# Patient Record
Sex: FEMALE | Race: WHITE | NOT HISPANIC OR LATINO | Employment: OTHER | URBAN - NONMETROPOLITAN AREA
[De-identification: names, ages, dates, MRNs, and addresses within clinical notes are randomized per-mention and may not be internally consistent; named-entity substitution may affect disease eponyms.]

---

## 2018-05-17 ENCOUNTER — TRANSFERRED RECORDS (OUTPATIENT)
Dept: HEALTH INFORMATION MANAGEMENT | Facility: HOSPITAL | Age: 48
End: 2018-05-17

## 2018-05-17 LAB
HPV ABSTRACT: NORMAL
PAP-ABSTRACT: NORMAL

## 2018-10-04 ENCOUNTER — TRANSFERRED RECORDS (OUTPATIENT)
Dept: HEALTH INFORMATION MANAGEMENT | Facility: CLINIC | Age: 48
End: 2018-10-04

## 2018-10-10 ENCOUNTER — TRANSFERRED RECORDS (OUTPATIENT)
Dept: HEALTH INFORMATION MANAGEMENT | Facility: CLINIC | Age: 48
End: 2018-10-10

## 2018-11-07 ENCOUNTER — TRANSFERRED RECORDS (OUTPATIENT)
Dept: HEALTH INFORMATION MANAGEMENT | Facility: HOSPITAL | Age: 48
End: 2018-11-07

## 2018-11-07 LAB — HIV 1&2 EXT: NORMAL

## 2021-06-22 ENCOUNTER — TRANSFERRED RECORDS (OUTPATIENT)
Dept: HEALTH INFORMATION MANAGEMENT | Facility: HOSPITAL | Age: 51
End: 2021-06-22

## 2022-07-19 ENCOUNTER — TRANSFERRED RECORDS (OUTPATIENT)
Dept: HEALTH INFORMATION MANAGEMENT | Facility: CLINIC | Age: 52
End: 2022-07-19

## 2022-11-18 ENCOUNTER — APPOINTMENT (OUTPATIENT)
Dept: GENERAL RADIOLOGY | Facility: HOSPITAL | Age: 52
End: 2022-11-18
Attending: NURSE PRACTITIONER
Payer: COMMERCIAL

## 2022-11-18 ENCOUNTER — HOSPITAL ENCOUNTER (EMERGENCY)
Facility: HOSPITAL | Age: 52
Discharge: HOME | End: 2022-11-18
Attending: NURSE PRACTITIONER | Admitting: NURSE PRACTITIONER
Payer: COMMERCIAL

## 2022-11-18 VITALS
SYSTOLIC BLOOD PRESSURE: 162 MMHG | TEMPERATURE: 98.7 F | RESPIRATION RATE: 18 BRPM | OXYGEN SATURATION: 94 % | DIASTOLIC BLOOD PRESSURE: 112 MMHG | HEART RATE: 87 BPM

## 2022-11-18 DIAGNOSIS — Z20.822 ENCOUNTER FOR LABORATORY TESTING FOR COVID-19 VIRUS: ICD-10-CM

## 2022-11-18 DIAGNOSIS — S69.92XA HAND INJURY, LEFT, INITIAL ENCOUNTER: Primary | ICD-10-CM

## 2022-11-18 LAB
FLUAV RNA SPEC QL NAA+PROBE: NEGATIVE
FLUBV RNA RESP QL NAA+PROBE: NEGATIVE
RSV RNA SPEC NAA+PROBE: NEGATIVE
SARS-COV-2 RNA RESP QL NAA+PROBE: NEGATIVE

## 2022-11-18 PROCEDURE — 87637 SARSCOV2&INF A&B&RSV AMP PRB: CPT | Performed by: NURSE PRACTITIONER

## 2022-11-18 PROCEDURE — G0463 HOSPITAL OUTPT CLINIC VISIT: HCPCS

## 2022-11-18 PROCEDURE — 99213 OFFICE O/P EST LOW 20 MIN: CPT | Performed by: NURSE PRACTITIONER

## 2022-11-18 PROCEDURE — C9803 HOPD COVID-19 SPEC COLLECT: HCPCS

## 2022-11-18 PROCEDURE — 73130 X-RAY EXAM OF HAND: CPT | Mod: LT

## 2022-11-18 RX ORDER — LEVOTHYROXINE SODIUM 100 UG/1
100 TABLET ORAL
COMMUNITY
Start: 2022-09-29 | End: 2023-10-23

## 2022-11-18 RX ORDER — NICOTINE 21 MG/24HR
PATCH, TRANSDERMAL 24 HOURS TRANSDERMAL
COMMUNITY
Start: 2022-08-15 | End: 2024-07-09

## 2022-11-18 RX ORDER — OXYCODONE HYDROCHLORIDE 5 MG/1
TABLET ORAL
COMMUNITY
Start: 2022-09-16 | End: 2023-10-23

## 2022-11-18 RX ORDER — HYDROXYZINE HYDROCHLORIDE 25 MG/1
25 TABLET, FILM COATED ORAL
COMMUNITY
Start: 2022-09-29 | End: 2023-11-01

## 2022-11-18 RX ORDER — PREGABALIN 75 MG/1
CAPSULE ORAL
COMMUNITY
Start: 2022-11-15 | End: 2023-10-23

## 2022-11-18 RX ORDER — NICOTINE 21 MG/24HR
PATCH, TRANSDERMAL 24 HOURS TRANSDERMAL
COMMUNITY
Start: 2022-08-16

## 2022-11-18 RX ORDER — TEMAZEPAM 15 MG/1
CAPSULE ORAL
COMMUNITY
Start: 2022-11-15 | End: 2023-10-23

## 2022-11-18 RX ORDER — FLUTICASONE PROPIONATE AND SALMETEROL 250; 50 UG/1; UG/1
1 POWDER RESPIRATORY (INHALATION)
COMMUNITY
Start: 2022-09-29 | End: 2023-12-08

## 2022-11-18 RX ORDER — ALBUTEROL SULFATE 90 UG/1
AEROSOL, METERED RESPIRATORY (INHALATION)
COMMUNITY
Start: 2022-10-30

## 2022-11-18 RX ORDER — ESTRADIOL 0.1 MG/G
1 CREAM VAGINAL
COMMUNITY
Start: 2022-09-29

## 2022-11-18 RX ORDER — SIMVASTATIN 40 MG
40 TABLET ORAL
COMMUNITY
Start: 2022-09-29 | End: 2023-11-22

## 2022-11-18 RX ORDER — GABAPENTIN 100 MG/1
100 CAPSULE ORAL 3 TIMES DAILY
COMMUNITY
Start: 2022-10-30 | End: 2023-10-23

## 2022-11-18 RX ORDER — PRAMIPEXOLE DIHYDROCHLORIDE 1.5 MG/1
1.5 TABLET ORAL
COMMUNITY
Start: 2022-09-29

## 2022-11-18 RX ORDER — IPRATROPIUM BROMIDE AND ALBUTEROL SULFATE 2.5; .5 MG/3ML; MG/3ML
3 SOLUTION RESPIRATORY (INHALATION)
COMMUNITY
Start: 2022-09-29

## 2022-11-18 RX ORDER — DULOXETIN HYDROCHLORIDE 30 MG/1
30 CAPSULE, DELAYED RELEASE ORAL
COMMUNITY
Start: 2022-09-29 | End: 2023-10-23

## 2022-11-18 ASSESSMENT — ENCOUNTER SYMPTOMS
MYALGIAS: 1
FEVER: 0
CHILLS: 0
JOINT SWELLING: 1

## 2022-11-18 ASSESSMENT — ACTIVITIES OF DAILY LIVING (ADL): ADLS_ACUITY_SCORE: 35

## 2022-11-19 NOTE — ED PROVIDER NOTES
History     Chief Complaint   Patient presents with     Hand Pain     HPI  Jami Wang is a 52 year old female who presents to urgent care for evaluation of an injury to her left hand.  Patient tells me that she got up in the middle of the night and was walking in the dark when she tripped and fell injuring her left hand.  She has pain and swelling mostly to her thumb.  Patient had left thumb CMC arthroplasty to this thumb 3 months ago.  She tells me that she is concerned that she may have injured this area and made things worse.  She denies any paresthesias.  She still able to move her thumb with minimal difficulty.    Patient also states that she would like a COVID-19 test.  No concerning symptoms.    Allergies:  Allergies   Allergen Reactions     Hydromorphone Other (See Comments)     Azithromycin      Other reaction(s): Stomach Upset     Black [place] Flavor Itching     Erythromycin Nausea and Vomiting     Pecan Extract Allergy Skin Test Itching     Penicillins Rash     Has tolerated Cefazolin (ANCEF) and other Cephalosporins       Problem List:    There are no problems to display for this patient.       Past Medical History:    No past medical history on file.    Past Surgical History:    No past surgical history on file.    Family History:    No family history on file.    Social History:  Marital Status:   [2]        Medications:    DULoxetine (CYMBALTA) 30 MG capsule  estradiol (ESTRACE) 0.1 MG/GM vaginal cream  fluticasone-salmeterol (ADVAIR) 250-50 MCG/ACT inhaler  gabapentin (NEURONTIN) 100 MG capsule  hydrOXYzine (ATARAX) 25 MG tablet  ipratropium - albuterol 0.5 mg/2.5 mg/3 mL (DUONEB) 0.5-2.5 (3) MG/3ML neb solution  levothyroxine (SYNTHROID/LEVOTHROID) 100 MCG tablet  nicotine (NICODERM CQ) 14 MG/24HR 24 hr patch  nicotine (NICODERM CQ) 21 MG/24HR 24 hr patch  omeprazole (PRILOSEC) 20 MG DR capsule  oxyCODONE (ROXICODONE) 5 MG tablet  pramipexole (MIRAPEX) 1.5 MG tablet  pregabalin (LYRICA)  75 MG capsule  PROAIR  (90 Base) MCG/ACT inhaler  simvastatin (ZOCOR) 40 MG tablet  temazepam (RESTORIL) 15 MG capsule          Review of Systems   Constitutional: Negative for chills and fever.   Musculoskeletal: Positive for joint swelling and myalgias.   All other systems reviewed and are negative.      Physical Exam   BP: (!) 162/112  Pulse: 87  Temp: 98.7  F (37.1  C)  Resp: 18  SpO2: 94 %      Physical Exam  Vitals and nursing note reviewed.   Constitutional:       Appearance: Normal appearance. She is not ill-appearing or toxic-appearing.   Eyes:      Pupils: Pupils are equal, round, and reactive to light.   Cardiovascular:      Rate and Rhythm: Normal rate.   Pulmonary:      Effort: Pulmonary effort is normal.   Musculoskeletal:         General: Swelling and tenderness present. No deformity.      Left hand: Swelling and tenderness present. Normal range of motion. Normal strength. Normal sensation. Normal capillary refill. Normal pulse.      Cervical back: Neck supple.      Comments: Old healed surgical incision noted to dorsal aspect of left thumb over the MCP joint.  Swelling and tenderness adjacent to this area.  Full range of motion to the left thumb.  No redness or bruising.  No obvious deformity.   Skin:     General: Skin is warm and dry.      Capillary Refill: Capillary refill takes less than 2 seconds.      Findings: No bruising or erythema.   Neurological:      Mental Status: She is alert and oriented to person, place, and time.         ED Course                 Procedures         Results for orders placed or performed during the hospital encounter of 11/18/22 (from the past 24 hour(s))   Symptomatic; Yes; 11/15/2022 Influenza A/B & SARS-CoV2 (COVID-19) Virus PCR Multiplex Nasopharyngeal    Specimen: Nasopharyngeal; Swab   Result Value Ref Range    Influenza A PCR Negative Negative    Influenza B PCR Negative Negative    RSV PCR Negative Negative    SARS CoV2 PCR Negative Negative    Narrative     Testing was performed using the Xpert Xpress CoV2/Flu/RSV Assay on the [employer] GeneXpert Instrument. This test should be ordered for the detection of SARS-CoV-2 and influenza viruses in individuals who meet clinical and/or epidemiological criteria. Test performance is unknown in asymptomatic patients. This test is for in vitro diagnostic use under the FDA EUA for laboratories certified under CLIA to perform high or moderate complexity testing. This test has not been FDA cleared or approved. A negative result does not rule out the presence of PCR inhibitors in the specimen or target RNA in concentration below the limit of detection for the assay. If only one viral target is positive but coinfection with multiple targets is suspected, the sample should be re-tested with another FDA cleared, approved, or authorized test, if coinfection would change clinical management. This test was validated by the [facility]. These laboratories are certified under the Clinical Laboratory Improvement Amendments of 1988 (CLIA-88) as qualified to perform high complexity laboratory testing.   XR Hand Left G/E 3 Views    Narrative    XR HAND LEFT G/E 3 VIEWS    HISTORY: 52 years Female pain swelling in wrist and thumb through the  wrist that radiates up into and around the forearm.    COMPARISON: None    TECHNIQUE: Left hand 3 views    FINDINGS: There are metallic suture anchors adjacent to the proximal  first and second metacarpals. The trapezium is absent. Congruent.  There is no evidence of fracture or dislocation. No concerning osseous  changes are present.      Impression    IMPRESSION: Postsurgical changes. No evidence of fracture, dislocation  or concerning osseous change.    MARI MILLIGAN MD         SYSTEM ID:  [id number]       Medications - No data to display    Assessments & Plan (with Medical Decision Making)     I have reviewed the nursing notes.    This is a 52-year-old female that presented for  evaluation of left hand pain following an injury.  Patient had CMC arthroplasty done to the left thumb 3 months ago and was concerned that she may have caused further damage to her thumb.  She still moving her thumb with minimal difficulty.  Tenderness to palpation over the MCP joint along with mild swelling and tenderness adjacent to this area.  No obvious deformity.    X-ray negative for acute fractures or dislocation.  Postsurgical changes were noted per radiologist read.  Patient tested negative for COVID-19, influenza and RSV.      Findings were discussed with patient.  Thumb spica splint was applied.  Recommended applying ice packs over the affected area and taking Tylenol ibuprofen as needed for pain.  Follow-up with orthopedic surgeon for reevaluation if symptoms persist.  Return to ED/UC for worsening or concerning symptoms.    I have reviewed the findings, diagnosis, plan and need for follow up with the patient.  This document was prepared using a combination of typing and voice generated software.  While every attempt was made for accuracy, spelling and grammatical errors may exist.    New Prescriptions    No medications on file       Final diagnoses:   Hand injury, left, initial encounter       11/18/2022   [facility]     Mpofu, Prudence, CNP  11/18/22 2020

## 2022-11-19 NOTE — ED TRIAGE NOTES
Patient presents to urgent care for left hand from a fall 2 days ago. Patient reports she had left hand surgery back in Sept.  Patient also wants a COVID test as she was exposed to COVID .

## 2022-11-19 NOTE — DISCHARGE INSTRUCTIONS
Use the splint to your hand.  Apply ice packs and take Tylenol or ibuprofen as needed for pain.    Follow-up with your surgeon as needed if no improvement in symptoms.    Return to emergency department for worsening or concerning symptoms.

## 2022-12-19 ENCOUNTER — HOSPITAL ENCOUNTER (OUTPATIENT)
Dept: GENERAL RADIOLOGY | Facility: HOSPITAL | Age: 52
Discharge: HOME | End: 2022-12-19
Attending: NURSE PRACTITIONER | Admitting: NURSE PRACTITIONER
Payer: COMMERCIAL

## 2022-12-19 DIAGNOSIS — Z00.00 ROUTINE CHECK-UP: ICD-10-CM

## 2022-12-19 PROCEDURE — 73522 X-RAY EXAM HIPS BI 3-4 VIEWS: CPT

## 2023-01-10 ENCOUNTER — MEDICAL CORRESPONDENCE (OUTPATIENT)
Dept: MRI IMAGING | Facility: HOSPITAL | Age: 53
End: 2023-01-10

## 2023-01-20 ENCOUNTER — HOSPITAL ENCOUNTER (OUTPATIENT)
Dept: MRI IMAGING | Facility: HOSPITAL | Age: 53
Discharge: HOME | End: 2023-01-20
Attending: ORTHOPAEDIC SURGERY | Admitting: ORTHOPAEDIC SURGERY
Payer: COMMERCIAL

## 2023-01-20 DIAGNOSIS — R29.898 LEG WEAKNESS: ICD-10-CM

## 2023-01-20 DIAGNOSIS — M54.50 LOW BACK PAIN: ICD-10-CM

## 2023-01-20 PROCEDURE — 72148 MRI LUMBAR SPINE W/O DYE: CPT

## 2023-02-12 ENCOUNTER — HEALTH MAINTENANCE LETTER (OUTPATIENT)
Age: 53
End: 2023-02-12

## 2023-02-16 ENCOUNTER — APPOINTMENT (OUTPATIENT)
Dept: GENERAL RADIOLOGY | Facility: HOSPITAL | Age: 53
End: 2023-02-16
Attending: NURSE PRACTITIONER
Payer: COMMERCIAL

## 2023-02-16 ENCOUNTER — HOSPITAL ENCOUNTER (EMERGENCY)
Facility: HOSPITAL | Age: 53
Discharge: HOME | End: 2023-02-16
Attending: PHYSICIAN ASSISTANT | Admitting: PHYSICIAN ASSISTANT
Payer: COMMERCIAL

## 2023-02-16 VITALS
OXYGEN SATURATION: 96 % | TEMPERATURE: 98 F | HEART RATE: 92 BPM | DIASTOLIC BLOOD PRESSURE: 90 MMHG | SYSTOLIC BLOOD PRESSURE: 155 MMHG | RESPIRATION RATE: 18 BRPM

## 2023-02-16 DIAGNOSIS — R07.81 RIB PAIN ON RIGHT SIDE: Primary | ICD-10-CM

## 2023-02-16 PROCEDURE — G0463 HOSPITAL OUTPT CLINIC VISIT: HCPCS

## 2023-02-16 PROCEDURE — 250N000013 HC RX MED GY IP 250 OP 250 PS 637: Performed by: NURSE PRACTITIONER

## 2023-02-16 PROCEDURE — 99213 OFFICE O/P EST LOW 20 MIN: CPT | Performed by: NURSE PRACTITIONER

## 2023-02-16 PROCEDURE — 74019 RADEX ABDOMEN 2 VIEWS: CPT

## 2023-02-16 PROCEDURE — 71101 X-RAY EXAM UNILAT RIBS/CHEST: CPT | Mod: RT

## 2023-02-16 RX ORDER — LIDOCAINE 4 G/G
1 PATCH TOPICAL ONCE
Status: DISCONTINUED | OUTPATIENT
Start: 2023-02-16 | End: 2023-02-16 | Stop reason: HOSPADM

## 2023-02-16 RX ORDER — LIDOCAINE 50 MG/G
1 PATCH TOPICAL EVERY 24 HOURS
Qty: 10 PATCH | Refills: 0 | Status: SHIPPED | OUTPATIENT
Start: 2023-02-16 | End: 2023-02-26

## 2023-02-16 RX ADMIN — Medication 1 PATCH: at 21:09

## 2023-02-16 ASSESSMENT — ACTIVITIES OF DAILY LIVING (ADL): ADLS_ACUITY_SCORE: 35

## 2023-02-16 ASSESSMENT — ENCOUNTER SYMPTOMS: FEVER: 0

## 2023-02-17 ASSESSMENT — ENCOUNTER SYMPTOMS
COLOR CHANGE: 1
VOMITING: 1
PSYCHIATRIC NEGATIVE: 1
SHORTNESS OF BREATH: 0
DIARRHEA: 0
CHILLS: 0
MYALGIAS: 1
NECK STIFFNESS: 0
NAUSEA: 0
BACK PAIN: 0
ABDOMINAL PAIN: 0
NECK PAIN: 0

## 2023-02-17 NOTE — ED PROVIDER NOTES
History     Chief Complaint   Patient presents with     Rib Pain     HPI  Jami Wang is a 52 year old female who presents to urgent care today (ambulatory) with complaints of right rib pain and bruising over the past 2 weeks after she had fallen into a .  No open skin wounds.  No previous rib fractures.  Denies any fever or chills.  Patient states she had vomited once two days ago, none since, no current nausea and denies diarrhea.  Denies any shortness of breath or chest pain.  No other concerns.    Allergies:  Allergies   Allergen Reactions     Hydromorphone Other (See Comments)     Azithromycin      Other reaction(s): Stomach Upset     Black [place] Flavor Itching     Erythromycin Nausea and Vomiting     Pecan Extract Allergy Skin Test Itching     Penicillins Rash     Has tolerated Cefazolin (ANCEF) and other Cephalosporins       Problem List:    There are no problems to display for this patient.       Past Medical History:    No past medical history on file.    Past Surgical History:    No past surgical history on file.    Family History:    No family history on file.    Social History:  Marital Status:   [2]        Medications:    DULoxetine (CYMBALTA) 30 MG capsule  estradiol (ESTRACE) 0.1 MG/GM vaginal cream  fluticasone-salmeterol (ADVAIR) 250-50 MCG/ACT inhaler  gabapentin (NEURONTIN) 100 MG capsule  hydrOXYzine (ATARAX) 25 MG tablet  ipratropium - albuterol 0.5 mg/2.5 mg/3 mL (DUONEB) 0.5-2.5 (3) MG/3ML neb solution  levothyroxine (SYNTHROID/LEVOTHROID) 100 MCG tablet  lidocaine (LIDODERM) 5 % patch  nicotine (NICODERM CQ) 14 MG/24HR 24 hr patch  nicotine (NICODERM CQ) 21 MG/24HR 24 hr patch  omeprazole (PRILOSEC) 20 MG DR capsule  oxyCODONE (ROXICODONE) 5 MG tablet  pramipexole (MIRAPEX) 1.5 MG tablet  pregabalin (LYRICA) 75 MG capsule  PROAIR  (90 Base) MCG/ACT inhaler  simvastatin (ZOCOR) 40 MG tablet  temazepam (RESTORIL) 15 MG capsule      Review of Systems    Constitutional: Negative for chills and fever.   Respiratory: Negative for shortness of breath.    Cardiovascular: Negative for chest pain.   Gastrointestinal: Positive for vomiting (once 2 days ago). Negative for abdominal pain, diarrhea and nausea.   Musculoskeletal: Positive for myalgias. Negative for back pain, gait problem, neck pain and neck stiffness.   Skin: Positive for color change (slight bruising to right ribs).   Psychiatric/Behavioral: Negative.      Physical Exam   BP: 155/90  Pulse: 92  Temp: 98  F (36.7  C)  Resp: 18  SpO2: 96 %    Physical Exam  Vitals and nursing note reviewed.   Constitutional:       General: She is not in acute distress.     Appearance: Normal appearance. She is not ill-appearing or toxic-appearing.   Cardiovascular:      Rate and Rhythm: Normal rate and regular rhythm.      Pulses: Normal pulses.      Heart sounds: Normal heart sounds.   Pulmonary:      Effort: Pulmonary effort is normal.      Breath sounds: Normal breath sounds.   Chest:      Chest wall: Tenderness present. No mass, lacerations, deformity, swelling, crepitus or edema.      Comments: Right rib pain 6-7, slight bruising.  Abdominal:      General: Bowel sounds are normal. There is no distension.      Palpations: Abdomen is soft.      Tenderness: There is no abdominal tenderness. There is no guarding or rebound.   Neurological:      Mental Status: She is alert.   Psychiatric:         Mood and Affect: Mood normal.       ED Course     Procedures    Results for orders placed or performed during the hospital encounter of 02/16/23 (from the past 24 hour(s))   XR Abdomen 2 Views    Narrative    XR ABDOMEN 2 VIEWS    HISTORY: 52 years Female pain    COMPARISON: None    TECHNIQUE: Abdomen 3 views    FINDINGS: There is a moderate volume of stool. There is no evidence of  bowel obstruction or free air.      Impression    IMPRESSION: No evidence of bowel obstruction or free air.    MARI MILLIGAN MD         SYSTEM ID:   [id number]   Ribs XR, unilat 3 views + PA chest, right    Narrative    XR RIBS & CHEST RT 3VW    HISTORY: 52 years Female rib pain    COMPARISON: None    TECHNIQUE: Chest x-ray and 2 views of the right RIBS, 3 views total    FINDINGS: Lungs are clear. Heart size and pulmonary vascularity are  within normal limits.    There is no evidence of right rib fracture.      Impression    IMPRESSION: No evidence of right rib fracture.    MARI MILLIGAN MD         SYSTEM ID:  [id number]       Medications - No data to display    Assessments & Plan (with Medical Decision Making)     I have reviewed the nursing notes.    I have reviewed the findings, diagnosis, plan and need for follow up with the patient.  (R07.81) Rib pain on right side  (primary encounter diagnosis)  Plan:   Patient ambulatory with a nontoxic appearance.  Patient able to stand from seated position and ambulate.  Patient has had ongoing right rib pain to ribs 6/7 which have slight bruising to the area.  No crepitus.  X-ray shows no evidence of right rib fracture.  Patient concerned about possible constipation, has had bowel movement daily.  No abdominal tenderness upon palpation.  Abdominal x-ray shows no evidence of bowel obstruction or free air.  Symptoms consistent with muscle strain.  Lidoderm patch placed to help with pain.  Patient alternate ice and heat as needed for pain.  Rest.  Patient to follow-up with primary care provider or return to urgent care/ED with any worsening in condition or additional concerns.  Patient is in agreement with treatment plan.    Discharge Medication List as of 2/16/2023  9:07 PM      START taking these medications    Details   lidocaine (LIDODERM) 5 % patch Place 1 patch onto the skin every 24 hours for 10 daysDisp-10 patch, [id number]           Final diagnoses:   Rib pain on right side     2/16/2023   [facility]     Brie Aden, CHEMA  02/17/23 [id number]

## 2023-02-17 NOTE — ED TRIAGE NOTES
Fell a couple weeks ago into ,  noticed bruising over ribs recently. Pain has been constant.  Pain is currently 8/10 Pt states she does have a hx of reflux states she does also have a hernia.    Jessa Behrman, LPN

## 2023-02-17 NOTE — DISCHARGE INSTRUCTIONS
Rest  Alternate ice and heat  Lidoderm patch as needed  Follow up with primary care provider or return to urgent care/ED with any worsening in condition or additional concerns.

## 2023-03-19 ENCOUNTER — HOSPITAL ENCOUNTER (EMERGENCY)
Facility: HOSPITAL | Age: 53
Discharge: HOME | End: 2023-03-19
Attending: NURSE PRACTITIONER | Admitting: NURSE PRACTITIONER
Payer: COMMERCIAL

## 2023-03-19 VITALS
TEMPERATURE: 98.4 F | OXYGEN SATURATION: 94 % | SYSTOLIC BLOOD PRESSURE: 147 MMHG | RESPIRATION RATE: 18 BRPM | DIASTOLIC BLOOD PRESSURE: 89 MMHG | HEART RATE: 101 BPM

## 2023-03-19 DIAGNOSIS — B86 SCABIES INFESTATION: ICD-10-CM

## 2023-03-19 PROCEDURE — 99213 OFFICE O/P EST LOW 20 MIN: CPT | Performed by: NURSE PRACTITIONER

## 2023-03-19 PROCEDURE — G0463 HOSPITAL OUTPT CLINIC VISIT: HCPCS

## 2023-03-19 RX ORDER — HYDROXYZINE PAMOATE 25 MG/1
25 CAPSULE ORAL 3 TIMES DAILY PRN
Qty: 30 CAPSULE | Refills: 0 | Status: SHIPPED | OUTPATIENT
Start: 2023-03-19

## 2023-03-19 RX ORDER — PERMETHRIN 50 MG/G
CREAM TOPICAL
Qty: 60 G | Refills: 1 | Status: SHIPPED | OUTPATIENT
Start: 2023-03-19 | End: 2023-10-23

## 2023-03-19 ASSESSMENT — ENCOUNTER SYMPTOMS
FEVER: 0
VOMITING: 0
MYALGIAS: 0
DIZZINESS: 0
NAUSEA: 0
ACTIVITY CHANGE: 1
SHORTNESS OF BREATH: 0
HEADACHES: 0
CHILLS: 0
COLOR CHANGE: 1
LIGHT-HEADEDNESS: 0

## 2023-03-19 ASSESSMENT — ACTIVITIES OF DAILY LIVING (ADL): ADLS_ACUITY_SCORE: 33

## 2023-03-20 NOTE — ED TRIAGE NOTES
States ongoing for a couple months, stated granddaughter moved up and had bed bugs at her home, brought sea shells, sprayed everywhere, no other new exposures.   Therapies tried cleaned everything, bacitracin and lanocaine, no relief.    Jessa Behrman, LPN

## 2023-03-20 NOTE — DISCHARGE INSTRUCTIONS
You need to treat your bedding, furniture and car when you do your treatment.  You should repeat the treatment in two weeks.  Be reevaluated if the rash worsens and/or you develop fevers or signs of infection  Watch for signs of infection: pain, redness, abnormal drainage, or fevers.

## 2023-03-20 NOTE — ED PROVIDER NOTES
History     Chief Complaint   Patient presents with     Rash     HPI  Jami Wang is a 53 year old female who presents with a 3-week history of severely pruritic, red, papule, macular rash on her bilateral arms, lower legs, and underneath her left breast.  Has applied hydrocortisone cream and Lanacane ointment without relief of her symptoms.  Denies new exposures, but a family member recently moved back into her house that had been homeless was exposed to bedbugs.  Denies recent travel.  Denies fevers, chills, nausea, vomiting, shortness of breath, and headaches.    Rash      Duration: three weeks     Description  Location: arms, under left breast. Lower legs  Itching: severe    Intensity:  moderate    Accompanying signs and symptoms: None    History (similar episodes/previous evaluation): None    Precipitating or alleviating factors:  New exposures:  None, medication no, lotions no, soaps no, clothes detergant no, foods - no, no new exposures to pets, house dust mites and trees  Recent travel: no      Therapies tried and outcome: hydrocortisone cream -  not effective and lanicaine     Allergies:  Allergies   Allergen Reactions     Hydromorphone Other (See Comments)     Azithromycin      Other reaction(s): Stomach Upset     Black [place] Flavor Itching     Erythromycin Nausea and Vomiting     Pecan Extract Allergy Skin Test Itching     Penicillins Rash     Has tolerated Cefazolin (ANCEF) and other Cephalosporins       Problem List:    There are no problems to display for this patient.       Past Medical History:    History reviewed. No pertinent past medical history.    Past Surgical History:    History reviewed. No pertinent surgical history.    Family History:    History reviewed. No pertinent family history.    Social History:  Marital Status:   [2]        Medications:    DULoxetine (CYMBALTA) 30 MG capsule  estradiol (ESTRACE) 0.1 MG/GM vaginal cream  fluticasone-salmeterol (ADVAIR) 250-50 MCG/ACT  inhaler  gabapentin (NEURONTIN) 100 MG capsule  hydrOXYzine (ATARAX) 25 MG tablet  hydrOXYzine (VISTARIL) 25 MG capsule  ipratropium - albuterol 0.5 mg/2.5 mg/3 mL (DUONEB) 0.5-2.5 (3) MG/3ML neb solution  levothyroxine (SYNTHROID/LEVOTHROID) 100 MCG tablet  nicotine (NICODERM CQ) 14 MG/24HR 24 hr patch  nicotine (NICODERM CQ) 21 MG/24HR 24 hr patch  omeprazole (PRILOSEC) 20 MG DR capsule  oxyCODONE (ROXICODONE) 5 MG tablet  permethrin (ELIMITE) 5 % external cream  pramipexole (MIRAPEX) 1.5 MG tablet  pregabalin (LYRICA) 75 MG capsule  PROAIR  (90 Base) MCG/ACT inhaler  simvastatin (ZOCOR) 40 MG tablet  temazepam (RESTORIL) 15 MG capsule          Review of Systems   Constitutional: Positive for activity change. Negative for chills and fever.   Respiratory: Negative for shortness of breath.    Gastrointestinal: Negative for nausea and vomiting.   Musculoskeletal: Negative for myalgias.   Skin: Positive for color change and rash (itching red macular paplur rash ).   Neurological: Negative for dizziness, light-headedness and headaches.       Physical Exam   BP: 147/89  Pulse: 101  Temp: 98.4  F (36.9  C)  Resp: 18  SpO2: 94 %      Physical Exam  Vitals and nursing note reviewed.   Constitutional:       General: She is in acute distress (Moderate).      Appearance: She is overweight.   Cardiovascular:      Rate and Rhythm: Normal rate.   Pulmonary:      Effort: Pulmonary effort is normal.   Skin:     General: Skin is warm and dry.      Capillary Refill: Capillary refill takes less than 2 seconds.      Findings: Erythema and rash present. No bruising. Rash is macular and papular.          Neurological:      Mental Status: She is alert and oriented to person, place, and time.   Psychiatric:         Behavior: Behavior normal.         ED Course                 Procedures                  No results found for this or any previous visit (from the past 24 hour(s)).    Medications - No data to display    Assessments &  Plan (with Medical Decision Making)     I have reviewed the nursing notes.    I have reviewed the findings, diagnosis, plan and need for follow up with the patient.  (B86) Scabies infestation  Comment:  53 year old female who presents with a 3-week history of severely pruritic, red, papule, macular rash on her bilateral arms, lower legs, and underneath her left breast.  Has applied hydrocortisone cream and Lanacane ointment without relief of her symptoms.  Denies new exposures, but a family member recently moved back into her house that had been homeless was exposed to bedbugs.  Denies recent travel.  Denies fevers, chills, nausea, vomiting, shortness of breath, and headaches.    MDM: Erythema, macular, papular rash on medial, lower arms, underneath left breast, and bilateral lower legs.    Plan: Permethrin.  Education provided and/or discussed for this/these medication and scabies.  You need to treat your bedding, furniture and car when you do your treatment.  You should repeat the treatment in two weeks.  Be reevaluated if the rash worsens and/or you develop fevers or signs of infection  Watch for signs of infection: pain, redness, abnormal drainage, or fevers.  These discharge instructions and medications were reviewed with her and understanding verbalized.    This document was prepared using a combination of typing and voice generated software.  While every attempt was made for accuracy, spelling and grammatical errors may exist.    Medical Decision Making  The patient's presentation was of low complexity (an acute and uncomplicated illness or injury).    The patient's evaluation involved:  history and exam without other MDM data elements    The patient's management necessitated moderate risk (prescription drug management including medications given in the ED).        Discharge Medication List as of 3/19/2023  9:39 PM      START taking these medications    Details   hydrOXYzine (VISTARIL) 25 MG capsule Take 1  capsule (25 mg) by mouth 3 times daily as needed for itching, Disp-30 capsule, R-0, [facility]      permethrin (ELIMITE) 5 % external cream Apply cream from head to toe (except the face); leave on for 8-14 hours then wash off with water; reapply in 1 week if live mites appear.Disp-60 g, [id number]             Final diagnoses:   Scabies infestation       3/19/2023   [facility]       Lili Campos, CNP  03/20/23 1201

## 2023-03-20 NOTE — ED TRIAGE NOTES
Patient presents with c/o full body rash that started 3 weeks ago. Started on right arm and spread. Patient report pain and itching.

## 2023-04-01 ENCOUNTER — HOSPITAL ENCOUNTER (EMERGENCY)
Facility: HOSPITAL | Age: 53
Discharge: HOME | End: 2023-04-01
Attending: PHYSICIAN ASSISTANT | Admitting: PHYSICIAN ASSISTANT
Payer: COMMERCIAL

## 2023-04-01 VITALS
OXYGEN SATURATION: 95 % | TEMPERATURE: 98.6 F | SYSTOLIC BLOOD PRESSURE: 119 MMHG | HEART RATE: 104 BPM | RESPIRATION RATE: 16 BRPM | DIASTOLIC BLOOD PRESSURE: 84 MMHG

## 2023-04-01 DIAGNOSIS — S61.209A AVULSION OF FINGER, INITIAL ENCOUNTER: ICD-10-CM

## 2023-04-01 PROCEDURE — G0463 HOSPITAL OUTPT CLINIC VISIT: HCPCS

## 2023-04-01 PROCEDURE — 99213 OFFICE O/P EST LOW 20 MIN: CPT | Performed by: PHYSICIAN ASSISTANT

## 2023-04-01 RX ORDER — BUPIVACAINE HYDROCHLORIDE 5 MG/ML
10 INJECTION, SOLUTION PERINEURAL ONCE
Status: DISCONTINUED | OUTPATIENT
Start: 2023-04-01 | End: 2023-04-01 | Stop reason: HOSPADM

## 2023-04-01 ASSESSMENT — ACTIVITIES OF DAILY LIVING (ADL): ADLS_ACUITY_SCORE: 35

## 2023-04-02 NOTE — ED TRIAGE NOTES
Patient presents to urgent care for cut to tip of the left index finger from peeling potatoes with a potatoe slicer tonight.  Last Tdap 2015     Triage Assessment     Row Name 04/01/23 2014       Cognitive/Neuro/Behavioral WDL    Cognitive/Neuro/Behavioral WDL WDL

## 2023-04-02 NOTE — ED PROVIDER NOTES
History     Chief Complaint   Patient presents with     Laceration     Left index finger       HPI  Jami Wang is a 53 year old female who presents to the urgent care for evaluation of left index finger injury.  Patient states she cut her finger while peeling potatoes.  Patient states that her symptoms include bleeding and pain.    Allergies:  Allergies   Allergen Reactions     Hydromorphone Other (See Comments)     Azithromycin      Other reaction(s): Stomach Upset     Black [place] Flavor Itching     Erythromycin Nausea and Vomiting     Pecan Extract Allergy Skin Test Itching     Penicillins Rash     Has tolerated Cefazolin (ANCEF) and other Cephalosporins       Problem List:    There are no problems to display for this patient.       Past Medical History:    No past medical history on file.    Past Surgical History:    No past surgical history on file.    Family History:    No family history on file.    Social History:  Marital Status:   [2]        Medications:    DULoxetine (CYMBALTA) 30 MG capsule  estradiol (ESTRACE) 0.1 MG/GM vaginal cream  fluticasone-salmeterol (ADVAIR) 250-50 MCG/ACT inhaler  gabapentin (NEURONTIN) 100 MG capsule  hydrOXYzine (ATARAX) 25 MG tablet  hydrOXYzine (VISTARIL) 25 MG capsule  ipratropium - albuterol 0.5 mg/2.5 mg/3 mL (DUONEB) 0.5-2.5 (3) MG/3ML neb solution  levothyroxine (SYNTHROID/LEVOTHROID) 100 MCG tablet  nicotine (NICODERM CQ) 14 MG/24HR 24 hr patch  nicotine (NICODERM CQ) 21 MG/24HR 24 hr patch  omeprazole (PRILOSEC) 20 MG DR capsule  oxyCODONE (ROXICODONE) 5 MG tablet  permethrin (ELIMITE) 5 % external cream  pramipexole (MIRAPEX) 1.5 MG tablet  pregabalin (LYRICA) 75 MG capsule  PROAIR  (90 Base) MCG/ACT inhaler  simvastatin (ZOCOR) 40 MG tablet  temazepam (RESTORIL) 15 MG capsule          Review of Systems   All other systems reviewed and are negative.      Physical Exam   BP: 119/84  Pulse: 104  Temp: 98.6  F (37  C)  Resp: 16  SpO2: 95  %      Physical Exam  Constitutional:       General: She is not in acute distress.     Appearance: Normal appearance. She is normal weight. She is not ill-appearing, toxic-appearing or diaphoretic.   HENT:      Head: Normocephalic and atraumatic.      Right Ear: External ear normal.      Left Ear: External ear normal.   Eyes:      Extraocular Movements: Extraocular movements intact.      Conjunctiva/sclera: Conjunctivae normal.      Pupils: Pupils are equal, round, and reactive to light.   Cardiovascular:      Rate and Rhythm: Normal rate.   Pulmonary:      Effort: Pulmonary effort is normal. No respiratory distress.   Musculoskeletal:         General: Normal range of motion.      Comments: There is avulsion to the left tip of the index finger.  Bleeding is controlled sensation intact throughout range of motion normal.  Cap refill around the injury is less than 2 seconds.   Skin:     General: Skin is warm and dry.      Coloration: Skin is not jaundiced or pale.   Neurological:      Mental Status: She is alert and oriented to person, place, and time. Mental status is at baseline.      Cranial Nerves: No cranial nerve deficit.   Psychiatric:         Mood and Affect: Mood normal.         ED Course        I have reviewed the epic chart, the nurses note and triage note. vital signs were reviewed.  Discussed making sure tetanus is up-to-date however the patient was not interested in this.  I did discuss risk and benefits and she is willing to accept the chances of having tetanus.  Wound was cleaned after being numbed up secondary to pain control.  No laceration repair is needed at this time.  Antibiotic ointment and dressing placed.  Discussed proper management of the wound.  No further questions at this time.         Procedures             No results found for this or any previous visit (from the past 24 hour(s)).    Medications   bupivacaine (MARCAINE) 0.5% injection MDV (has no administration in time range)        Assessments & Plan (with Medical Decision Making)     I have reviewed the nursing notes.    I have reviewed the findings, diagnosis, plan and need for follow up with the patient.        Discharge Medication List as of 4/1/2023  9:16 PM          Final diagnoses:   Avulsion of finger, initial encounter       4/1/2023   [facility]     Wenceslao Rowell PA-C  04/01/23 1540

## 2023-05-08 ENCOUNTER — TRANSFERRED RECORDS (OUTPATIENT)
Dept: HEALTH INFORMATION MANAGEMENT | Facility: CLINIC | Age: 53
End: 2023-05-08

## 2023-05-08 LAB — RETINOPATHY: NEGATIVE

## 2023-07-14 LAB
ALBUMIN (URINE) MG/SPEC: <5 MG/L
ALBUMIN/CREATININE RATIO: NORMAL
CREATININE (URINE): 0.94 G/L

## 2023-08-24 ENCOUNTER — HOSPITAL ENCOUNTER (EMERGENCY)
Facility: HOSPITAL | Age: 53
Discharge: HOME | End: 2023-08-24
Payer: COMMERCIAL

## 2023-08-24 VITALS
DIASTOLIC BLOOD PRESSURE: 93 MMHG | HEART RATE: 99 BPM | OXYGEN SATURATION: 96 % | HEIGHT: 62 IN | WEIGHT: 166.4 LBS | RESPIRATION RATE: 16 BRPM | BODY MASS INDEX: 30.62 KG/M2 | TEMPERATURE: 98.5 F | SYSTOLIC BLOOD PRESSURE: 150 MMHG

## 2023-08-24 DIAGNOSIS — N39.0 UTI (URINARY TRACT INFECTION): ICD-10-CM

## 2023-08-24 LAB
ALBUMIN UR-MCNC: 10 MG/DL
APPEARANCE UR: ABNORMAL
BACTERIA #/AREA URNS HPF: ABNORMAL /HPF
BILIRUB UR QL STRIP: NEGATIVE
COLOR UR AUTO: YELLOW
GLUCOSE UR STRIP-MCNC: NEGATIVE MG/DL
HGB UR QL STRIP: ABNORMAL
KETONES UR STRIP-MCNC: NEGATIVE MG/DL
LEUKOCYTE ESTERASE UR QL STRIP: ABNORMAL
MUCOUS THREADS #/AREA URNS LPF: PRESENT /LPF
NITRATE UR QL: NEGATIVE
PH UR STRIP: 5.5 [PH] (ref 4.7–8)
RBC URINE: 14 /HPF
SP GR UR STRIP: 1.02 (ref 1–1.03)
SQUAMOUS EPITHELIAL: 3 /HPF
UROBILINOGEN UR STRIP-MCNC: NORMAL MG/DL
WBC CLUMPS #/AREA URNS HPF: PRESENT /HPF
WBC URINE: >182 /HPF

## 2023-08-24 PROCEDURE — 87086 URINE CULTURE/COLONY COUNT: CPT

## 2023-08-24 PROCEDURE — G0463 HOSPITAL OUTPT CLINIC VISIT: HCPCS

## 2023-08-24 PROCEDURE — 81001 URINALYSIS AUTO W/SCOPE: CPT

## 2023-08-24 PROCEDURE — 99213 OFFICE O/P EST LOW 20 MIN: CPT

## 2023-08-24 RX ORDER — SULFAMETHOXAZOLE/TRIMETHOPRIM 800-160 MG
1 TABLET ORAL 2 TIMES DAILY
Qty: 14 TABLET | Refills: 0 | Status: SHIPPED | OUTPATIENT
Start: 2023-08-24 | End: 2023-08-31

## 2023-08-24 ASSESSMENT — ENCOUNTER SYMPTOMS
DIFFICULTY URINATING: 0
NAUSEA: 0
BACK PAIN: 1
VOMITING: 0
ACTIVITY CHANGE: 0
APPETITE CHANGE: 0
ABDOMINAL PAIN: 0
DYSURIA: 1
FLANK PAIN: 1
HEMATURIA: 0
DIARRHEA: 0
FEVER: 0
FATIGUE: 1
FREQUENCY: 1
CHILLS: 0

## 2023-08-24 ASSESSMENT — ACTIVITIES OF DAILY LIVING (ADL): ADLS_ACUITY_SCORE: 35

## 2023-08-24 NOTE — ED TRIAGE NOTES
Pt presents with c/o dysuria, and times were she is unable to urinate. Sx onset was this morning. Concerned about a UTI. Denies vaginal discharge, flank pain, abdomen pain. Endorses Hx of UTI's. Pt has been taking aleve.

## 2023-08-24 NOTE — ED PROVIDER NOTES
History     Chief Complaint   Patient presents with    UTI     HPI  Jami Wang is a 53 year old female who presents to the urgent care with complaints of dysuria, right sided back pain, and the feeling of incomplete bladder emptying that started this morning. She denies vaginal discharge, abd pain, flank pain, vaginal discharge, hematuria, fevers, chills, and n/v/d. Denies concern for STIs. No recent abx.     Allergies:  Allergies   Allergen Reactions    Hydromorphone Other (See Comments)    Azithromycin      Other reaction(s): Stomach Upset    Black [place] Flavor Itching    Erythromycin Nausea and Vomiting    Pecan Extract Allergy Skin Test Itching    Penicillins Rash     Has tolerated Cefazolin (ANCEF) and other Cephalosporins       Problem List:    There are no problems to display for this patient.       Past Medical History:    No past medical history on file.    Past Surgical History:    No past surgical history on file.    Family History:    No family history on file.    Social History:  Marital Status:   [2]        Medications:    DULoxetine (CYMBALTA) 30 MG capsule  estradiol (ESTRACE) 0.1 MG/GM vaginal cream  fluticasone-salmeterol (ADVAIR) 250-50 MCG/ACT inhaler  gabapentin (NEURONTIN) 100 MG capsule  hydrOXYzine (ATARAX) 25 MG tablet  hydrOXYzine (VISTARIL) 25 MG capsule  ipratropium - albuterol 0.5 mg/2.5 mg/3 mL (DUONEB) 0.5-2.5 (3) MG/3ML neb solution  levothyroxine (SYNTHROID/LEVOTHROID) 100 MCG tablet  nicotine (NICODERM CQ) 14 MG/24HR 24 hr patch  nicotine (NICODERM CQ) 21 MG/24HR 24 hr patch  omeprazole (PRILOSEC) 20 MG DR capsule  oxyCODONE (ROXICODONE) 5 MG tablet  permethrin (ELIMITE) 5 % external cream  pramipexole (MIRAPEX) 1.5 MG tablet  pregabalin (LYRICA) 75 MG capsule  PROAIR  (90 Base) MCG/ACT inhaler  simvastatin (ZOCOR) 40 MG tablet  sulfamethoxazole-trimethoprim (BACTRIM DS) 800-160 MG tablet  temazepam (RESTORIL) 15 MG capsule          Review of Systems  "  Constitutional:  Positive for fatigue. Negative for activity change, appetite change, chills and fever.   Gastrointestinal:  Negative for abdominal pain, diarrhea, nausea and vomiting.   Genitourinary:  Positive for dysuria, flank pain (right sided), frequency and urgency. Negative for difficulty urinating, hematuria, pelvic pain, vaginal bleeding, vaginal discharge and vaginal pain.   Musculoskeletal:  Positive for back pain (right sided).   All other systems reviewed and are negative.      Physical Exam   BP: 150/93  Pulse: 99  Temp: 98.5  F (36.9  C)  Resp: 16  Height: 157.5 cm (5' 2\")  Weight: 75.5 kg (166 lb 6.4 oz)  SpO2: 96 %      Physical Exam  Vitals and nursing note reviewed.   Constitutional:       General: She is not in acute distress.     Appearance: Normal appearance. She is not ill-appearing or toxic-appearing.   Cardiovascular:      Rate and Rhythm: Normal rate and regular rhythm.      Pulses: Normal pulses.      Heart sounds: Normal heart sounds.   Pulmonary:      Effort: Pulmonary effort is normal.      Breath sounds: Normal breath sounds.   Abdominal:      General: Abdomen is flat.      Palpations: Abdomen is soft.      Tenderness: There is no right CVA tenderness or left CVA tenderness.   Skin:     General: Skin is warm and dry.      Capillary Refill: Capillary refill takes less than 2 seconds.   Neurological:      General: No focal deficit present.      Mental Status: She is alert and oriented to person, place, and time.         ED Course                 Procedures                Results for orders placed or performed during the hospital encounter of 08/24/23 (from the past 24 hour(s))   UA with Microscopic reflex to Culture    Specimen: Urine, Midstream   Result Value Ref Range    Color Urine Yellow Colorless, Straw, Light Yellow, Yellow    Appearance Urine Slightly Cloudy (A) Clear    Glucose Urine Negative Negative mg/dL    Bilirubin Urine Negative Negative    Ketones Urine Negative " Negative mg/dL    Specific Gravity Urine 1.020 1.003 - 1.035    Blood Urine Moderate (A) Negative    pH Urine 5.5 4.7 - 8.0    Protein Albumin Urine 10 (A) Negative mg/dL    Urobilinogen Urine Normal Normal, 2.0 mg/dL    Nitrite Urine Negative Negative    Leukocyte Esterase Urine Large (A) Negative    Bacteria Urine Few (A) None Seen /HPF    WBC Clumps Urine Present (A) None Seen /HPF    Mucus Urine Present (A) None Seen /LPF    RBC Urine 14 (H) <=2 /HPF    WBC Urine >182 (H) <=5 /HPF    Squamous Epithelials Urine 3 (H) <=1 /HPF    Narrative    Urine Culture ordered based on laboratory criteria       Medications - No data to display    Assessments & Plan (with Medical Decision Making)     I have reviewed the nursing notes.    I have reviewed the findings, diagnosis, plan and need for follow up with the patient.  Jami Wang is a 53 year old female who presents to the urgent care with complaints of dysuria, right sided back pain, and the feeling of incomplete bladder emptying that started this morning. She denies vaginal discharge, abd pain, flank pain, vaginal discharge, hematuria, fevers, chills, and n/v/d. Denies concern for STIs. No recent abx.     MDM: Ua positive for infection with large leuk esterase and >182 WBC. Culture pending. She does have right lower back/flank pain. No CVA tenderness. Less suspicious for pyelonephritis. Lungs clear, heart tones regular. VSS and afebrile. She is non toxic in appearance. No noted distress. Bactrim twice daily for 7 days.     (N39.0) UTI (urinary tract infection)  Plan: Antibiotics twice daily for 7 days. Yogurt or probiotic while taking. Push fluids. Return with any back pain, vomiting, or other concerns.     Follow up with PCP as needed. Understanding verbalized.        Discharge Medication List as of 8/24/2023  1:19 PM        START taking these medications    Details   sulfamethoxazole-trimethoprim (BACTRIM DS) 800-160 MG tablet Take 1 tablet by mouth 2 times daily  for 7 days, Disp-14 tablet, R-0, E-Prescribe             Final diagnoses:   UTI (urinary tract infection)       8/24/2023   [facility]       Aarti Sherwood NP  08/24/23 7341

## 2023-08-24 NOTE — DISCHARGE INSTRUCTIONS
Antibiotics twice daily for 7 days. Yogurt or probiotic while taking. Push fluids. Return with any back pain, vomiting, or other concerns.     Follow up with PCP as needed.

## 2023-08-26 LAB — BACTERIA UR CULT: NORMAL

## 2023-09-15 ENCOUNTER — TRANSFERRED RECORDS (OUTPATIENT)
Dept: HEALTH INFORMATION MANAGEMENT | Facility: HOSPITAL | Age: 53
End: 2023-09-15

## 2023-09-15 LAB
ALBUMIN (EXTERNAL): 4.2 G/DL (ref 3.5–5.2)
ALKALINE PHOSPHATASE (EXTERNAL): 89 IU/L (ref 50–136)
ALT SERPL-CCNC: 16 IU/L (ref 8–45)
ANION GAP SERPL CALC-SCNC: 10 MMOL/L (ref 5–18)
AST SERPL-CCNC: 16 IU/L (ref 2–40)
BILIRUB SERPL-MCNC: 0.4 MG/DL (ref 0.2–1.2)
BUN SERPL-MCNC: 11 MG/DL (ref 8–25)
CALCIUM (EXTERNAL): 9.5 MG/DL (ref 8.5–10.5)
CHLORIDE (EXTERNAL): 111 MMOL/L (ref 98–110)
CHOLESTEROL (EXTERNAL): 249 MG/DL (ref 100–199)
CO2 (EXTERNAL): 21 MMOL/L (ref 21–31)
CREATININE (EXTERNAL): 0.87 MG/DL (ref 0.57–1.11)
GFR ESTIMATED (EXTERNAL): 79 ML/MIN/1.73M2
GFR ESTIMATED (IF AFRICAN AMERICAN) (EXTERNAL): ABNORMAL ML/MIN/1.73M2
GLUCOSE (EXTERNAL): 118 MG/DL (ref 70–99)
HBA1C MFR BLD: 6.1 %
HDLC SERPL-MCNC: 40 MG/DL
LDL CHOLESTEROL CALCULATED (EXTERNAL): 145 MG/DL
NON HDL CHOLESTEROL (EXTERNAL): 209 MG/DL
POTASSIUM (EXTERNAL): 3.7 MMOL/L (ref 3.5–5)
PROTEIN TOTAL (EXTERNAL): 7.4 G/DL (ref 6–8)
SODIUM (EXTERNAL): 142 MMOL/L (ref 135–145)
TRIGLYCERIDES (EXTERNAL): 318 MG/DL
TSH SERPL-ACNC: 0.59 UIU/ML (ref 0.35–4.94)

## 2023-09-18 PROBLEM — M47.812 SPONDYLOSIS WITHOUT MYELOPATHY OR RADICULOPATHY, CERVICAL REGION: Status: ACTIVE | Noted: 2021-08-10

## 2023-09-18 PROBLEM — G25.81 RLS (RESTLESS LEGS SYNDROME): Chronic | Status: ACTIVE | Noted: 2018-03-08

## 2023-09-18 PROBLEM — F17.200 TOBACCO USE DISORDER: Chronic | Status: ACTIVE | Noted: 2018-07-18

## 2023-09-18 PROBLEM — E78.5 HYPERLIPIDEMIA: Chronic | Status: ACTIVE | Noted: 2018-09-27

## 2023-09-18 PROBLEM — E03.9 HYPOTHYROIDISM (ACQUIRED): Chronic | Status: ACTIVE | Noted: 2017-06-06

## 2023-09-18 PROBLEM — G47.00 INSOMNIA: Chronic | Status: ACTIVE | Noted: 2022-08-11

## 2023-09-18 PROBLEM — L50.9 URTICARIA: Status: ACTIVE | Noted: 2023-03-22

## 2023-09-18 PROBLEM — K42.9 UMBILICAL HERNIA WITHOUT OBSTRUCTION AND WITHOUT GANGRENE: Status: ACTIVE | Noted: 2022-07-22

## 2023-09-18 PROBLEM — F41.1 GENERALIZED ANXIETY DISORDER: Chronic | Status: ACTIVE | Noted: 2021-08-10

## 2023-09-18 PROBLEM — E11.9 TYPE 2 DIABETES MELLITUS WITHOUT COMPLICATION, WITHOUT LONG-TERM CURRENT USE OF INSULIN (H): Chronic | Status: ACTIVE | Noted: 2023-04-19

## 2023-10-12 ENCOUNTER — TRANSCRIBE ORDERS (OUTPATIENT)
Dept: OTHER | Age: 53
End: 2023-10-12

## 2023-10-12 DIAGNOSIS — M47.812 SPONDYLOSIS WITHOUT MYELOPATHY OR RADICULOPATHY, CERVICAL REGION: Primary | ICD-10-CM

## 2023-10-22 ENCOUNTER — HEALTH MAINTENANCE LETTER (OUTPATIENT)
Age: 53
End: 2023-10-22

## 2023-10-23 ENCOUNTER — OFFICE VISIT (OUTPATIENT)
Dept: FAMILY MEDICINE | Facility: OTHER | Age: 53
End: 2023-10-23
Attending: STUDENT IN AN ORGANIZED HEALTH CARE EDUCATION/TRAINING PROGRAM
Payer: COMMERCIAL

## 2023-10-23 VITALS
SYSTOLIC BLOOD PRESSURE: 120 MMHG | TEMPERATURE: 98 F | DIASTOLIC BLOOD PRESSURE: 72 MMHG | OXYGEN SATURATION: 94 % | HEART RATE: 101 BPM | BODY MASS INDEX: 29.89 KG/M2 | WEIGHT: 163.4 LBS

## 2023-10-23 DIAGNOSIS — Z76.89 ENCOUNTER TO ESTABLISH CARE: Primary | ICD-10-CM

## 2023-10-23 DIAGNOSIS — G25.81 RLS (RESTLESS LEGS SYNDROME): Chronic | ICD-10-CM

## 2023-10-23 DIAGNOSIS — F32.A DEPRESSIVE DISORDER: Chronic | ICD-10-CM

## 2023-10-23 DIAGNOSIS — R06.83 SNORING: ICD-10-CM

## 2023-10-23 DIAGNOSIS — K21.9 GASTROESOPHAGEAL REFLUX DISEASE WITHOUT ESOPHAGITIS: ICD-10-CM

## 2023-10-23 DIAGNOSIS — M48.02 CERVICAL STENOSIS OF SPINAL CANAL: ICD-10-CM

## 2023-10-23 DIAGNOSIS — Z12.11 COLON CANCER SCREENING: ICD-10-CM

## 2023-10-23 DIAGNOSIS — E11.9 TYPE 2 DIABETES MELLITUS WITHOUT COMPLICATION, WITHOUT LONG-TERM CURRENT USE OF INSULIN (H): Chronic | ICD-10-CM

## 2023-10-23 DIAGNOSIS — M51.369 DEGENERATION OF LUMBAR INTERVERTEBRAL DISC: ICD-10-CM

## 2023-10-23 DIAGNOSIS — F41.1 GENERALIZED ANXIETY DISORDER: Chronic | ICD-10-CM

## 2023-10-23 DIAGNOSIS — J41.8 MIXED SIMPLE AND MUCOPURULENT CHRONIC BRONCHITIS (H): ICD-10-CM

## 2023-10-23 DIAGNOSIS — E03.9 HYPOTHYROIDISM (ACQUIRED): Chronic | ICD-10-CM

## 2023-10-23 DIAGNOSIS — G47.00 INSOMNIA, UNSPECIFIED TYPE: ICD-10-CM

## 2023-10-23 DIAGNOSIS — Z12.31 BREAST CANCER SCREENING BY MAMMOGRAM: ICD-10-CM

## 2023-10-23 PROBLEM — L50.9 URTICARIA: Status: RESOLVED | Noted: 2023-03-22 | Resolved: 2023-10-23

## 2023-10-23 PROBLEM — G89.4 CHRONIC PAIN DISORDER: Status: ACTIVE | Noted: 2023-09-12

## 2023-10-23 PROBLEM — Z90.710 S/P COMPLETE HYSTERECTOMY: Status: ACTIVE | Noted: 2023-10-23

## 2023-10-23 PROBLEM — K42.9 UMBILICAL HERNIA WITHOUT OBSTRUCTION AND WITHOUT GANGRENE: Status: RESOLVED | Noted: 2022-07-22 | Resolved: 2023-10-23

## 2023-10-23 PROCEDURE — 99215 OFFICE O/P EST HI 40 MIN: CPT | Performed by: STUDENT IN AN ORGANIZED HEALTH CARE EDUCATION/TRAINING PROGRAM

## 2023-10-23 RX ORDER — PREGABALIN 25 MG/1
CAPSULE ORAL
COMMUNITY
Start: 2023-09-27 | End: 2024-09-23

## 2023-10-23 RX ORDER — PROCHLORPERAZINE 25 MG/1
SUPPOSITORY RECTAL
Qty: 3 EACH | Refills: 3 | Status: SHIPPED | OUTPATIENT
Start: 2023-10-23

## 2023-10-23 RX ORDER — TRAZODONE HYDROCHLORIDE 150 MG/1
150 TABLET ORAL AT BEDTIME
COMMUNITY
Start: 2022-07-06 | End: 2024-09-23

## 2023-10-23 RX ORDER — GUAIFENESIN 600 MG/1
600 TABLET, EXTENDED RELEASE ORAL 2 TIMES DAILY PRN
Qty: 30 TABLET | Refills: 1 | Status: SHIPPED | OUTPATIENT
Start: 2023-10-23 | End: 2024-02-27

## 2023-10-23 RX ORDER — PROCHLORPERAZINE 25 MG/1
SUPPOSITORY RECTAL
COMMUNITY
Start: 2023-10-08 | End: 2023-10-23

## 2023-10-23 RX ORDER — PROCHLORPERAZINE 25 MG/1
SUPPOSITORY RECTAL
COMMUNITY
Start: 2023-09-26 | End: 2023-10-23

## 2023-10-23 RX ORDER — FLUTICASONE FUROATE, UMECLIDINIUM BROMIDE AND VILANTEROL TRIFENATATE 200; 62.5; 25 UG/1; UG/1; UG/1
1 POWDER RESPIRATORY (INHALATION) DAILY
Status: CANCELLED | OUTPATIENT
Start: 2023-10-23

## 2023-10-23 RX ORDER — PROCHLORPERAZINE 25 MG/1
SUPPOSITORY RECTAL
Qty: 1 EACH | Refills: 1 | Status: SHIPPED | OUTPATIENT
Start: 2023-10-23

## 2023-10-23 ASSESSMENT — ANXIETY QUESTIONNAIRES
GAD7 TOTAL SCORE: 17
6. BECOMING EASILY ANNOYED OR IRRITABLE: NEARLY EVERY DAY
IF YOU CHECKED OFF ANY PROBLEMS ON THIS QUESTIONNAIRE, HOW DIFFICULT HAVE THESE PROBLEMS MADE IT FOR YOU TO DO YOUR WORK, TAKE CARE OF THINGS AT HOME, OR GET ALONG WITH OTHER PEOPLE: SOMEWHAT DIFFICULT
3. WORRYING TOO MUCH ABOUT DIFFERENT THINGS: NEARLY EVERY DAY
5. BEING SO RESTLESS THAT IT IS HARD TO SIT STILL: MORE THAN HALF THE DAYS
2. NOT BEING ABLE TO STOP OR CONTROL WORRYING: NEARLY EVERY DAY
7. FEELING AFRAID AS IF SOMETHING AWFUL MIGHT HAPPEN: MORE THAN HALF THE DAYS
1. FEELING NERVOUS, ANXIOUS, OR ON EDGE: MORE THAN HALF THE DAYS
GAD7 TOTAL SCORE: 17

## 2023-10-23 ASSESSMENT — PATIENT HEALTH QUESTIONNAIRE - PHQ9
5. POOR APPETITE OR OVEREATING: MORE THAN HALF THE DAYS
SUM OF ALL RESPONSES TO PHQ QUESTIONS 1-9: 20

## 2023-10-23 ASSESSMENT — PAIN SCALES - GENERAL: PAINLEVEL: MODERATE PAIN (5)

## 2023-10-23 ASSESSMENT — ASTHMA QUESTIONNAIRES
QUESTION_3 LAST FOUR WEEKS HOW OFTEN DID YOUR ASTHMA SYMPTOMS (WHEEZING, COUGHING, SHORTNESS OF BREATH, CHEST TIGHTNESS OR PAIN) WAKE YOU UP AT NIGHT OR EARLIER THAN USUAL IN THE MORNING: FOUR OR MORE NIGHTS A WEEK
ACT_TOTALSCORE: 12
QUESTION_4 LAST FOUR WEEKS HOW OFTEN HAVE YOU USED YOUR RESCUE INHALER OR NEBULIZER MEDICATION (SUCH AS ALBUTEROL): TWO OR THREE TIMES PER WEEK
ACT_TOTALSCORE: 12
QUESTION_5 LAST FOUR WEEKS HOW WOULD YOU RATE YOUR ASTHMA CONTROL: SOMEWHAT CONTROLLED
QUESTION_2 LAST FOUR WEEKS HOW OFTEN HAVE YOU HAD SHORTNESS OF BREATH: MORE THAN ONCE A DAY
QUESTION_1 LAST FOUR WEEKS HOW MUCH OF THE TIME DID YOUR ASTHMA KEEP YOU FROM GETTING AS MUCH DONE AT WORK, SCHOOL OR AT HOME: A LITTLE OF THE TIME

## 2023-10-23 NOTE — Clinical Note
Please abstraction lipids, A1c, bmp, microalbumin, tsh and lung cancer screening from [facility] everywhere

## 2023-10-23 NOTE — PROGRESS NOTES
Assessment & Plan     Encounter to establish care  Medical, surgical, family, and social histories discussed updated. Reviewed active healthcare problems. Medications reviewed.   Patient would still prefer to keep PCP in [place] - would like us to fill in when needed for acute issues/when she needs to be seen locally.     Type 2 diabetes mellitus without complication, without long-term current use of insulin (H)  Controlled. Continues on Ozempic. Up to date on monitoring.   - Continuous Blood Gluc  (DEXCOM G6 ) MIKE; Use to read blood sugars as per 's instructions.  - Continuous Blood Gluc Sensor (DEXCOM G6 SENSOR) MISC; CHANGE SENSOR EVERY 10 DAYS    Hypothyroidism (acquired)  Stable. Continues on synthroid 88mcg daily. Recent tsh within normal limits.     Mixed simple and mucopurulent chronic bronchitis (H)  Suspect COPD rather than asthma with tobacco history.   Recent low dose CT within normal limits   On advair, continue for now. Consider anticholinergic/laba inhaler in the future if indeed COPD.   Will update lung testing - likely more copd than asthma  Can trial guaifenesin to help with secretions   Encouraged smoking cessation   - General PFT Lab (Please always keep checked); Future  - Pulmonary Function Test; Future  - guaiFENesin (MUCINEX) 600 MG 12 hr tablet; Take 1 tablet (600 mg) by mouth 2 times daily as needed for congestion  - General PFT Lab (Please always keep checked)    Generalized anxiety disorder  Following with  in [place] - on hydroxyzine as needed   Would recommend daily agent     Depressive disorder  Stopped buproprion.   Has follow up with  in [place].     RLS (restless legs syndrome)  Stable. Continues on Mirapex.     Insomnia, unspecified type  Not well controlled. Uses hydroxyzine, Trazodone and as needed sonata. Has not been able to get daridorexant due to insurance issue. Following with  in [place].     Breast cancer screening by mammogram  - MA  "Screen Bilateral w/Jakob; Future    Colon cancer screening  - Colonoscopy Screening  Referral; Future    Gastroesophageal reflux disease without esophagitis  With longstanding history of GERD and omeprazole use, reasonable to update EGD.   - Adult GI  Referral - Procedure Only; Future    Snoring  Stop bang 3-4. Referral placed for sleep study.   - Adult Sleep Eval & Management  Referral; Future    Degeneration of lumbar intervertebral disc  Cervical stenosis of spinal canal  Uses pregabalin for pain  Waiting to hear from neurosurgery at  of      Review of external notes as documented elsewhere in note  42 minutes spent by me on the date of the encounter doing chart review, history and exam, documentation and further activities per the note     Nicotine/Tobacco Cessation:  She reports that she has been smoking cigarettes. She started smoking about 37 years ago. She has a 37.50 pack-year smoking history. She uses smokeless tobacco.  Nicotine/Tobacco Cessation Plan:   Information offered: Patient not interested at this time        Hannah Eaton MD  [facility] ALBERTO Anthony is a 53 year old, presenting for the following health issues:  Establish Care        10/23/2023     7:59 AM   Additional Questions   Roomed by Amadou Pandey   Accompanied by Self         10/23/2023     7:59 AM   Patient Reported Additional Medications   Patient reports taking the following new medications none       HPI     Establish care - moved here one year ago. Was in [facility] prior. Dad's side of the family is here. Reports she wants to keep doctor there but needs a \"backup doctor\". Doctor in [place] has been her physician for 10-15 years. Wants to see her for sleep, diabetes, and mood issues.     Last mammogram was this last year.     Type 2 diabetes mellitus - current regimen: ozempic. Has Dexcom G6.   A1c: 6.1 on 9/15/2023  Eye exam was done in [place] this year     Hypothyroid " - synthroid dose is 88mcg. TSH 0.59 9/15/2023.     Insomnia - insurance won't cover daridorexant. On Trazodone. Has sonata that she uses as needed if she doesn't get sleep the night before.     Restless legs - on mirapex     GERD - long history of reflux. On omeprazole 20mg daily. Symptoms may be worsening. Last endoscopy 7-10 years ago.     Lung disease - currently on advair, inhalers. Lung cancer screening 8/2/2022 within normal limits. Worsening cough in the last few months. No leg swelling. No PND or orthopnea.     Wondering if she has SUKI. Does snore.   Snore (+1)  Daytime fatigue (+1)  Stop breathing (+1)  Hypertension (+1)  BMI >35 (+1)  Age >50 (+1)  Neck >40 (+1)  Gender male (+1)    Chronic pain - takes lyrica for lumbar disk disease with spinal stenosis, severe, per patient in her neck. Waiting for [facility] to call and schedule.     Depression and Anxiety Follow-Up  How are you doing with your depression since your last visit? Worsened Worsened because she stopped taking the medication due to feeling loopy  How are you doing with your anxiety since your last visit?  In between no change and worsened. Its not really and but also no change.  Are you having other symptoms that might be associated with depression or anxiety? No  Have you had a significant life event? No   Do you have any concerns with your use of alcohol or other drugs? No    Taking hydroxyzine every night - 25mg.   Has appointment with dr jose guadalupe casey to discuss depression/anxiety further     Social History     Tobacco Use    Smoking status: Every Day     Packs/day: 1.50     Years: 25.00     Additional pack years: 0.00     Total pack years: 37.50     Types: Cigarettes     Start date: 1986    Smokeless tobacco: Current   Vaping Use    Vaping Use: Every day   Substance Use Topics    Drug use: Never          No data to display                   No data to display                    Objective    /72 (BP Location: Right arm, Patient Position:  Sitting, Cuff Size: Adult Large)   Pulse 101   Temp 98  F (36.7  C) (Tympanic)   Wt 74.1 kg (163 lb 6.4 oz)   SpO2 94%   BMI 29.89 kg/m    Body mass index is 29.89 kg/m .    Physical Exam  Constitutional:       General: She is not in acute distress.     Appearance: Normal appearance.   Cardiovascular:      Rate and Rhythm: Normal rate and regular rhythm.      Heart sounds: No murmur heard.  Pulmonary:      Effort: Pulmonary effort is normal.      Breath sounds: Rhonchi present. No wheezing or rales.   Musculoskeletal:      Right lower leg: No edema.      Left lower leg: No edema.   Neurological:      General: No focal deficit present.      Mental Status: She is alert and oriented to person, place, and time.   Psychiatric:         Mood and Affect: Mood normal.         Behavior: Behavior normal.

## 2023-10-23 NOTE — COMMUNITY RESOURCES LIST (ENGLISH)
10/23/2023   [facility]  N/A  For questions about this resource list or additional care needs, please contact your primary care clinic or care manager.  Phone: [phone]   Email: N/A   Address: [address] [place] [address]   Hours: N/A        Food and Nutrition       Food pantry  1  [facility] and Service [place] Distance: 5.19 miles      Pickup   [address] [place] [address]  Language: English  Hours: Mon 9:00 AM - 11:00 AM , Tue 1:00 PM - 3:00 PM , Wed - Thu 9:00 AM - 11:00 AM  Fees: Free, Self Pay   Phone: (431) 541-1514 Email: [email or web address]@[facility] Website: https://[facility].org/[facility]/[place]     2  [facility] [employer] [facility] Free [facility] Distance: 16.43 miles      Pickup   [address] [place] [address]  Language: English  Hours: Mon - Sun Open 24 Hours  Fees: Free   Phone: (870) 483-1637 Email: lito@[employer].org Website: http://www.[employer].org     SNAP application assistance  3  [facility] & Human Services  Economic Services & Supports [facility] Distance: 4.71 miles      In-Person, Phone/Virtual   [address] [place] [address]  Language: English  Hours: Mon - Fri 8:00 AM - 4:30 PM  Fees: Free   Phone: (331) 888-2137 Website: https://www.[facility].gov/vbtxzlyluoa-k-r/public-health-human-services/economic-services-supports     4  [facility] & Human Services - Economic Services & [facility] Distance: 16.44 miles      In-Person, Phone/Virtual   [address] [place] [address]  Language: English  Hours: Mon - Fri 8:00 AM - 4:30 PM  Fees: Free   Phone: (289) 196-5678 Email: financialassistance@[facility].gov Website: https://[place].gov/eszecuwhnww-f-o/public-health-human-services/economic-services-supports     Soup kitchen or free meals  5  [facility]  [facility] and [facility] Center Distance: 5.19 miles      Pickup   [address] [place] [address]  Language: English  Hours: Mon - Fri 4:00 PM - 4:45 PM  Fees: Free   Phone: (603) 747-7579 Email: jing@[facility] Hospital[facility].[employer] Website: https://[place].[facility].org/northern/[place]          Important Numbers & Websites       Emergency Services   911  [facility] Services   311  Poison Control   (957) 339-6922  Suicide Prevention Lifeline   (947) 549-2471 (TALK)  Child Abuse Hotline   (946) 796-1864 (4-A-Child)  Sexual Assault Hotline   (304) 402-8605 (HOPE)  National Runaway Safeline   (906) 555-6869 (RUNAWAY)  All-Options Talkline   (742) 163-9289  Substance Abuse Referral   (544) 556-5112 (HELP)

## 2023-10-23 NOTE — COMMUNITY RESOURCES LIST (ENGLISH)
10/23/2023   [facility]  N/A  For questions about this resource list or additional care needs, please contact your primary care clinic or care manager.  Phone: [phone]   Email: N/A   Address: [address] [place] [address]   Hours: N/A        Food and Nutrition       Food pantry  1  [facility] and Service [place] Distance: 5.19 miles      Pickup   [address] [place] [address]  Language: English  Hours: Mon 9:00 AM - 11:00 AM , Tue 1:00 PM - 3:00 PM , Wed - Thu 9:00 AM - 11:00 AM  Fees: Free, Self Pay   Phone: (990) 785-5860 Email: jing@[facility] Website: https://[facility].org/[facility]/[place]     2  [facility] Meetings[employer] [facility] Free [facility] Distance: 16.43 miles      Pickup   [address] [place] [address]  Language: English  Hours: Mon - Sun Open 24 Hours  Fees: Free   Phone: (785) 109-2437 Email: lito@[employer].org Website: http://www.[employer].org     SNAP application assistance  3  [facility] & Human Services  Economic Services & Supports [facility] Distance: 4.71 miles      In-Person, Phone/Virtual   [address] [place] [address]  Language: English  Hours: Mon - Fri 8:00 AM - 4:30 PM  Fees: Free   Phone: (115) 504-6102 Website: https://www.[facility].gov/wvrosvxyyrj-p-t/public-health-human-services/economic-services-supports     4  [facility] & Human Services - Economic Services & [facility] Distance: 16.44 miles      In-Person, Phone/Virtual   [address] [place] [address]  Language: English  Hours: Mon - Fri 8:00 AM - 4:30 PM  Fees: Free   Phone: (407) 346-4778 Email: financialassistance@[facility].gov Website: https://[place].gov/eqglkkwcnln-n-a/public-health-human-services/economic-services-supports     Soup kitchen or free meals  5  [facility]  [facility] and Service Center Distance: 5.19 miles      Pickup   [address] [place] [address]  Language: English  Hours: Mon - Fri 4:00 PM - 4:45 PM  Fees: Free   Phone: (709) 968-8511 Email: jing@[facility].[employer] Website: https://centralusa.[facility].org/northern/[place]          Important Numbers & Websites       Emergency Services   911  [facility] Services   311  Poison Control   (110) 741-8042  Suicide Prevention Lifeline   (812) 459-7128 (TALK)  Child Abuse Hotline   (442) 991-9264 (4-A-Child)  Sexual Assault Hotline   (762) 308-6301 (HOPE)  National Runaway Safeline   (521) 680-8104 (RUNAWAY)  All-Options Talkline   (231) 636-6743  Substance Abuse Referral   (270) 229-8447 (HELP)

## 2023-10-23 NOTE — COMMUNITY RESOURCES LIST (ENGLISH)
10/23/2023   [facility]  N/A  For questions about this resource list or additional care needs, please contact your primary care clinic or care manager.  Phone: [phone]   Email: N/A   Address: [address] [place] [id number]   Hours: N/A        Food and Nutrition       Food pantry  1  [facility] [place] Distance: 5.19 miles      Pickup   [address] [place] [address]  Language: English  Hours: Mon 9:00 AM - 11:00 AM , Tue 1:00 PM - 3:00 PM , Wed - Thu 9:00 AM - 11:00 AM  Fees: Free, Self Pay   Phone: (156) 190-3830 Email: [email or web address] Website: [email or web address]/[facility]/[place]     2  [facility] [employer] [facility] Free [facility] Distance: 16.43 miles      Pickup   [address] [place] [address]  Language: English  Hours: Mon - Sun Open 24 Hours  Fees: Free   Phone: (182) 714-2808 Email: lito@[place].org Website: http://www.[place].org     SNAP application assistance  3  [facility]  Economic Services & Supports [facility] Distance: 4.71 miles      In-Person, Phone/Virtual   [address] [place] [address]  Language: English  Hours: Mon - Fri 8:00 AM - 4:30 PM  Fees: Free   Phone: (129) 490-3002 Website: https://www.[facility].gov/fabgbfvfocd-e-k/public-health-human-services/economic-services-supports     4  [facility] - Economic Services & [facility] Distance: 16.44 miles      In-Person, Phone/Virtual   [address] [place] [address]  Language: English  Hours: Mon - Fri 8:00 AM - 4:30 PM  Fees: Free   Phone: (404) 578-5244 Email: financialassistance@[facility].gov Website: https://[place].gov/swvclcfvjbp-s-o/public-health-human-services/economic-services-supports     Soup kitchen or free meals  5  [facility]  [facility] Distance: 5.19 miles      Pickup   [address] [place] [address]  Language: English  Hours: Mon - Fri 4:00 PM - 4:45 PM  Fees: Free   Phone: (200) 854-3882 Email: jing@[facility].[employer] Website: https://[place].[facility].org/northern/[place]          Important Numbers & Websites       Emergency Services   911  [facility] Services   311  Poison Control   (668) 340-4536  Suicide Prevention Lifeline   (607) 421-3694 (TALK)  Child Abuse Hotline   (318) 934-2522 (4-A-Child)  Sexual Assault Hotline   (865) 129-9184 (HOPE)  National Runaway Safeline   (254) 534-1405 (RUNAWAY)  All-Options Talkline   (389) 563-7869  Substance Abuse Referral   (453) 604-8480 (HELP)

## 2023-10-23 NOTE — PATIENT INSTRUCTIONS
A referral was placed for you to have a colonoscopy. This can take 2 weeks to hear to schedule, as it goes through scheduling review. If it is a diagnostic (more urgent) colonoscopy, please call after one week. General surgery department phone number is [phone].     You are due for your mammogram. You can call the [facility] at [phone] to schedule an appointment or you can also schedule through [employer].      The imaging (radiology) department will call to schedule your lung testing. If you do not hear to schedule, please call [phone] to schedule your imaging.      Sleep medicine will call to schedule your sleep appointment. Will need to complete questionairre prior.     Hold off on Trelegy for now - need lung testing.     Dexcom sensor and  refilled.     Thank you for choosing [facility].   I have office hours 8:00 am to 4:30 pm on Mondays, Tuesdays, Wednesday mornings, Thursdays and Fridays. I am out of the office Wednesday afternoons.   Following your visit, if you had labs and diagnostic testing, once they have returned, I will review them and you will be contacted by myself or my nurse if there are concerns. If the labs are normal, you will receive a letter instead of a phone call unless otherwise requested. If you are on My Chart, you can also view results there.  For refills, notify your pharmacy regarding what you need and the pharmacy will generate a refill request. Please plan ahead and allow 3-5 days for refill requests.  If you have an urgent/same day appointment need, please request that when you call and our support staff will send me an Overbook request to try to accommodate you for the urgent visit.   In the event that you need to be seen for emergent concerns and I am out of office, please see one of my colleagues for acute concerns.  You may also present to Urgent Care or the ER.  I appreciate the opportunity to serve you and look forward to  supporting your healthcare needs in the future.

## 2023-10-24 ENCOUNTER — TRANSFERRED RECORDS (OUTPATIENT)
Dept: OTHER | Facility: HOSPITAL | Age: 53
End: 2023-10-24

## 2023-10-24 ASSESSMENT — SLEEP AND FATIGUE QUESTIONNAIRES
HOW LIKELY ARE YOU TO NOD OFF OR FALL ASLEEP WHILE LYING DOWN TO REST IN THE AFTERNOON WHEN CIRCUMSTANCES PERMIT: MODERATE CHANCE OF DOZING
HOW LIKELY ARE YOU TO NOD OFF OR FALL ASLEEP WHILE SITTING AND TALKING TO SOMEONE: SLIGHT CHANCE OF DOZING
HOW LIKELY ARE YOU TO NOD OFF OR FALL ASLEEP IN A CAR, WHILE STOPPED FOR A FEW MINUTES IN TRAFFIC: WOULD NEVER DOZE
HOW LIKELY ARE YOU TO NOD OFF OR FALL ASLEEP WHILE SITTING INACTIVE IN A PUBLIC PLACE: MODERATE CHANCE OF DOZING
HOW LIKELY ARE YOU TO NOD OFF OR FALL ASLEEP WHILE SITTING QUIETLY AFTER LUNCH WITHOUT ALCOHOL: SLIGHT CHANCE OF DOZING
HOW LIKELY ARE YOU TO NOD OFF OR FALL ASLEEP WHILE WATCHING TV: HIGH CHANCE OF DOZING
HOW LIKELY ARE YOU TO NOD OFF OR FALL ASLEEP WHEN YOU ARE A PASSENGER IN A CAR FOR AN HOUR WITHOUT A BREAK: MODERATE CHANCE OF DOZING
HOW LIKELY ARE YOU TO NOD OFF OR FALL ASLEEP WHILE SITTING AND READING: HIGH CHANCE OF DOZING

## 2023-10-27 NOTE — PROGRESS NOTES
Chart review prior to sleep testing.    Patient Summary:  53 year old female who is referred for chronic insomnia.    Patient Active Problem List    Diagnosis Date Noted    Cervical stenosis of spinal canal 10/23/2023     Priority: Medium    Degeneration of lumbar intervertebral disc 10/23/2023     Priority: Medium    Mixed simple and mucopurulent chronic bronchitis (H) 10/23/2023     Priority: Medium    S/P complete hysterectomy 10/23/2023     Priority: Medium    Chronic pain disorder 09/12/2023     Priority: Medium    Type 2 diabetes mellitus without complication, without long-term current use of insulin (H) 04/19/2023     Priority: Medium    Insomnia 08/11/2022     Priority: Medium     Current Medications: Lunesta - causes a foul taste in her mouth. Not sleeping well still.   Previous medications: Valium, Trazodone, Restoril, Gabapentin, Lyrica       Generalized anxiety disorder 08/10/2021     Priority: Medium    Spondylosis without myelopathy or radiculopathy, cervical region 08/10/2021     Priority: Medium    Hyperlipidemia 09/27/2018     Priority: Medium    Tobacco use disorder (30 pack year history plus) 07/18/2018     Priority: Medium    RLS (restless legs syndrome) 03/08/2018     Priority: Medium    Hypothyroidism (acquired) 06/06/2017     Priority: Medium    Depressive disorder 06/21/2002     Priority: Medium       Current Outpatient Medications   Medication    Blood Glucose Monitoring Suppl (ACCU-CHEK GUIDE) w/Device KIT    Continuous Blood Gluc  (DEXCOM G6 ) IMKE    Continuous Blood Gluc Sensor (DEXCOM G6 SENSOR) MISC    Daridorexant HCl 50 MG TABS    estradiol (ESTRACE) 0.1 MG/GM vaginal cream    fluticasone-salmeterol (ADVAIR) 250-50 MCG/ACT inhaler    guaiFENesin (MUCINEX) 600 MG 12 hr tablet    hydrOXYzine (ATARAX) 25 MG tablet    hydrOXYzine (VISTARIL) 25 MG capsule    ipratropium - albuterol 0.5 mg/2.5 mg/3 mL (DUONEB) 0.5-2.5 (3) MG/3ML neb solution    levothyroxine  "(SYNTHROID/LEVOTHROID) 88 MCG tablet    nicotine (NICODERM CQ) 14 MG/24HR 24 hr patch    nicotine (NICODERM CQ) 21 MG/24HR 24 hr patch    omeprazole (PRILOSEC) 20 MG DR capsule    pramipexole (MIRAPEX) 1.5 MG tablet    pregabalin (LYRICA) 25 MG capsule    PROAIR  (90 Base) MCG/ACT inhaler    semaglutide (OZEMPIC) 2 MG/3ML pen    simvastatin (ZOCOR) 40 MG tablet    traZODone (DESYREL) 150 MG tablet    triamcinolone (KENALOG) 0.1 % external cream    zaleplon (SONATA) 10 MG capsule     No current facility-administered medications for this visit.       Pertinent PMHx of generalized anxiety disorder, chronic pain disorder, DM II, hypothyroidism, RLS, depression.    Per chart review, has regular care with Monica Marie NP with [facility] in [place], [place].  It appears that a sleep consult was recommended based on recent visits presumably in regards to chronic insomnia, reported restless leg syndrome.    Noted for multiple prior medications tried for sleep:  Quviviq (Daridorexant) - on current list, 50mg  Eszopiclone  Zaleplon  Valium  Temazepam  Pregablin  Gabapentin  Trazodone - on current list, 150mg  Pramipexole - on current list, 1.5mg  Hydroxyzine - on current list, 25mg BID PRN    STOP-BANG score of 2, with unknown neck circumference.  [place] score of 14.  RYAN: 28 (report of 4 to all questions)    BMI of Estimated body mass index is 29.89 kg/m  as calculated from the following:    Height as of 8/24/23: 1.575 m (5' 2\").    Weight as of 10/23/23: 74.1 kg (163 lb 6.4 oz).     Per questionnaire: \"I cant sleep or i fall asleep and cant stay asleep. \"    Sxs for 5-6 years.    See above for multiple medication trials to date.    Caffeine use:  No for 3+ per day.  No for within 6 hours of bed.    Tobacco use: Yes    Sleep pattern:  Workdays.  8-10pm to 3am (?).  Weekends.  8-10pm to 3am..  Time to fall asleep: ~20 minutes with medication  Awakenings: 3-4 times per night, \"I don't\" minutes to return to " sleep.  What wakes up: Snorting self awake;Pain;Use the bathroom;Anxiety;Uncertain   What do you usually do if you have trouble getting back to sleep? - Go and clean the house or work on puzzles or play on my phone   Average total sleep time:  4 hours  Napping.  ? (Not on purpose) days per week, 1-2 hours per nap.    Yes for RLS screen.  No for sleep walking.  Yes for dream enactment behavior.  No for bruxism.    Yes for morning headaches.  Yes for snoring.  No for observed apnea.      Noted for pan-positive review of symptoms:  In the last TWO WEEKS have you experienced any of the following symptoms?   Fevers No   Night Sweats Yes   Weight Gain Yes   Pain at Night Yes   Double Vision No   Changes in Vision Yes   Difficulty Breathing through Nose Yes   Sore Throat in Morning Yes   Dry Mouth in the Morning Yes   Shortness of Breath Lying Flat Yes   Shortness of Breath With Activity Yes   Awakening with Shortness of Breath Yes   Increased Cough Yes   Heart Racing at Night No   Swelling in Feet or Legs No   Diarrhea at Night No   Urinating More than Once at Night Yes   Joint Pains at Night Yes   Headaches in Morning Yes   Weakness in Arms or Legs Yes   Depressed Mood Yes   Anxiety Yes       A/P:    1.)  Low pretest likelihood of SUKI with STOP-BANG score of 2.    2.)  Chronic sleep onset and maintenance insomnia    Noted for history of trial of high number of medications for insomnia including benzodiazepines, gabapentin, DENISA receptor agonists, direct orexin receptor antagonists, trazodone, dopaminergic's.    I suspect her insomnia is highly multifactorial along with an increased clinical suspicion for sleep state misperception.  Sleep state misperception-increased concern based on reports of incomplete response to multiple medications spanning multiple medication families for insomnia  Clear psychophysiological insomnia with inadequate sleep hygiene, inadequate stimulus control, potential excessive daytime  napping  Comorbid chronic pain, mental illness    I would recommend we start with clinic consult with sleep diaries for 2 weeks.  Most likely we will perform actigraphy, but will wait to see details from visit.    ---  This note was written with the assistance of the Dragon voice-dictation technology software. The final document, although reviewed, may contain errors. For corrections, please contact the office.    Wenceslao Ramirez MD    Sleep Medicine  [place], [place]  Main Office: [phone]  [facility] [address], [address]  Schedule visits: [phone]  Main Office: [phone]  Fax: [phone]

## 2023-10-27 NOTE — PROGRESS NOTES
10/24/23 1910   STOP-BANG Giancarlo F, Nakia B, Giuseppe P, Giancarlo SA, Allan S, Winnie S, Feli A, Vijay CM. Anesthesiology. 2008 May;108(5):812-21. Copyright   2008, the American Society of Anesthesiologists, Inc. [employer]   Do you SNORE loudly (louder than talking or loud enough to be heard through closed doors)? 0   Do you often feel TIRED, fatigued, or sleepy during daytime? 1   Has anyone OBSERVED you stop breathing during your sleep? 0   Do you have or are you being treated for high blood PRESSURE? 0   BMI more than 35kg/m2? 0   AGE over 50 years old? 1   NECK circumference > 16 inches (40cm)? 0   GENDER: Male? 0

## 2023-10-27 NOTE — PROGRESS NOTES
10/24/23 1908   Joffre Sleepiness Scale   Sitting and reading 3   Watching TV 3   Sitting, inactive in a public place (e.g. a theatre or a meeting) 2   As a passenger in a car for an hour without a break 2   Lying down to rest in the afternoon when circumstances permit 2   Sitting and talking to someone 1   Sitting quietly after a lunch without alcohol 1   In a car, while stopped for a few minutes in traffic 0   Total score - Joffre 14

## 2023-10-27 NOTE — PROGRESS NOTES
10/24/23 1906   Reason For Your Visit   Please briefly describe the main reason(s) for your sleep visit I cant sleep or i fall asleep and cant stay asleep.   Approximately when did this problem start Not sure 5 or 6 years ago   What are your goals for this visit To find out why i cant stay asleep   Time in Bed - Work Or School Days   Do you work or go to school No   What time do you usually get into bed 8 to 10 pm   About how long does it take you to fall asleep 20 minutes with sleeping pills   How often do you have trouble falling asleep 7   How often do you wake up during the night 3 or 4   What wakes you up at night Snorting self awake;Pain;Use the bathroom;Anxiety;Uncertain   How often do you have trouble falling back to sleep 7   About how long does it take to fall back to sleep I dont   What do you usually do if you have trouble getting back to sleep Go and clean the house or work on puzzles or play on my phone   What time do you usually get out of bed to start your day 3 am   Do you use an alarm No   Time in Bed - Weekends/Non-work Days/All Other Days   What time do you usually get into bed 8 to 10 pm   About how long does it take you to fall asleep 20 minutes   What time do you usually get out of bed to start your day 3 am   Do you use an alarm No   Sleep Need   On average, about how much sleep do you think you get 4 hours   About how much sleep do you think you need 7 or 8   Sleep Position   Which sleep positions do you prefer Back;Side;Recliner   How often do you take a nap on purpose 0   About how long are your naps 1 to 2 hours   Do you feel better after naps Yes   How often do you doze off unintentionally 7   Have you ever had a driving accident or near-miss due to sleepiness/drowsiness No   Sleep Disruptions - Breathing/Snoring   Do you snore Yes   Do other people complain about your snoring No   Have you been told you stop breathing in your sleep No   Do you have issues with any of the following  Morning headaches;Morning mouth dryness;Stuffy nose when you wake up;Heartburn or reflux at night;Getting up to urinate more than once   Sleep Disruptions - Movement   Do you get pain, discomfort, with an urge to move Yes   Does it happen when you are resting Yes   Does it get better if you move around No   Does it happen more at night Yes   Have you been told you kick your legs at night Yes   Sleep Disruptions - Behaviours in Sleep   Have you ever experienced any of the following during your sleep Kicking or punching;See or hear things that are not really there upon awakening or just falling asleep   Do you ever experience sudden muscle weakness during the day Yes   4) Is there anything else you would like your sleep provider to know I will get the list of medications that i have used. And some of the questions i will answer during the consult   Caffeine, Alcohol and Other Substances   How many caffeinated beverages (coffee, tea, soda, energy drinks) per day 2   What time of day is your last caffeine use Noon   List any prescribed or over the counter stimulants that you take 0   Do you drink alcohol to help you sleep No   Do you drink alcohol near bedtime No   In the last TWO WEEKS have you experienced any of the following symptoms?   Fevers No   Night Sweats Yes   Weight Gain Yes   Pain at Night Yes   Double Vision No   Changes in Vision Yes   Difficulty Breathing through Nose Yes   Sore Throat in Morning Yes   Dry Mouth in the Morning Yes   Shortness of Breath Lying Flat Yes   Shortness of Breath With Activity Yes   Awakening with Shortness of Breath Yes   Increased Cough Yes   Heart Racing at Night No   Swelling in Feet or Legs No   Diarrhea at Night No   Urinating More than Once at Night Yes   Joint Pains at Night Yes   Headaches in Morning Yes   Weakness in Arms or Legs Yes   Depressed Mood Yes   Anxiety Yes

## 2023-10-27 NOTE — PROGRESS NOTES
10/23/23 1230   Insomnia Severity Index- The RYAN contact information and permission to use: [employer], [place]. Internet: [email or web address]   Difficulty falling asleep 4   Difficulty staying asleep 4   Problems waking up too early 4   How SATISFIED/DISSATISFIED are you with your CURRENT sleep pattern? 4   How NOTICEABLE to others do you think your sleep problem is in terms of impairing the quality of your life? 4   How WORRIED/DISTRESSED are you about your current sleep problem? 4   To what extent do you consider your sleep problem to INTERFERE with your daily functioning (e.g. daytime fatigue, mood, ability to function at work/daily chores, concentration, memory, mood, etc.) CURRENTLY? 4   RYAN Total Score 28

## 2023-10-30 ENCOUNTER — VIRTUAL VISIT (OUTPATIENT)
Dept: SLEEP MEDICINE | Facility: HOSPITAL | Age: 53
End: 2023-10-30
Attending: FAMILY MEDICINE
Payer: COMMERCIAL

## 2023-10-30 DIAGNOSIS — G47.33 OSA (OBSTRUCTIVE SLEEP APNEA): Primary | ICD-10-CM

## 2023-11-01 ENCOUNTER — HOSPITAL ENCOUNTER (OUTPATIENT)
Facility: HOSPITAL | Age: 53
End: 2023-11-01
Attending: SURGERY | Admitting: SURGERY
Payer: COMMERCIAL

## 2023-11-01 ENCOUNTER — PREP FOR PROCEDURE (OUTPATIENT)
Dept: SURGERY | Facility: OTHER | Age: 53
End: 2023-11-01

## 2023-11-01 ENCOUNTER — OFFICE VISIT (OUTPATIENT)
Dept: SURGERY | Facility: OTHER | Age: 53
End: 2023-11-01
Attending: NURSE PRACTITIONER
Payer: COMMERCIAL

## 2023-11-01 VITALS
RESPIRATION RATE: 16 BRPM | OXYGEN SATURATION: 95 % | HEIGHT: 62 IN | TEMPERATURE: 98.4 F | HEART RATE: 94 BPM | BODY MASS INDEX: 30 KG/M2 | SYSTOLIC BLOOD PRESSURE: 110 MMHG | DIASTOLIC BLOOD PRESSURE: 70 MMHG | WEIGHT: 163 LBS

## 2023-11-01 DIAGNOSIS — K21.9 GASTROESOPHAGEAL REFLUX DISEASE, UNSPECIFIED WHETHER ESOPHAGITIS PRESENT: Primary | ICD-10-CM

## 2023-11-01 DIAGNOSIS — Z86.0100 HISTORY OF COLONIC POLYPS: ICD-10-CM

## 2023-11-01 DIAGNOSIS — R19.5 CHANGE IN STOOL: ICD-10-CM

## 2023-11-01 DIAGNOSIS — Z86.0100 HISTORY OF COLONIC POLYPS: Primary | ICD-10-CM

## 2023-11-01 DIAGNOSIS — R19.8 ALTERED BOWEL FUNCTION: ICD-10-CM

## 2023-11-01 DIAGNOSIS — K21.9 GASTROESOPHAGEAL REFLUX DISEASE WITHOUT ESOPHAGITIS: ICD-10-CM

## 2023-11-01 PROCEDURE — 99213 OFFICE O/P EST LOW 20 MIN: CPT | Performed by: NURSE PRACTITIONER

## 2023-11-01 RX ORDER — ROSUVASTATIN CALCIUM 40 MG/1
40 TABLET, COATED ORAL DAILY
COMMUNITY
Start: 2023-10-26 | End: 2024-04-02

## 2023-11-01 RX ORDER — BISACODYL 5 MG/1
5 TABLET, DELAYED RELEASE ORAL DAILY PRN
Qty: 4 TABLET | Refills: 0 | Status: SHIPPED | OUTPATIENT
Start: 2023-11-01 | End: 2024-01-30

## 2023-11-01 RX ORDER — ARIPIPRAZOLE 2 MG/1
5 TABLET ORAL
COMMUNITY
Start: 2023-10-26 | End: 2024-05-02

## 2023-11-01 ASSESSMENT — PAIN SCALES - GENERAL: PAINLEVEL: SEVERE PAIN (6)

## 2023-11-01 NOTE — PROGRESS NOTES
CLINIC NOTE - CONSULT  11/1/2023    Patient : Jami Wang    Referring Physician :  Dr. Eaton    Reason for Referral : Upper and lower endoscopy    This is a 53 year old female with a need for an upper and lower endoscopy.  Upper endoscopy is needed for Gastroesohpageal Reflux.  Lower endoscopy is needed for History of Colon Polyps and Change in Bowel Habits (change in character of stools).      Last EGD : 5 years ago approximately  History of GERD : YES  History of PUD : NO  On PPI's : YES    Last colonoscopy : 5 years  Family history of colon cancer : Yes but unknown who  Family history of colon polyps : NO  Personal history of colon cancer : NO  Personal history of colon polyps : YES  Rectal bleeding : NO  Changes in bowel habits :YES  Personal history of inflammatory bowel disease : NO    Past Medical History:  Past Medical History:   Diagnosis Date    Gastroesophageal reflux disease with esophagitis     Hypothyroidism     Insomnia     Type 2 diabetes mellitus (H)     Umbilical hernia without obstruction and without gangrene     Urticaria     Last Assessment & Plan: Formatting of this note might be different from the original. Patient presented to the emergency room on 3/19/2023 with history of full body rash starting 3 weeks prior with discomfort and itching treated with hydrocortisone cream without relief.  Homeless family member had moved back into the house with prior exposure to bedbugs.  Under breasts lower legs severity moderate       Past Surgical History:  Past Surgical History:   Procedure Laterality Date    CARPAL TUNNEL RELEASE RT/LT Bilateral     cmc arthroplasty Left     COLONOSCOPY - HIM SCAN N/A 10/04/2018    [facility].    HYSTERECTOMY, PAP NO LONGER INDICATED N/A 10/10/2018    Cervix removed per [facility]'s Hysterectomy Path Report.    HYSTERECTOMY, TOTAL, LAPAROSCOPIC, WITH SALPINGO-OOPHORECTOMY, CYSTOSCOPY Bilateral 10/10/2018    [facility].       Family History History:  No  family history on file.    History of Tobacco Use:  History   Smoking Status    Every Day    Packs/day: 0.20    Years: 25.00    Types: Cigarettes    Start date: 1986   Smokeless Tobacco    Current       Current Medications:  Current Outpatient Medications   Medication Sig Dispense Refill    ARIPiprazole (ABILIFY) 2 MG tablet Take 2 mg by mouth      Blood Glucose Monitoring Suppl (ACCU-CHEK GUIDE) w/Device KIT USE TO TEST BLOOD SUGAR DAILY      Continuous Blood Gluc  (DEXCOM G6 ) MIKE Use to read blood sugars as per 's instructions. 1 each 1    Continuous Blood Gluc Sensor (DEXCOM G6 SENSOR) MISC CHANGE SENSOR EVERY 10 DAYS 3 each 3    Daridorexant HCl 50 MG TABS Take 50 mg by mouth      estradiol (ESTRACE) 0.1 MG/GM vaginal cream Place 1 g vaginally      fluticasone-salmeterol (ADVAIR) 250-50 MCG/ACT inhaler Inhale 1 puff into the lungs      guaiFENesin (MUCINEX) 600 MG 12 hr tablet Take 1 tablet (600 mg) by mouth 2 times daily as needed for congestion 30 tablet 1    hydrOXYzine (VISTARIL) 25 MG capsule Take 1 capsule (25 mg) by mouth 3 times daily as needed for itching 30 capsule 0    ipratropium - albuterol 0.5 mg/2.5 mg/3 mL (DUONEB) 0.5-2.5 (3) MG/3ML neb solution Inhale 3 mLs into the lungs      levothyroxine (SYNTHROID/LEVOTHROID) 88 MCG tablet Take 88 mcg by mouth daily      omeprazole (PRILOSEC) 20 MG DR capsule TAKE 1 CAPSULE BY MOUTH EVERY DAY AS NEEDED FOR GI UPSET FOR UP TO 14 DAYS      pramipexole (MIRAPEX) 1.5 MG tablet Take 1.5 mg by mouth      pregabalin (LYRICA) 25 MG capsule       PROAIR  (90 Base) MCG/ACT inhaler INHALE 2 PUFFS INTO THE LUNGS EVERY 4 HOURS AS NEEDED FOR SHORTNESS OF BREATH      rosuvastatin (CRESTOR) 40 MG tablet Take 40 mg by mouth daily      semaglutide (OZEMPIC) 2 MG/3ML pen Inject 0.25 mg Subcutaneous every 7 days      triamcinolone (KENALOG) 0.1 % external cream Apply topically 2 times daily      nicotine (NICODERM CQ) 14 MG/24HR 24 hr patch  " (Patient not taking: Reported on 11/1/2023)      nicotine (NICODERM CQ) 21 MG/24HR 24 hr patch  (Patient not taking: Reported on 11/1/2023)      simvastatin (ZOCOR) 40 MG tablet Take 40 mg by mouth (Patient not taking: Reported on 11/1/2023)      traZODone (DESYREL) 150 MG tablet Take 150 mg by mouth at bedtime (Patient not taking: Reported on 11/1/2023)         Allergies:  Allergies   Allergen Reactions    Hydromorphone Other (See Comments)    Amoxicillin-Pot Clavulanate     Azithromycin      Other reaction(s): Stomach Upset    Black [place] Path Flavor Itching    Erythromycin Nausea and Vomiting    Pecan Extract Allergy Skin Test Itching    Penicillins Rash     Has tolerated Cefazolin (ANCEF) and other Cephalosporins       ROS:  Constitutional: positive for weight gain  Eyes: negative  Ears, nose, mouth, throat, and face: positive for stuffed nose  Respiratory: positive for emphysema or ?COPD  Cardiovascular: negative  Gastrointestinal: positive for GERD, history of colon polyps, change in bowels  Genitourinary:positive for blood in urine  Integument/breast: dry and cracked skin to fingers  Hematologic/lymphatic: negative  Musculoskeletal: positive for neck pain and back pain   Neurological: positive for headaches, dizziness, and balance problems  Behavioral/Psych: positive for vaping, medical marijuana, depression, and tobacco use  Endocrine: positive for depression  Allergic/Immunologic: negative    PHYSICAL EXAM:     Vital signs: /70 (BP Location: Left arm, Cuff Size: Adult Regular)   Pulse 94   Temp 98.4  F (36.9  C) (Tympanic)   Resp 16   Ht 1.575 m (5' 2\")   Wt 73.9 kg (163 lb)   SpO2 95%   BMI 29.81 kg/m     BMI: Body mass index is 29.81 kg/m .   General: Normal, healthy, cooperative, in no acute distress, alert   Skin: no rashes   Lungs: respirations are non-labored   Abdominal: non-distended   Extremities: No cyanosis, clubbing or edema noted bilaterally in Upper and Lower " Extremities   Neurological: without deficit    Assessment:   53 year old female with need for upper endoscopy for Gastroesohpageal Reflux and lower endoscopy for History of Colon Polyps and Change in Bowel Habits (change in nature of stools):    Plan:   Will schedule an esophagogastroduodenoscopy and colonoscopy.  The procedures with their risks, benefits and alternatives were explained.  Risks include but are not limited to bleeding, perforation, missing lesions, need for additional procedures, reaction to anesthesia.  All the patients questions were answered.  The patient consents to proceed.  The procedures will be scheduled.    Patient will need pre-operative clearance for these procedures.

## 2023-11-21 ENCOUNTER — HOSPITAL ENCOUNTER (OUTPATIENT)
Dept: RESPIRATORY THERAPY | Facility: HOSPITAL | Age: 53
Discharge: HOME | End: 2023-11-21
Attending: STUDENT IN AN ORGANIZED HEALTH CARE EDUCATION/TRAINING PROGRAM | Admitting: INTERNAL MEDICINE
Payer: COMMERCIAL

## 2023-11-21 DIAGNOSIS — J41.8 MIXED SIMPLE AND MUCOPURULENT CHRONIC BRONCHITIS (H): Primary | ICD-10-CM

## 2023-11-21 PROBLEM — E06.3 HASHIMOTO'S DISEASE: Status: ACTIVE | Noted: 2017-10-26

## 2023-11-21 PROBLEM — R29.898 BILATERAL ARM WEAKNESS: Status: ACTIVE | Noted: 2023-11-14

## 2023-11-21 PROBLEM — Z86.19 HISTORY OF HELICOBACTER PYLORI INFECTION: Status: ACTIVE | Noted: 2023-11-14

## 2023-11-21 PROBLEM — E06.3 HASHIMOTO'S DISEASE: Status: RESOLVED | Noted: 2017-10-26 | Resolved: 2023-11-21

## 2023-11-21 PROBLEM — Z90.710 S/P COMPLETE HYSTERECTOMY: Chronic | Status: ACTIVE | Noted: 2023-10-23

## 2023-11-21 LAB — HGB BLD-MCNC: 13.9 G/DL (ref 11.7–15.7)

## 2023-11-21 PROCEDURE — 94726 PLETHYSMOGRAPHY LUNG VOLUMES: CPT | Mod: 26 | Performed by: INTERNAL MEDICINE

## 2023-11-21 PROCEDURE — 85018 HEMOGLOBIN: CPT | Performed by: STUDENT IN AN ORGANIZED HEALTH CARE EDUCATION/TRAINING PROGRAM

## 2023-11-21 PROCEDURE — 94010 BREATHING CAPACITY TEST: CPT

## 2023-11-21 PROCEDURE — 94010 BREATHING CAPACITY TEST: CPT | Mod: 26 | Performed by: INTERNAL MEDICINE

## 2023-11-21 PROCEDURE — 94729 DIFFUSING CAPACITY: CPT | Mod: 26 | Performed by: INTERNAL MEDICINE

## 2023-11-21 PROCEDURE — 36415 COLL VENOUS BLD VENIPUNCTURE: CPT | Performed by: STUDENT IN AN ORGANIZED HEALTH CARE EDUCATION/TRAINING PROGRAM

## 2023-11-21 PROCEDURE — 94726 PLETHYSMOGRAPHY LUNG VOLUMES: CPT

## 2023-11-21 PROCEDURE — 94729 DIFFUSING CAPACITY: CPT

## 2023-11-21 NOTE — PROGRESS NOTES
[facility] [place]  [address] [facility] [address]  Phone: [phone]  Primary Provider: Hannah Tovar  Pre-op Performing Provider: HANNAH TOVAR      PREOPERATIVE EVALUATION:  Today's date: 11/22/2023    Gabrielle is a 53 year old, presenting for the following:  Pre-Op Exam    {(!) Visit Details have not yet been documented.  Please enter Visit Details and then use this list to pull in documentation. (Optional):211338}    Surgical Information:  Surgery/Procedure: ***  Surgery Location: ***  Surgeon: ***  Surgery Date: ***  Time of Surgery: ***  Where patient plans to recover: {Preop post recovery plans :223195}  Fax number for surgical facility: {:[phone]}    {2021 Provider Charting Preference for Preop :346925}    Subjective       HPI related to upcoming procedure: planning screening colonoscopy and EGD for longstanding GERD.     Has been feeling well*** with no fevers, congestion, cough, or other URI symptoms.   *** cardiac history. Denies any shortness of breath, chest pain, or dyspnea on exertion.   Is able to ***{light housework, climb flight of stairs, walk at 4mph, heavy housework, dance/tennis/softball} function at >4 METS.   Has *** had prior surgeries with general anesthesia and did ***.       {Click here to pull in Questionnaire Data after Qnr completed :494999}  Health Care Directive:  Patient does not have a Health Care Directive or Living Will: {ADVANCE_DIRECTIVE_STATUS:266406}    Preoperative Review of :  {Mnpmpreport:509523}  {Review MNPMP for all patients per [facility] MNPMP Profile:742258}    Status of Chronic Conditions:  COPD - Patient has a longstanding history of moderate-severe COPD . Patient has been doing well overall noting {LUNG SX:614587} and continues on medication regimen consisting of *** without adverse reactions or side effects.    HYPOTHYROIDISM - Patient has a longstanding history of chronic Hypothyroidism. Patient has been doing well, noting no tremor,  "insomnia, hair loss or changes in skin texture. Continues to take medications as directed, without adverse reactions or side effects. Last TSH No results found for: \"TSH\".  TSH at OSH was 0.59 on 9/15/23.     T2DM - has been well controlled. Last A1c 9/15/23 was 6.1 in [place]. Continues on Ozempic, dose ***.     ANXIETY/DEPRESSION - ***    INSOMNIA - ***    Tobacco use - ***        Review of Systems  {ROS Preop Choices:732936}    Patient Active Problem List    Diagnosis Date Noted    Mixed simple and mucopurulent chronic bronchitis (H) 10/23/2023     Priority: High    Type 2 diabetes mellitus without complication, without long-term current use of insulin (H) 04/19/2023     Priority: High    Bilateral arm weakness 11/14/2023     Priority: Medium    Cervical stenosis of spinal canal 10/23/2023     Priority: Medium    Degeneration of lumbar intervertebral disc 10/23/2023     Priority: Medium    Chronic pain disorder 09/12/2023     Priority: Medium    Insomnia 08/11/2022     Priority: Medium     Current Medications: Lunesta - causes a foul taste in her mouth. Not sleeping well still.   Previous medications: Valium, Trazodone, Restoril, Gabapentin, Lyrica       Generalized anxiety disorder 08/10/2021     Priority: Medium    Spondylosis without myelopathy or radiculopathy, cervical region 08/10/2021     Priority: Medium    Hyperlipidemia 09/27/2018     Priority: Medium    Tobacco use disorder (30 pack year history plus) 07/18/2018     Priority: Medium    RLS (restless legs syndrome) 03/08/2018     Priority: Medium    Hypothyroidism (acquired) 06/06/2017     Priority: Medium    Depressive disorder 06/21/2002     Priority: Medium    History of Helicobacter pylori infection 11/14/2023     Priority: Low    S/P complete hysterectomy 10/23/2023     Priority: Low      Past Medical History:   Diagnosis Date    Gastroesophageal reflux disease with esophagitis     Hashimoto's disease     Hypothyroidism     Insomnia     Type 2 " diabetes mellitus (H)     Umbilical hernia without obstruction and without gangrene     Urticaria     Last Assessment & Plan: Formatting of this note might be different from the original. Patient presented to the emergency room on 3/19/2023 with history of full body rash starting 3 weeks prior with discomfort and itching treated with hydrocortisone cream without relief.  Homeless family member had moved back into the house with prior exposure to bedbugs.  Under breasts lower legs severity moderate     Past Surgical History:   Procedure Laterality Date    CARPAL TUNNEL RELEASE RT/LT Bilateral     [facility] arthroplasty Left     COLONOSCOPY - HIM SCAN N/A 10/04/2018    [facility].    HYSTERECTOMY, PAP NO LONGER INDICATED N/A 10/10/2018    Cervix removed per [facility]'s Hysterectomy Path Report.    HYSTERECTOMY, TOTAL, LAPAROSCOPIC, WITH SALPINGO-OOPHORECTOMY, CYSTOSCOPY Bilateral 10/10/2018    [facility].     Current Outpatient Medications   Medication Sig Dispense Refill    ARIPiprazole (ABILIFY) 2 MG tablet Take 2 mg by mouth      bisacodyl (DULCOLAX) 5 MG EC tablet Take 1 tablet (5 mg) by mouth daily as needed for constipation 4 tablet 0    Blood Glucose Monitoring Suppl (ACCU-CHEK GUIDE) w/Device KIT USE TO TEST BLOOD SUGAR DAILY      Continuous Blood Gluc  (DEXCOM G6 ) MIKE Use to read blood sugars as per 's instructions. 1 each 1    Continuous Blood Gluc Sensor (DEXCOM G6 SENSOR) MISC CHANGE SENSOR EVERY 10 DAYS 3 each 3    Daridorexant HCl 50 MG TABS Take 50 mg by mouth      estradiol (ESTRACE) 0.1 MG/GM vaginal cream Place 1 g vaginally      fluticasone-salmeterol (ADVAIR) 250-50 MCG/ACT inhaler Inhale 1 puff into the lungs      guaiFENesin (MUCINEX) 600 MG 12 hr tablet Take 1 tablet (600 mg) by mouth 2 times daily as needed for congestion 30 tablet 1    hydrOXYzine (VISTARIL) 25 MG capsule Take 1 capsule (25 mg) by mouth 3 times daily as needed for itching 30 capsule 0     ipratropium - albuterol 0.5 mg/2.5 mg/3 mL (DUONEB) 0.5-2.5 (3) MG/3ML neb solution Inhale 3 mLs into the lungs      levothyroxine (SYNTHROID/LEVOTHROID) 88 MCG tablet Take 88 mcg by mouth daily      nicotine (NICODERM CQ) 14 MG/24HR 24 hr patch  (Patient not taking: Reported on 11/1/2023)      nicotine (NICODERM CQ) 21 MG/24HR 24 hr patch  (Patient not taking: Reported on 11/1/2023)      omeprazole (PRILOSEC) 20 MG DR capsule TAKE 1 CAPSULE BY MOUTH EVERY DAY AS NEEDED FOR GI UPSET FOR UP TO 14 DAYS      pramipexole (MIRAPEX) 1.5 MG tablet Take 1.5 mg by mouth      pregabalin (LYRICA) 25 MG capsule       PROAIR  (90 Base) MCG/ACT inhaler INHALE 2 PUFFS INTO THE LUNGS EVERY 4 HOURS AS NEEDED FOR SHORTNESS OF BREATH      rosuvastatin (CRESTOR) 40 MG tablet Take 40 mg by mouth daily      semaglutide (OZEMPIC) 2 MG/3ML pen Inject 0.25 mg Subcutaneous every 7 days      simvastatin (ZOCOR) 40 MG tablet Take 40 mg by mouth (Patient not taking: Reported on 11/1/2023)      traZODone (DESYREL) 150 MG tablet Take 150 mg by mouth at bedtime (Patient not taking: Reported on 11/1/2023)      triamcinolone (KENALOG) 0.1 % external cream Apply topically 2 times daily         Allergies   Allergen Reactions    Hydromorphone Other (See Comments)    Amoxicillin-Pot Clavulanate     Azithromycin      Other reaction(s): Stomach Upset    Black [place] Flavor Itching    Erythromycin Nausea and Vomiting    Pecan Extract Allergy Skin Test Itching    Penicillins Rash     Has tolerated Cefazolin (ANCEF) and other Cephalosporins        Social History     Tobacco Use    Smoking status: Every Day     Packs/day: 0.20     Years: 25.00     Additional pack years: 0.00     Total pack years: 5.00     Types: Cigarettes     Start date: 1986    Smokeless tobacco: Current   Substance Use Topics    Alcohol use: Not on file     {FAMILY HISTORY (Optional):430049995}  History   Drug Use Unknown         Objective     /84 (BP Location: Right arm,  "Patient Position: Sitting, Cuff Size: Adult Regular)   Pulse 90   Temp 97.9  F (36.6  C)   Resp 18   Ht 1.575 m (5' 2\")   Wt 75.8 kg (167 lb 3.2 oz)   SpO2 95%   BMI 30.58 kg/m      Physical Exam  {EXAM Preop Choices:059046}    Recent Labs   Lab Test 23  0719   HGB 13.9        Diagnostics:  {LABS:866804}   {EK}    Revised Cardiac Risk Index (RCRI):  The patient has the following serious cardiovascular risks for perioperative complications:  {PREOP REVISED CARDIAC RISK INDEX (RCRI) :484240}     RCRI Interpretation: {REVISED CARDIAC RISK INTERPRETATION :103952}         Signed Electronically by: Hannah Eaton MD  Copy of this evaluation report is provided to requesting physician.    {Provider Resources  Preop [facility] Preop Guidelines  Revised Cardiac Risk Index :147247}  "

## 2023-11-22 ENCOUNTER — OFFICE VISIT (OUTPATIENT)
Dept: FAMILY MEDICINE | Facility: OTHER | Age: 53
End: 2023-11-22
Attending: STUDENT IN AN ORGANIZED HEALTH CARE EDUCATION/TRAINING PROGRAM
Payer: COMMERCIAL

## 2023-11-22 VITALS
HEIGHT: 62 IN | BODY MASS INDEX: 30.77 KG/M2 | OXYGEN SATURATION: 95 % | RESPIRATION RATE: 18 BRPM | SYSTOLIC BLOOD PRESSURE: 130 MMHG | DIASTOLIC BLOOD PRESSURE: 84 MMHG | HEART RATE: 90 BPM | WEIGHT: 167.2 LBS | TEMPERATURE: 97.9 F

## 2023-11-22 DIAGNOSIS — E11.9 TYPE 2 DIABETES MELLITUS WITHOUT COMPLICATION, WITHOUT LONG-TERM CURRENT USE OF INSULIN (H): Chronic | ICD-10-CM

## 2023-11-22 DIAGNOSIS — L24.9 IRRITANT CONTACT DERMATITIS, UNSPECIFIED TRIGGER: ICD-10-CM

## 2023-11-22 DIAGNOSIS — F17.200 TOBACCO USE DISORDER: Chronic | ICD-10-CM

## 2023-11-22 DIAGNOSIS — E03.9 HYPOTHYROIDISM (ACQUIRED): Chronic | ICD-10-CM

## 2023-11-22 DIAGNOSIS — G47.00 INSOMNIA, UNSPECIFIED TYPE: Chronic | ICD-10-CM

## 2023-11-22 DIAGNOSIS — Z12.11 COLON CANCER SCREENING: ICD-10-CM

## 2023-11-22 DIAGNOSIS — F32.A DEPRESSIVE DISORDER: Chronic | ICD-10-CM

## 2023-11-22 DIAGNOSIS — K21.9 GASTROESOPHAGEAL REFLUX DISEASE WITHOUT ESOPHAGITIS: ICD-10-CM

## 2023-11-22 DIAGNOSIS — G25.81 RLS (RESTLESS LEGS SYNDROME): Chronic | ICD-10-CM

## 2023-11-22 DIAGNOSIS — F41.1 GENERALIZED ANXIETY DISORDER: Chronic | ICD-10-CM

## 2023-11-22 DIAGNOSIS — J41.8 MIXED SIMPLE AND MUCOPURULENT CHRONIC BRONCHITIS (H): Chronic | ICD-10-CM

## 2023-11-22 DIAGNOSIS — Z01.818 PREOP GENERAL PHYSICAL EXAM: Primary | ICD-10-CM

## 2023-11-22 LAB
ANION GAP SERPL CALCULATED.3IONS-SCNC: 10 MMOL/L (ref 7–15)
BUN SERPL-MCNC: 10.2 MG/DL (ref 6–20)
CALCIUM SERPL-MCNC: 9.9 MG/DL (ref 8.6–10)
CHLORIDE SERPL-SCNC: 107 MMOL/L (ref 98–107)
CREAT SERPL-MCNC: 0.86 MG/DL (ref 0.51–0.95)
DEPRECATED HCO3 PLAS-SCNC: 25 MMOL/L (ref 22–29)
EGFRCR SERPLBLD CKD-EPI 2021: 79 ML/MIN/1.73M2
GLUCOSE SERPL-MCNC: 97 MG/DL (ref 70–99)
POTASSIUM SERPL-SCNC: 4.1 MMOL/L (ref 3.4–5.3)
SODIUM SERPL-SCNC: 142 MMOL/L (ref 135–145)

## 2023-11-22 PROCEDURE — 36415 COLL VENOUS BLD VENIPUNCTURE: CPT | Performed by: STUDENT IN AN ORGANIZED HEALTH CARE EDUCATION/TRAINING PROGRAM

## 2023-11-22 PROCEDURE — 93000 ELECTROCARDIOGRAM COMPLETE: CPT | Mod: 77 | Performed by: INTERNAL MEDICINE

## 2023-11-22 PROCEDURE — 99214 OFFICE O/P EST MOD 30 MIN: CPT | Performed by: STUDENT IN AN ORGANIZED HEALTH CARE EDUCATION/TRAINING PROGRAM

## 2023-11-22 PROCEDURE — 80048 BASIC METABOLIC PNL TOTAL CA: CPT | Performed by: STUDENT IN AN ORGANIZED HEALTH CARE EDUCATION/TRAINING PROGRAM

## 2023-11-22 RX ORDER — CEFADROXIL 500 MG/1
500 CAPSULE ORAL 2 TIMES DAILY
Qty: 10 CAPSULE | Refills: 0 | Status: SHIPPED | OUTPATIENT
Start: 2023-11-22 | End: 2023-11-27

## 2023-11-22 RX ORDER — CLOBETASOL PROPIONATE 0.5 MG/G
CREAM TOPICAL 2 TIMES DAILY
Qty: 45 G | Refills: 0 | Status: SHIPPED | OUTPATIENT
Start: 2023-11-22

## 2023-11-22 ASSESSMENT — PAIN SCALES - GENERAL: PAINLEVEL: SEVERE PAIN (6)

## 2023-11-22 NOTE — PROGRESS NOTES
[facility]  [address]  [place] [address]  Phone: [phone]  Primary Provider: Carolyn Tovar  Pre-op Performing Provider: CAROLYN TOVAR      PREOPERATIVE EVALUATION:  Today's date: 11/22/2023    Gabrielle is a 53 year old, presenting for the following:  Pre-Op Exam        11/22/2023     9:23 AM   Additional Questions   Roomed by andre montgomery rn   Accompanied by none         11/22/2023     9:23 AM   Patient Reported Additional Medications   Patient reports taking the following new medications none       Surgical Information:  Surgery/Procedure: upper endoscopy and colonoscopy with possible biopsy, possible polypectomy   Surgery Location: [facility]  Surgeon: Dr. SOWMYA Monahan  Surgery Date: 12/4/2023  Time of Surgery: 6 am  Where patient plans to recover: At home with family  Fax number for surgical facility: Note does not need to be faxed, will be available electronically in Epic.    Assessment & Plan     The proposed surgical procedure is considered LOW risk.    Preop general physical exam  Optimized for surgical procedure, which is low risk.   - Basic metabolic panel; Future  - EKG 12-lead complete w/read - (Clinic Performed)  - Basic metabolic panel    Colon cancer screening  Gastroesophageal reflux disease without esophagitis  Reason for colonoscopy and EGD.     Mixed simple and mucopurulent chronic bronchitis (H)  Presumed COPD with tobacco history.   Minimally compliant with inhaler therapy - awaiting results of PFTs.   No wheezing on examination today. Does have frequent cough, responded well to her mucinex and is chronic & likely secondary to chronic bronchitis COPD type.   No signs of pneumonia or infection.     Type 2 diabetes mellitus without complication, without long-term current use of insulin (H)  Well controlled on Ozempic. Will hold one week prior to surgery.     RLS (restless legs syndrome)  Stable. Continues on pramipexole.     Depressive  disorder  Generalized anxiety disorder  Currently on Abilify 5mg. Fairly stable. Following with provider in [place].     Insomnia, unspecified type  Long history of difficulty - awaiting sleep study to r/o other pathology and SUKI  Uses trazodone 1/2 the month then daridorexant the other half (insurance only gives her half month rx)    Hypothyroidism (acquired)  Stable - tsh within normal limits and continues on synthroid    Tobacco use disorder (30 pack year history plus)  30+ pack year history, currently 1 pack/day smoker    Irritant contact dermatitis, unspecified trigger  Secondary to epoxy, located on right 3rd/4th finger.   Some honey colored crust - will do 5 days of abx to be safe/minimize infection risk. Has tolerated cephalosporins in the past and this should not hinder surgery.   Clobetasol given for active lesions/pruritus.   Discussed avoiding triggers/epoxy  Follow up if worsening/fails to improve   - cefadroxil (DURICEF) 500 MG capsule; Take 1 capsule (500 mg) by mouth 2 times daily for 5 days  - clobetasol (TEMOVATE) 0.05 % external cream; Apply topically 2 times daily Apply twice daily to affected lesions for up to 10-14 days.      Possible Sleep Apnea:      11/22/2023    10:36 AM   STOP-Bang Total Score   Total Score 3   Risk Stratification 3 - 4: Moderate Risk for SUKI           - No identified additional risk factors other than previously addressed    Antiplatelet or Anticoagulation Medication Instructions:   - Patient is on no antiplatelet or anticoagulation medications.    Additional Medication Instructions:  Patient is to take all scheduled medications on the day of surgery EXCEPT for modifications listed below:   - GLP-1 Injectable (exenitide, liraglutide, semaglutide, dulaglutide, etc.): HOLD 7 days before surgery   -hold supplements one week prior  - hold topical creams day of surgery   -bring inhaler to preop    RECOMMENDATION:  APPROVAL GIVEN to proceed with proposed procedure, without  further diagnostic evaluation.      Subjective       HPI related to upcoming procedure: planning screening colonoscopy and EGD for longstanding GERD.     Has been feeling well with no fevers, congestion, cough, or other URI symptoms. No recent covid, etc. Does have chronic sinus related issues with congestion.  No ischemic cardiac history. Denies any chest pain. No exertional symptoms. History of palpitations.   Does have significant lung disease - does have chronic dyspnea related to her copd. Mucinex has been very helpful. No longer doing daily advair. Formal PFTs were completed yesterday, awaiting results.   Is able to function at >4 METS. Able to walk a few blocks, up stairs with grocery bag.   Has  had prior surgeries with general anesthesia and did fine.   With anesthesia, reports hard time waking up/slower to wake up.     Reports chemical burn from epoxy resin - predominantly affecting right hand.   Has tried creams.   Just started when she hit diabetes.   Flares whenever she does her expoxy resin.   No fevers, chills, spreading redness         11/22/2023     9:01 AM   Preop Questions   1. Have you ever had a heart attack or stroke? UNKNOWN - no   2. Have you ever had surgery on your heart or blood vessels, such as a stent placement, a coronary artery bypass, or surgery on an artery in your head, neck, heart, or legs? No   3. Do you have chest pain with activity? No   4. Do you have a history of  heart failure? No   5. Do you currently have a cold, bronchitis or symptoms of other infection? No   6. Do you have a cough, shortness of breath, or wheezing? YES - constant related to COPD. No changes.    7. Do you or anyone in your family have previous history of blood clots? YES - Uncle   8. Do you or does anyone in your family have a serious bleeding problem such as prolonged bleeding following surgeries or cuts? UNKNOWN - no   9. Have you ever had problems with anemia or been told to take iron pills? UNKNOWN -  "no   10. Have you had any abnormal blood loss such as black, tarry or bloody stools, or abnormal vaginal bleeding? No   11. Have you ever had a blood transfusion? No   12. Are you willing to have a blood transfusion if it is medically needed before, during, or after your surgery? Yes   13. Have you or any of your relatives ever had problems with anesthesia? YES - patient   14. Do you have sleep apnea, excessive snoring or daytime drowsiness? UNKNOWN - yes, sleeping issues   15. Do you have any artifical heart valves or other implanted medical devices like a pacemaker, defibrillator, or continuous glucose monitor? No   16. Do you have artificial joints? No   17. Are you allergic to latex? No   18. Is there any chance that you may be pregnant? No       Health Care Directive:  Patient does not have a Health Care Directive or Living Will: Discussed advance care planning with patient; information given to patient to review.    Preoperative Review of :   reviewed - controlled substances reflected in medication list.    COPD - Patient has a longstanding history of moderate-severe COPD . Patient has been doing well overall noting SOB and continues on medication regimen consisting of as needed nebs without adverse reactions or side effects. Stopped her advair.     HYPOTHYROIDISM - Patient has a longstanding history of chronic Hypothyroidism. Patient has been doing well, noting no tremor, insomnia, hair loss or changes in skin texture. Continues to take medications as directed, without adverse reactions or side effects. Last TSH No results found for: \"TSH\".  TSH at OSH was 0.59 on 9/15/23.     T2DM - has been well controlled. Last A1c 9/15/23 was 6.1 in [place]. Continues on Ozempic, dose 0.25mg.     ANXIETY/DEPRESSION - stable. Continues on Abilify 5mg.     INSOMNIA - fluctuates between daridoexant and trazodone for her sleep. Awaiting evaluation with sleep medicine. Planning to r/o sleep apnea.     Tobacco use - was " down to 5 cigs/day, now up to 1 pack per day again. Over 30 pack year history.     Review of Systems  Constitutional, neuro, ENT, endocrine, pulmonary, cardiac, gastrointestinal, genitourinary, musculoskeletal, integument and psychiatric systems are negative, except as otherwise noted.    Patient Active Problem List    Diagnosis Date Noted    Mixed simple and mucopurulent chronic bronchitis (H) 10/23/2023     Priority: High    Type 2 diabetes mellitus without complication, without long-term current use of insulin (H) 04/19/2023     Priority: High    Bilateral arm weakness 11/14/2023     Priority: Medium    Cervical stenosis of spinal canal 10/23/2023     Priority: Medium    Degeneration of lumbar intervertebral disc 10/23/2023     Priority: Medium    Chronic pain disorder 09/12/2023     Priority: Medium    Insomnia 08/11/2022     Priority: Medium     Current Medications: Lunesta - causes a foul taste in her mouth. Not sleeping well still.   Previous medications: Valium, Trazodone, Restoril, Gabapentin, Lyrica       Generalized anxiety disorder 08/10/2021     Priority: Medium    Spondylosis without myelopathy or radiculopathy, cervical region 08/10/2021     Priority: Medium    Hyperlipidemia 09/27/2018     Priority: Medium    Tobacco use disorder (30 pack year history plus) 07/18/2018     Priority: Medium    RLS (restless legs syndrome) 03/08/2018     Priority: Medium    Hypothyroidism (acquired) 06/06/2017     Priority: Medium    Depressive disorder 06/21/2002     Priority: Medium    History of Helicobacter pylori infection 11/14/2023     Priority: Low    S/P complete hysterectomy 10/23/2023     Priority: Low      Past Medical History:   Diagnosis Date    Gastroesophageal reflux disease with esophagitis     Hashimoto's disease     Hypothyroidism     Insomnia     Type 2 diabetes mellitus (H)     Umbilical hernia without obstruction and without gangrene     Urticaria     Last Assessment & Plan: Formatting of this note  might be different from the original. Patient presented to the emergency room on 3/19/2023 with history of full body rash starting 3 weeks prior with discomfort and itching treated with hydrocortisone cream without relief.  Homeless family member had moved back into the house with prior exposure to bedbugs.  Under breasts lower legs severity moderate     Past Surgical History:   Procedure Laterality Date    CARPAL TUNNEL RELEASE RT/LT Bilateral     [facility] arthroplasty Left     COLONOSCOPY - HIM SCAN N/A 10/04/2018    [facility].    HYSTERECTOMY, PAP NO LONGER INDICATED N/A 10/10/2018    Cervix removed per [facility]'s Hysterectomy Path Report.    HYSTERECTOMY, TOTAL, LAPAROSCOPIC, WITH SALPINGO-OOPHORECTOMY, CYSTOSCOPY Bilateral 10/10/2018    [facility].     Current Outpatient Medications   Medication Sig Dispense Refill    cefadroxil (DURICEF) 500 MG capsule Take 1 capsule (500 mg) by mouth 2 times daily for 5 days 10 capsule 0    clobetasol (TEMOVATE) 0.05 % external cream Apply topically 2 times daily Apply twice daily to affected lesions for up to 10-14 days. 45 g 0    ARIPiprazole (ABILIFY) 2 MG tablet Take 5 mg by mouth      bisacodyl (DULCOLAX) 5 MG EC tablet Take 1 tablet (5 mg) by mouth daily as needed for constipation 4 tablet 0    Blood Glucose Monitoring Suppl (ACCU-CHEK GUIDE) w/Device KIT USE TO TEST BLOOD SUGAR DAILY      Continuous Blood Gluc  (DEXCOM G6 ) MIKE Use to read blood sugars as per 's instructions. 1 each 1    Continuous Blood Gluc Sensor (DEXCOM G6 SENSOR) MISC CHANGE SENSOR EVERY 10 DAYS 3 each 3    Daridorexant HCl 50 MG TABS Take 50 mg by mouth      estradiol (ESTRACE) 0.1 MG/GM vaginal cream Place 1 g vaginally      fluticasone-salmeterol (ADVAIR) 250-50 MCG/ACT inhaler Inhale 1 puff into the lungs      guaiFENesin (MUCINEX) 600 MG 12 hr tablet Take 1 tablet (600 mg) by mouth 2 times daily as needed for congestion 30 tablet 1    hydrOXYzine (VISTARIL)  25 MG capsule Take 1 capsule (25 mg) by mouth 3 times daily as needed for itching 30 capsule 0    ipratropium - albuterol 0.5 mg/2.5 mg/3 mL (DUONEB) 0.5-2.5 (3) MG/3ML neb solution Inhale 3 mLs into the lungs      levothyroxine (SYNTHROID/LEVOTHROID) 88 MCG tablet Take 88 mcg by mouth daily      nicotine (NICODERM CQ) 14 MG/24HR 24 hr patch  (Patient not taking: Reported on 11/1/2023)      nicotine (NICODERM CQ) 21 MG/24HR 24 hr patch  (Patient not taking: Reported on 11/1/2023)      omeprazole (PRILOSEC) 20 MG DR capsule TAKE 1 CAPSULE BY MOUTH EVERY DAY AS NEEDED FOR GI UPSET FOR UP TO 14 DAYS      pramipexole (MIRAPEX) 1.5 MG tablet Take 1.5 mg by mouth      pregabalin (LYRICA) 25 MG capsule       PROAIR  (90 Base) MCG/ACT inhaler INHALE 2 PUFFS INTO THE LUNGS EVERY 4 HOURS AS NEEDED FOR SHORTNESS OF BREATH      rosuvastatin (CRESTOR) 40 MG tablet Take 40 mg by mouth daily      semaglutide (OZEMPIC) 2 MG/3ML pen Inject 0.25 mg Subcutaneous every 7 days      traZODone (DESYREL) 150 MG tablet Take 150 mg by mouth at bedtime (Patient not taking: Reported on 11/1/2023)      triamcinolone (KENALOG) 0.1 % external cream Apply topically 2 times daily         Allergies   Allergen Reactions    Hydromorphone Other (See Comments)    Amoxicillin-Pot Clavulanate     Azithromycin      Other reaction(s): Stomach Upset    Black [place] Flavor Itching    Erythromycin Nausea and Vomiting    Pecan Extract Allergy Skin Test Itching    Penicillins Rash     Has tolerated Cefazolin (ANCEF) and other Cephalosporins        Social History     Tobacco Use    Smoking status: Every Day     Packs/day: 0.20     Years: 25.00     Additional pack years: 0.00     Total pack years: 5.00     Types: Cigarettes     Start date: 1986    Smokeless tobacco: Current   Substance Use Topics    Alcohol use: Not on file     No family history on file.  History   Drug Use Unknown         Objective     /84 (BP Location: Right arm, Patient Position:  "Sitting, Cuff Size: Adult Regular)   Pulse 90   Temp 97.9  F (36.6  C)   Resp 18   Ht 1.575 m (5' 2\")   Wt 75.8 kg (167 lb 3.2 oz)   SpO2 95%   BMI 30.58 kg/m      Physical Exam    GENERAL APPEARANCE: healthy, alert and no distress     EYES: EOMI, PERRL     HENT: ear canals and TM's normal and nose and mouth without ulcers or lesions     NECK: no adenopathy, no asymmetry, masses, or scars and thyroid normal to palpation     RESP: lungs clear to auscultation - no rales, rhonchi or wheezes; good air entry; no increased work of breathing     CV: regular rates and rhythm, normal S1 S2, no S3 or S4 and no murmur, click or rub     ABDOMEN:  soft, nontender, no HSM or masses and bowel sounds normal     MS: extremities normal- no gross deformities noted, no evidence of inflammation in joints, FROM in all extremities.     SKIN: no rashes; on her right hand 3rd and 4th finger, there are erythematous plaques with erythema and some mild edema with crusting, no drainage, slight tenderness without significant warmth     NEURO: Normal strength and tone, sensory exam grossly normal, mentation intact and speech normal     PSYCH: mentation appears normal. and affect normal/bright     LYMPHATICS: No cervical adenopathy    Recent Labs   Lab Test 11/21/23  0719   HGB 13.9        Diagnostics:  Recent Results (from the past 24 hour(s))   EKG 12-lead complete w/read - (Clinic Performed)    Collection Time: 11/22/23 10:05 AM   Result Value Ref Range    Systolic Blood Pressure  mmHg    Diastolic Blood Pressure  mmHg    Ventricular Rate 84 BPM    Atrial Rate 84 BPM    NH Interval 162 ms    QRS Duration 90 ms     ms    QTc 453 ms    P Axis 63 degrees    R AXIS -7 degrees    T Axis 52 degrees    Interpretation ECG       Sinus rhythm  Minimal voltage criteria for LVH, may be normal variant ( Tramaine product )  Borderline ECG  No previous ECGs available     Basic metabolic panel    Collection Time: 11/22/23 10:26 AM   Result Value " Ref Range    Sodium 142 135 - 145 mmol/L    Potassium 4.1 3.4 - 5.3 mmol/L    Chloride 107 98 - 107 mmol/L    Carbon Dioxide (CO2) 25 22 - 29 mmol/L    Anion Gap 10 7 - 15 mmol/L    Urea Nitrogen 10.2 6.0 - 20.0 mg/dL    Creatinine 0.86 0.51 - 0.95 mg/dL    GFR Estimate 79 >60 mL/min/1.73m2    Calcium 9.9 8.6 - 10.0 mg/dL    Glucose 97 70 - 99 mg/dL      EKG: appears normal, NSR, normal axis, normal intervals, no acute ST/T changes c/w ischemia, no LVH by voltage criteria, there are no prior tracings available    Revised Cardiac Risk Index (RCRI):  The patient has the following serious cardiovascular risks for perioperative complications:   - No serious cardiac risks = 0 points     RCRI Interpretation: 0 points: Class I (very low risk - 0.4% complication rate)         Signed Electronically by: Hannah Eaton MD  Copy of this evaluation report is provided to requesting physician.

## 2023-11-22 NOTE — PATIENT INSTRUCTIONS
Before Your Surgery     Call your surgeon if there is any change in your health. This includes signs of a cold or flu (such as a sore throat, runny nose, cough, rash, fever, urinary symptoms).   If you take prescribed drugs: Follow your doctor s orders about which medicines to take and which to stop until after surgery.  HOLD: ozempic one week prior to surgery  Hold supplements one week prior   Hold topical creams day of surgery   Bring inhaler to preoperative area   Okay to take your pramipexole, trazodone, or daridorexant the night prior   Okay to continue lyrica, crestor, abilify, omeprazole, thyroid (synthroid)  Do not smoke, drink alcohol or take over the counter medicine (unless your surgeon or primary care doctor tells you to) for the 24 hours before and after surgery. Smoking can increase your risk of an infection. Nicotine patches are safe to use. NSAIDS like ibuprofen (Advil, Motrin) should be held one day before surgery. Celebrex should be held 3 days before surgery. Naproxen (Aleve) should be held four days before surgery.   Do not take supplements/vitamins day prior and morning of surgery. Some supplements/vitamins can interfere with anesthesia.   Eating and drinking prior to surgery: follow the instructions from your surgeon  Take a shower or bath the night before surgery and morning of surgery. Use the soap your surgeon gave you to gently clean your skin night before and morning of surgery. If you do not have soap from your surgeon, use your regular soap.[facility] patients can receive free Chlorhexidine Gluconate 4% soap for surgery at an outpatient [facility] pharmacy.  Do not shave the surgery site.  Wear clean pajamas and have clean sheets on your bed.  Brush teeth morning of surgery to reduce risk of infection.

## 2023-11-26 LAB
ATRIAL RATE - MUSE: 84 BPM
DIASTOLIC BLOOD PRESSURE - MUSE: NORMAL MMHG
INTERPRETATION ECG - MUSE: NORMAL
P AXIS - MUSE: 63 DEGREES
PR INTERVAL - MUSE: 162 MS
QRS DURATION - MUSE: 90 MS
QT - MUSE: 384 MS
QTC - MUSE: 453 MS
R AXIS - MUSE: -7 DEGREES
SYSTOLIC BLOOD PRESSURE - MUSE: NORMAL MMHG
T AXIS - MUSE: 52 DEGREES
VENTRICULAR RATE- MUSE: 84 BPM

## 2023-11-27 ENCOUNTER — TELEPHONE (OUTPATIENT)
Dept: SURGERY | Facility: OTHER | Age: 53
End: 2023-11-27

## 2023-11-27 NOTE — OR NURSING
Instructed to hold NSAIDs, ASA, vitamins or supplements starting 11/27. Take levothyroxine day of procedure & bring inhaler.

## 2023-11-27 NOTE — TELEPHONE ENCOUNTER
Spoke with patient and told her I'd leave a new instruction packet at  of clinic for her to . Patient verbalized understanding.     Lucretia Howard RN on 11/27/2023 at 9:23 AM      ----- Message from Sabra Pelayo RN sent at 11/27/2023  9:11 AM CST -----  Regarding: Patient lost her instructions  She's scheduled 12/04 with Dr. Monahan.  Patient called and states she lost her instructions and needs another copy.  She's asking for a call back [phone]

## 2023-11-28 RX ORDER — LIDOCAINE 40 MG/G
CREAM TOPICAL
Status: CANCELLED | OUTPATIENT
Start: 2023-11-28

## 2023-11-28 RX ORDER — ONDANSETRON 4 MG/1
4 TABLET, ORALLY DISINTEGRATING ORAL EVERY 30 MIN PRN
Status: CANCELLED | OUTPATIENT
Start: 2023-11-28

## 2023-11-28 RX ORDER — SODIUM CHLORIDE, SODIUM LACTATE, POTASSIUM CHLORIDE, CALCIUM CHLORIDE 600; 310; 30; 20 MG/100ML; MG/100ML; MG/100ML; MG/100ML
INJECTION, SOLUTION INTRAVENOUS CONTINUOUS
Status: CANCELLED | OUTPATIENT
Start: 2023-11-28

## 2023-11-28 RX ORDER — ONDANSETRON 2 MG/ML
4 INJECTION INTRAMUSCULAR; INTRAVENOUS EVERY 30 MIN PRN
Status: CANCELLED | OUTPATIENT
Start: 2023-11-28

## 2023-11-30 ENCOUNTER — TELEPHONE (OUTPATIENT)
Dept: NEUROSURGERY | Facility: CLINIC | Age: 53
End: 2023-11-30
Payer: COMMERCIAL

## 2023-11-30 ENCOUNTER — VIRTUAL VISIT (OUTPATIENT)
Dept: PULMONOLOGY | Facility: OTHER | Age: 53
End: 2023-11-30
Attending: FAMILY MEDICINE
Payer: COMMERCIAL

## 2023-11-30 VITALS — WEIGHT: 163 LBS | HEIGHT: 62 IN | BODY MASS INDEX: 30 KG/M2

## 2023-11-30 DIAGNOSIS — G47.00 INSOMNIA, UNSPECIFIED TYPE: Primary | ICD-10-CM

## 2023-11-30 DIAGNOSIS — G89.4 CHRONIC PAIN SYNDROME: Primary | ICD-10-CM

## 2023-11-30 DIAGNOSIS — G25.81 RESTLESS LEGS SYNDROME (RLS): ICD-10-CM

## 2023-11-30 PROCEDURE — 99214 OFFICE O/P EST MOD 30 MIN: CPT | Mod: VID | Performed by: FAMILY MEDICINE

## 2023-11-30 ASSESSMENT — PAIN SCALES - GENERAL: PAINLEVEL: MODERATE PAIN (4)

## 2023-11-30 NOTE — NURSING NOTE
Is the patient currently in the state of [place]? YES    Visit mode:VIDEO    If the visit is dropped, the patient can be reconnected by: VIDEO VISIT: Text to cell phone:   Telephone Information:   Mobile [phone]       Will anyone else be joining the visit? NO  (If patient encounters technical issues they should call [phone]    How would you like to obtain your AVS? MyChart    Are changes needed to the allergy or medication list? Pt stated no med changes    Reason for visit: RECHECK    Sherly Altman VVF  Has patient had flu shot for current/most recent flu season? If so, when? No Pt has not had influenza vaccine

## 2023-12-01 RX ORDER — ROTIGOTINE 4 MG/24H
1 PATCH, EXTENDED RELEASE TRANSDERMAL DAILY
Qty: 30 PATCH | Refills: 1 | Status: SHIPPED | OUTPATIENT
Start: 2023-12-01 | End: 2023-12-08

## 2023-12-01 NOTE — PROGRESS NOTES
"Jami Wang is a 53 year old female who is being evaluated via a billable video visit.       The patient has been notified of following:      \"This video visit will be conducted via a call between you and your physician/provider. We have found that certain health care needs can be provided without the need for an in-person physical exam.  This service lets us provide the care you need with a video conversation.  If a prescription is necessary we can send it directly to your pharmacy.  If lab work is needed we can place an order for that and you can then stop by our lab to have the test done at a later time.     Video visits are billed at different rates depending on your insurance coverage.  Please reach out to your insurance provider with any questions.     If during the course of the call the physician/provider feels a video visit is not appropriate, you will not be charged for this service.\"     Patient has given verbal consent for Video visit? Yes  How would you like to obtain your AVS? Mail a copy  If you are dropped from the video visit, the video invite should be resent to: Text to cell phone: -  Will anyone else be joining your video visit? No  If patient encounters technical issues they should call [phone]      Video-Visit Details     Type of service:  Video Visit     Start Time:  0800  End Time:  0830    Originating Location (pt. Location): Home     Distant Location (provider location):  Off-site, [facility] [place]       Platform used for Video Visit: [employer]      Virtual visit for chronic insomnia.     A/P:     1.)  Low pretest likelihood of SUKI with STOP-BANG score of 2.     2.)  Chronic sleep onset and maintenance insomnia     Noted for history of trial of high number of medications for insomnia including benzodiazepines, gabapentin, DENISA receptor agonists, direct orexin receptor antagonists, trazodone, dopaminergic's.     I suspect her insomnia is highly multifactorial along " with an increased clinical suspicion for sleep state misperception.  Sleep state misperception-increased concern based on reports of incomplete response to multiple medications spanning multiple medication families for insomnia  Clear psychophysiological insomnia with inadequate sleep hygiene, inadequate stimulus control (household chores, use of phone), potential excessive daytime napping  Comorbid chronic pain, mental illness  Potential restless leg syndrome with no known prior ferritin levels along with concern for augmentation with incredibly high dose of pramipexole (up to 5 mg/day)     Given her longstanding chronic insomnia with incomplete or short-lived response to a large number of medication trials, we discussed that we do need to get a subjective and objective picture of her sleep wake pattern.  She appears open to this so we will start with actigraphy with sleep diaries for 2 weeks.  We also briefly discussed my concern regarding the very high dose of pramipexole and would increase the clinical suspicion for augmentation that may also be worsening her insomnia symptoms.  And I agree that it is challenging to assess the effectiveness of the to Daridorexant given that she is not able to take it consistently.    While I do not recommend additional sleep promoting medications, we discussed that I would like to try to decrease the pramipexole and for this would like to change to rotigotine patch and once the patch is started we will start with decreasing daytime pramipexole and then nighttime pramipexole.  I will also see if we might build to get coverage for Belsomra to allow for consistent usage, though I do not suspect that this will be significantly improved compared to previous medication trials.     Recommended Sleep Testing/Procedures:     Actigraphy and Sleep Diaries        SUBJECTIVE:  Jami Wang is a 53 year old female.     53 year old female who is referred for chronic insomnia.      Pertinent  "PMHx of generalized anxiety disorder, chronic pain disorder, DM II, hypothyroidism, RLS, depression.     Per chart review, has regular care with Monica Marie NP with [facility] in [place], [place].  It appears that a sleep consult was recommended based on recent visits presumably in regards to chronic insomnia, reported restless leg syndrome.  Appears referral has also been placed to sleep psychology.     Noted for multiple prior medications tried for sleep:  Quviviq (Daridorexant) - on current list, 50mg  Eszopiclone  Zaleplon  Valium  Temazepam  Pregablin  Gabapentin  Ropinirole - ? Dose / timing  Trazodone - on current list, 150mg  Pramipexole - on current list, 1.5mg  Hydroxyzine - on current list, 25mg BID PRN     STOP-BANG score of 2, with unknown neck circumference.  [place] score of 14.  RYAN: 28 (report of 4 to all questions)     No sleep diaries were available for review.     BMI of Estimated body mass index is 29.89 kg/m  as calculated from the following:    Height as of 8/24/23: 1.575 m (5' 2\").    Weight as of 10/23/23: 74.1 kg (163 lb 6.4 oz).      Today -we reviewed her extensive sleep history today of longstanding chronic sleep onset and maintenance insomnia.  We reviewed her current sleep related medications focused on decreasing sleep onset latency as well as treating presumed restless leg syndrome.     She does report that her insomnia has been better the last few weeks and she attributes this to stopping her thyroid replacement and diabetes medications (Ozempic).     She also discussed challenges to establishing a consistent sleep-wake pattern and potentially in regards to behavioral modification given that her granddaughter lives with her and is in many activities.     We also clarified that she is on an incredibly high dose of pramipexole she reports that she has the 1.5 mg tablets and takes 1.5 tablets (2.25 mg) at bedtime and take an additional 1-2 tablets (1.5-3 mg) during the day " "with up to a total of over 5 mg potentially daily.  She also feels that it has not been effective and she is needing to take more during the day for increased daytime restless leg type symptoms.     She discussed most recently starting Quivivig (direct orexin receptor antagonist) but is unsure of the effectiveness given that her insurance only covers 2 weeks out of 1 month..     We did confirm her caffeine use is 1 cup of coffee and 1 Mountain Dew typically in the morning, none later.     Per questionnaire: \"I cant sleep or i fall asleep and cant stay asleep. \"     Sxs for 5-6 years.     See above for multiple medication trials to date.     Caffeine use:  No for 3+ per day.  No for within 6 hours of bed.     Tobacco use: Yes     Sleep pattern:  Workdays.  8-10pm to 3am (?).  Weekends.  8-10pm to 3am..  Time to fall asleep: ~20 minutes with medication  Awakenings: 3-4 times per night, \"I don't\" minutes to return to sleep.  What wakes up: Snorting self awake;Pain;Use the bathroom;Anxiety;Uncertain   What do you usually do if you have trouble getting back to sleep? - Go and clean the house or work on puzzles or play on my phone   Average total sleep time:  4 hours  Napping.  ? (Not on purpose) days per week, 1-2 hours per nap.     Yes for RLS screen.  No for sleep walking.  Yes for dream enactment behavior.  No for bruxism.     Yes for morning headaches.  Yes for snoring.  No for observed apnea.        Noted for relatively pan-positive review of symptoms.        Past medical history:           Patient Active Problem List     Diagnosis Date Noted    Bilateral arm weakness 11/14/2023       Priority: Medium    History of Helicobacter pylori infection 11/14/2023       Priority: Medium    Cervical stenosis of spinal canal 10/23/2023       Priority: Medium    Degeneration of lumbar intervertebral disc 10/23/2023       Priority: Medium    Mixed simple and mucopurulent chronic bronchitis (H) 10/23/2023       Priority: Medium "    S/P complete hysterectomy 10/23/2023       Priority: Medium    Chronic pain disorder 09/12/2023       Priority: Medium    Type 2 diabetes mellitus without complication, without long-term current use of insulin (H) 04/19/2023       Priority: Medium    Insomnia 08/11/2022       Priority: Medium       Current Medications: Lunesta - causes a foul taste in her mouth. Not sleeping well still.   Previous medications: Valium, Trazodone, Restoril, Gabapentin, Lyrica        Generalized anxiety disorder 08/10/2021       Priority: Medium    Spondylosis without myelopathy or radiculopathy, cervical region 08/10/2021       Priority: Medium    Hyperlipidemia 09/27/2018       Priority: Medium    Tobacco use disorder (30 pack year history plus) 07/18/2018       Priority: Medium    RLS (restless legs syndrome) 03/08/2018       Priority: Medium    Hypothyroidism (acquired) 06/06/2017       Priority: Medium    Depressive disorder 06/21/2002       Priority: Medium         10 point ROS of systems including Constitutional, Eyes, Respiratory, Cardiovascular, Gastroenterology, Genitourinary, Integumentary, Muscularskeletal, Psychiatric were all negative except for pertinent positives noted in my HPI.     Current Outpatient Prescriptions          Current Outpatient Medications   Medication Sig Dispense Refill    ARIPiprazole (ABILIFY) 2 MG tablet Take 5 mg by mouth        bisacodyl (DULCOLAX) 5 MG EC tablet Take 1 tablet (5 mg) by mouth daily as needed for constipation 4 tablet 0    Blood Glucose Monitoring Suppl (ACCU-CHEK GUIDE) w/Device KIT USE TO TEST BLOOD SUGAR DAILY        clobetasol (TEMOVATE) 0.05 % external cream Apply topically 2 times daily Apply twice daily to affected lesions for up to 10-14 days. 45 g 0    Continuous Blood Gluc  (DEXCOM G6 ) MIKE Use to read blood sugars as per 's instructions. 1 each 1    Continuous Blood Gluc Sensor (DEXCOM G6 SENSOR) MISC CHANGE SENSOR EVERY 10 DAYS 3 each 3     Daridorexant HCl 50 MG TABS Take 50 mg by mouth        estradiol (ESTRACE) 0.1 MG/GM vaginal cream Place 1 g vaginally        fluticasone-salmeterol (ADVAIR) 250-50 MCG/ACT inhaler Inhale 1 puff into the lungs        guaiFENesin (MUCINEX) 600 MG 12 hr tablet Take 1 tablet (600 mg) by mouth 2 times daily as needed for congestion 30 tablet 1    hydrOXYzine (VISTARIL) 25 MG capsule Take 1 capsule (25 mg) by mouth 3 times daily as needed for itching 30 capsule 0    ipratropium - albuterol 0.5 mg/2.5 mg/3 mL (DUONEB) 0.5-2.5 (3) MG/3ML neb solution Inhale 3 mLs into the lungs        levothyroxine (SYNTHROID/LEVOTHROID) 88 MCG tablet Take 88 mcg by mouth daily        nicotine (NICODERM CQ) 14 MG/24HR 24 hr patch  (Patient not taking: Reported on 11/1/2023)        nicotine (NICODERM CQ) 21 MG/24HR 24 hr patch  (Patient not taking: Reported on 11/1/2023)        omeprazole (PRILOSEC) 20 MG DR capsule TAKE 1 CAPSULE BY MOUTH EVERY DAY AS NEEDED FOR GI UPSET FOR UP TO 14 DAYS        pramipexole (MIRAPEX) 1.5 MG tablet Take 1.5 mg by mouth        pregabalin (LYRICA) 25 MG capsule          PROAIR  (90 Base) MCG/ACT inhaler INHALE 2 PUFFS INTO THE LUNGS EVERY 4 HOURS AS NEEDED FOR SHORTNESS OF BREATH        rosuvastatin (CRESTOR) 40 MG tablet Take 40 mg by mouth daily        semaglutide (OZEMPIC) 2 MG/3ML pen Inject 0.25 mg Subcutaneous every 7 days        traZODone (DESYREL) 150 MG tablet Take 150 mg by mouth at bedtime (Patient not taking: Reported on 11/1/2023)        triamcinolone (KENALOG) 0.1 % external cream Apply topically 2 times daily                OBJECTIVE:  There were no vitals taken for this visit.     Physical Exam      ---  This note was written with the assistance of the Dragon voice-dictation technology software. The final document, although reviewed, may contain errors. For corrections, please contact the office.     Total time spent preparing to see the patient, review of chart, obtaining history  and physical examination, review of sleep testing, review of treatment options, education, discussion with patient and documenting in Epic / EMR was 45 minutes.  All time involved was spent on the day of service for the patient (the same day as the patient's appointment).     Wenceslao Ramirez MD     Sleep Medicine  [place], [place]  Main Office: [phone]  [facility] [address], [address]  Schedule visits: [phone]  Main Office: [phone]  Fax: [phone]

## 2023-12-01 NOTE — OR NURSING
Patient called to reschedule EGD/ Colonoscopy for 12/04 with Dr Monahan - states she is feeling sick - Call transferred to Dr Monahan's Office to reschedule.

## 2023-12-04 DIAGNOSIS — G25.81 RESTLESS LEGS SYNDROME (RLS): ICD-10-CM

## 2023-12-04 DIAGNOSIS — G47.00 INSOMNIA, UNSPECIFIED TYPE: ICD-10-CM

## 2023-12-04 NOTE — TELEPHONE ENCOUNTER
Action 12/5/23 MV 1.09pm   Action Taken Imaging request faxed to [facility]     Action 12/11/23 MV 1.30pm   Action Taken Images resolved in PACS       SPINE PATIENTS - NEW PROTOCOL PREVISIT    RECORDS RECEIVED FROM: external   REASON FOR VISIT: Spondylosis without myelopathy or radiculopathy, cervical region    Date of Appt: 12/19/23   NOTES (FOR ALL VISITS) STATUS DETAILS   OFFICE NOTE from referring provider Care Everywhere Monica Marie NP @ [facility]:  9/27/23 9/12/23   MEDICATION LIST Care Everywhere    IMAGING  (FOR ALL VISITS)     MRI (HEAD, NECK, SPINE) PACS [facility]:  MRI Lumbar Spine 1/20/23    [facility]:  MRI Cervical Spine 9/13/21  MRI Lumbar Spine 9/13/21  MRI Thoracic Spine 9/13/21

## 2023-12-05 ENCOUNTER — TELEPHONE (OUTPATIENT)
Dept: PULMONOLOGY | Facility: OTHER | Age: 53
End: 2023-12-05

## 2023-12-05 NOTE — TELEPHONE ENCOUNTER
Received an APPROVAL from [employer] for Suvorexant (BELSOMRA) 20 MG tablet. Effective dates 11/5/23-12/4/24. Will scan letter into chart when received.

## 2023-12-05 NOTE — TELEPHONE ENCOUNTER
Received a PA request from [employer] for Suvorexant (BELSOMRA) 20 MG tablet. Submitted on CMM. Waiting for a response.

## 2023-12-08 ENCOUNTER — OFFICE VISIT (OUTPATIENT)
Dept: FAMILY MEDICINE | Facility: OTHER | Age: 53
End: 2023-12-08
Attending: STUDENT IN AN ORGANIZED HEALTH CARE EDUCATION/TRAINING PROGRAM
Payer: COMMERCIAL

## 2023-12-08 VITALS
RESPIRATION RATE: 18 BRPM | BODY MASS INDEX: 31.08 KG/M2 | DIASTOLIC BLOOD PRESSURE: 68 MMHG | OXYGEN SATURATION: 91 % | HEART RATE: 97 BPM | HEIGHT: 62 IN | SYSTOLIC BLOOD PRESSURE: 124 MMHG | TEMPERATURE: 98.4 F | WEIGHT: 168.9 LBS

## 2023-12-08 DIAGNOSIS — J44.1 COPD EXACERBATION (H): ICD-10-CM

## 2023-12-08 DIAGNOSIS — J41.8 MIXED SIMPLE AND MUCOPURULENT CHRONIC BRONCHITIS (H): Primary | ICD-10-CM

## 2023-12-08 PROCEDURE — 90471 IMMUNIZATION ADMIN: CPT | Performed by: FAMILY MEDICINE

## 2023-12-08 PROCEDURE — 99214 OFFICE O/P EST MOD 30 MIN: CPT | Mod: 25 | Performed by: FAMILY MEDICINE

## 2023-12-08 PROCEDURE — 90686 IIV4 VACC NO PRSV 0.5 ML IM: CPT | Performed by: FAMILY MEDICINE

## 2023-12-08 ASSESSMENT — PAIN SCALES - GENERAL: PAINLEVEL: SEVERE PAIN (6)

## 2023-12-08 NOTE — PROGRESS NOTES
"  Assessment & Plan       ICD-10-CM    1. Mixed simple and mucopurulent chronic bronchitis (H)  J41.8 tiotropium (SPIRIVA RESPIMAT) 2.5 MCG/ACT inhaler      2. COPD exacerbation (H)  J44.1 tiotropium (SPIRIVA RESPIMAT) 2.5 MCG/ACT inhaler      Reviewed chart almost back a year   Seeing PCP in [place] and Dr VILLALOBOS here  Still smoking but decreasing   Real wet cough - more like chronic bronchitis /COPD   Pt compliance is some question on that  Stopped Advair and other meds  After long discussion- will start Spiriva - try first for 2-4 weeks   If improved but not satisfactory - then - add back Advair  Needs to quit smoking   Follow-up in March with DR VILLALOBOS  Has appt with [place] PCP next week       Review of external notes as documented elsewhere in note  Review of the result(s) of each unique test - PFT  Diagnosis or treatment significantly limited by social determinants of health - complex - lots of counseling   Prescription drug management  30 minutes spent by me on the date of the encounter doing chart review, history and exam, documentation and further activities per the note       BMI:   Estimated body mass index is 30.89 kg/m  as calculated from the following:    Height as of this encounter: 1.575 m (5' 2\").    Weight as of this encounter: 76.6 kg (168 lb 14.4 oz).           No follow-ups on file.    Ras Quintanilla MD  [facility] [place] Anthony is a 53 year old, presenting for the following health issues:  No chief complaint on file.        12/8/2023     9:22 AM   Additional Questions   Roomed by RHETT Wilkins   Accompanied by self`       HPI     COPD Follow-Up  Overall, how are your COPD symptoms since your last clinic visit?  Slightly worse  How much fatigue or shortness of breath do you have when you are walking?  More than usual  How much shortness of breath do you have when you are resting?  None  How often do you cough? All the time  Have you noticed any change in your " "sputum/phlegm?  No  Have you experienced a recent fever? No  Please describe how far you can walk without stopping to rest:  The length of 3-5 rooms  How many flights of stairs are you able to walk up without stopping?  1  Have you had any Emergency Room Visits, Urgent Care Visits, or Hospital Admissions because of your COPD since your last office visit?  No    Says Advair not help ?mucinex and duonebs - she feels nothing has made a difference   Pt stopped Advair - was on for about 2 months   Stopped \" everything \"-- nebs/advair /  Neupro for RLS- due to rash   Still smoking some     History   Smoking Status    Every Day    Packs/day: 0.20    Years: 25.00    Types: Cigarettes    Start date: 1986   Smokeless Tobacco    Current     No results found for: \"FEV1\", \"[id number]\"          Review of Systems   Constitutional, HEENT, cardiovascular, pulmonary, gi and gu systems are negative, except as otherwise noted.      Objective    /68 (BP Location: Left arm, Patient Position: Sitting, Cuff Size: Adult Large)   Pulse 97   Temp 98.4  F (36.9  C) (Tympanic)   Resp 18   Ht 1.575 m (5' 2\")   Wt 76.6 kg (168 lb 14.4 oz)   SpO2 91%   BMI 30.89 kg/m    There is no height or weight on file to calculate BMI.  Physical Exam   GENERAL: healthy, alert and no distress  NECK: no adenopathy, no asymmetry, masses, or scars and thyroid normal to palpation  RESP: lungs clear to auscultation - no rales, rhonchi or wheezes  CV: regular rate and rhythm, normal S1 S2, no S3 or S4, no murmur, click or rub, no peripheral edema and peripheral pulses strong  MS: no gross musculoskeletal defects noted, no edema                      "

## 2023-12-08 NOTE — LETTER
My COPD Action Plan     Name: Jami Wang    YOB: 1970   Date: 12/8/2023    My doctor: Ras Quintanilla MD   My clinic: [facility] [place]    [facility] [address]  [phone]  My Controller Medicine: Albuterol (Proair/Ventolin/Proventolin)   Dose: 108mcg     My Rescue Medicine:  duo neb   Dose:  0.5mg/2.5mg/3ml     My Flare Up Medicine:  none   Dose: none     My COPD Severity: { :712105}      Use of Oxygen: Oxygen Not Prescribed      Make sure you've had your pneumonia   vaccines.          GREEN ZONE       Doing well today    Usual level of activity and exercise  Usual amount of cough and mucus  No shortness of breath  Usual level of health (thinking clearly, sleeping well, feel like eating) Actions:    Take daily medicines  Use oxygen as prescribed  Follow regular exercise and diet plan  Avoid cigarette smoke and other irritants that harm the lungs           YELLOW ZONE          Having a bad day or flare up    Short of breath more than usual  A lot more sputum (mucus) than usual  Sputum looks yellow, green, tan, brown or bloody  More coughing or wheezing  Fever or chills  Less energy; trouble completing activities  Trouble thinking or focusing  Using quick relief inhaler or nebulizer more often  Poor sleep; symptoms wake me up  Do not feel like eating Actions:    Get plenty of rest  Take daily medicines  Use quick relief inhaler every *** hours  If you use oxygen, call you doctor to see if you should adjust your oxygen  Do breathing exercises or other things to help you relax  Let a loved one, friend or neighbor know you are feeling worse  Call your care team if you have 2 or more symptoms.  Start taking steroids or antibiotics if directed by your care team           RED ZONE       Need medical care now    Severe shortness of breath (feel you can't breathe)  Fever, chills  Not enough breath to do any activity  Trouble coughing up mucus, walking or talking  Blood in  mucus  Frequent coughing Rescue medicines are not working  Not able to sleep because of breathing  Feel confused or drowsy  Chest pain    Actions:    Call your health care team.  If you cannot reach your care team, call 911 or go to the emergency room.        Annual Reminders:  Meet with Care Team, Flu Shot every Fall  Pharmacy:    [id number] - [place], [place] - [address] [facility] [address] [id number] - [place], [place] - [address] AT [facility] [address]  [address]

## 2023-12-18 ENCOUNTER — CARE COORDINATION (OUTPATIENT)
Dept: NEUROSURGERY | Facility: CLINIC | Age: 53
End: 2023-12-18
Payer: COMMERCIAL

## 2023-12-19 ENCOUNTER — PRE VISIT (OUTPATIENT)
Dept: NEUROSURGERY | Facility: CLINIC | Age: 53
End: 2023-12-19

## 2023-12-19 ENCOUNTER — OFFICE VISIT (OUTPATIENT)
Dept: NEUROSURGERY | Facility: CLINIC | Age: 53
End: 2023-12-19
Payer: COMMERCIAL

## 2023-12-19 VITALS
HEART RATE: 87 BPM | HEIGHT: 62 IN | RESPIRATION RATE: 16 BRPM | BODY MASS INDEX: 30.91 KG/M2 | SYSTOLIC BLOOD PRESSURE: 142 MMHG | WEIGHT: 168 LBS | DIASTOLIC BLOOD PRESSURE: 87 MMHG | OXYGEN SATURATION: 96 %

## 2023-12-19 DIAGNOSIS — M54.2 CERVICALGIA: ICD-10-CM

## 2023-12-19 DIAGNOSIS — R20.0 FACIAL NUMBNESS: ICD-10-CM

## 2023-12-19 DIAGNOSIS — R32 URINARY INCONTINENCE, UNSPECIFIED TYPE: ICD-10-CM

## 2023-12-19 DIAGNOSIS — M47.12 CERVICAL SPONDYLOSIS WITH MYELOPATHY: ICD-10-CM

## 2023-12-19 DIAGNOSIS — R20.2 PARESTHESIAS: ICD-10-CM

## 2023-12-19 DIAGNOSIS — M54.50 CHRONIC BILATERAL LOW BACK PAIN, UNSPECIFIED WHETHER SCIATICA PRESENT: ICD-10-CM

## 2023-12-19 DIAGNOSIS — G89.29 CHRONIC BILATERAL LOW BACK PAIN, UNSPECIFIED WHETHER SCIATICA PRESENT: ICD-10-CM

## 2023-12-19 DIAGNOSIS — R15.9 INCONTINENCE OF FECES, UNSPECIFIED FECAL INCONTINENCE TYPE: ICD-10-CM

## 2023-12-19 DIAGNOSIS — H53.2 DOUBLE VISION: ICD-10-CM

## 2023-12-19 DIAGNOSIS — R29.2 HYPER REFLEXIA: Primary | ICD-10-CM

## 2023-12-19 DIAGNOSIS — R26.89 IMBALANCE: ICD-10-CM

## 2023-12-19 DIAGNOSIS — F17.219 CIGARETTE NICOTINE DEPENDENCE WITH NICOTINE-INDUCED DISORDER: ICD-10-CM

## 2023-12-19 DIAGNOSIS — R29.898 TRANSIENT WEAKNESS OF LOWER EXTREMITY: ICD-10-CM

## 2023-12-19 PROCEDURE — 99205 OFFICE O/P NEW HI 60 MIN: CPT | Performed by: PHYSICIAN ASSISTANT

## 2023-12-19 RX ORDER — PROCHLORPERAZINE 25 MG/1
SUPPOSITORY RECTAL
COMMUNITY
Start: 2023-12-07 | End: 2024-06-03

## 2023-12-19 RX ORDER — HYDROXYZINE HYDROCHLORIDE 25 MG/1
TABLET, FILM COATED ORAL
COMMUNITY
Start: 2023-12-18 | End: 2024-08-09

## 2023-12-19 RX ORDER — BLOOD SUGAR DIAGNOSTIC
STRIP MISCELLANEOUS
COMMUNITY
Start: 2023-12-13

## 2023-12-19 ASSESSMENT — PAIN SCALES - GENERAL: PAINLEVEL: SEVERE PAIN (7)

## 2023-12-19 NOTE — PROGRESS NOTES
Writer received call from patient to confirm change in appointment time.     Mariana Aden LPN  Neurosurgery

## 2023-12-19 NOTE — LETTER
"12/19/2023       RE: Jami Wang  [address]  [facility] [address]       Dear Colleague,    Thank you for referring your patient, Jami Wang, to the [facility]UROSURGERY [facility]IC [place] at [facility]. Please see a copy of my visit note below.        Neurosurgery Clinic Note    Chief Complaint: neck pain    History of Present Illness:  It was a pleasure to evaluate Jami Wang in clinic today at the kind referral of   Monica Marie, NP  [address] [place] [address].    Jami Wang is a 53 year old female with a PMH of RLS, DM2, REINALDO/MDD, hypothyroidism, tobacco dependency,  who is presenting for evaluation of neck pain.    Patient notes her neck is her main worry, but she has low back issues as well.    She notes neck pain for 10+ years.  Like someone having a knife in her.  Throbbing, burning and never goes away.  She also notes facial numbness in scattered areas.  If she sleeps wrong, her neck pain is really bad and that is when she experiences the numbness and \"double vision\".  She describes when moving her arm to make coffee that her hand looks like it is trailing, streaking.  She does not see 2 of something.      Several years ago (9/20212), she was lifting a large rug (~15 pounds) into her house and her entire body went limp. She decided to go out of the [employer] business after that episode.  She was able to get back up within 5 minutes, but notes her pain has increased over the years.  This has been a recurrent issue with her body giving out.  The last time it occurred is about 1 year ago.      She has constant headaches which start at the base of her skull and go upward into her head.      She has muscle weakness in her hands for about 5-6 years.  She is unable to open jars.  She drops things frequently.  She denies issues with buttoning or zipping.  She has had issues with her balance for the last couple of " "years.  She endorses stress incontinence (sneezing, etc), but sometimes she has incontinence with standing up or because she could not get to the bathroom on time.  She also notes bowel urgency.  She endorses constant tingling in her fingertips and the top of her toes on the left side.      The back pain is constant.  She is unable to lift things or walk for very long distances because of pain.  She is having to hang onto things or sit .  She can walk about 1000 feet max distance.  She has had low back pain since a fall on ice about 12-13 years ago.  She denies prior surgery on her neck or low back.      Currently smokes 1/2-1 pack per day.  Using e-cig to try and stop smoking.  She is interested in trying \"breath therapy\".     She has NS and chills.  She is post-menopausal.    Taking ozempic for DM2    Patient has issues with needles.      Conservative Treatment:  Lyrica 25 mg tid - not helpful  Aleve  Oxycodone PRN - by PCP  Injections - not tried  Oral steroids - not tried  Marijuana smoking                    Review of Systems   See HPI    Past Medical History:   Diagnosis Date    Gastroesophageal reflux disease with esophagitis     Hashimoto's disease     Hypothyroidism     Insomnia     Type 2 diabetes mellitus (H)     Umbilical hernia without obstruction and without gangrene     Urticaria     Last Assessment & Plan: Formatting of this note might be different from the original. Patient presented to the emergency room on 3/19/2023 with history of full body rash starting 3 weeks prior with discomfort and itching treated with hydrocortisone cream without relief.  Homeless family member had moved back into the house with prior exposure to bedbugs.  Under breasts lower legs severity moderate       Past Surgical History:   Procedure Laterality Date    CARPAL TUNNEL RELEASE RT/LT Bilateral     cmc arthroplasty Left     COLONOSCOPY - HIM SCAN N/A 10/04/2018    [facility].    HYSTERECTOMY, PAP NO LONGER INDICATED N/A " 10/10/2018    Cervix removed per [facility]'s Hysterectomy Path Report.    HYSTERECTOMY, TOTAL, LAPAROSCOPIC, WITH SALPINGO-OOPHORECTOMY, CYSTOSCOPY Bilateral 10/10/2018    [facility].       Social History     Socioeconomic History    Marital status:      Spouse name: None    Number of children: None    Years of education: None    Highest education level: None   Tobacco Use    Smoking status: Every Day     Packs/day: 0.20     Years: 25.00     Additional pack years: 0.00     Total pack years: 5.00     Types: Cigarettes     Start date: 1986     Passive exposure: Never    Smokeless tobacco: Current   Vaping Use    Vaping Use: Every day   Substance and Sexual Activity    Drug use: Never     Social Determinants of Health     Financial Resource Strain: Unknown (11/22/2023)    Financial Resource Strain     Within the past 12 months, have you or your family members you live with been unable to get utilities (heat, electricity) when it was really needed?: Patient refused   Food Insecurity: Unknown (11/22/2023)    Food Insecurity     Within the past 12 months, did you worry that your food would run out before you got money to buy more?: Patient refused     Within the past 12 months, did the food you bought just not last and you didn t have money to get more?: Patient refused   Recent Concern: Food Insecurity - High Risk (10/23/2023)    Food Insecurity     Within the past 12 months, did you worry that your food would run out before you got money to buy more?: Yes     Within the past 12 months, did the food you bought just not last and you didn t have money to get more?: Yes   Transportation Needs: Unknown (11/22/2023)    Transportation Needs     Within the past 12 months, has lack of transportation kept you from medical appointments, getting your medicines, non-medical meetings or appointments, work, or from getting things that you need?: Patient refused   Interpersonal Safety: Low Risk  (10/23/2023)     Interpersonal Safety     Do you feel physically and emotionally safe where you currently live?: Yes     Within the past 12 months, have you been hit, slapped, kicked or otherwise physically hurt by someone?: No     Within the past 12 months, have you been humiliated or emotionally abused in other ways by your partner or ex-partner?: No   Housing Stability: Unknown (11/22/2023)    Housing Stability     Do you have housing? : Patient refused     Are you worried about losing your housing?: Patient refused       family history is not on file.       IMAGING   Imaging independently reviewed.      MR Lumbar Spine w/o Contrast  Result Date: 1/20/2023  Exam: MR LUMBAR SPINE W/O CONTRAST Exam Reason: Low back pain; Leg weakness Technique: Sagittal T1, T2, and STIR as well as axial T2 images of the lumbar spine were obtained. Comparison: None. FINDINGS:  Normal lumbar lordosis is maintained.  No abnormalities of alignment are identified. The vertebral body heights are preserved. The marrow signal is unremarkable. The distal cord and conus medullaris have a normal caliber and morphology. The conus terminates at L2. No abnormal cord signal is seen in the distal cord or conus. There are a couple of T2 hyperintense cysts in the kidneys. L1-2: Minimal symmetric disc bulge. Minimal central canal narrowing. No significant neural foraminal narrowing. L2-3: Mild symmetric disc bulge. Mild central canal narrowing. Minimal bilateral neural foraminal narrowing. L3-4: Mild symmetric disc bulge. Mild central canal narrowing. Minimal bilateral neural foraminal narrowing. Mild facet hypertrophy. L4-5: Mild symmetric disc bulge. Minimal central canal narrowing. Mild bilateral neural foraminal narrowing. Mild facet hypertrophy. L5-S1: Mild symmetric disc bulge. Minimal central canal narrowing. Mild to moderate bilateral neural foraminal narrowing. Moderate facet hypertrophy.   IMPRESSION: Moderate facet arthropathy at L5-S1 with mild to  "moderate bilateral neural foraminal narrowing. Otherwise mild degenerative changes of the lumbar spine as detailed above. No high-grade central canal narrowing. RIO APARICIO MD   SYSTEM ID:  [id number]    XR Pelvis and Hip Bilateral 2 Views  Result Date: 12/19/2022  XR PELVIS AND HIP BILATERAL 2 VIEWS HISTORY: Bilateral hip pain. COMPARISON: None. TECHNIQUE: 5 views of the pelvis and hips. FINDINGS: No significant hip osteoarthritis. Facet degenerative changes degenerative hypertrophy at L5-S1. Possible pars defects. Bilateral SI joint degeneration.   IMPRESSION: Bulky L5-S1 facet degeneration. Possible L5 pars defects. Bilateral SI joint degeneration.  SHARI FRY MD   SYSTEM ID:  [id number]    Nicotine Usage:  Yes     Physical Exam   BP (!) 142/87 (BP Location: Right arm, Patient Position: Sitting)   Pulse 87   Resp 16   Ht 1.575 m (5' 2\")   Wt 76.2 kg (168 lb)   SpO2 96%   BMI 30.73 kg/m      Constitutional: Appears well-developed and well-nourished. Cooperative. No acute distress.   HENT:   Head: Normocephalic and atraumatic.   Eyes: Conjunctivae are normal.  Neck: Neck supple. No tracheal deviation present.  Cardiovascular: Normal rate and regular rhythm.    Pulmonary/Chest: Effort normal and breath sounds normal.  Abdominal: Exhibits no obvious distension.   Musculoskeletal: Able to move all extremities.  No involuntary motor movements.  Cervical muscular/paraspinal tension noted. Low back pain with extension and twisting. +SI R>L TTP and provocation (distraction, compression, Gaenslen's)   Skin: Skin is warm, dry and intact.   Psychiatric: Normal mood and affect. Speech is normal and behavior is normal.    Neurological:  Alert, NAD, and oriented to person, place, and time.   Facies symmetrical, EOMI, PERRL, speech clear/fluent, tongue midline, no pronator drift or dysmetria with FTN, left facial numbness, hearing bilaterally intact.   Gait: unable to tandem walk/imbalanced.     Strength " "(L/R)  5/5 Deltoid  5/5 Bicep  5/5 Wrist Extensor  5/5 Tricep  5/5 Finger Flexion  4/5 Finger Abduction  4+/5 Handgrip    5/4+ Hip Flexion  5/5 Knee Extension  5/5 Ankle Dorsiflexion  5/5 Extensor Hallucis Longus  5/5 Plantar Flexion    Reflexes (L/R)  3+/3+ Bicep  3+/3+ Brachioradialis  3+/3+ Patellar  3+/3+ Ankle    No Lhermitte's  No Spurling's  +/+ Tito's   No ankle clonus    Sensation: \"weird\" sensation in fingers       ASSESSMENT & PLAN:  Jami Wang is a 53 year old female with a PMH of RLS, DM2, REINALDO/MDD, hypothyroidism, tobacco dependency, who is presenting for evaluation of neck pain--but has low back pain as well.  Her signs and symptoms are concerning not only for spinal stenosis, but also for an intracranial process.      Will need EOS and full neuroaxis imaging for further evaluation.  Since patient lives remote, okay for her to get imaging done on same day as her return visit.  Patient understands that the official radiology read will not likely be available at that time and additional follow up may be needed.  She has severe claustrophobia and will need to have Valium prior to her imaging.  She will contact me a few days ahead of her MRIs for the prescription.      Discussed she will need to work on smoking cessation.      I spent 66 minutes spent in patient care, independent review and interpretation of medical records/imaging, reviewing old records.       Again, thank you for allowing me to participate in the care of your patient.      Sincerely,    Carlita Watson PA-C  "

## 2023-12-19 NOTE — TELEPHONE ENCOUNTER
SPINE PATIENTS - NEW PROTOCOL PREVISIT    RECORDS RECEIVED FROM: Care Everywhere   REASON FOR VISIT: Spondylosis without myelopathy or radiculopathy, cervical region   Date of Appt: 12/19/23 @ 2:30 pm    NOTES (FOR ALL VISITS) STATUS DETAILS   OFFICE NOTE from referring provider Care Everywhere Monica Marie NP @ [facility]:  9/27/23 9/12/23   MEDICATION LIST Care Everywhere    IMAGING  (FOR ALL VISITS)     XRAY (SPINE) *NEUROSURGERY* PACS [facility]:  MRI Lumbar Spine 1/20/23     [facility]:  MRI Cervical Spine 9/13/21  MRI Lumbar Spine 9/13/21  MRI Thoracic Spine 9/13/21

## 2023-12-19 NOTE — PROGRESS NOTES
"    Neurosurgery Clinic Note    Chief Complaint: neck pain    History of Present Illness:  It was a pleasure to evaluate Jami Wang in clinic today at the kind referral of   Monica Marie, NP  [address] [place] [address].    Jami Wang is a 53 year old female with a PMH of RLS, DM2, REINALDO/MDD, hypothyroidism, tobacco dependency,  who is presenting for evaluation of neck pain.    Patient notes her neck is her main worry, but she has low back issues as well.    She notes neck pain for 10+ years.  Like someone having a knife in her.  Throbbing, burning and never goes away.  She also notes facial numbness in scattered areas.  If she sleeps wrong, her neck pain is really bad and that is when she experiences the numbness and \"double vision\".  She describes when moving her arm to make coffee that her hand looks like it is trailing, streaking.  She does not see 2 of something.      Several years ago (9/20212), she was lifting a large rug (~15 pounds) into her house and her entire body went limp. She decided to go out of the [employer] business after that episode.  She was able to get back up within 5 minutes, but notes her pain has increased over the years.  This has been a recurrent issue with her body giving out.  The last time it occurred is about 1 year ago.      She has constant headaches which start at the base of her skull and go upward into her head.      She has muscle weakness in her hands for about 5-6 years.  She is unable to open jars.  She drops things frequently.  She denies issues with buttoning or zipping.  She has had issues with her balance for the last couple of years.  She endorses stress incontinence (sneezing, etc), but sometimes she has incontinence with standing up or because she could not get to the bathroom on time.  She also notes bowel urgency.  She endorses constant tingling in her fingertips and the top of her toes on the left side.      The back pain is " "constant.  She is unable to lift things or walk for very long distances because of pain.  She is having to hang onto things or sit .  She can walk about 1000 feet max distance.  She has had low back pain since a fall on ice about 12-13 years ago.  She denies prior surgery on her neck or low back.      Currently smokes 1/2-1 pack per day.  Using e-cig to try and stop smoking.  She is interested in trying \"breath therapy\".     She has NS and chills.  She is post-menopausal.    Taking ozempic for DM2    Patient has issues with needles.      Conservative Treatment:  Lyrica 25 mg tid - not helpful  Aleve  Oxycodone PRN - by PCP  Injections - not tried  Oral steroids - not tried  Marijuana smoking                    Review of Systems   See HPI    Past Medical History:   Diagnosis Date    Gastroesophageal reflux disease with esophagitis     Hashimoto's disease     Hypothyroidism     Insomnia     Type 2 diabetes mellitus (H)     Umbilical hernia without obstruction and without gangrene     Urticaria     Last Assessment & Plan: Formatting of this note might be different from the original. Patient presented to the emergency room on 3/19/2023 with history of full body rash starting 3 weeks prior with discomfort and itching treated with hydrocortisone cream without relief.  Homeless family member had moved back into the house with prior exposure to bedbugs.  Under breasts lower legs severity moderate       Past Surgical History:   Procedure Laterality Date    CARPAL TUNNEL RELEASE RT/LT Bilateral     cmc arthroplasty Left     [facility]    HYSTERECTOMY, PAP NO LONGER INDICATED N/A 10/10/2018    Cervix removed per [facility]'s Hysterectomy Path Report.    HYSTERECTOMY, TOTAL, LAPAROSCOPIC, WITH SALPINGO-OOPHORECTOMY, CYSTOSCOPY Bilateral 10/10/2018    [facility].       Social History     Socioeconomic History    Marital status:      Spouse name: None    Number of " children: None    Years of education: None    Highest education level: None   Tobacco Use    Smoking status: Every Day     Packs/day: 0.20     Years: 25.00     Additional pack years: 0.00     Total pack years: 5.00     Types: Cigarettes     Start date: 1986     Passive exposure: Never    Smokeless tobacco: Current   Vaping Use    Vaping Use: Every day   Substance and Sexual Activity    Drug use: Never     Social Determinants of Health     Financial Resource Strain: Unknown (11/22/2023)    Financial Resource Strain     Within the past 12 months, have you or your family members you live with been unable to get utilities (heat, electricity) when it was really needed?: Patient refused   Food Insecurity: Unknown (11/22/2023)    Food Insecurity     Within the past 12 months, did you worry that your food would run out before you got money to buy more?: Patient refused     Within the past 12 months, did the food you bought just not last and you didn t have money to get more?: Patient refused   Recent Concern: Food Insecurity - High Risk (10/23/2023)    Food Insecurity     Within the past 12 months, did you worry that your food would run out before you got money to buy more?: Yes     Within the past 12 months, did the food you bought just not last and you didn t have money to get more?: Yes   Transportation Needs: Unknown (11/22/2023)    Transportation Needs     Within the past 12 months, has lack of transportation kept you from medical appointments, getting your medicines, non-medical meetings or appointments, work, or from getting things that you need?: Patient refused   Interpersonal Safety: Low Risk  (10/23/2023)    Interpersonal Safety     Do you feel physically and emotionally safe where you currently live?: Yes     Within the past 12 months, have you been hit, slapped, kicked or otherwise physically hurt by someone?: No     Within the past 12 months, have you been humiliated or emotionally abused in other ways by your  partner or ex-partner?: No   Housing Stability: Unknown (11/22/2023)    Housing Stability     Do you have housing? : Patient refused     Are you worried about losing your housing?: Patient refused       family history is not on file.       IMAGING   Imaging independently reviewed.      MR Lumbar Spine w/o Contrast  Result Date: 1/20/2023  Exam: MR LUMBAR SPINE W/O CONTRAST Exam Reason: Low back pain; Leg weakness Technique: Sagittal T1, T2, and STIR as well as axial T2 images of the lumbar spine were obtained. Comparison: None. FINDINGS:  Normal lumbar lordosis is maintained.  No abnormalities of alignment are identified. The vertebral body heights are preserved. The marrow signal is unremarkable. The distal cord and conus medullaris have a normal caliber and morphology. The conus terminates at L2. No abnormal cord signal is seen in the distal cord or conus. There are a couple of T2 hyperintense cysts in the kidneys. L1-2: Minimal symmetric disc bulge. Minimal central canal narrowing. No significant neural foraminal narrowing. L2-3: Mild symmetric disc bulge. Mild central canal narrowing. Minimal bilateral neural foraminal narrowing. L3-4: Mild symmetric disc bulge. Mild central canal narrowing. Minimal bilateral neural foraminal narrowing. Mild facet hypertrophy. L4-5: Mild symmetric disc bulge. Minimal central canal narrowing. Mild bilateral neural foraminal narrowing. Mild facet hypertrophy. L5-S1: Mild symmetric disc bulge. Minimal central canal narrowing. Mild to moderate bilateral neural foraminal narrowing. Moderate facet hypertrophy.   IMPRESSION: Moderate facet arthropathy at L5-S1 with mild to moderate bilateral neural foraminal narrowing. Otherwise mild degenerative changes of the lumbar spine as detailed above. No high-grade central canal narrowing. RIO APARICIO MD   SYSTEM ID:  [id number]    XR Pelvis and Hip Bilateral 2 Views  Result Date: 12/19/2022  XR PELVIS AND HIP BILATERAL 2 VIEWS HISTORY:  "Bilateral hip pain. COMPARISON: None. TECHNIQUE: 5 views of the pelvis and hips. FINDINGS: No significant hip osteoarthritis. Facet degenerative changes degenerative hypertrophy at L5-S1. Possible pars defects. Bilateral SI joint degeneration.   IMPRESSION: Bulky L5-S1 facet degeneration. Possible L5 pars defects. Bilateral SI joint degeneration.  SHARI FRY MD   SYSTEM ID:  [id number]    Nicotine Usage:  Yes     Physical Exam   BP (!) 142/87 (BP Location: Right arm, Patient Position: Sitting)   Pulse 87   Resp 16   Ht 1.575 m (5' 2\")   Wt 76.2 kg (168 lb)   SpO2 96%   BMI 30.73 kg/m      Constitutional: Appears well-developed and well-nourished. Cooperative. No acute distress.   HENT:   Head: Normocephalic and atraumatic.   Eyes: Conjunctivae are normal.  Neck: Neck supple. No tracheal deviation present.  Cardiovascular: Normal rate and regular rhythm.    Pulmonary/Chest: Effort normal and breath sounds normal.  Abdominal: Exhibits no obvious distension.   Musculoskeletal: Able to move all extremities.  No involuntary motor movements.  Cervical muscular/paraspinal tension noted. Low back pain with extension and twisting. +SI R>L TTP and provocation (distraction, compression, Gaenslen's)   Skin: Skin is warm, dry and intact.   Psychiatric: Normal mood and affect. Speech is normal and behavior is normal.    Neurological:  Alert, NAD, and oriented to person, place, and time.   Facies symmetrical, EOMI, PERRL, speech clear/fluent, tongue midline, no pronator drift or dysmetria with FTN, left facial numbness, hearing bilaterally intact.   Gait: unable to tandem walk/imbalanced.     Strength (L/R)  5/5 Deltoid  5/5 Bicep  5/5 Wrist Extensor  5/5 Tricep  5/5 Finger Flexion  4/5 Finger Abduction  4+/5 Handgrip    5/4+ Hip Flexion  5/5 Knee Extension  5/5 Ankle Dorsiflexion  5/5 Extensor Hallucis Longus  5/5 Plantar Flexion    Reflexes (L/R)  3+/3+ Bicep  3+/3+ Brachioradialis  3+/3+ Patellar  3+/3+ Ankle    No " "Lhermitte's  No Spurling's  +/+ Tito's   No ankle clonus    Sensation: \"weird\" sensation in fingers       ASSESSMENT & PLAN:  Jami Wang is a 53 year old female with a PMH of RLS, DM2, REINALDO/MDD, hypothyroidism, tobacco dependency, who is presenting for evaluation of neck pain--but has low back pain as well.  Her signs and symptoms are concerning not only for spinal stenosis, but also for an intracranial process.      Will need EOS and full neuroaxis imaging for further evaluation.  Since patient lives remote, okay for her to get imaging done on same day as her return visit.  Patient understands that the official radiology read will not likely be available at that time and additional follow up may be needed.  She has severe claustrophobia and will need to have Valium prior to her imaging.  She will contact me a few days ahead of her MRIs for the prescription.      Discussed she will need to work on smoking cessation.      I spent 66 minutes spent in patient care, independent review and interpretation of medical records/imaging, reviewing old records.      Carlita Watson PA-C  Department of Neurosurgery  Office: [phone]       "

## 2023-12-19 NOTE — PATIENT INSTRUCTIONS
PT order provided to you.     MRI imaging of brain, cervical, thoracic and lumbar spine ordered.  Please get that when you are able.  Please let provider know a couple days ahead of your scheduled visit so Valium can be ordered.  Okay to follow up with provider same day of MRIs.        EOS XR - please get today if possible.      Smoking cessation.

## 2023-12-21 ENCOUNTER — OFFICE VISIT (OUTPATIENT)
Dept: SLEEP MEDICINE | Facility: HOSPITAL | Age: 53
End: 2023-12-21
Attending: FAMILY MEDICINE
Payer: COMMERCIAL

## 2023-12-21 DIAGNOSIS — G47.30 SLEEP-DISORDERED BREATHING: Primary | ICD-10-CM

## 2023-12-21 NOTE — PROGRESS NOTES
Jami came and picked up her actiwatch. She was instructed on the device and the paperwork that needed to be filled out. She demonstrated understand of the process. She will be returning her Actiwatch on 1/4

## 2023-12-22 ENCOUNTER — THERAPY VISIT (OUTPATIENT)
Dept: PHYSICAL THERAPY | Facility: OTHER | Age: 53
End: 2023-12-22
Attending: NURSE PRACTITIONER
Payer: COMMERCIAL

## 2023-12-22 DIAGNOSIS — G89.4 CHRONIC PAIN SYNDROME: ICD-10-CM

## 2023-12-22 PROCEDURE — 97542 WHEELCHAIR MNGMENT TRAINING: CPT | Mod: GP

## 2023-12-22 NOTE — PROGRESS NOTES
PHYSICAL THERAPY EVALUATION FOR DURABLE MEDICAL EQUIPMENT:  Type of Visit: Evaluation     Presenting condition and medical history: She has spinal stenosis and disc degenerative disease in multiple levels of her neck and low back. She can only walk a little bit then she is in terrible pain and arms and legs will give out so has to sit down. She has had about 4 falls in the past year. Her balance has gotten much worse as her neuropathy has progressed with the spinal stenosis. She was given some medical equipment when a family member passed away including a manual wheelchair, a small 3 wheeled scooter, a 2 wheeled and 4 wheeled walker.  She uses a walker most days and on her bad days, which are getting more often, she has to use the manual wheelchair. She still can only go short distances with the walker before she becomes short of breath with her COPD and increased pain in back and neck and cannot walk any further. She has a very hard time with the manual wheelchair though because the shoulder movements put pressure on her neck and history of about 10 hand surgeries (carpel tunnel both x2, trigger finger both x2, thumb surgery both sides, CMC arthroplasty on L and needs to have done on R), she has poor  strength and has a hard time gripping, dropped multiple things, and can't open things. The scooter works on level surfaces but not with the wide turns for in her home and not well on gravel, hills or thresholds to be able to use to get to medical appointments or to get into the community for shopping and to get out of the house for her mental health. She has a ramp into her home and their single level house is wheelchair accessible. Stove and fridge were moved so she can use the wheelchair in the kitchen without a problem. Has met with spine surgeon at [facility] and currently doing testing to see what she is appropriate for. She's been having episodes of double vision at times, she is working with neurology on this  "as well. With all her medical conditions she doesn't drive much anymore, and only for short distances if needed. They have a truck and also got a ramp to be able to load the wheelchair in the back. Her  does help her more on painful days, she still tries to do as much as she can around the house but very limited due to the pain. He works nights and then sleeps during the day. Her 12 year old grand daughter stays overnights with them so she is not alone most of the time. She might be home alone for a few hours a day during the week if he is running errands or longer when he works on a weekend.   Date of onset: chronic, progressively worse over several years  Dates & types of surgery including recent and planned surgery: possible back surgery  Cardiovascular status: COPD  Height: 5' 2\"   Weight: 168  See electronic medical record for Abuse and Falls Screening details.  Full list of past medical history, past surgical history and problem list from chart:   Past Medical History:   Diagnosis Date    Gastroesophageal reflux disease with esophagitis     Hashimoto's disease     Hypothyroidism     Insomnia     Type 2 diabetes mellitus (H)     Umbilical hernia without obstruction and without gangrene     Urticaria     Last Assessment & Plan: Formatting of this note might be different from the original. Patient presented to the emergency room on 3/19/2023 with history of full body rash starting 3 weeks prior with discomfort and itching treated with hydrocortisone cream without relief.  Homeless family member had moved back into the house with prior exposure to bedbugs.  Under breasts lower legs severity moderate      Past Surgical History:   Procedure Laterality Date    CARPAL TUNNEL RELEASE RT/LT Bilateral     cmc arthroplasty Left     COLON[facility] - HIM SC[facility]    HYSTERECTOMY, PAP NO LONGER INDICATED N/A 10/10/2018    [facility]'s Hysterectomy Path Report.    " HYSTERECTOMY, TOTAL, LAPAROSCOPIC, WITH SALPINGO-OOPHORECTOMY, CYSTOSCOPY Bilateral 10/10/2018    [facility].      Patient Active Problem List   Diagnosis    Generalized anxiety disorder    Depressive disorder    Hyperlipidemia    Hypothyroidism (acquired)    Insomnia    RLS (restless legs syndrome)    Spondylosis without myelopathy or radiculopathy, cervical region    Tobacco use disorder (30 pack year history plus)    Type 2 diabetes mellitus without complication, without long-term current use of insulin (H)    Chronic pain disorder    Cervical stenosis of spinal canal    Degeneration of lumbar intervertebral disc    Mixed simple and mucopurulent chronic bronchitis (H)    S/P complete hysterectomy    Bilateral arm weakness    History of Helicobacter pylori infection       Others Present at Evaluation:  Damian, pt, PT, vendor   Rehabilitation Technology Supplier/Phone Number:   Prem Joseph, [employer] with [email or web address] [phone]    Current Equipment for Mobility:   Manual Wheelchair, Power 3 wheeled Scooter, 4 wheeled and 2 wheeled walker- uses when her back or neck are flared up at least 4 days per week  All donated to her and are difficult for her to use because of her progressing mobility limitations.   Current Equipment Requires: New power equipment needed because she has used the more basic options and they are no longer providing safe and effective mobility for her daily needs.   Rationale for Equipment Changes: to be able to increase her independence in her home and for safe mobility around home and to medical appointments    Wheelchair Use: Ability to Perform Weight Shifts: Independent    Problems with Equipment for Mobility:  Equipment that she has been given (none purchased through insurance):   walker - does help with support to reduce falls but still puts a lot of stress through hands and shoulders that bothers cervical radiculopathy, limited tolerance of gait with COPD  manual  wheelchair - limited strength and range of motion in shoulders puts strain on cervical spine, poor  strength makes it difficult to maneuver wheelchair over thresholds and ramps or for any distance  3 wheeled scooter - not good in her smaller home with the wide turns, not well on gravel, hills or thresholds to be able to use to get to medical appointments or to get into the community     Home Accessibility  Primary Entrance:  Exposed ramp   Primary Entrance Width (inches): wheelchair accessible  All Rooms Wheelchair Accessible: can get wheelchair to the door of bathroom then holds onto walls/sink,   Type of home: single story  Who provides assistance (PCA/Family):  and daughter-in-law, applying for PCA coverage and may be qualifying for 1.75 hours per day (not finished with process yet)  How many hours home alone per day: few hours but not every day  Assistive equipment currently using: walker, manual wheelchair, 3 wheeled scooter    Employment/community mobility status: disabled     Community Activities of Daily Living  Transportation: truck  Community Mobility Requirements: Medical Appointments, Shopping    Cognitive Status Exam  Orientation Oriented to person, place and time   Level of Consciousness Alert  Follows Commands and Answers Questions: 100% of the time, Follows multi step instructions  Personal Safety and Judgement: Intact  Memory: Intact  Attention: No deficits identified  Organization/Problem Solving: No deficits identified  Executive Function: No deficits identified    Vision: Wears glasses    Hearing: intact    LEVEL OF FUNCTION    Bed mobility: Independent    Transfers: independent, uses upper extremity assistance     Bathing: sometimes needing help out of the tub    Upper Body Dressing: need moderate assist when really hurting    Lower Body Dressing: sitting due to balance is terrible, needs moderate assistance when really hurting     Toileting: independent    Grooming:  Independent    Eating/Self Feeding: Independent     Instrumental Activities of Daily Living (IADL):   Meal Planning/Prep: Mod Assist  Home/Financial Management: she can do a little house cleaning, but needs assistance with most shopping, cleaning, and laundry    Fatigue:  Reported Problems: fatigue with any mobility    Balance:  Unsupported Sitting Balance: WNL  Sitting Balance in Chair: WNL  Standing Balance: slow and reaches for walls/chairs, hand held assist from  with gait in/out of clinic or walker at home    Ambulation:   Level of [place]: Independent  Assistive Device(s): Walker (four wheeled), hand held assist, very short distance can do without holding on    Wheelchair Ability:   Manual wheelchair very limited due to pain and weakness, power scooters in stores go well    NEUROMUSCULAR:  History of Pressure Sores: No  Current Pressure Sores: No  Able to Perform Effective Pressure Relief/Reperfusion at Seated Surface: Yes  Methods of Pressure Relief: Standing  Methods Performed Consistently Through the Day: Yes    Coordination: hands limited coordination with trigger finger and h/o surgery    Tone: WNL    Sensation:  Sensory Deficits Reported: numbness at times but not constant  Sensation on Seating Surface: Intact per report, Impaired per report normally intact, sometimes numbness present in buttock    Pain: pain present constantly in back and neck, radiates down arms and legs when it gets worse with much activity like if she tries to do some light housework or really bad after back doctor did evaluation     Edema: no    POSTURE/POSITIONING:   Head and Neck:  Head and Neck Position: WFL  Head Control: Good  Tone/Movement of Head and Neck: good    Upper Extremities  Shoulder Position: WFL Bilaterally  UE ROM: R 125, L 130  UE Strength: shoulder flex, abd, ER, IR, elbow flex, ext: 4-/5 and reproduce radicular pain in cervical spine,  strength moderately limited  Dominance:  Right    Pelvis  Anterior/Posterior Pelvis Position: Neutral  Pelvic Obliquity: WFL  Pelvic Rotation: WFL    Trunk  Anterior/Posterior Trunk Position: Increased Thoracic Kyphosis, Partially Flexible  Left-Right Trunk Position: WFL  Upper Trunk Rotation: Neutral    Lower Extremities  Hip Position: Flexible, Neutral  LE ROM/ hip Limitations: she is able to stand upright so no hip or knee flexion contractures but takes short steps with limited hip extension present  LE Strength: hip flexion bilaterally 3+/5, hip abduction bilaterally 4-/5, hip add 3+/5, L knee flex and ext 3+/5, R knee flex and ext 4-/5, ankle L 3+/5, R 4/5, pain on L side but can be on both  Foot Positioning: WFL    Patient Measurements: vendor measured    Assessment & Plan   CLINICAL IMPRESSIONS   Medical Diagnosis: G89.4 (ICD-10-CM) - Chronic pain syndrome (also has orders for PT for: M54.50, G89.29 (ICD-10-CM) - Chronic bilateral low back pain, unspecified whether sciatica present, R26.89 (ICD-10-CM) - Imbalance, M54.2 (ICD-10-CM) - Cervicalgia, R32 (ICD-10-CM) - Urinary incontinence, unspecified type)  Treatment Diagnosis: spinal stenosis with lumbar radiculopathy and cervical radiculopathy causing chronic pain, weakness, and parasthesia, COPD, multiple co-morbidities  Impression/Assessment: Patient is a 53 year old female with multiple diagnoses.  The following significant findings have been identified: Pain, Decreased ROM/flexibility, Decreased strength, Impaired balance, Impaired sensation, Impaired gait, Impaired muscle performance, Decreased activity tolerance, and Impaired posture. These impairments interfere with their ability to perform self care tasks, work tasks, recreational activities, household chores, driving , household mobility, and community mobility as compared to previous level of function.     Clinical Decision Making (Complexity):   Clinical Presentation: Evolving/Changing  Clinical Presentation Rationale: based on medical and  "personal factors listed in PT evaluation  Clinical Decision Making (Complexity): Moderate complexity    PLAN OF CARE  Treatment Interventions: Interventions: Wheelchair Management/Training  Wheelchair goals set and met with assessment today:  Patient and family demonstrates understanding of equipment to reduce risk to patient and caregiver during ADL.  Patient and family demonstrates understanding of equipment for independent mobility, including benefits and limitations.  Frequency of Treatment: 1 time visit only for DME assessment  Duration of Treatment:  one visit only    Education Assessment: Learner/Method: Patient/Caregiver explanation  Risks and benefits of evaluation/treatment have been explained.   Patient/Family/caregiver agrees with Plan of Care.     Evaluation Time: included as part of wheelchair assessment  Intervention time: Wheelchair Mgmt/Training Minutes (62445): 90    Signing Clinician: Misty Quispe DPT     Referring Provider: Monica Marie NP    REQUISITION AND JUSTIFICATION FOR DURABLE MEDICAL EQUIPMENT    Patient Name:  Jami Wang (Gina\)  MR #:  [id number]  :  1970  Age/Gender:  53 year old female  Visit Date:  Jami \"Juancarlos Wang seen for seating and wheeled mobility evaluation by Misty Quispe DPT and ALEXANDER Dai from [employer] on 23.    CLINICAL CRITERIA FOR MOBILITY ASSISTIVE EQUIPMENT  Coverage Criteria Per Local Coverage Determination    A) Gabrielle has mobility limitations due to spinal stenosis causing lumbar and cervical radiculopathy with chronic pain, weakness and parasthesia in addition to her co-morbidities  that significantly impairs her ability to participate in all of her mobility-related activities of daily living (MRADL). Specifically affected are toileting (being able to get there in time to prevent accidents), dressing, and bathing (getting into the bathroom of designated place). Current equipment used is walker or manual " wheelchair that cause increased pain. This patient needs the new equipment requested to be able to increase her independence, decrease pain, and improve quality of life. Please see additional documentation in the seating and wheeled mobility report for details.   Gabrielle had a successful clinical trial here, and also will complete a successful trial at home with the recommended equipment. Jami is very willing and physically / cognitively able to use the recommended equipment to assist her with mobility-related activities of daily living and general mobility. A group 2 power wheelchair is being requested because it has better turning radius in her smaller home, more sturdy device to reduce risk of falls, and more power to be able to get up/down ramp to get into her home and over multiple surfaces including gravel and hills which are common in [facility] parking lots and sidewalks that Jami needs for independence with her activities of daily living and getting in and out of her home for medical appointments. A Group 1 power wheelchair does not have sufficient electronics to support this patient's progressive neurological deficits due to spinal stenosis with cervical and lumbar radiculopathy.  B) Gabrielle's mobility limitation cannot be sufficiently and safely resolved by the use of an appropriately fitted cane or walker because she is a very high fall risk. Timed up and Go results 28 seconds with no assistive device (could be slightly quicker with walker but she didn't bring it with her today instead held onto  for support), TUG results over 11 seconds is considered high fall risk. Distance and time to ambulate - stand from chair, walk 10 feet, turn around target, walk 10 feet, turn and sit on chair. Strength of legs is hip flexion bilaterally 3+/5, hip abduction bilaterally 4-/5, hip add 3+/5, L knee flex and ext 3+/5, R knee flex and ext 4-/5, ankle L 3+/5, R 4/5, pain present for one maximal repetition.  Fatigue and shortness of breath also impacts this patient's ability to ambulate, regardless of the gait aid.    C) Gabrielle does not have sufficient upper extremity function to self-propel an optimally-configured manual wheelchair in her home to perform MRADLs during a typical day due to limitations in strength, endurance, range of motion, and coordination. Strength of arms is shoulder flex, abd, ER, IR, elbow flex, ext: 4-/5 and reproduce radicular pain in cervical spine,  strength moderately limited.  D) Gabrielle is not able to use a POV/scooter because it will not fit in her home environment. Jami pushes heavily from chair for transfers and the scooter would be unsteady to safely transfer to and from a POV, pain with reaching that will limit the ability to operate the [employer] steering system.  E) The need for this equipment is LIFETIME.     RECOMMENDATIONS FOR MOBILITY BASE, SEATING SYSTEM AND COMPONENTS  Gabrielle has spinal stenosis that has progressively gotten worse and is causing cervical and lumbar radiculopathy and in addition to shortness of breath with her COPD she has very limited mobility in her home and in the community. She requires a walker for most of her gait or used hand held assist in/out of her appointment today. She has decreased step length, wide base of support, unsteady gait with no assistive device and has history of 4 falls in the past year. She requires a power wheelchair due to both her cervical radiculopathy causing pain, weakness and numbness in her upper extremities and her long history of over 10 hand surgeries causing decreased  strength. She is only 53 and wants to keep some independence around her home and in the community, a power wheelchair will give her increased mobility to see her granddaughters activities which is important for her mental health. She requires a group 2 power wheelchair ([employer]) to meet these mobility needs in a [facility] community and be able to  get in and out her her home.     To fit the power wheelchair to Gabrielle, the following features are required:   Height adjustable arms to be set to an appropriate height for her transfers and to rest arms on for upper body and trunk support to reduce pain with proper posture.   Swing away hardware for joystick on the right side so that she is able to drive up to a table or move up to a chair or bed to transfer and be able to swing the joystick out of the way so she does not catch on it causing injury and it doesn't damage the expensive drive features that would render the wheelchair unusable.   Upgraded batteries to make for full day use and for enough power to use on ramps and unstable surfaces to get in and out of her house for appointments. The smaller battery size will not accommodate the daily energy need.       I have read and concur with the above recommendations.    Physician Printed Name __________________________________________    Physician Signature  _____________________________________________    Date of Signature ______________________________    Physician Phone  ______________________________

## 2023-12-28 DIAGNOSIS — G25.81 RESTLESS LEGS SYNDROME (RLS): ICD-10-CM

## 2023-12-28 DIAGNOSIS — G47.00 INSOMNIA, UNSPECIFIED TYPE: ICD-10-CM

## 2024-01-04 ENCOUNTER — DOCUMENTATION ONLY (OUTPATIENT)
Dept: SLEEP MEDICINE | Facility: HOSPITAL | Age: 54
End: 2024-01-04
Attending: FAMILY MEDICINE
Payer: COMMERCIAL

## 2024-01-05 ENCOUNTER — DOCUMENTATION ONLY (OUTPATIENT)
Dept: SLEEP MEDICINE | Facility: HOSPITAL | Age: 54
End: 2024-01-05

## 2024-01-08 NOTE — OR NURSING
Ozempic - 12 day hold (last dose 1/03), Instructed to bring rescue inhaler dy of procedure.  Instructed to hold NSAIDs, ASA< vitamins & supplements starting today 1/08, continue other medications as prescribed up to night before, take levothyroxine day of procedure.

## 2024-01-09 ENCOUNTER — TELEPHONE (OUTPATIENT)
Dept: FAMILY MEDICINE | Facility: OTHER | Age: 54
End: 2024-01-09

## 2024-01-10 ENCOUNTER — OFFICE VISIT (OUTPATIENT)
Dept: FAMILY MEDICINE | Facility: OTHER | Age: 54
End: 2024-01-10
Attending: FAMILY MEDICINE
Payer: COMMERCIAL

## 2024-01-10 VITALS
DIASTOLIC BLOOD PRESSURE: 78 MMHG | BODY MASS INDEX: 32.09 KG/M2 | HEIGHT: 62 IN | HEART RATE: 100 BPM | TEMPERATURE: 98.3 F | SYSTOLIC BLOOD PRESSURE: 119 MMHG | WEIGHT: 174.4 LBS | OXYGEN SATURATION: 95 %

## 2024-01-10 DIAGNOSIS — F17.200 TOBACCO USE DISORDER: Chronic | ICD-10-CM

## 2024-01-10 DIAGNOSIS — E03.9 HYPOTHYROIDISM (ACQUIRED): Chronic | ICD-10-CM

## 2024-01-10 DIAGNOSIS — R21 RASH: ICD-10-CM

## 2024-01-10 DIAGNOSIS — Z01.818 PREOPERATIVE EXAMINATION: ICD-10-CM

## 2024-01-10 DIAGNOSIS — R06.09 CHRONIC DYSPNEA: Primary | ICD-10-CM

## 2024-01-10 DIAGNOSIS — E11.9 TYPE 2 DIABETES MELLITUS WITHOUT COMPLICATION, WITHOUT LONG-TERM CURRENT USE OF INSULIN (H): ICD-10-CM

## 2024-01-10 DIAGNOSIS — Z12.11 COLON CANCER SCREENING: ICD-10-CM

## 2024-01-10 PROCEDURE — 99214 OFFICE O/P EST MOD 30 MIN: CPT | Performed by: FAMILY MEDICINE

## 2024-01-10 RX ORDER — DOXYCYCLINE HYCLATE 100 MG
100 TABLET ORAL 2 TIMES DAILY
Qty: 14 TABLET | Refills: 0 | Status: SHIPPED | OUTPATIENT
Start: 2024-01-10 | End: 2024-01-17

## 2024-01-10 NOTE — PROGRESS NOTES
[facility] [place]  [address]  [place] [address]  Phone: [phone]  Primary Provider: Hannah Eaton  Pre-op Performing Provider: ALBERTO PITTMAN      PREOPERATIVE EVALUATION:  Today's date: 1/10/2024    Gabrielle is a 53 year old, presenting for the following:  Pre-Op Exam        1/10/2024    10:08 AM   Additional Questions   Roomed by Marianne Clancy   Accompanied by PCA         1/10/2024    10:08 AM   Patient Reported Additional Medications   Patient reports taking the following new medications none       Surgical Information:  Surgery/Procedure: upper endoscopy and colonoscopy with possible biopsy, possible polypectomy   Surgery Location: [facility]  Surgeon: MICHELLE Rossi  Surgery Date: 1/15/24  Time of Surgery: 8:55 AM  Where patient plans to recover: At home with family  Fax number for surgical facility: Note does not need to be faxed, will be available electronically in Epic.    Assessment & Plan     The proposed surgical procedure is considered LOW risk.    Preoperative examination    Colon cancer screening    Chronic dyspnea  Echo - may be done after colonoscopy.  - Echocardiogram Complete; Future    Rash  Ongoing rash on fingers, topical steroid clobetasol didn't help, visually resembles dyshidrotic eczema, now borderline developing a paronychia, not fluctuance yet.  - doxycycline hyclate (VIBRA-TABS) 100 MG tablet; Take 1 tablet (100 mg) by mouth 2 times daily for 7 days  - Adult Dermatology  Referral; Future    Type 2 diabetes mellitus without complication, without long-term current use of insulin (H)    Hypothyroidism (acquired)    Tobacco use disorder (30 pack year history plus)           - No identified additional risk factors other than previously addressed    Antiplatelet or Anticoagulation Medication Instructions:   - Patient is on no antiplatelet or anticoagulation medications.    Additional Medication Instructions:  Ozempic already on hold.  Okay to continue other meds per  usual schedule.    RECOMMENDATION:  APPROVAL GIVEN to proceed with proposed procedure, without further diagnostic evaluation.          Subjective       HPI related to upcoming procedure: preop EGD colonoscopy          1/10/2024     9:56 AM   Preop Questions   1. Have you ever had a heart attack or stroke? No   2. Have you ever had surgery on your heart or blood vessels, such as a stent placement, a coronary artery bypass, or surgery on an artery in your head, neck, heart, or legs? No   3. Do you have chest pain with activity? No   4. Do you have a history of  heart failure? No   5. Do you currently have a cold, bronchitis or symptoms of other infection? No   6. Do you have a cough, shortness of breath, or wheezing? YES - chronic cough and wheezing   7. Do you or anyone in your family have previous history of blood clots? No   8. Do you or does anyone in your family have a serious bleeding problem such as prolonged bleeding following surgeries or cuts? No   9. Have you ever had problems with anemia or been told to take iron pills? No   10. Have you had any abnormal blood loss such as black, tarry or bloody stools, or abnormal vaginal bleeding? No   11. Have you ever had a blood transfusion? No   12. Are you willing to have a blood transfusion if it is medically needed before, during, or after your surgery? Yes   13. Have you or any of your relatives ever had problems with anesthesia? YES - patient has difficult time waking up   14. Do you have sleep apnea, excessive snoring or daytime drowsiness? UNKNOWN - upcoming sleep eval later this month per her report   15. Do you have any artifical heart valves or other implanted medical devices like a pacemaker, defibrillator, or continuous glucose monitor? No   16. Do you have artificial joints? No   17. Are you allergic to latex? No   18. Is there any chance that you may be pregnant? No     Health Care Directive:  Patient does not have a Health Care Directive or Living  Will: Discussed advance care planning with patient; however, patient declined at this time.    Preoperative Review of :  Current meds on med list      Status of Chronic Conditions:  Chronic dyspnea/cough, Clinical COPD, smoking 25+ yrs, up to 2 ppd, current 1/2 to 1 ppd.  PFT's dated 11/21/23 showing mild diffusion defect - Pulmonary [facility]  No previous echo.  CT Chest 9/15/23 - minor emphysema.  Restless Leg Syndrome / Sleep issues - reports upcoming sleep study  GERD  Hypothyroid - TSH current/at goal on supplement  HLP on statin  DM2 - last dose of Ozempic 2 weeks ago per patient (holding for surgery)  Hasn't used her estrogen cream in 3 weeks - takes for hot flashes and vaginal symptoms  Patient doesn't have pill bottles/med list with her - so unable to update med list - she thinks she isn't on some of the listed meds  Smoking marijuana daily        Review of Systems   Constitutional:  Negative for fever.   HENT:  Negative for sore throat.    Respiratory:  Negative for shortness of breath.    Cardiovascular:  Negative for chest pain and peripheral edema.   Neurological:  Negative for light-headedness.         Patient Active Problem List    Diagnosis Date Noted    Bilateral arm weakness 11/14/2023     Priority: Medium    History of Helicobacter pylori infection 11/14/2023     Priority: Medium    Cervical stenosis of spinal canal 10/23/2023     Priority: Medium    Degeneration of lumbar intervertebral disc 10/23/2023     Priority: Medium    Mixed simple and mucopurulent chronic bronchitis (H) 10/23/2023     Priority: Medium    S/P complete hysterectomy 10/23/2023     Priority: Medium    Chronic pain disorder 09/12/2023     Priority: Medium    Type 2 diabetes mellitus without complication, without long-term current use of insulin (H) 04/19/2023     Priority: Medium    Insomnia 08/11/2022     Priority: Medium     Current Medications: Lunesta - causes a foul taste in her mouth. Not sleeping well still.    Previous medications: Valium, Trazodone, Restoril, Gabapentin, Lyrica       Generalized anxiety disorder 08/10/2021     Priority: Medium    Spondylosis without myelopathy or radiculopathy, cervical region 08/10/2021     Priority: Medium    Hyperlipidemia 09/27/2018     Priority: Medium    Tobacco use disorder (30 pack year history plus) 07/18/2018     Priority: Medium    RLS (restless legs syndrome) 03/08/2018     Priority: Medium    Hypothyroidism (acquired) 06/06/2017     Priority: Medium    Depressive disorder 06/21/2002     Priority: Medium      Past Medical History:   Diagnosis Date    Arthritis     Depressive disorder     Gastroesophageal reflux disease with esophagitis     Hashimoto's disease     Hypothyroidism     Insomnia     Type 2 diabetes mellitus (H)     Umbilical hernia without obstruction and without gangrene     Urticaria     Last Assessment & Plan: Formatting of this note might be different from the original. Patient presented to the emergency room on 3/19/2023 with history of full body rash starting 3 weeks prior with discomfort and itching treated with hydrocortisone cream without relief.  Homeless family member had moved back into the house with prior exposure to bedbugs.  Under breasts lower legs severity moderate     Past Surgical History:   Procedure Laterality Date    ABDOMEN SURGERY      CARPAL TUNNEL RELEASE RT/LT Bilateral     cmc arthroplasty Left     COLONOSCOPY      [facility] - HIM SCAN N/A [facility]    HYSTERECTOMY, PAP NO LONGER INDICATED N/A 10/10/2018    Cervix removed per [facility]'s Hysterectomy Path Report.    HYSTERECTOMY, TOTAL, LAPAROSCOPIC, WITH SALPINGO-OOPHORECTOMY, CYSTOSCOPY Bilateral 10/10/2018    [facility].     Current Outpatient Medications   Medication Sig Dispense Refill    ACCU-CHEK GUIDE test strip       ARIPiprazole (ABILIFY) 2 MG tablet Take 5 mg by mouth      bisacodyl (DULCOLAX) 5 MG EC tablet Take 1 tablet (5 mg) by mouth daily  as needed for constipation 4 tablet 0    Blood Glucose Monitoring Suppl (ACCU-CHEK GUIDE) w/Device KIT USE TO TEST BLOOD SUGAR DAILY      clobetasol (TEMOVATE) 0.05 % external cream Apply topically 2 times daily Apply twice daily to affected lesions for up to 10-14 days. 45 g 0    Continuous Blood Gluc  (DEXCOM G6 ) MIKE Use to read blood sugars as per 's instructions. 1 each 1    Continuous Blood Gluc Sensor (DEXCOM G6 SENSOR) MISC CHANGE SENSOR EVERY 10 DAYS 3 each 3    Continuous Blood Gluc Transmit (DEXCOM G6 TRANSMITTER) MISC CHANGE EVERY 90 DAYS      Daridorexant HCl 50 MG TABS Take 50 mg by mouth      estradiol (ESTRACE) 0.1 MG/GM vaginal cream Place 1 g vaginally      guaiFENesin (MUCINEX) 600 MG 12 hr tablet Take 1 tablet (600 mg) by mouth 2 times daily as needed for congestion 30 tablet 1    hydrOXYzine (VISTARIL) 25 MG capsule Take 1 capsule (25 mg) by mouth 3 times daily as needed for itching 30 capsule 0    hydrOXYzine HCl (ATARAX) 25 MG tablet       ipratropium - albuterol 0.5 mg/2.5 mg/3 mL (DUONEB) 0.5-2.5 (3) MG/3ML neb solution Inhale 3 mLs into the lungs      levothyroxine (SYNTHROID/LEVOTHROID) 88 MCG tablet Take 88 mcg by mouth daily      nicotine (NICODERM CQ) 14 MG/24HR 24 hr patch       nicotine (NICODERM CQ) 21 MG/24HR 24 hr patch       omeprazole (PRILOSEC) 20 MG DR capsule TAKE 1 CAPSULE BY MOUTH EVERY DAY AS NEEDED FOR GI UPSET FOR UP TO 14 DAYS      pramipexole (MIRAPEX) 1.5 MG tablet Take 1.5 mg by mouth      pregabalin (LYRICA) 25 MG capsule       PROAIR  (90 Base) MCG/ACT inhaler       rosuvastatin (CRESTOR) 40 MG tablet Take 40 mg by mouth daily      semaglutide (OZEMPIC) 2 MG/3ML pen Inject 0.25 mg Subcutaneous every 7 days      Suvorexant (BELSOMRA) 20 MG tablet Take 1 tablet (20 mg) by mouth at bedtime 30 tablet 1    tiotropium (SPIRIVA RESPIMAT) 2.5 MCG/ACT inhaler Inhale 2 puffs into the lungs daily 4 g 4    traZODone (DESYREL) 150 MG tablet  "Take 150 mg by mouth at bedtime      triamcinolone (KENALOG) 0.1 % external cream Apply topically 2 times daily         Allergies   Allergen Reactions    Hydromorphone Other (See Comments)    Amoxicillin-Pot Clavulanate     Azithromycin      Other reaction(s): Stomach Upset    Black [place] Flavor Itching    Erythromycin Nausea and Vomiting    Pecan Extract Allergy Skin Test Itching    Penicillins Rash     Has tolerated Cefazolin (ANCEF) and other Cephalosporins        Social History     Tobacco Use    Smoking status: Every Day     Packs/day: 1.50     Years: 25.00     Additional pack years: 0.00     Total pack years: 37.50     Types: Cigarettes     Start date: 1/1/1986     Passive exposure: Never    Smokeless tobacco: Current   Substance Use Topics    Alcohol use: Not Currently       History   Drug Use    Types: Marijuana         Objective     /78 (BP Location: Right arm, Patient Position: Sitting, Cuff Size: Adult Regular)   Pulse 100   Temp 98.3  F (36.8  C) (Tympanic)   Ht 1.575 m (5' 2\")   Wt 79.1 kg (174 lb 6.4 oz)   SpO2 95%   BMI 31.90 kg/m      Physical Exam  Constitutional:       General: She is not in acute distress.     Appearance: Normal appearance.   HENT:      Head: Normocephalic and atraumatic.      Mouth/Throat:      Mouth: Mucous membranes are moist.      Pharynx: Oropharynx is clear.   Eyes:      Conjunctiva/sclera: Conjunctivae normal.      Pupils: Pupils are equal, round, and reactive to light.   Neck:      Vascular: No carotid bruit.   Cardiovascular:      Rate and Rhythm: Normal rate and regular rhythm.      Heart sounds: Normal heart sounds. No murmur heard.  Pulmonary:      Effort: Pulmonary effort is normal.      Breath sounds: Normal breath sounds.   Abdominal:      General: Bowel sounds are normal.      Palpations: Abdomen is soft.      Tenderness: There is no abdominal tenderness.   Musculoskeletal:      Cervical back: Normal range of motion.      Right lower leg: No edema.     "  Left lower leg: No edema.   Lymphadenopathy:      Cervical: No cervical adenopathy.   Neurological:      Mental Status: She is alert and oriented to person, place, and time.     Eczema appearing rash on fingers, swelling near a couple proximal nails.      Recent Labs   Lab Test 11/22/23  1026 11/21/23  0719   HGB  --  13.9     --    POTASSIUM 4.1  --    CR 0.86  --         Diagnostics:  CBC, CMP dated 12/19/23 are acceptable.    EKG dated 11/22/23 - sinus rhythm, minimal voltage criteria for LVH (may be normal variant), rate 84,            Signed Electronically by: ALBERTO PITTMAN DO  Copy of this evaluation report is provided to requesting physician.

## 2024-01-10 NOTE — COMMUNITY RESOURCES LIST (ENGLISH)
01/10/2024   [facility]  N/A  For questions about this resource list or additional care needs, please contact your primary care clinic or care manager.  Phone: [phone]   Email: N/A   Address: [address] [place] [id number]   Hours: N/A        Transportation       Free or low-cost transportation  1  [employer] [facility] - Free or Low-cost Transportation Distance: 6.44 miles      In-Person   [address] [place] [address]  Language: English  Hours: Mon - Fri 8:00 AM - 4:30 PM  Fees: Free   Phone: (409) 218-7274 Email: contactus@[employer].org Website: https://www.[employer].org/component/mymaplocations/tyomqvq-irianizcdik-sjioio          Important Numbers & Websites       Emergency Services   911  [facility]   311  Poison Control   (545) 991-1856  Suicide Prevention Lifeline   (953) 443-7072 (TALK)  Child Abuse Hotline   (690) 430-1061 (4-A-Child)  Sexual Assault Hotline   (285) 360-7535 (HOPE)  National Runaway Safeline   (351) 190-2688 (RUNAWAY)  All-Options Talkline   (606) 641-7454  Substance Abuse Referral   (764) 320-2748 (HELP)

## 2024-01-11 ENCOUNTER — VIRTUAL VISIT (OUTPATIENT)
Dept: PULMONOLOGY | Facility: OTHER | Age: 54
End: 2024-01-11
Attending: FAMILY MEDICINE
Payer: COMMERCIAL

## 2024-01-11 VITALS — WEIGHT: 174 LBS | HEIGHT: 62 IN | BODY MASS INDEX: 32.02 KG/M2

## 2024-01-11 DIAGNOSIS — G25.81 RESTLESS LEGS SYNDROME (RLS): ICD-10-CM

## 2024-01-11 DIAGNOSIS — G47.00 INSOMNIA, UNSPECIFIED TYPE: Primary | ICD-10-CM

## 2024-01-11 DIAGNOSIS — E11.9 TYPE 2 DIABETES MELLITUS WITHOUT COMPLICATION, WITHOUT LONG-TERM CURRENT USE OF INSULIN (H): Chronic | ICD-10-CM

## 2024-01-11 DIAGNOSIS — G89.4 CHRONIC PAIN DISORDER: ICD-10-CM

## 2024-01-11 PROCEDURE — 99214 OFFICE O/P EST MOD 30 MIN: CPT | Mod: 95 | Performed by: FAMILY MEDICINE

## 2024-01-11 ASSESSMENT — PAIN SCALES - GENERAL: PAINLEVEL: EXTREME PAIN (8)

## 2024-01-11 NOTE — NURSING NOTE
Is the patient currently in the state of [place]? YES    Visit mode:VIDEO    If the visit is dropped, the patient can be reconnected by: VIDEO VISIT: Text to cell phone:   Telephone Information:   Mobile [phone]       Will anyone else be joining the visit? NO  (If patient encounters technical issues they should call [phone] :[phone])    How would you like to obtain your AVS? MyChart    Are changes needed to the allergy or medication list? No    Reason for visit: RECHECK    Dontae BRICEÑO

## 2024-01-11 NOTE — PROGRESS NOTES
"Virtual Visit Details    Type of service:  Video Visit   Video Start Time: {video visit start/end time for provider to select:990670}  Video End Time:{video visit start/end time for provider to select:740321}    Originating Location (pt. Location): {video visit patient location:573264::\"Home\"}  {PROVIDER LOCATION On-site should be selected for visits conducted from your clinic location or adjoining [facility] hospital, academic office, or other nearby [facility] building. Off-site should be selected for all other provider locations, including home:496093}  Distant Location (provider location):  {virtual location provider:759259}  Platform used for Video Visit: {Virtual Visit Platforms:870717::\"[employer]\"}    "

## 2024-01-11 NOTE — PROGRESS NOTES
"Jami Wang is a 53 year old female who is being evaluated via a billable video visit.       The patient has been notified of following:      \"This video visit will be conducted via a call between you and your physician/provider. We have found that certain health care needs can be provided without the need for an in-person physical exam.  This service lets us provide the care you need with a video conversation.  If a prescription is necessary we can send it directly to your pharmacy.  If lab work is needed we can place an order for that and you can then stop by our lab to have the test done at a later time.     Video visits are billed at different rates depending on your insurance coverage.  Please reach out to your insurance provider with any questions.     If during the course of the call the physician/provider feels a video visit is not appropriate, you will not be charged for this service.\"     Patient has given verbal consent for Video visit? Yes  How would you like to obtain your AVS? Mail a copy  If you are dropped from the video visit, the video invite should be resent to: Text to cell phone: -  Will anyone else be joining your video visit? No  If patient encounters technical issues they should call [phone]      Video-Visit Details     Type of service:  Video Visit     Start Time:  0800  End Time:  0825    Originating Location (pt. Location): Home     Distant Location (provider location):  Off-site, [facility]       Platform used for Video Visit: [employer]    Virtual visit for review of actigraphy results.     A/P:     1.)  Low pretest likelihood of SUKI with STOP-BANG score of 2.     2.)  Chronic sleep onset and maintenance insomnia     Noted for history of trial of high number of medications for insomnia including benzodiazepines, gabapentin, DENISA receptor agonists, direct orexin receptor antagonists, trazodone, dopaminergic's.     I suspect her insomnia is highly " multifactorial along with an increased clinical suspicion for sleep state misperception.  Sleep state misperception-increased concern based on reports of incomplete response to multiple medications spanning multiple medication families for insomnia  Clear psychophysiological insomnia with inadequate sleep hygiene, inadequate stimulus control (household chores, use of phone), potential excessive daytime napping  Comorbid chronic pain, mental illness  Potential restless leg syndrome with no known prior ferritin levels along with concern for augmentation with incredibly high dose of pramipexole (up to 5 mg/day)     Actigraphy was difficult to interpret given incomplete sleep diaries, but it showed a highly variable sleep onset and sleep offset timing, but total sleep time was higher than expected at 6 hours and an early sleep phase pattern.  The portion of sleep diaries completed were fairly consistent with actigraphy and did show a higher total sleep time than previously reported.    We also briefly discussed my concern regarding the very high dose of pramipexole and would increase the clinical suspicion for augmentation that may also be worsening her insomnia symptoms.  Unfortunately, she did not have success with adherence for the rotigotine patch, and did restart pramipexole but is on a slightly lower dose of 0.75 mg twice daily and 2.25 mg at bedtime.  As we hopefully are able to start nightly suvorexant, I would still like to work on reducing this dose of pramipexole.    She reports that she did get temporary approval for the suvorexant, presumed 20 mg size, and will build to start taking this consistently in roughly 4 days.    I did recommend follow-up with her primary physician in regards to the levothyroxine dosing and to recheck a TSH and also recheck a ferritin (elevated at 226 in April of this year).    Plan for follow-up in 1 month.  I will also write a letter of medical Sisley and sent to her via email in  "support of the suvorexant.      SUBJECTIVE:  Jami Wang is a 53 year old female.    RYAN 28 (all 4's).    Pertinent PMHx of generalized anxiety disorder, chronic pain disorder, DM II, hypothyroidism, RLS, depression.     Per chart review, has regular care with Monica Marie NP with [facility] in [place], [place].  It appears that a sleep consult was recommended based on recent visits presumably in regards to chronic insomnia, reported restless leg syndrome.  Appears referral has also been placed to sleep psychology.     Noted for multiple prior medications tried for sleep:  Quviviq (Daridorexant) - on current list, 50mg  Eszopiclone  Zaleplon  Valium  Temazepam  Pregablin  Gabapentin  Ropinirole - ? Dose / timing  Trazodone - on current list, 150mg  Pramipexole - on current list, 1.5mg  Hydroxyzine - on current list, 25mg BID PRN     STOP-BANG score of 2, with unknown neck circumference.  Sundance score of 14.  RYAN: 28 (report of 4 to all questions)     No sleep diaries were available for review.     BMI of Estimated body mass index is 29.89 kg/m  as calculated from the following:    Height as of 8/24/23: 1.575 m (5' 2\").    Weight as of 10/23/23: 74.1 kg (163 lb 6.4 oz).      11/30/2023 -we reviewed her extensive sleep history today of longstanding chronic sleep onset and maintenance insomnia.  We reviewed her current sleep related medications focused on decreasing sleep onset latency as well as treating presumed restless leg syndrome.     She does report that her insomnia has been better the last few weeks and she attributes this to stopping her thyroid replacement and diabetes medications (Ozempic).     She also discussed challenges to establishing a consistent sleep-wake pattern and potentially in regards to behavioral modification given that her granddaughter lives with her and is in many activities.     We also clarified that she is on an incredibly high dose of pramipexole she reports that she has " "the 1.5 mg tablets and takes 1.5 tablets (2.25 mg) at bedtime and take an additional 1-2 tablets (1.5-3 mg) during the day with up to a total of over 5 mg potentially daily.  She also feels that it has not been effective and she is needing to take more during the day for increased daytime restless leg type symptoms.     She discussed most recently starting Quivivig (direct orexin receptor antagonist) but is unsure of the effectiveness given that her insurance only covers 2 weeks out of 1 month..     We did confirm her caffeine use is 1 cup of coffee and 1 Mountain Dew typically in the morning, none later.     Per questionnaire: \"I cant sleep or i fall asleep and cant stay asleep. \"     Sxs for 5-6 years.     See above for multiple medication trials to date.     Caffeine use:  No for 3+ per day.  No for within 6 hours of bed.     Tobacco use: Yes     Sleep pattern:  Workdays.  8-10pm to 3am (?).  Weekends.  8-10pm to 3am..  Time to fall asleep: ~20 minutes with medication  Awakenings: 3-4 times per night, \"I don't\" minutes to return to sleep.  What wakes up: Snorting self awake;Pain;Use the bathroom;Anxiety;Uncertain   What do you usually do if you have trouble getting back to sleep? - Go and clean the house or work on puzzles or play on my phone   Average total sleep time:  4 hours  Napping.  ? (Not on purpose) days per week, 1-2 hours per nap.     Yes for RLS screen.  No for sleep walking.  Yes for dream enactment behavior.  No for bruxism.     Yes for morning headaches.  Yes for snoring.  No for observed apnea.     Noted for relatively pan-positive review of symptoms.    A/P for actigraphy, plan for likely referral to sleep psychology, change to suvorexant, eventual goal to decrease pramipexole given concern for augmentation, check ferritin.    Today -overall, she feels that her sleep was improved during the time of the actigraphy and she attributes this to not taking the levothyroxine for majority of this time.  " She has restarted levothyroxine at a dose of 88 mcg and again feels that this does exacerbate insomnia.  She was finally able to get approval, though may be temporary, for the suvorexant and will be able to start this as of Monday night.    She did receive an attempt to use the rotigotine patch, but found that it was unable to stay stuck to her skin so she has returned to taking the pramipexole.  This is still the 1.5 mg tablet and she is taking 0.75 mg in the morning, 0.75 mg around 3-4 PM and 2.25 mg near bedtime.    I did find 1 previous ferritin level with Kelsi on April 14, 2023 that was elevated at 226.    Actigraphy results:  Performed from 12/21/2023 - 1/3/2024.  Adequate signal quality, but sleep diaries were only partially completed - though portions completed seemed fairly consistent to actigraphy.  Average total sleep time of 6-7 hours.  Light exposure was not appropriately linked with sleep period.  Average sleep onset of ~8pm, ranging from 7pm - 11pm.  Was quite variable.  Average sleep offset of ~5am, ranging from ?[place] - 6am.  Was highly variable.  I suspect daytime naps noted ~2-3 per week, but hard to assess with incomplete sleep diaries.  Frequent prolonged middle of the night awakening and some increased body movement.          Past medical history:    Patient Active Problem List    Diagnosis Date Noted    Bilateral arm weakness 11/14/2023     Priority: Medium    History of Helicobacter pylori infection 11/14/2023     Priority: Medium    Cervical stenosis of spinal canal 10/23/2023     Priority: Medium    Degeneration of lumbar intervertebral disc 10/23/2023     Priority: Medium    Mixed simple and mucopurulent chronic bronchitis (H) 10/23/2023     Priority: Medium    S/P complete hysterectomy 10/23/2023     Priority: Medium    Chronic pain disorder 09/12/2023     Priority: Medium    Type 2 diabetes mellitus without complication, without long-term current use of insulin (H) 04/19/2023     Priority:  Medium    Insomnia 08/11/2022     Priority: Medium     Current Medications: Lunesta - causes a foul taste in her mouth. Not sleeping well still.   Previous medications: Valium, Trazodone, Restoril, Gabapentin, Lyrica       Generalized anxiety disorder 08/10/2021     Priority: Medium    Spondylosis without myelopathy or radiculopathy, cervical region 08/10/2021     Priority: Medium    Hyperlipidemia 09/27/2018     Priority: Medium    Tobacco use disorder (30 pack year history plus) 07/18/2018     Priority: Medium    RLS (restless legs syndrome) 03/08/2018     Priority: Medium    Hypothyroidism (acquired) 06/06/2017     Priority: Medium    Depressive disorder 06/21/2002     Priority: Medium       10 point ROS of systems including Constitutional, Eyes, Respiratory, Cardiovascular, Gastroenterology, Genitourinary, Integumentary, Muscularskeletal, Psychiatric were all negative except for pertinent positives noted in my HPI.    Current Outpatient Medications   Medication Sig Dispense Refill    ACCU-CHEK GUIDE test strip       ARIPiprazole (ABILIFY) 2 MG tablet Take 5 mg by mouth      bisacodyl (DULCOLAX) 5 MG EC tablet Take 1 tablet (5 mg) by mouth daily as needed for constipation 4 tablet 0    Blood Glucose Monitoring Suppl (ACCU-CHEK GUIDE) w/Device KIT USE TO TEST BLOOD SUGAR DAILY      clobetasol (TEMOVATE) 0.05 % external cream Apply topically 2 times daily Apply twice daily to affected lesions for up to 10-14 days. 45 g 0    Continuous Blood Gluc  (DEXCOM G6 ) MIKE Use to read blood sugars as per 's instructions. 1 each 1    Continuous Blood Gluc Sensor (DEXCOM G6 SENSOR) MISC CHANGE SENSOR EVERY 10 DAYS 3 each 3    Continuous Blood Gluc Transmit (DEXCOM G6 TRANSMITTER) MISC CHANGE EVERY 90 DAYS      doxycycline hyclate (VIBRA-TABS) 100 MG tablet Take 1 tablet (100 mg) by mouth 2 times daily for 7 days 14 tablet 0    estradiol (ESTRACE) 0.1 MG/GM vaginal cream Place 1 g vaginally       guaiFENesin (MUCINEX) 600 MG 12 hr tablet Take 1 tablet (600 mg) by mouth 2 times daily as needed for congestion 30 tablet 1    hydrOXYzine (VISTARIL) 25 MG capsule Take 1 capsule (25 mg) by mouth 3 times daily as needed for itching 30 capsule 0    hydrOXYzine HCl (ATARAX) 25 MG tablet       ipratropium - albuterol 0.5 mg/2.5 mg/3 mL (DUONEB) 0.5-2.5 (3) MG/3ML neb solution Inhale 3 mLs into the lungs      levothyroxine (SYNTHROID/LEVOTHROID) 88 MCG tablet Take 88 mcg by mouth daily      nicotine (NICODERM CQ) 14 MG/24HR 24 hr patch       nicotine (NICODERM CQ) 21 MG/24HR 24 hr patch       omeprazole (PRILOSEC) 20 MG DR capsule TAKE 1 CAPSULE BY MOUTH EVERY DAY AS NEEDED FOR GI UPSET FOR UP TO 14 DAYS      pramipexole (MIRAPEX) 1.5 MG tablet Take 1.5 mg by mouth      pregabalin (LYRICA) 25 MG capsule       PROAIR  (90 Base) MCG/ACT inhaler       rosuvastatin (CRESTOR) 40 MG tablet Take 40 mg by mouth daily      semaglutide (OZEMPIC) 2 MG/3ML pen Inject 0.25 mg Subcutaneous every 7 days      Suvorexant (BELSOMRA) 20 MG tablet Take 1 tablet (20 mg) by mouth at bedtime 30 tablet 1    tiotropium (SPIRIVA RESPIMAT) 2.5 MCG/ACT inhaler Inhale 2 puffs into the lungs daily 4 g 4    traZODone (DESYREL) 150 MG tablet Take 150 mg by mouth at bedtime      triamcinolone (KENALOG) 0.1 % external cream Apply topically 2 times daily         OBJECTIVE:  There were no vitals taken for this visit.    Physical Exam     ---  This note was written with the assistance of the Dragon voice-dictation technology software. The final document, although reviewed, may contain errors. For corrections, please contact the office.    Total time spent preparing to see the patient, review of chart, obtaining history and physical examination, review of sleep testing, review of treatment options, education, discussion with patient and documenting in Epic / EMR was 25 minutes.  All time involved was spent on the day of service for the patient (the  same day as the patient's appointment).    Wenceslao Ramirez MD    Sleep Medicine  [place], [place]  Main Office: [phone]  [facility] [address], [address]  Schedule visits: [phone]  Main Office: [phone]  Fax: [phone]

## 2024-01-11 NOTE — LETTER
Dear Medical Reviewer,    I am writing on behalf of my patient, Jami Wang, to request coverage for the medication suvorexant, which is necessary for the management of their chronic insomnia secondary to a medical illness.    Despite our best efforts to manage their insomnia through conservative treatment methods, such as sleep hygiene education, cognitive behavioral therapy and multiple other medications trials (including eszopiclone, zaleplon, temazepam, pregabalin, gabapentin, ropinirole, trazodone, pramipexole, hydroxyzine) and other non-pharmacological interventions, their condition has not improved significantly. The chronic insomnia they are experiencing is adversely affecting their quality of life, daily functioning, and overall health.    Suvorexant is a dual orexin receptor antagonist that is specifically indicated for the treatment of insomnia characterized by difficulties with sleep onset and/or sleep maintenance. It has proven to be an effective treatment option for many patients with similar conditions to Jami and it is my professional opinion that this medication will provide them with significant relief and help manage their symptoms.    Without this medication, Jami is at risk of suffering from severe sleep deprivation, which can lead to other serious health complications such as increased risk of cardiac disease, impaired immune function, and mental health disorders among others.    I understand that suvorexant may not be a preferred medication on Jami s current insurance plan. However, given their specific medical history and the severity of their symptoms, I strongly believe that suvorexant is medically necessary and appropriate for their treatment.    I kindly request that you reconsider the coverage for suvorexant for her.    Thank you for your time and consideration. Please do not hesitate to contact me if you require any further information or clarification.    Wenceslao Ramirez,  MD    Sleep Medicine  [facility]  - [place], [place]  Main Office: [phone]  [place] [facility] - [address], [address]  Schedule visits: [phone]  Main Office: [phone]  Fax: [phone]

## 2024-01-14 ASSESSMENT — ENCOUNTER SYMPTOMS
SORE THROAT: 0
SHORTNESS OF BREATH: 0
FEVER: 0
LIGHT-HEADEDNESS: 0

## 2024-01-16 ENCOUNTER — ANCILLARY PROCEDURE (OUTPATIENT)
Dept: RADIOLOGY | Facility: CLINIC | Age: 54
End: 2024-01-16
Payer: COMMERCIAL

## 2024-01-17 ENCOUNTER — TELEPHONE (OUTPATIENT)
Dept: FAMILY MEDICINE | Facility: OTHER | Age: 54
End: 2024-01-17

## 2024-01-17 DIAGNOSIS — R21 RASH: Primary | ICD-10-CM

## 2024-01-17 NOTE — TELEPHONE ENCOUNTER
Writer spoke with patient, rash still presents, patient cannot get in to Derm at [place] until July. Patient wants new referral, writer will pend for provider.  Batsheva Bell LPN

## 2024-01-17 NOTE — TELEPHONE ENCOUNTER
Reason for call:  REFERRAL   Concern: Atopic Dermatitis   Have you seen a provider for this concern? Yes  Who? Dr Mark   When? 1/10/24  What services are you requesting? Dermatologist   Description: Patient has had a rash for a few months and Dr Soriano doesn't have any openings at [facility] and he doesn't have an open schedule past March 2024. Patient wants to see a dermatologist in the [facility] or [facility].   Was an appointment offered for this a call? No  If Yes:  Appointment type                Date  Preferred method for responding to this message: Telephone Call  What is your phone number ? [phone]  If we cannot reach you directly, may we leave a detailed response at the number you provided? Yes  Can this message wait until your PCP/Provider returns if not available today? YES, No

## 2024-01-22 ENCOUNTER — HOSPITAL ENCOUNTER (OUTPATIENT)
Dept: CARDIOLOGY | Facility: HOSPITAL | Age: 54
Discharge: HOME | End: 2024-01-22
Attending: FAMILY MEDICINE | Admitting: INTERNAL MEDICINE
Payer: COMMERCIAL

## 2024-01-22 DIAGNOSIS — R06.09 CHRONIC DYSPNEA: ICD-10-CM

## 2024-01-22 LAB — LVEF ECHO: NORMAL

## 2024-01-22 PROCEDURE — 93306 TTE W/DOPPLER COMPLETE: CPT | Mod: 26 | Performed by: INTERNAL MEDICINE

## 2024-01-22 PROCEDURE — 93306 TTE W/DOPPLER COMPLETE: CPT

## 2024-01-24 ENCOUNTER — OFFICE VISIT (OUTPATIENT)
Dept: FAMILY MEDICINE | Facility: OTHER | Age: 54
End: 2024-01-24
Attending: FAMILY MEDICINE
Payer: COMMERCIAL

## 2024-01-24 VITALS
RESPIRATION RATE: 18 BRPM | SYSTOLIC BLOOD PRESSURE: 120 MMHG | WEIGHT: 172.9 LBS | HEART RATE: 95 BPM | HEIGHT: 62 IN | OXYGEN SATURATION: 93 % | DIASTOLIC BLOOD PRESSURE: 72 MMHG | BODY MASS INDEX: 31.82 KG/M2 | TEMPERATURE: 98.3 F

## 2024-01-24 DIAGNOSIS — R93.1 DECREASED CARDIAC EJECTION FRACTION: Primary | ICD-10-CM

## 2024-01-24 DIAGNOSIS — F17.210 CIGARETTE NICOTINE DEPENDENCE WITHOUT COMPLICATION: ICD-10-CM

## 2024-01-24 PROCEDURE — 99213 OFFICE O/P EST LOW 20 MIN: CPT | Performed by: FAMILY MEDICINE

## 2024-01-24 RX ORDER — TRIAMCINOLONE ACETONIDE 1 MG/G
CREAM TOPICAL 2 TIMES DAILY
Status: CANCELLED | OUTPATIENT
Start: 2024-01-24

## 2024-01-24 ASSESSMENT — PAIN SCALES - GENERAL: PAINLEVEL: MODERATE PAIN (5)

## 2024-01-24 NOTE — PROGRESS NOTES
Assessment & Plan     Decreased cardiac ejection fraction  - NM Lexiscan stress test; Future    Cigarette nicotine dependence without complication  - nicotine (NICORETTE) 2 MG gum; Place 1 each (2 mg) inside cheek every hour as needed for nicotine withdrawal symptoms        Anton Anthony is a 53 year old, presenting for the following health issues:  Derm Problem        1/24/2024    10:32 AM   Additional Questions   Roomed by Isabel Jarquin   Accompanied by  Damian         1/24/2024    10:32 AM   Patient Reported Additional Medications   Patient reports taking the following new medications none     HPI     States she did a virtual derm visit.  States they told her the rash on her fingers was from smoking.  States down to 3-4 cigs per day, was 1-1.5 ppd.  States rash improving somewhat.  She also self-stopped her Vistaril as she feels that may have been contributing.  Concerned rash is still present.  Advised her to follow-up with Derm.    Would like Rx for nicotine gum.    Discussed echo results from 2 days ago.  Decreased EF - needs stress test.      Rash  Onset: 9 months ago or so   Description: little red bump, dry, looks like little open cuts on fingertips, rash areas are red and flaky. Patient noticed that when she would scratch with fingers (which had open area) rash would spread to area that she was itching   Location: both hands, fingers, arms and legs, top of left foot  Character: blotchy, flakey, draining, red, very itchy, bleed a little when scratching  Itching (Pruritis): YES  Progression of Symptoms:  improving  Accompanying Signs & Symptoms:  Fever: no   Body aches or joint pain: no   Sore throat symptoms: no   Recent cold symptoms: no   History:   Previous similar rash: no   Precipitating factors:   Exposure to similar rash: no   New exposures: None and started medication hydroxyzine about 1 year ago, stopped taking about 3-4 days ago, rash is starting to heal. Patient used to do her own nails,  "unsure if it is a fungus or reaction from medication.    Recent travel: no   Alleviating factors:  Cerave cream    Therapies Tried and outcome: pours peroxide over open cuts on fingers               Objective    /72   Pulse 95   Temp 98.3  F (36.8  C) (Tympanic)   Resp 18   Ht 1.575 m (5' 2\")   Wt 78.4 kg (172 lb 14.4 oz)   SpO2 93%   BMI 31.62 kg/m    Body mass index is 31.62 kg/m .  Physical Exam  Constitutional:       General: She is not in acute distress.     Appearance: Normal appearance.   Pulmonary:      Effort: Pulmonary effort is normal.   Skin:     Comments: Dermatitis appearing skin distal digits - unchanged from previous visit   Neurological:      Mental Status: She is alert and oriented to person, place, and time.                    Signed Electronically by: ALBERTO PITTMAN DO    "

## 2024-02-02 ENCOUNTER — TELEPHONE (OUTPATIENT)
Dept: NUCLEAR MEDICINE | Facility: HOSPITAL | Age: 54
End: 2024-02-02

## 2024-02-02 NOTE — TELEPHONE ENCOUNTER
Appointment Reminder Call    -Exam prep  -Where and when to register  -Appointment length with waiting time

## 2024-02-05 ENCOUNTER — HOSPITAL ENCOUNTER (OUTPATIENT)
Dept: NUCLEAR MEDICINE | Facility: HOSPITAL | Age: 54
Setting detail: NUCLEAR MEDICINE
Discharge: HOME | End: 2024-02-05
Attending: FAMILY MEDICINE
Payer: COMMERCIAL

## 2024-02-05 ENCOUNTER — TELEPHONE (OUTPATIENT)
Dept: FAMILY MEDICINE | Facility: OTHER | Age: 54
End: 2024-02-05

## 2024-02-05 ENCOUNTER — HOSPITAL ENCOUNTER (OUTPATIENT)
Dept: CARDIOLOGY | Facility: HOSPITAL | Age: 54
Discharge: HOME | End: 2024-02-05
Attending: FAMILY MEDICINE
Payer: COMMERCIAL

## 2024-02-05 DIAGNOSIS — R93.1 DECREASED CARDIAC EJECTION FRACTION: ICD-10-CM

## 2024-02-05 LAB
CV BLOOD PRESSURE: 70 MMHG
CV STRESS MAX HR HE: 120
NUC STRESS EJECTION FRACTION: 75 %
RATE PRESSURE PRODUCT: NORMAL
STRESS ECHO BASELINE DIASTOLIC HE: 86
STRESS ECHO BASELINE HR: 95 BPM
STRESS ECHO BASELINE SYSTOLIC BP: 128
STRESS ECHO CALCULATED PERCENT HR: 73 %
STRESS ECHO LAST STRESS DIASTOLIC BP: 84
STRESS ECHO LAST STRESS SYSTOLIC BP: 156
STRESS ECHO TARGET HR: 165

## 2024-02-05 PROCEDURE — 93018 CV STRESS TEST I&R ONLY: CPT | Performed by: INTERNAL MEDICINE

## 2024-02-05 PROCEDURE — 93016 CV STRESS TEST SUPVJ ONLY: CPT | Performed by: INTERNAL MEDICINE

## 2024-02-05 PROCEDURE — A9500 TC99M SESTAMIBI: HCPCS | Performed by: RADIOLOGY

## 2024-02-05 PROCEDURE — 250N000011 HC RX IP 250 OP 636: Mod: JZ | Performed by: INTERNAL MEDICINE

## 2024-02-05 PROCEDURE — 343N000001 HC RX 343: Performed by: RADIOLOGY

## 2024-02-05 PROCEDURE — 93017 CV STRESS TEST TRACING ONLY: CPT

## 2024-02-05 PROCEDURE — 78452 HT MUSCLE IMAGE SPECT MULT: CPT

## 2024-02-05 RX ORDER — AMINOPHYLLINE 25 MG/ML
INJECTION, SOLUTION INTRAVENOUS
Status: DISCONTINUED
Start: 2024-02-05 | End: 2024-02-05 | Stop reason: WASHOUT

## 2024-02-05 RX ORDER — REGADENOSON 0.08 MG/ML
0.4 INJECTION, SOLUTION INTRAVENOUS ONCE
Status: COMPLETED | OUTPATIENT
Start: 2024-02-05 | End: 2024-02-05

## 2024-02-05 RX ADMIN — Medication 32.6 MILLICURIE: at 08:40

## 2024-02-05 RX ADMIN — Medication 10.4 MILLICURIE: at 06:52

## 2024-02-05 RX ADMIN — REGADENOSON 0.4 MG: 0.08 INJECTION, SOLUTION INTRAVENOUS at 08:33

## 2024-02-12 ENCOUNTER — TELEPHONE (OUTPATIENT)
Dept: NEUROSURGERY | Facility: CLINIC | Age: 54
End: 2024-02-12
Payer: COMMERCIAL

## 2024-02-12 DIAGNOSIS — F40.240 CLAUSTROPHOBIA: Primary | ICD-10-CM

## 2024-02-12 RX ORDER — DIAZEPAM 5 MG
TABLET ORAL
Qty: 1 TABLET | Refills: 0 | Status: SHIPPED | OUTPATIENT
Start: 2024-02-14 | End: 2024-04-02

## 2024-02-12 NOTE — TELEPHONE ENCOUNTER
Writer left voice mail for patient. Prescription called in to pharmacy. Informed patient she will need a .     Mariana Aden LPN  [place]

## 2024-02-12 NOTE — TELEPHONE ENCOUNTER
Health Call Center    Phone Message    May a detailed message be left on voicemail: yes     Reason for Call: Pt wants to let Carlita Watson know that her MRI is set up for 2/16/24.  She needs medication to help her with the MRI because she is claustrophobic.  She usually gets 5mg of Valium.  She is requesting that this be sent to [facility] in [facility].  She said that she needs it by 2/14/24.  She is requesting a call back to confirm once this is sent.  Thanks.

## 2024-02-16 ENCOUNTER — ANCILLARY PROCEDURE (OUTPATIENT)
Dept: GENERAL RADIOLOGY | Facility: CLINIC | Age: 54
End: 2024-02-16
Attending: PHYSICIAN ASSISTANT
Payer: COMMERCIAL

## 2024-02-16 ENCOUNTER — ANCILLARY PROCEDURE (OUTPATIENT)
Dept: MRI IMAGING | Facility: CLINIC | Age: 54
End: 2024-02-16
Attending: PHYSICIAN ASSISTANT
Payer: COMMERCIAL

## 2024-02-16 DIAGNOSIS — R29.898 TRANSIENT WEAKNESS OF LOWER EXTREMITY: ICD-10-CM

## 2024-02-16 DIAGNOSIS — R26.89 IMBALANCE: ICD-10-CM

## 2024-02-16 DIAGNOSIS — R32 URINARY INCONTINENCE, UNSPECIFIED TYPE: ICD-10-CM

## 2024-02-16 DIAGNOSIS — M54.2 CERVICALGIA: ICD-10-CM

## 2024-02-16 DIAGNOSIS — R29.2 HYPER REFLEXIA: ICD-10-CM

## 2024-02-16 DIAGNOSIS — G89.29 CHRONIC BILATERAL LOW BACK PAIN, UNSPECIFIED WHETHER SCIATICA PRESENT: ICD-10-CM

## 2024-02-16 DIAGNOSIS — M47.12 CERVICAL SPONDYLOSIS WITH MYELOPATHY: ICD-10-CM

## 2024-02-16 DIAGNOSIS — R20.2 PARESTHESIAS: ICD-10-CM

## 2024-02-16 DIAGNOSIS — M54.50 CHRONIC BILATERAL LOW BACK PAIN, UNSPECIFIED WHETHER SCIATICA PRESENT: ICD-10-CM

## 2024-02-16 DIAGNOSIS — R20.0 FACIAL NUMBNESS: ICD-10-CM

## 2024-02-16 DIAGNOSIS — H53.2 DOUBLE VISION: ICD-10-CM

## 2024-02-16 DIAGNOSIS — R15.9 INCONTINENCE OF FECES, UNSPECIFIED FECAL INCONTINENCE TYPE: ICD-10-CM

## 2024-02-16 PROCEDURE — 70553 MRI BRAIN STEM W/O & W/DYE: CPT | Mod: GC | Performed by: RADIOLOGY

## 2024-02-16 PROCEDURE — 72141 MRI NECK SPINE W/O DYE: CPT | Performed by: RADIOLOGY

## 2024-02-16 PROCEDURE — A9585 GADOBUTROL INJECTION: HCPCS | Performed by: RADIOLOGY

## 2024-02-16 PROCEDURE — 72082 X-RAY EXAM ENTIRE SPI 2/3 VW: CPT | Performed by: STUDENT IN AN ORGANIZED HEALTH CARE EDUCATION/TRAINING PROGRAM

## 2024-02-16 PROCEDURE — 72146 MRI CHEST SPINE W/O DYE: CPT | Performed by: RADIOLOGY

## 2024-02-16 PROCEDURE — 77073 BONE LENGTH STUDIES: CPT | Performed by: STUDENT IN AN ORGANIZED HEALTH CARE EDUCATION/TRAINING PROGRAM

## 2024-02-16 PROCEDURE — 72148 MRI LUMBAR SPINE W/O DYE: CPT | Performed by: RADIOLOGY

## 2024-02-16 RX ORDER — GADOBUTROL 604.72 MG/ML
7.5 INJECTION INTRAVENOUS ONCE
Status: COMPLETED | OUTPATIENT
Start: 2024-02-16 | End: 2024-02-16

## 2024-02-16 RX ADMIN — GADOBUTROL 7.5 ML: 604.72 INJECTION INTRAVENOUS at 14:58

## 2024-02-24 ENCOUNTER — TELEPHONE (OUTPATIENT)
Dept: NEUROSURGERY | Facility: CLINIC | Age: 54
End: 2024-02-24
Payer: COMMERCIAL

## 2024-02-27 ENCOUNTER — APPOINTMENT (OUTPATIENT)
Dept: LAB | Facility: OTHER | Age: 54
End: 2024-02-27
Attending: STUDENT IN AN ORGANIZED HEALTH CARE EDUCATION/TRAINING PROGRAM
Payer: COMMERCIAL

## 2024-02-27 ENCOUNTER — OFFICE VISIT (OUTPATIENT)
Dept: FAMILY MEDICINE | Facility: OTHER | Age: 54
End: 2024-02-27
Attending: STUDENT IN AN ORGANIZED HEALTH CARE EDUCATION/TRAINING PROGRAM
Payer: COMMERCIAL

## 2024-02-27 VITALS
TEMPERATURE: 97.4 F | WEIGHT: 179.7 LBS | OXYGEN SATURATION: 94 % | RESPIRATION RATE: 16 BRPM | BODY MASS INDEX: 32.87 KG/M2 | HEART RATE: 89 BPM | SYSTOLIC BLOOD PRESSURE: 135 MMHG | DIASTOLIC BLOOD PRESSURE: 87 MMHG

## 2024-02-27 DIAGNOSIS — Z01.818 PREOP GENERAL PHYSICAL EXAM: Primary | ICD-10-CM

## 2024-02-27 DIAGNOSIS — G25.81 RLS (RESTLESS LEGS SYNDROME): Chronic | ICD-10-CM

## 2024-02-27 DIAGNOSIS — M18.0 PRIMARY OSTEOARTHRITIS OF BOTH FIRST CARPOMETACARPAL JOINTS: ICD-10-CM

## 2024-02-27 DIAGNOSIS — I51.9 DECREASED LEFT VENTRICULAR FUNCTION: ICD-10-CM

## 2024-02-27 DIAGNOSIS — R94.31 PROLONGED Q-T INTERVAL ON ECG: ICD-10-CM

## 2024-02-27 DIAGNOSIS — E11.9 TYPE 2 DIABETES MELLITUS WITHOUT COMPLICATION, WITHOUT LONG-TERM CURRENT USE OF INSULIN (H): Chronic | ICD-10-CM

## 2024-02-27 DIAGNOSIS — F32.A DEPRESSIVE DISORDER: Chronic | ICD-10-CM

## 2024-02-27 DIAGNOSIS — E03.9 HYPOTHYROIDISM (ACQUIRED): Chronic | ICD-10-CM

## 2024-02-27 DIAGNOSIS — J41.8 MIXED SIMPLE AND MUCOPURULENT CHRONIC BRONCHITIS (H): Chronic | ICD-10-CM

## 2024-02-27 DIAGNOSIS — E78.2 MIXED HYPERLIPIDEMIA: Chronic | ICD-10-CM

## 2024-02-27 DIAGNOSIS — F41.1 GENERALIZED ANXIETY DISORDER: Chronic | ICD-10-CM

## 2024-02-27 DIAGNOSIS — F17.200 TOBACCO USE DISORDER: Chronic | ICD-10-CM

## 2024-02-27 PROBLEM — I50.20 HEART FAILURE WITH REDUCED EJECTION FRACTION, NYHA CLASS I (H): Status: ACTIVE | Noted: 2024-02-27

## 2024-02-27 LAB
ANION GAP SERPL CALCULATED.3IONS-SCNC: 14 MMOL/L (ref 7–15)
ATRIAL RATE - MUSE: 94 BPM
BASOPHILS # BLD AUTO: 0 10E3/UL (ref 0–0.2)
BASOPHILS NFR BLD AUTO: 0 %
BUN SERPL-MCNC: 13.7 MG/DL (ref 6–20)
CALCIUM SERPL-MCNC: 9.6 MG/DL (ref 8.6–10)
CHLORIDE SERPL-SCNC: 104 MMOL/L (ref 98–107)
CREAT SERPL-MCNC: 0.87 MG/DL (ref 0.51–0.95)
DEPRECATED HCO3 PLAS-SCNC: 23 MMOL/L (ref 22–29)
DIASTOLIC BLOOD PRESSURE - MUSE: NORMAL MMHG
EGFRCR SERPLBLD CKD-EPI 2021: 78 ML/MIN/1.73M2
EOSINOPHIL # BLD AUTO: 0.2 10E3/UL (ref 0–0.7)
EOSINOPHIL NFR BLD AUTO: 2 %
ERYTHROCYTE [DISTWIDTH] IN BLOOD BY AUTOMATED COUNT: 14 % (ref 10–15)
EST. AVERAGE GLUCOSE BLD GHB EST-MCNC: 146 MG/DL
FERRITIN SERPL-MCNC: 205 NG/ML (ref 11–328)
GLUCOSE SERPL-MCNC: 114 MG/DL (ref 70–99)
HBA1C MFR BLD: 6.7 %
HCT VFR BLD AUTO: 47.3 % (ref 35–47)
HGB BLD-MCNC: 15.6 G/DL (ref 11.7–15.7)
IMM GRANULOCYTES # BLD: 0.1 10E3/UL
IMM GRANULOCYTES NFR BLD: 0 %
INTERPRETATION ECG - MUSE: NORMAL
LYMPHOCYTES # BLD AUTO: 3.3 10E3/UL (ref 0–5.3)
LYMPHOCYTES NFR BLD AUTO: 25 %
MCH RBC QN AUTO: 30.7 PG (ref 26.5–33)
MCHC RBC AUTO-ENTMCNC: 33 G/DL (ref 31.5–36.5)
MCV RBC AUTO: 93 FL (ref 78–100)
MONOCYTES # BLD AUTO: 0.8 10E3/UL (ref 0–1.3)
MONOCYTES NFR BLD AUTO: 6 %
NEUTROPHILS # BLD AUTO: 8.8 10E3/UL (ref 1.6–8.3)
NEUTROPHILS NFR BLD AUTO: 67 %
NRBC # BLD AUTO: 0 10E3/UL
NRBC BLD AUTO-RTO: 0 /100
P AXIS - MUSE: 54 DEGREES
PLATELET # BLD AUTO: 255 10E3/UL (ref 150–450)
POTASSIUM SERPL-SCNC: 4.1 MMOL/L (ref 3.4–5.3)
PR INTERVAL - MUSE: 164 MS
QRS DURATION - MUSE: 84 MS
QT - MUSE: 384 MS
QTC - MUSE: 480 MS
R AXIS - MUSE: -8 DEGREES
RBC # BLD AUTO: 5.08 10E6/UL (ref 3.8–5.2)
SODIUM SERPL-SCNC: 141 MMOL/L (ref 135–145)
SYSTOLIC BLOOD PRESSURE - MUSE: NORMAL MMHG
T AXIS - MUSE: 55 DEGREES
TSH SERPL DL<=0.005 MIU/L-ACNC: 2.73 UIU/ML (ref 0.3–4.2)
VENTRICULAR RATE- MUSE: 94 BPM
WBC # BLD AUTO: 13.2 10E3/UL (ref 4–11)

## 2024-02-27 PROCEDURE — 93010 ELECTROCARDIOGRAM REPORT: CPT | Mod: 77 | Performed by: INTERNAL MEDICINE

## 2024-02-27 PROCEDURE — 82728 ASSAY OF FERRITIN: CPT | Performed by: STUDENT IN AN ORGANIZED HEALTH CARE EDUCATION/TRAINING PROGRAM

## 2024-02-27 PROCEDURE — 84443 ASSAY THYROID STIM HORMONE: CPT | Performed by: STUDENT IN AN ORGANIZED HEALTH CARE EDUCATION/TRAINING PROGRAM

## 2024-02-27 PROCEDURE — 36415 COLL VENOUS BLD VENIPUNCTURE: CPT | Performed by: STUDENT IN AN ORGANIZED HEALTH CARE EDUCATION/TRAINING PROGRAM

## 2024-02-27 PROCEDURE — 83036 HEMOGLOBIN GLYCOSYLATED A1C: CPT | Performed by: STUDENT IN AN ORGANIZED HEALTH CARE EDUCATION/TRAINING PROGRAM

## 2024-02-27 PROCEDURE — 85025 COMPLETE CBC W/AUTO DIFF WBC: CPT | Performed by: STUDENT IN AN ORGANIZED HEALTH CARE EDUCATION/TRAINING PROGRAM

## 2024-02-27 PROCEDURE — 99214 OFFICE O/P EST MOD 30 MIN: CPT | Performed by: STUDENT IN AN ORGANIZED HEALTH CARE EDUCATION/TRAINING PROGRAM

## 2024-02-27 PROCEDURE — 80048 BASIC METABOLIC PNL TOTAL CA: CPT | Performed by: STUDENT IN AN ORGANIZED HEALTH CARE EDUCATION/TRAINING PROGRAM

## 2024-02-27 RX ORDER — FLUTICASONE PROPIONATE AND SALMETEROL 250; 50 UG/1; UG/1
1 POWDER RESPIRATORY (INHALATION) EVERY 12 HOURS
COMMUNITY

## 2024-02-27 RX ORDER — GUAIFENESIN 600 MG/1
600 TABLET, EXTENDED RELEASE ORAL 2 TIMES DAILY PRN
Qty: 30 TABLET | Refills: 1 | Status: SHIPPED | OUTPATIENT
Start: 2024-02-27

## 2024-02-27 ASSESSMENT — PAIN SCALES - GENERAL: PAINLEVEL: SEVERE PAIN (7)

## 2024-02-27 NOTE — PROGRESS NOTES
Preoperative Evaluation  [facility] [place]  [address]  [place] [address]  Phone: [phone]  Primary Provider: Carolyn Tovar  Pre-op Performing Provider: CAROLYN TOVAR  Feb 27, 2024       Gabrielle is a 53 year old, presenting for the following:  Pre-Op Exam        2/27/2024     1:19 PM   Additional Questions   Roomed by Armando Aden   Accompanied by None         2/27/2024     1:19 PM   Patient Reported Additional Medications   Patient reports taking the following new medications None     Surgical Information  Surgery/Procedure: Revision Left Thumb Carpometacarpal Arthroplasty  Surgery Location: [facility]   Surgeon: Monica Aden   Surgery Date: 3/1/2024 and 3/15/23  Time of Surgery: Unknown   Where patient plans to recover: At home with family  Fax number for surgical facility: [phone]    Assessment & Plan     The proposed surgical procedure is considered INTERMEDIATE risk.    Preop general physical exam  Primary osteoarthritis of both first carpometacarpal joints  BMP stable, A1c indicates diabetes control.  Does have elevated white blood cell count, however has been borderline elevated in the past and do feel some of it is from dehydration today with her elevated hematocrit.  No signs of infection on examination nor symptoms indicative of concerning infection.  Patient also underwent echocardiogram after having some chest tightness, which showed borderline reduced ejection fraction at 45 to 50%. Lexiscan stress test was normal. Planning further evaluation with cardiology, however symptoms have resolved and no edema, orthopnea or shortness of breath that would hinder upcoming surgery  Feel patient is optimized  - Basic metabolic panel; Future  - CBC with Platelets & Differential; Future  - EKG 12-lead complete w/read - Clinics  - Basic metabolic panel  - CBC with Platelets & Differential    Decreased left ventricular function  No edema, rales, dyspnea or hypoxemia,  however noted to have a mildly reduced EF on echocardiogram that was completed for chest tightness/shortness of breath last month.  Lexiscan stress test was negative for any inducible ischemia and EF was normal on stress test   Still possibly reduced RVF however.   Has seen cardiology - planning cardiac MRI.  As there are no active symptoms of heart failure, do feel she is optimized for her upcoming surgery    Type 2 diabetes mellitus without complication, without long-term current use of insulin (H)  A1c less than 7, not currently on any diabetes therapies.  Lab Results   Component Value Date    A1C 6.7 02/27/2024     - Hemoglobin A1c; Future  - Hemoglobin A1c    Mixed simple and mucopurulent chronic bronchitis (H)  Stable, continues with daily Spiriva and Advair use.  Mucinex used as needed to help with congestion and allergies.  No signs of rhonchi or recent exacerbation  - guaiFENesin (MUCINEX) 600 MG 12 hr tablet; Take 1 tablet (600 mg) by mouth 2 times daily as needed for congestion    Mixed hyperlipidemia  Not currently on any statin therapy.  Planning to resume next month, as she should be on statin therapy with her mixed hyperlipidemia and type 2 diabetes.    Prolonged Q-T interval on ECG  Noted today, likely due to her psychiatry medications.  QTc is 480.    Depressive disorder  Generalized anxiety disorder  Waxing and waning symptoms.  Continues on pregabalin, trazodone, Abilify    RLS (restless legs syndrome)  Ferritin within normal limits  Continues on pramipexole  - Ferritin; Future  - Ferritin    Hypothyroidism (acquired)  TSH stable on recent labs in December, continues on Synthroid  - TSH with free T4 reflex; Future  - TSH with free T4 reflex    Tobacco use disorder (30 pack year history plus)  Currently smoking 1 pack/day.  Encouraged titration down prior to upcoming surgery.    Leukocytosis  Mild and had on labs in December (wbc 12.1 then).  No signs of infection.  Suspect dehydration is also  contributing with her elevated hematocrit today, as would likely be closer to normal range if she were better hydrated.  She will monitor and notify us and surgery site if infectious symptoms develop.           - No identified additional risk factors other than previously addressed    Antiplatelet or Anticoagulation Medication Instructions   - Patient is on no antiplatelet or anticoagulation medications.    Additional Medication Instructions  Patient is to take all scheduled medications on the day of surgery EXCEPT for modifications listed below:  Hold topical creams, any nsaids    Recommendation  APPROVAL GIVEN to proceed with proposed procedure, without further diagnostic evaluation.        Subjective     HPI related to upcoming procedure: had prior left CMC surgery, needing revision now. Right hand also has bone on bone.     Has been feeling well with no fevers, congestion, cough, or other URI symptoms. Was sick last week but has recovered.   No ischemic cardiac history. Denies any shortness of breath, chest pain, or dyspnea on exertion. No orthopnea. Was having some chest tightness. Had an echo that showed EF 45-50%, mildly reduced with early diastolic dysfunction. Stress test did not show any abnormalities 2/5/24. Saw cardiology, awaiting cardiac MRI.   Is able to function at >4 METS.   Has had prior surgeries with general anesthesia and did well. Notes she is slow to come out anesthesia though (no malignant hyperthermia).      Has been off ozempic for 1-2 months. Needing updated A1c. Didn't stop for side effects.   Recently blood glucose has been up to 170-185 throughout the day, mostly after eating.         2/27/2024    12:58 PM   Preop Questions   1. Have you ever had a heart attack or stroke? No   2. Have you ever had surgery on your heart or blood vessels, such as a stent placement, a coronary artery bypass, or surgery on an artery in your head, neck, heart, or legs? No   3. Do you have chest pain with  activity? No   4. Do you have a history of  heart failure? No   5. Do you currently have a cold, bronchitis or symptoms of other infection? No   6. Do you have a cough, shortness of breath, or wheezing? YES - All of the above from her COPD   7. Do you or anyone in your family have previous history of blood clots? No   8. Do you or does anyone in your family have a serious bleeding problem such as prolonged bleeding following surgeries or cuts?  No   9. Have you ever had problems with anemia or been told to take iron pills? No   10. Have you had any abnormal blood loss such as black, tarry or bloody stools, or abnormal vaginal bleeding? No   11. Have you ever had a blood transfusion? No   12. Are you willing to have a blood transfusion if it is medically needed before, during, or after your surgery? Yes   13. Have you or any of your relatives ever had problems with anesthesia? Yes- Patient has a hard time waking up    14. Do you have sleep apnea, excessive snoring or daytime drowsiness? No   15. Do you have any artifical heart valves or other implanted medical devices like a pacemaker, defibrillator, or continuous glucose monitor? No   16. Do you have artificial joints? No   17. Are you allergic to latex? No   18. Is there any chance that you may be pregnant? No     Health Care Directive  Patient does not have a Health Care Directive or Living Will: Discussed advance care planning with patient; however, patient declined at this time.    Preoperative Review of    reviewed - controlled substances reflected in medication list.      Status of Chronic Conditions:  COPD - Patient has a longstanding history of moderate-severe COPD . Patient has been doing well overall noting minimal to no symptoms and continues on medication regimen consisting of spiriva with advair without adverse reactions or side effects.    DEPRESSION - Patient has a long history of Depression of moderate severity requiring medication for control  with recent symptoms being stable..Current symptoms of depression include none.     DIABETES - Patient has a longstanding history of DiabetesType Type II . Patient is being treated with none and denies significant side effects. Control has been good. Complicating factors include but are not limited to: hypertension and hyperlipidemia.     HYPERLIPIDEMIA - Patient has a long history of significant Hyperlipidemia requiring medication for treatment with recent good control. She stopped the statin one month ago.     HYPERTENSION - Patient has longstanding history of HTN , currently denies any symptoms referable to elevated blood pressure. Specifically denies chest pain, palpitations, dyspnea, orthopnea, PND or peripheral edema. Blood pressure readings have been in normal range. Current medication regimen is as listed below. Patient denies any side effects of medication.     HYPOTHYROIDISM - Patient has a longstanding history of chronic Hypothyroidism. Patient has been doing well, noting no tremor, insomnia, hair loss or changes in skin texture. Continues to take medications as directed, without adverse reactions or side effects.     15-20 cigs/day, has increased slightly.     Patient Active Problem List    Diagnosis Date Noted    Bilateral arm weakness 11/14/2023     Priority: Medium    History of Helicobacter pylori infection 11/14/2023     Priority: Medium    Cervical stenosis of spinal canal 10/23/2023     Priority: Medium    Degeneration of lumbar intervertebral disc 10/23/2023     Priority: Medium    Mixed simple and mucopurulent chronic bronchitis (H) 10/23/2023     Priority: Medium    S/P complete hysterectomy 10/23/2023     Priority: Medium    Chronic pain disorder 09/12/2023     Priority: Medium    Type 2 diabetes mellitus without complication, without long-term current use of insulin (H) 04/19/2023     Priority: Medium    Insomnia 08/11/2022     Priority: Medium     Current Medications: Lunesta - causes a foul  taste in her mouth. Not sleeping well still.   Previous medications: Valium, Trazodone, Restoril, Gabapentin, Lyrica       Generalized anxiety disorder 08/10/2021     Priority: Medium    Spondylosis without myelopathy or radiculopathy, cervical region 08/10/2021     Priority: Medium    Hyperlipidemia 09/27/2018     Priority: Medium    Tobacco use disorder (30 pack year history plus) 07/18/2018     Priority: Medium    RLS (restless legs syndrome) 03/08/2018     Priority: Medium    Hypothyroidism (acquired) 06/06/2017     Priority: Medium    Depressive disorder 06/21/2002     Priority: Medium      Past Medical History:   Diagnosis Date    Arthritis     Depressive disorder     Gastroesophageal reflux disease with esophagitis     Hashimoto's disease     Hypothyroidism     Insomnia     Type 2 diabetes mellitus (H)     Umbilical hernia without obstruction and without gangrene     Urticaria     Last Assessment & Plan: Formatting of this note might be different from the original. Patient presented to the emergency room on 3/19/2023 with history of full body rash starting 3 weeks prior with discomfort and itching treated with hydrocortisone cream without relief.  Homeless family member had moved back into the house with prior exposure to bedbugs.  Under breasts lower legs severity moderate     Past Surgical History:   Procedure Laterality Date    ABDOMEN SURGERY      CARPAL TUNNEL RELEASE RT/LT Bilateral     cmc arthroplasty Left     COLONOSCOPY      COLONOSCOPY - HIM SCAN N/A 10/04/2018    [facility].    HYSTERECTOMY, PAP NO LONGER INDICATED N/A 10/10/2018    Cervix removed per [facility]'s Hysterectomy Path Report.    HYSTERECTOMY, TOTAL, LAPAROSCOPIC, WITH SALPINGO-OOPHORECTOMY, CYSTOSCOPY Bilateral 10/10/2018    Critical access hospital.     Current Outpatient Medications   Medication Sig Dispense Refill    ARIPiprazole (ABILIFY) 2 MG tablet Take 5 mg by mouth      Blood Glucose Monitoring Suppl (ACCU-CHEK GUIDE) w/Device KIT  USE TO TEST BLOOD SUGAR DAILY      clobetasol (TEMOVATE) 0.05 % external cream Apply topically 2 times daily Apply twice daily to affected lesions for up to 10-14 days. 45 g 0    Continuous Blood Gluc  (DEXCOM G6 ) MIKE Use to read blood sugars as per 's instructions. 1 each 1    Continuous Blood Gluc Sensor (DEXCOM G6 SENSOR) MISC CHANGE SENSOR EVERY 10 DAYS 3 each 3    Continuous Blood Gluc Transmit (DEXCOM G6 TRANSMITTER) MISC CHANGE EVERY 90 DAYS      estradiol (ESTRACE) 0.1 MG/GM vaginal cream Place 1 g vaginally      levothyroxine (SYNTHROID/LEVOTHROID) 88 MCG tablet Take 88 mcg by mouth daily      omeprazole (PRILOSEC) 20 MG DR capsule TAKE 1 CAPSULE BY MOUTH EVERY DAY AS NEEDED FOR GI UPSET FOR UP TO 14 DAYS      pramipexole (MIRAPEX) 1.5 MG tablet Take 1.5 mg by mouth      pregabalin (LYRICA) 25 MG capsule       PROAIR  (90 Base) MCG/ACT inhaler       tiotropium (SPIRIVA RESPIMAT) 2.5 MCG/ACT inhaler Inhale 2 puffs into the lungs daily 4 g 4    traZODone (DESYREL) 150 MG tablet Take 150 mg by mouth at bedtime      triamcinolone (KENALOG) 0.1 % external cream Apply topically 2 times daily      ACCU-CHEK GUIDE test strip  (Patient not taking: Reported on 2/27/2024)      diazepam (VALIUM) 5 MG tablet Take up to 20 minutes prior to procedure. (Patient not taking: Reported on 2/27/2024) 1 tablet 0    guaiFENesin (MUCINEX) 600 MG 12 hr tablet Take 1 tablet (600 mg) by mouth 2 times daily as needed for congestion (Patient not taking: Reported on 2/27/2024) 30 tablet 1    hydrOXYzine (VISTARIL) 25 MG capsule Take 1 capsule (25 mg) by mouth 3 times daily as needed for itching (Patient not taking: Reported on 1/24/2024) 30 capsule 0    hydrOXYzine HCl (ATARAX) 25 MG tablet  (Patient not taking: Reported on 1/24/2024)      ipratropium - albuterol 0.5 mg/2.5 mg/3 mL (DUONEB) 0.5-2.5 (3) MG/3ML neb solution Inhale 3 mLs into the lungs (Patient not taking: Reported on 2/27/2024)       nicotine (NICODERM CQ) 14 MG/24HR 24 hr patch  (Patient not taking: Reported on 2/27/2024)      nicotine (NICODERM CQ) 21 MG/24HR 24 hr patch  (Patient not taking: Reported on 2/27/2024)      nicotine (NICORETTE) 2 MG gum Place 1 each (2 mg) inside cheek every hour as needed for nicotine withdrawal symptoms (Patient not taking: Reported on 2/27/2024) 100 each 1    rosuvastatin (CRESTOR) 40 MG tablet Take 40 mg by mouth daily (Patient not taking: Reported on 2/27/2024)      semaglutide (OZEMPIC) 2 MG/3ML pen Inject 0.25 mg Subcutaneous every 7 days (Patient not taking: Reported on 2/27/2024)      Suvorexant (BELSOMRA) 20 MG tablet Take 1 tablet (20 mg) by mouth at bedtime (Patient not taking: Reported on 2/27/2024) 30 tablet 1       Allergies   Allergen Reactions    Hydromorphone Other (See Comments)    Amoxicillin-Pot Clavulanate     Azithromycin      Other reaction(s): Stomach Upset    Black Wisner Flavor Itching    Erythromycin Nausea and Vomiting    Pecan Extract Allergy Skin Test Itching    Penicillins Rash     Has tolerated Cefazolin (ANCEF) and other Cephalosporins        Social History     Tobacco Use    Smoking status: Every Day     Packs/day: 0.50     Years: 25.00     Additional pack years: 0.00     Total pack years: 12.50     Types: Cigarettes     Start date: 1/1/1986     Passive exposure: Current    Smokeless tobacco: Current   Substance Use Topics    Alcohol use: Not Currently     Family History   Problem Relation Age of Onset    Osteoporosis Mother      History   Drug Use    Types: Marijuana             Review of Systems  Constitutional, HEENT, cardiovascular, pulmonary, gi and gu systems are negative, except as otherwise noted.    Objective    /87 (BP Location: Left arm, Patient Position: Sitting, Cuff Size: Adult Large)   Pulse 89   Temp 97.4  F (36.3  C) (Tympanic)   Resp 16   Wt 81.5 kg (179 lb 11.2 oz)   SpO2 94%   BMI 32.87 kg/m     Estimated body mass index is 32.87 kg/m  as  "calculated from the following:    Height as of 1/24/24: 1.575 m (5' 2\").    Weight as of this encounter: 81.5 kg (179 lb 11.2 oz).    Physical Exam  Constitutional:       General: She is not in acute distress.     Appearance: Normal appearance. She is not ill-appearing.   HENT:      Right Ear: Tympanic membrane and ear canal normal.      Left Ear: Tympanic membrane and ear canal normal.      Nose: Nose normal.      Mouth/Throat:      Mouth: Mucous membranes are moist.      Pharynx: Oropharynx is clear. No oropharyngeal exudate or posterior oropharyngeal erythema.   Eyes:      Extraocular Movements: Extraocular movements intact.      Conjunctiva/sclera: Conjunctivae normal.      Pupils: Pupils are equal, round, and reactive to light.   Cardiovascular:      Rate and Rhythm: Normal rate and regular rhythm.      Heart sounds: No murmur heard.  Pulmonary:      Effort: Pulmonary effort is normal.      Breath sounds: Normal breath sounds. No wheezing, rhonchi or rales.   Abdominal:      Palpations: Abdomen is soft. There is no mass.      Tenderness: There is no abdominal tenderness. There is no guarding.   Musculoskeletal:      Cervical back: Neck supple.      Right lower leg: No edema.      Left lower leg: No edema.   Lymphadenopathy:      Cervical: No cervical adenopathy.   Skin:     General: Skin is warm and dry.      Capillary Refill: Capillary refill takes less than 2 seconds.   Neurological:      General: No focal deficit present.      Mental Status: She is alert and oriented to person, place, and time.   Psychiatric:         Mood and Affect: Mood normal.         Behavior: Behavior normal.         Thought Content: Thought content normal.         Judgment: Judgment normal.           Recent Labs   Lab Test 11/22/23  1026 11/21/23  0719   HGB  --  13.9     --    POTASSIUM 4.1  --    CR 0.86  --         Diagnostics  Recent Results (from the past 24 hour(s))   EKG 12-lead complete w/read - Clinics    Collection " Time: 02/27/24  1:39 PM   Result Value Ref Range    Systolic Blood Pressure  mmHg    Diastolic Blood Pressure  mmHg    Ventricular Rate 94 BPM    Atrial Rate 94 BPM    ND Interval 164 ms    QRS Duration 84 ms     ms    QTc 480 ms    P Axis 54 degrees    R AXIS -8 degrees    T Axis 55 degrees    Interpretation ECG       Sinus rhythm with sinus arrhythmia  Prolonged QT  Abnormal ECG  When compared with ECG of 22-NOV-2023 10:05,  No significant change was found  Confirmed by MD Solo Anthony ([id number]) on 2/27/2024 3:38:29 PM     Basic metabolic panel    Collection Time: 02/27/24  2:38 PM   Result Value Ref Range    Sodium 141 135 - 145 mmol/L    Potassium 4.1 3.4 - 5.3 mmol/L    Chloride 104 98 - 107 mmol/L    Carbon Dioxide (CO2) 23 22 - 29 mmol/L    Anion Gap 14 7 - 15 mmol/L    Urea Nitrogen 13.7 6.0 - 20.0 mg/dL    Creatinine 0.87 0.51 - 0.95 mg/dL    GFR Estimate 78 >60 mL/min/1.73m2    Calcium 9.6 8.6 - 10.0 mg/dL    Glucose 114 (H) 70 - 99 mg/dL   Hemoglobin A1c    Collection Time: 02/27/24  2:38 PM   Result Value Ref Range    Estimated Average Glucose 146 mg/dL    Hemoglobin A1C 6.7 (H) <5.7 %   TSH with free T4 reflex    Collection Time: 02/27/24  2:38 PM   Result Value Ref Range    TSH 2.73 0.30 - 4.20 uIU/mL   Ferritin    Collection Time: 02/27/24  2:38 PM   Result Value Ref Range    Ferritin 205 11 - 328 ng/mL   CBC with platelets and differential    Collection Time: 02/27/24  2:38 PM   Result Value Ref Range    WBC Count 13.2 (H) 4.0 - 11.0 10e3/uL    RBC Count 5.08 3.80 - 5.20 10e6/uL    Hemoglobin 15.6 11.7 - 15.7 g/dL    Hematocrit 47.3 (H) 35.0 - 47.0 %    MCV 93 78 - 100 fL    MCH 30.7 26.5 - 33.0 pg    MCHC 33.0 31.5 - 36.5 g/dL    RDW 14.0 10.0 - 15.0 %    Platelet Count 255 150 - 450 10e3/uL    % Neutrophils 67 %    % Lymphocytes 25 %    % Monocytes 6 %    % Eosinophils 2 %    % Basophils 0 %    % Immature Granulocytes 0 %    NRBCs per 100 WBC 0 <1 /100    Absolute Neutrophils 8.8 (H)  1.6 - 8.3 10e3/uL    Absolute Lymphocytes 3.3 0.0 - 5.3 10e3/uL    Absolute Monocytes 0.8 0.0 - 1.3 10e3/uL    Absolute Eosinophils 0.2 0.0 - 0.7 10e3/uL    Absolute Basophils 0.0 0.0 - 0.2 10e3/uL    Absolute Immature Granulocytes 0.1 <=0.4 10e3/uL    Absolute NRBCs 0.0 10e3/uL      EKG: appears normal, NSR, normal axis, normal intervals, no acute ST/T changes c/w ischemia, no LVH by voltage criteria, unchanged from previous tracings, qtc is prolonged at 480    Revised Cardiac Risk Index (RCRI)  The patient has the following serious cardiovascular risks for perioperative complications:   - No serious cardiac risks = 0 points     RCRI Interpretation: 0 points: Class I (very low risk - 0.4% complication rate)         Signed Electronically by: Hannah Eaton MD  Copy of this evaluation report is provided to requesting physician.

## 2024-02-27 NOTE — PATIENT INSTRUCTIONS
Preparing for Your Surgery  Getting started  A nurse will call you to review your health history and instructions. They will give you an arrival time based on your scheduled surgery time. Please be ready to share:  Your doctor's clinic name and phone number  Your medical, surgical, and anesthesia history  A list of allergies and sensitivities  A list of medicines, including herbal treatments and over-the-counter drugs  Whether the patient has a legal guardian (ask how to send us the papers in advance)  Please tell us if you're pregnant--or if there's any chance you might be pregnant. Some surgeries may injure a fetus (unborn baby), so they require a pregnancy test. Surgeries that are safe for a fetus don't always need a test, and you can choose whether to have one.   If you have a child who's having surgery, please ask for a copy of Preparing for Your Child's Surgery.    Preparing for surgery  Within 10 to 30 days of surgery: Have a pre-op exam (sometimes called an H&P, or History and Physical). This can be done at a clinic or pre-operative center.  If you're having a , you may not need this exam. Talk to your care team.  At your pre-op exam, talk to your care team about all medicines you take. If you need to stop any medicines before surgery, ask when to start taking them again.  We do this for your safety. Many medicines can make you bleed too much during surgery. Some change how well surgery (anesthesia) drugs work.  Call your insurance company to let them know you're having surgery. (If you don't have insurance, call [phone].)  Call your clinic if there's any change in your health. This includes signs of a cold or flu (sore throat, runny nose, cough, rash, fever). It also includes a scrape or scratch near the surgery site.  If you have questions on the day of surgery, call your hospital or surgery center.  Eating and drinking guidelines  For your safety: Unless your surgeon tells you otherwise,  follow the guidelines below.  Eat and drink as usual until 8 hours before you arrive for surgery. After that, no food or milk.  Drink clear liquids until 2 hours before you arrive. These are liquids you can see through, like water, Gatorade, and Propel Water. They also include plain black coffee and tea (no cream or milk), candy, and breath mints. You can spit out gum when you arrive.  If you drink alcohol: Stop drinking it the night before surgery.  If your care team tells you to take medicine on the morning of surgery, it's okay to take it with a sip of water.  Preventing infection  Shower or bathe the night before and morning of your surgery. Follow the instructions your clinic gave you. (If no instructions, use regular soap.)  Don't shave or clip hair near your surgery site. We'll remove the hair if needed.  Don't smoke or vape the morning of surgery. You may chew nicotine gum up to 2 hours before surgery. A nicotine patch is okay.  Note: Some surgeries require you to completely quit smoking and nicotine. Check with your surgeon.  Your care team will make every effort to keep you safe from infection. We will:  Clean our hands often with soap and water (or an alcohol-based hand rub).  Clean the skin at your surgery site with a special soap that kills germs.  Give you a special gown to keep you warm. (Cold raises the risk of infection.)  Wear special hair covers, masks, gowns and gloves during surgery.  Give antibiotic medicine, if prescribed. Not all surgeries need antibiotics.  What to bring on the day of surgery  Photo ID and insurance card  Copy of your health care directive, if you have one  Glasses and hearing aids (bring cases)  You can't wear contacts during surgery  Inhaler and eye drops, if you use them (tell us about these when you arrive)  CPAP machine or breathing device, if you use them  A few personal items, if spending the night  If you have . . .  A pacemaker, ICD (cardiac defibrillator) or other  implant: Bring the ID card.  An implanted stimulator: Bring the remote control.  A legal guardian: Bring a copy of the certified (court-stamped) guardianship papers.  Please remove any jewelry, including body piercings. Leave jewelry and other valuables at home.  If you're going home the day of surgery  You must have a responsible adult drive you home. They should stay with you overnight as well.  If you don't have someone to stay with you, and you aren't safe to go home alone, we may keep you overnight. Insurance often won't pay for this.  After surgery  If it's hard to control your pain or you need more pain medicine, please call your surgeon's office.  Questions?   If you have any questions for your care team, list them here: _________________________________________________________________________________________________________________________________________________________________________ ____________________________________ ____________________________________ ____________________________________  For informational purposes only. Not to replace the advice of your health care provider. Copyright   2003, 2019 [employer] [facility]. All rights reserved. Clinically reviewed by Shelia Enriquez MD. [employer] 005067 - REV 12/22.    How to Take Your Medication Before Surgery  - Take all of your medications before surgery except as noted below  - STOP taking all vitamins and herbal supplements 14 days before surgery.  - hold topical creams the morning of surgery   - bring inhalers to surgery   -

## 2024-02-28 ENCOUNTER — TELEPHONE (OUTPATIENT)
Dept: FAMILY MEDICINE | Facility: OTHER | Age: 54
End: 2024-02-28

## 2024-03-01 ENCOUNTER — MEDICAL CORRESPONDENCE (OUTPATIENT)
Dept: HEALTH INFORMATION MANAGEMENT | Facility: HOSPITAL | Age: 54
End: 2024-03-01

## 2024-03-10 ENCOUNTER — HEALTH MAINTENANCE LETTER (OUTPATIENT)
Age: 54
End: 2024-03-10

## 2024-03-12 ENCOUNTER — TELEPHONE (OUTPATIENT)
Dept: FAMILY MEDICINE | Facility: OTHER | Age: 54
End: 2024-03-12

## 2024-03-12 NOTE — TELEPHONE ENCOUNTER
[facility] Continuous Blood Gluc  (DEXCOM G6 ) DEVICE. SUBMITTED ON CMM, WAITING FOR RESPONSE.

## 2024-03-13 NOTE — TELEPHONE ENCOUNTER
RECEIVED PA APPROVAL FOR  Continuous Blood Gluc  (DEXCOM G6 ) DEVICE. SCANNED INTO EPIC. DATES 02/11/2024-03/12/2025

## 2024-03-19 ENCOUNTER — OFFICE VISIT (OUTPATIENT)
Dept: FAMILY MEDICINE | Facility: OTHER | Age: 54
End: 2024-03-19
Attending: STUDENT IN AN ORGANIZED HEALTH CARE EDUCATION/TRAINING PROGRAM
Payer: COMMERCIAL

## 2024-03-19 ENCOUNTER — ANCILLARY PROCEDURE (OUTPATIENT)
Dept: GENERAL RADIOLOGY | Facility: OTHER | Age: 54
End: 2024-03-19
Attending: STUDENT IN AN ORGANIZED HEALTH CARE EDUCATION/TRAINING PROGRAM
Payer: COMMERCIAL

## 2024-03-19 VITALS
BODY MASS INDEX: 32.76 KG/M2 | HEIGHT: 62 IN | WEIGHT: 178 LBS | TEMPERATURE: 97.7 F | RESPIRATION RATE: 18 BRPM | DIASTOLIC BLOOD PRESSURE: 68 MMHG | SYSTOLIC BLOOD PRESSURE: 126 MMHG | OXYGEN SATURATION: 92 % | HEART RATE: 97 BPM

## 2024-03-19 DIAGNOSIS — M25.541 METACARPOPHALANGEAL JOINT PAIN OF RIGHT HAND: ICD-10-CM

## 2024-03-19 DIAGNOSIS — M79.89 SWELLING OF DIGIT OF RIGHT HAND: ICD-10-CM

## 2024-03-19 DIAGNOSIS — M18.0 PRIMARY OSTEOARTHRITIS OF BOTH FIRST CARPOMETACARPAL JOINTS: ICD-10-CM

## 2024-03-19 DIAGNOSIS — M18.0 PRIMARY OSTEOARTHRITIS OF BOTH FIRST CARPOMETACARPAL JOINTS: Primary | ICD-10-CM

## 2024-03-19 LAB
ALBUMIN SERPL BCG-MCNC: 4 G/DL (ref 3.5–5.2)
ALP SERPL-CCNC: 91 U/L (ref 40–150)
ALT SERPL W P-5'-P-CCNC: 15 U/L (ref 0–50)
ANION GAP SERPL CALCULATED.3IONS-SCNC: 10 MMOL/L (ref 7–15)
AST SERPL W P-5'-P-CCNC: 17 U/L (ref 0–45)
BASOPHILS # BLD AUTO: 0 10E3/UL (ref 0–0.2)
BASOPHILS NFR BLD AUTO: 0 %
BILIRUB SERPL-MCNC: 0.2 MG/DL
BUN SERPL-MCNC: 13 MG/DL (ref 6–20)
CALCIUM SERPL-MCNC: 9.4 MG/DL (ref 8.6–10)
CHLORIDE SERPL-SCNC: 105 MMOL/L (ref 98–107)
CREAT SERPL-MCNC: 0.84 MG/DL (ref 0.51–0.95)
CRP SERPL-MCNC: 14.77 MG/L
DEPRECATED HCO3 PLAS-SCNC: 25 MMOL/L (ref 22–29)
EGFRCR SERPLBLD CKD-EPI 2021: 81 ML/MIN/1.73M2
EOSINOPHIL # BLD AUTO: 0.2 10E3/UL (ref 0–0.7)
EOSINOPHIL NFR BLD AUTO: 2 %
ERYTHROCYTE [DISTWIDTH] IN BLOOD BY AUTOMATED COUNT: 13.7 % (ref 10–15)
GLUCOSE SERPL-MCNC: 100 MG/DL (ref 70–99)
HCT VFR BLD AUTO: 45.6 % (ref 35–47)
HGB BLD-MCNC: 14.6 G/DL (ref 11.7–15.7)
IMM GRANULOCYTES # BLD: 0 10E3/UL
IMM GRANULOCYTES NFR BLD: 0 %
LYMPHOCYTES # BLD AUTO: 3.1 10E3/UL (ref 0.8–5.3)
LYMPHOCYTES NFR BLD AUTO: 26 %
MCH RBC QN AUTO: 30.3 PG (ref 26.5–33)
MCHC RBC AUTO-ENTMCNC: 32 G/DL (ref 31.5–36.5)
MCV RBC AUTO: 95 FL (ref 78–100)
MONOCYTES # BLD AUTO: 0.8 10E3/UL (ref 0–1.3)
MONOCYTES NFR BLD AUTO: 7 %
NEUTROPHILS # BLD AUTO: 7.6 10E3/UL (ref 1.6–8.3)
NEUTROPHILS NFR BLD AUTO: 65 %
NRBC # BLD AUTO: 0 10E3/UL
NRBC BLD AUTO-RTO: 0 /100
PLATELET # BLD AUTO: 287 10E3/UL (ref 150–450)
POTASSIUM SERPL-SCNC: 4.1 MMOL/L (ref 3.4–5.3)
PROT SERPL-MCNC: 7.3 G/DL (ref 6.4–8.3)
RBC # BLD AUTO: 4.82 10E6/UL (ref 3.8–5.2)
SODIUM SERPL-SCNC: 140 MMOL/L (ref 135–145)
WBC # BLD AUTO: 11.6 10E3/UL (ref 4–11)

## 2024-03-19 PROCEDURE — 85025 COMPLETE CBC W/AUTO DIFF WBC: CPT | Performed by: STUDENT IN AN ORGANIZED HEALTH CARE EDUCATION/TRAINING PROGRAM

## 2024-03-19 PROCEDURE — 86140 C-REACTIVE PROTEIN: CPT | Performed by: STUDENT IN AN ORGANIZED HEALTH CARE EDUCATION/TRAINING PROGRAM

## 2024-03-19 PROCEDURE — 80053 COMPREHEN METABOLIC PANEL: CPT | Performed by: STUDENT IN AN ORGANIZED HEALTH CARE EDUCATION/TRAINING PROGRAM

## 2024-03-19 PROCEDURE — 73130 X-RAY EXAM OF HAND: CPT | Mod: TC | Performed by: RADIOLOGY

## 2024-03-19 PROCEDURE — 36415 COLL VENOUS BLD VENIPUNCTURE: CPT | Performed by: STUDENT IN AN ORGANIZED HEALTH CARE EDUCATION/TRAINING PROGRAM

## 2024-03-19 PROCEDURE — 99215 OFFICE O/P EST HI 40 MIN: CPT | Performed by: STUDENT IN AN ORGANIZED HEALTH CARE EDUCATION/TRAINING PROGRAM

## 2024-03-19 RX ORDER — SEMAGLUTIDE 0.68 MG/ML
0.25 INJECTION, SOLUTION SUBCUTANEOUS
COMMUNITY
Start: 2024-03-05 | End: 2024-04-02 | Stop reason: ALTCHOICE

## 2024-03-19 RX ORDER — LANCETS
EACH MISCELLANEOUS
COMMUNITY
Start: 2024-03-05

## 2024-03-19 RX ORDER — ACETAMINOPHEN 325 MG/1
325-650 TABLET ORAL EVERY 6 HOURS PRN
COMMUNITY
Start: 2024-03-01

## 2024-03-19 RX ORDER — OXYCODONE HYDROCHLORIDE 5 MG/1
5 TABLET ORAL EVERY 4 HOURS PRN
COMMUNITY
End: 2024-04-02

## 2024-03-19 RX ORDER — IBUPROFEN 200 MG
200 TABLET ORAL EVERY 4 HOURS PRN
COMMUNITY

## 2024-03-19 RX ORDER — DESVENLAFAXINE 25 MG/1
1 TABLET, EXTENDED RELEASE ORAL EVERY MORNING
COMMUNITY
Start: 2024-01-31 | End: 2024-05-02

## 2024-03-19 RX ORDER — KETOCONAZOLE 20 MG/G
CREAM TOPICAL DAILY
COMMUNITY

## 2024-03-19 ASSESSMENT — PAIN SCALES - GENERAL: PAINLEVEL: WORST PAIN (10)

## 2024-03-19 NOTE — PATIENT INSTRUCTIONS
TYLENOL AND IBUPROFEN ALTERNATE DOSING  Example schedule     6:00am - tylenol 1000mg  9:00am - ibuprofen 400mg  12:00pm - tylenol 1000mg  3:00pm - ibuprofen 400mg  6:00pm - tylenol 1000mg  9:00pm - ibuprofen 400mg  12:00am (midnight) - can take additional 1000mg tylenol if needed    Try to wean oxycodone as able     You can dose tylenol every 6 hours, up to 4000mg per day if liver tests are normal. If history of liver disease, can only do 2000mg per day. Do not drink alcohol while taking high dose tylenol.   You can dose ibuprofen up to 3200g per day for a short use (10 days) and 2400g per day for more chronic use if you have good kidney function. Be sure to take food with ibuprofen dosing to protect the stomach.

## 2024-03-19 NOTE — LETTER
My COPD Action Plan     Name: Jami Wang    YOB: 1970   Date: 3/18/2024    My doctor: Hannah Eaton MD   My clinic: [facility] [place]    [address] [facility]  [place] [address]  [phone]  My Controller Medicine: Serevent/Fluticasone (Advair)   Dose: 250/50     My Rescue Medicine:PROAIR   Dose:108     My Flare Up Medicine: ipratropium - albuterol 0.5 mg/2.5 mg/3 mL (DUONEB) 0.5-2.5 (3) MG/3ML neb solution         My COPD Severity:       Use of Oxygen: Oxygen Not Prescribed      Make sure you've had your pneumonia   vaccines.          GREEN ZONE       Doing well today    Usual level of activity and exercise  Usual amount of cough and mucus  No shortness of breath  Usual level of health (thinking clearly, sleeping well, feel like eating) Actions:    Take daily medicines  Use oxygen as prescribed  Follow regular exercise and diet plan  Avoid cigarette smoke and other irritants that harm the lungs           YELLOW ZONE          Having a bad day or flare up    Short of breath more than usual  A lot more sputum (mucus) than usual  Sputum looks yellow, green, tan, brown or bloody  More coughing or wheezing  Fever or chills  Less energy; trouble completing activities  Trouble thinking or focusing  Using quick relief inhaler or nebulizer more often  Poor sleep; symptoms wake me up  Do not feel like eating Actions:    Get plenty of rest  Take daily medicines  Use quick relief inhaler   If you use oxygen, call you doctor to see if you should adjust your oxygen  Do breathing exercises or other things to help you relax  Let a loved one, friend or neighbor know you are feeling worse  Call your care team if you have 2 or more symptoms.  Start taking steroids or antibiotics if directed by your care team           RED ZONE       Need medical care now    Severe shortness of breath (feel you can't breathe)  Fever, chills  Not enough breath to do any activity  Trouble coughing up mucus, walking or  talking  Blood in mucus  Frequent coughing Rescue medicines are not working  Not able to sleep because of breathing  Feel confused or drowsy  Chest pain    Actions:    Call your health care team.  If you cannot reach your care team, call 911 or go to the emergency room.        Annual Reminders:  Meet with Care Team, Flu Shot every Fall  Pharmacy:    [id number] - [place], [place] - [address] AND [id number] - [place], [place] - [address] AT [facility] [address]

## 2024-03-19 NOTE — PROGRESS NOTES
Assessment & Plan     1. Primary osteoarthritis of both first carpometacarpal joints    2. Metacarpophalangeal joint pain of right hand    3. Swelling of digit of right hand      Surgery on her right first CMC and also her first digit Friday, 4 days ago.  Having significant more pain with this surgery than on her left hand, which was done a few weeks prior.  Her digits are all swollen, although worse over the second and third digits.  Reassuringly, has no spreading redness, has good cap refill, and is able to flex and her extend her fingers.  The fourth and fifth digit are minimally affected and not painful.  CBC is normal/stable and improved from prior to surgery actually.  CRP minimally elevated, which I would expect after surgery.  CMP is stable.  X-ray without acute finding, does have surgical changes.  Discussed with [facility] orthopedic PA in [place].  No overt concern, pain like this is not uncommon after surgery.  Recommended monitoring.  Did unwrap and relieve some of the cushion with her cast to try and see if that relieves any pressure.  No overt signs of compartment syndrome at this time but extensively discussed what patient would need to watch for.  Does not seem like a flexor tenosynovitis, but certainly likely has irritation with the recent operation on that digit.  She will monitor for any increasing pain, numbness, whole hand painful, worsening swelling, redness, fevers.  Discussed importance of keeping that arm elevated to minimize swelling.  Reviewed alternating dosing of Tylenol and ibuprofen, as it does seem she is getting behind her pain management and is using more of the oxycodone.  Has follow-up with hand therapy on Friday.  Discussed following up with me, the emergency department, or her surgery team sooner if concerning symptoms arise.    - CBC with platelets and differential; Future  - CRP, inflammation; Future  - XR Hand Right G/E 3 Views; Future  - Comprehensive metabolic panel (BMP +  Alb, Alk Phos, ALT, AST, Total. Bili, TP); Future  - CBC with platelets and differential  - CRP, inflammation  - Comprehensive metabolic panel (BMP + Alb, Alk Phos, ALT, AST, Total. Bili, TP)      Review of external notes as documented elsewhere in note  Discussion of management or test interpretation with external physician/other qualified healthcare professional/appropriate source - Adin LY at [facility]  Ordering of each unique test    42 minutes spent by me on the date of the encounter doing chart review, history and exam, documentation and further activities per the note        Subjective   Gabrielle is a 54 year old, presenting for the following health issues:  Diabetes and Lipids        3/19/2024    10:04 AM   Additional Questions   Roomed by April   Accompanied by          3/19/2024    10:04 AM   Patient Reported Additional Medications   Patient reports taking the following new medications none     HPI     Had right thumb arthroplasty 3/15/24. Having a ton of 2nd finger mcp pain.  Describes as a burning/deep aching pain.  Didn't have that with her left hand that was done a couple weeks prior. Taking oxycodone every 4 hours. Alternating with tylenol. No fevers. No spreading redness.  Very painful with extension and flexion.  Does have some numbness.  Significant other thinks that there is more swelling this time.  Certainly had a lot more pain with this surgery than the left hand. Says she has been elevating. Overall, slightly improved from day of surgery but is taking more pain medicine than she did the first time. Felt this block wore off after 12hrs rather than 3 days like the last one.     Was able to get a hold of ALIYAH Oakley who works with orthopedics in [place].  Pain and swelling after surgery like this are not uncommon.  She did have work done on her first digit also, which could be why she is having pain.  Recommended monitoring.  Close follow-up with hand physical therapy, which she has on  "Friday.        Objective    /68 (BP Location: Left arm, Patient Position: Sitting, Cuff Size: Adult Regular)   Pulse 97   Temp 97.7  F (36.5  C) (Temporal)   Resp 18   Ht 1.575 m (5' 2\")   Wt 80.7 kg (178 lb)   SpO2 92%   BMI 32.56 kg/m    Body mass index is 32.56 kg/m .  Physical Exam  Constitutional:       General: She is not in acute distress.     Appearance: Normal appearance.   Musculoskeletal:      Comments: Right hand with significant digit edema, worse in the second and third digits.  Very tender with subcutaneous edema over the second MCP.  Significant pain with digit extension.  She is able to move all of her fingers, although has some discomfort.  Cap refill is present in all the fingers, less than 2 seconds.  Digits are all warm compared to her left extremity.  Incision seems to be well-healed.  No significant redness over the second MCP digit.  Incision is clean, dry, and intact without any redness.  Steri-Strips in place.   Neurological:      Mental Status: She is alert.        Results for orders placed or performed in visit on 03/19/24   XR Hand Right G/E 3 Views     Status: None    Narrative    PROCEDURE:  XR HAND RIGHT G/E 3 VIEWS    HISTORY: recent surgery of 1st cmc; severe pain now of 2nd mcp joint;  Primary osteoarthritis of both first carpometacarpal joints;  Metacarpophalangeal joint pain of right hand; Swelling of digit of  right hand    COMPARISON:  3/19/2024    TECHNIQUE:  3 views of the right hand were obtained.    FINDINGS:  There is been resection of the trapezium bone. There are  metallic fixation devices seen adjacent to the proximal first and  second metacarpals. The remainder of the hand is intact.       Impression    IMPRESSION: Status post resection of the trapezium.      PHILLIP CUNNINGHAM MD         SYSTEM ID:  [id number]   Results for orders placed or performed in visit on 03/19/24   CRP, inflammation     Status: Abnormal   Result Value Ref Range    CRP Inflammation " 14.77 (H) <5.00 mg/L   Comprehensive metabolic panel (BMP + Alb, Alk Phos, ALT, AST, Total. Bili, TP)     Status: Abnormal   Result Value Ref Range    Sodium 140 135 - 145 mmol/L    Potassium 4.1 3.4 - 5.3 mmol/L    Carbon Dioxide (CO2) 25 22 - 29 mmol/L    Anion Gap 10 7 - 15 mmol/L    Urea Nitrogen 13.0 6.0 - 20.0 mg/dL    Creatinine 0.84 0.51 - 0.95 mg/dL    GFR Estimate 81 >60 mL/min/1.73m2    Calcium 9.4 8.6 - 10.0 mg/dL    Chloride 105 98 - 107 mmol/L    Glucose 100 (H) 70 - 99 mg/dL    Alkaline Phosphatase 91 40 - 150 U/L    AST 17 0 - 45 U/L    ALT 15 0 - 50 U/L    Protein Total 7.3 6.4 - 8.3 g/dL    Albumin 4.0 3.5 - 5.2 g/dL    Bilirubin Total 0.2 <=1.2 mg/dL   CBC with platelets and differential     Status: Abnormal   Result Value Ref Range    WBC Count 11.6 (H) 4.0 - 11.0 10e3/uL    RBC Count 4.82 3.80 - 5.20 10e6/uL    Hemoglobin 14.6 11.7 - 15.7 g/dL    Hematocrit 45.6 35.0 - 47.0 %    MCV 95 78 - 100 fL    MCH 30.3 26.5 - 33.0 pg    MCHC 32.0 31.5 - 36.5 g/dL    RDW 13.7 10.0 - 15.0 %    Platelet Count 287 150 - 450 10e3/uL    % Neutrophils 65 %    % Lymphocytes 26 %    % Monocytes 7 %    % Eosinophils 2 %    % Basophils 0 %    % Immature Granulocytes 0 %    NRBCs per 100 WBC 0 <1 /100    Absolute Neutrophils 7.6 1.6 - 8.3 10e3/uL    Absolute Lymphocytes 3.1 0.8 - 5.3 10e3/uL    Absolute Monocytes 0.8 0.0 - 1.3 10e3/uL    Absolute Eosinophils 0.2 0.0 - 0.7 10e3/uL    Absolute Basophils 0.0 0.0 - 0.2 10e3/uL    Absolute Immature Granulocytes 0.0 <=0.4 10e3/uL    Absolute NRBCs 0.0 10e3/uL   CBC with platelets and differential     Status: Abnormal    Narrative    The following orders were created for panel order CBC with platelets and differential.  Procedure                               Abnormality         Status                     ---------                               -----------         ------                     CBC with platelets and d...[[id number]]  Abnormal            Final result                  Please view results for these tests on the individual orders.                     Signed Electronically by: Hannah Eaton MD

## 2024-04-02 ENCOUNTER — TELEPHONE (OUTPATIENT)
Dept: FAMILY MEDICINE | Facility: OTHER | Age: 54
End: 2024-04-02

## 2024-04-02 ENCOUNTER — OFFICE VISIT (OUTPATIENT)
Dept: FAMILY MEDICINE | Facility: OTHER | Age: 54
End: 2024-04-02
Attending: STUDENT IN AN ORGANIZED HEALTH CARE EDUCATION/TRAINING PROGRAM
Payer: COMMERCIAL

## 2024-04-02 VITALS
TEMPERATURE: 98.3 F | WEIGHT: 183.13 LBS | HEART RATE: 106 BPM | SYSTOLIC BLOOD PRESSURE: 120 MMHG | HEIGHT: 62 IN | RESPIRATION RATE: 16 BRPM | BODY MASS INDEX: 33.7 KG/M2 | DIASTOLIC BLOOD PRESSURE: 78 MMHG | OXYGEN SATURATION: 95 %

## 2024-04-02 DIAGNOSIS — E78.2 MIXED HYPERLIPIDEMIA: Chronic | ICD-10-CM

## 2024-04-02 DIAGNOSIS — F32.A DEPRESSIVE DISORDER: Chronic | ICD-10-CM

## 2024-04-02 DIAGNOSIS — L84 CALLUS OF FOOT: ICD-10-CM

## 2024-04-02 DIAGNOSIS — E11.42 TYPE 2 DIABETES MELLITUS WITH DIABETIC POLYNEUROPATHY, WITHOUT LONG-TERM CURRENT USE OF INSULIN (H): Primary | ICD-10-CM

## 2024-04-02 DIAGNOSIS — F41.1 GENERALIZED ANXIETY DISORDER: Chronic | ICD-10-CM

## 2024-04-02 DIAGNOSIS — R22.9 SOFT TISSUE SWELLING: ICD-10-CM

## 2024-04-02 PROCEDURE — 99207 PR FOOT EXAM NO CHARGE: CPT | Performed by: STUDENT IN AN ORGANIZED HEALTH CARE EDUCATION/TRAINING PROGRAM

## 2024-04-02 PROCEDURE — 99214 OFFICE O/P EST MOD 30 MIN: CPT | Performed by: STUDENT IN AN ORGANIZED HEALTH CARE EDUCATION/TRAINING PROGRAM

## 2024-04-02 RX ORDER — TIRZEPATIDE 2.5 MG/.5ML
2.5 INJECTION, SOLUTION SUBCUTANEOUS
Qty: 2 ML | Refills: 0 | Status: SHIPPED | OUTPATIENT
Start: 2024-04-02 | End: 2024-05-02

## 2024-04-02 RX ORDER — BLOOD-GLUCOSE SENSOR
1 EACH MISCELLANEOUS
Qty: 6 EACH | Refills: 5 | Status: SHIPPED | OUTPATIENT
Start: 2024-04-02 | End: 2024-05-02

## 2024-04-02 RX ORDER — KETOROLAC TROMETHAMINE 30 MG/ML
1 INJECTION, SOLUTION INTRAMUSCULAR; INTRAVENOUS ONCE
Qty: 1 EACH | Refills: 0 | Status: SHIPPED | OUTPATIENT
Start: 2024-04-02 | End: 2024-04-02

## 2024-04-02 RX ORDER — ROSUVASTATIN CALCIUM 40 MG/1
40 TABLET, COATED ORAL DAILY
Qty: 90 TABLET | Refills: 0 | Status: SHIPPED | OUTPATIENT
Start: 2024-04-02

## 2024-04-02 ASSESSMENT — PAIN SCALES - GENERAL: PAINLEVEL: MODERATE PAIN (5)

## 2024-04-02 NOTE — PATIENT INSTRUCTIONS
Resume Crestor. Cut in half (20mg then) for one week, then can increase to 1 tab (40mg).   We will recheck in 8-12 weeks.   Prior auth for Mounjaro.   Usman 3 ordered to cara.   Let me know in one week if area on right arm is enlarging or not getting better. Will get ultrasound.   A referral was placed for you to see podiatry here at [place]. If you do not hear to schedule in 4 business days, please reach out to our clinic at [phone] so we can look into it further.    Restart Pristiq. If tolerating at two weeks, increase from 25mg to 50mg.   Hold off on taking abilify

## 2024-04-02 NOTE — TELEPHONE ENCOUNTER
Please call and have her resume Ozempic as discussed since Mounjaro is not a preferred drug and is not covered by her insurance.

## 2024-04-02 NOTE — TELEPHONE ENCOUNTER
PRIOR AUTHORIZATION DENIED    Medication: TIRZEPATIDE 2.5 MG/0.5ML SC SOPN  Insurance Company: [employer] - Phone [phone] Fax [phone]  Denial Date: 4/2/2024  Denial Reason(s):     Appeal Information:     Patient Notified: No

## 2024-04-02 NOTE — PROGRESS NOTES
Assessment & Plan     Type 2 diabetes mellitus with diabetic polyneuropathy, without long-term current use of insulin (H)  A1c is rising since she stopped Ozempic, was having lower blood sugar with Ozempic and other side effects.  Previously did not tolerate metformin.  Will trial Mounjaro, hopeful also for weight benefit.  Discussed dose titration.  Does not feel her current Dexcom is accurate, order placed for freestyle mily.  Recommend foot care with podiatry, referral placed.  Discussed taking care of her feet at home, not walking barefoot.  Will update vitamin B12 and folic acid with next labs.  - tirzepatide (MOUNJARO) 2.5 MG/0.5ML pen; Inject 2.5 mg Subcutaneous every 7 days  - Continuous Blood Gluc  (FREESTYLE MILY 3 READER) MIKE; 1 each once for 1 dose Use to read blood sugars per 's instructions.  - Continuous Blood Gluc Sensor (FREESTYLE MILY 3 SENSOR) MISC; 1 each every 14 days Use 1 sensor every 14 days. Use to read blood sugars per 's instructions.  - Orthopedic  Referral; Future  - [place] FOOT EXAM NO CHARGE  - Vitamin B12; Future  - Folate; Future    Mixed hyperlipidemia  Needs to resume cholesterol medication.  Reports inadequate response in the past to medication, but plan to resume and reassess.  Update LDL and AST/ALT in 3 months.  Crestor 40 mg refilled.  - rosuvastatin (CRESTOR) 40 MG tablet; Take 1 tablet (40 mg) by mouth daily  - AST; Future  - ALT; Future  - Lipid Profile (Chol, Trig, HDL, LDL calc); Future    Soft tissue swelling  Located over the right distal ulnar forearm, questionable cyst, small and easily movable there.  No obvious derangement, erythema, or bony tenderness.  Question if it is related to her recent surgery, as she did have significant swelling.  Does not seem to have been rubbing on the cast though at that point.  Recommend monitoring for additional week.  If enlarging or not improving, will get ultrasound of the area.    Callus  "of foot  Bilateral calluses.  Referred to podiatry.  - Orthopedic  Referral; Future    Generalized anxiety disorder  Depression  Not currently on medication but has trialed many in the past per her report.  Did not think Abilify was beneficial.  Has not taken in over 4 months.  Never started Pristiq which was prescribed by her prior primary care provider.  Plan to start Pristiq 25 mg.  If tolerating and slight benefit, plan to increase to 50 mg at 2 weeks.  Reassess in 1 month.        Prescription drug management        BMI  Estimated body mass index is 33.49 kg/m  as calculated from the following:    Height as of this encounter: 1.575 m (5' 2\").    Weight as of this encounter: 83.1 kg (183 lb 2 oz).   Weight management plan: Discussed healthy diet and exercise guidelines        Return in about 3 months (around 7/2/2024) for Follow up, OPHELIA    Anton Anthony is a 54 year old, presenting for the following health issues:  Diabetes and Lipids        4/2/2024     7:57 AM   Additional Questions   Roomed by Felisha Mcwilliams   Accompanied by          4/2/2024     7:57 AM   Patient Reported Additional Medications   Patient reports taking the following new medications None     HPI       Diabetes Follow-up    How often are you checking your blood sugar? Continuous glucose monitor but has not had one on for the last couple of weeks due to surgery   What time of day are you checking your blood sugars (select all that apply)?  Before and after meals  Have you had any blood sugars above 200?  No  Have you had any blood sugars below 70?  Yes   What symptoms do you notice when your blood sugar is low?  Shaky, Dizzy, Weak, Lethargy, and Blurred vision  What concerns do you have today about your diabetes? None   Do you have any of these symptoms? (Select all that apply)  Numbness in feet and Excessive thirst    Was on ozempic - had strange blood sugars on the ozempic. Felt like it made her go low. Wanting to try " "alternate agent.   Became very ill on Metformin.     BP Readings from Last 2 Encounters:   04/02/24 120/78   03/19/24 126/68     Hemoglobin A1C (%)   Date Value   02/27/2024 6.7 (H)             Hyperlipidemia Follow-Up    Are you regularly taking any medication or supplement to lower your cholesterol?   Yes- Crestor   Are you having muscle aches or other side effects that you think could be caused by your cholesterol lowering medication?  No    Stopped Crestor 40mg. When she was on it before didn't seem like it was working.   History of simvastatin.     Mood isnt great. Feeling more down and depressed. Less motivation. Not taking pristiq or abilify.  Was worried about taking them with her upcoming surgery        Objective    /78 (BP Location: Right arm, Patient Position: Sitting, Cuff Size: Adult Regular)   Pulse 106   Temp 98.3  F (36.8  C) (Tympanic)   Resp 16   Ht 1.575 m (5' 2\")   Wt 83.1 kg (183 lb 2 oz)   SpO2 95%   BMI 33.49 kg/m    Body mass index is 33.49 kg/m .    Physical Exam  Constitutional:       General: She is not in acute distress.     Appearance: Normal appearance.   Pulmonary:      Effort: Pulmonary effort is normal.   Musculoskeletal:      Comments: Small, mobile area of firm density over ulnar aspect of right distal forearm. Non-tender. No redness. No bony tenderness.   Skin:     General: Skin is warm and dry.   Neurological:      General: No focal deficit present.      Mental Status: She is alert and oriented to person, place, and time.   Psychiatric:         Mood and Affect: Mood normal.         Behavior: Behavior normal.        GENERAL: alert and no distress  Diabetic foot exam: normal DP and PT pulses, no trophic changes or ulcerative lesions, normal sensory exam, normal monofilament exam, except for findings noted on diabetic foot graphical image (reduced at 5-7 bilaterally).          , and callus formation noted mid-foot and to anterior foot with some also over heel        "     Signed Electronically by: Hannah Eaton MD

## 2024-04-03 NOTE — CONFIDENTIAL NOTE
Patient verbalized understanding and was agreeable with plan of care. Patient would like to have it sent to [facility].

## 2024-04-29 ENCOUNTER — OFFICE VISIT (OUTPATIENT)
Dept: PODIATRY | Facility: OTHER | Age: 54
End: 2024-04-29
Attending: PODIATRIST
Payer: COMMERCIAL

## 2024-04-29 ENCOUNTER — ANCILLARY PROCEDURE (OUTPATIENT)
Dept: GENERAL RADIOLOGY | Facility: OTHER | Age: 54
End: 2024-04-29
Attending: PODIATRIST
Payer: COMMERCIAL

## 2024-04-29 VITALS
HEART RATE: 90 BPM | DIASTOLIC BLOOD PRESSURE: 89 MMHG | OXYGEN SATURATION: 92 % | TEMPERATURE: 97.8 F | SYSTOLIC BLOOD PRESSURE: 132 MMHG

## 2024-04-29 DIAGNOSIS — M20.12 HALLUX VALGUS (ACQUIRED), LEFT FOOT: ICD-10-CM

## 2024-04-29 DIAGNOSIS — E11.9 DIABETES MELLITUS TYPE 2, NONINSULIN DEPENDENT (H): ICD-10-CM

## 2024-04-29 DIAGNOSIS — Z71.6 TOBACCO ABUSE COUNSELING: ICD-10-CM

## 2024-04-29 DIAGNOSIS — M20.11 HALLUX VALGUS (ACQUIRED), RIGHT FOOT: ICD-10-CM

## 2024-04-29 DIAGNOSIS — E11.42 DIABETIC POLYNEUROPATHY ASSOCIATED WITH TYPE 2 DIABETES MELLITUS (H): ICD-10-CM

## 2024-04-29 DIAGNOSIS — L84 CALLUS OF FOOT: ICD-10-CM

## 2024-04-29 DIAGNOSIS — M20.11 HALLUX VALGUS (ACQUIRED), RIGHT FOOT: Primary | ICD-10-CM

## 2024-04-29 DIAGNOSIS — F17.210 CIGARETTE NICOTINE DEPENDENCE WITHOUT COMPLICATION: ICD-10-CM

## 2024-04-29 DIAGNOSIS — L60.3 ONYCHODYSTROPHY: ICD-10-CM

## 2024-04-29 DIAGNOSIS — Z13.89 SCREENING FOR DIABETIC PERIPHERAL NEUROPATHY: ICD-10-CM

## 2024-04-29 PROCEDURE — 11055 PARING/CUTG B9 HYPRKER LES 1: CPT | Performed by: PODIATRIST

## 2024-04-29 PROCEDURE — 99203 OFFICE O/P NEW LOW 30 MIN: CPT | Mod: 25 | Performed by: PODIATRIST

## 2024-04-29 PROCEDURE — 73630 X-RAY EXAM OF FOOT: CPT | Mod: TC | Performed by: RADIOLOGY

## 2024-04-29 PROCEDURE — 11721 DEBRIDE NAIL 6 OR MORE: CPT | Mod: XS | Performed by: PODIATRIST

## 2024-04-29 PROCEDURE — 99207 PR FOOT EXAM NO CHARGE: CPT | Performed by: PODIATRIST

## 2024-04-29 PROCEDURE — 87101 SKIN FUNGI CULTURE: CPT | Performed by: PODIATRIST

## 2024-04-29 ASSESSMENT — PAIN SCALES - GENERAL: PAINLEVEL: NO PAIN (0)

## 2024-04-29 NOTE — PROGRESS NOTES
Chief complaint: Patient presents with:  Musculoskeletal Problem: callus  NEUROPATHY: Bilateral feet      History of Present Illness: This 54 year old NIDDM II female is seen with her  at the request of No ref. provider found for evaluation and suggestions of management of a painful callus on the RIGHT foot and bilateral painful bunions. The callus has been getting more painful for the past year. The bunions have also been giving her pain for a long time. The shoes she wears also make her bunions hurt more. Walking a lot causing bunion pain also with or without shoes. She would like to discuss surgical options for bunion surgery.    She consistently has burning, tingling, and numbness in her feet. She takes Pregabalin but it doesn't seem to make a difference for the burning, tingling, and numbness in her feet.    She has difficulty trimming her toenails as well because they are too thick. She recently broke a nail clipper while trying to trim her nails because her toenails were too thick for the clippers.    No further pedal complaints today.     Patient smokes 1 1/2 ppd. She is working on quitting on her own.      /89 (BP Location: Left arm, Patient Position: Sitting, Cuff Size: Adult Regular)   Pulse 90   Temp 97.8  F (36.6  C) (Tympanic)   SpO2 92%     Patient Active Problem List   Diagnosis    Generalized anxiety disorder    Depressive disorder    Hyperlipidemia    Hypothyroidism (acquired)    Insomnia    RLS (restless legs syndrome)    Spondylosis without myelopathy or radiculopathy, cervical region    Tobacco use disorder (30 pack year history plus)    Type 2 diabetes mellitus without complication, without long-term current use of insulin (H)    Chronic pain disorder    Cervical stenosis of spinal canal    Degeneration of lumbar intervertebral disc    Mixed simple and mucopurulent chronic bronchitis (H)    S/P complete hysterectomy    Bilateral arm weakness    History of Helicobacter pylori  infection    Prolonged Q-T interval on ECG    Decreased left ventricular function       Past Surgical History:   Procedure Laterality Date    ABDOMEN SURGERY      CARPAL TUNNEL RELEASE RT/LT Bilateral     cmc arthroplasty Left     COLONOSCOPY      COLONOSCOPY - HIM SCAN N/A 10/04/2018    [facility].    HYSTERECTOMY, PAP NO LONGER INDICATED N/A 10/10/2018    Cervix removed per [facility]'s Hysterectomy Path Report.    HYSTERECTOMY, TOTAL, LAPAROSCOPIC, WITH SALPINGO-OOPHORECTOMY, CYSTOSCOPY Bilateral 10/10/2018    [facility].       Current Outpatient Medications   Medication Sig Dispense Refill    ACCU-CHEK GUIDE test strip       acetaminophen (TYLENOL) 325 MG tablet Take 325-650 mg by mouth every 6 hours as needed      blood glucose monitoring (SOFTCLIX) lancets USE TO TEST 1 TIME PER DAY      Blood Glucose Monitoring Suppl (ACCU-CHEK GUIDE) w/Device KIT USE TO TEST BLOOD SUGAR DAILY      Continuous Blood Gluc  (DEXCOM G6 ) MIKE Use to read blood sugars as per 's instructions. 1 each 1    Continuous Blood Gluc Sensor (DEXCOM G6 SENSOR) MISC CHANGE SENSOR EVERY 10 DAYS 3 each 3    Continuous Blood Gluc Sensor (FREESTYLE MILY 3 SENSOR) MISC 1 each every 14 days Use 1 sensor every 14 days. Use to read blood sugars per 's instructions. 6 each 5    Continuous Blood Gluc Transmit (DEXCOM G6 TRANSMITTER) MISC CHANGE EVERY 90 DAYS      estradiol (ESTRACE) 0.1 MG/GM vaginal cream Place 1 g vaginally      fluticasone-salmeterol (ADVAIR) 250-50 MCG/ACT inhaler Inhale 1 puff into the lungs every 12 hours      Glucose 4-6 GM-MG CHEW Take 4 g by mouth once      ketoconazole (NIZORAL) 2 % external cream Apply topically daily      levothyroxine (SYNTHROID/LEVOTHROID) 88 MCG tablet Take 88 mcg by mouth daily      omeprazole (PRILOSEC) 20 MG DR capsule TAKE 1 CAPSULE BY MOUTH EVERY DAY AS NEEDED FOR GI UPSET FOR UP TO 14 DAYS      pramipexole (MIRAPEX) 1.5 MG tablet Take 1.5 mg by  mouth      pregabalin (LYRICA) 25 MG capsule       rosuvastatin (CRESTOR) 40 MG tablet Take 1 tablet (40 mg) by mouth daily 90 tablet 0    semaglutide (OZEMPIC) 2 MG/3ML pen Inject 0.25 mg Subcutaneous every 7 days 3 mL 0    tiotropium (SPIRIVA RESPIMAT) 2.5 MCG/ACT inhaler Inhale 2 puffs into the lungs daily 4 g 4    tirzepatide (MOUNJARO) 2.5 MG/0.5ML pen Inject 2.5 mg Subcutaneous every 7 days 2 mL 0    traZODone (DESYREL) 150 MG tablet Take 150 mg by mouth at bedtime      triamcinolone (KENALOG) 0.1 % external cream Apply topically 2 times daily      ARIPiprazole (ABILIFY) 2 MG tablet Take 5 mg by mouth (Patient not taking: Reported on 4/2/2024)      clobetasol (TEMOVATE) 0.05 % external cream Apply topically 2 times daily Apply twice daily to affected lesions for up to 10-14 days. (Patient not taking: Reported on 4/2/2024) 45 g 0    desvenlafaxine succinate (PRISTIQ) 25 MG 24 hr tablet Take 1 tablet by mouth every morning (Patient not taking: Reported on 4/2/2024)      guaiFENesin (MUCINEX) 600 MG 12 hr tablet Take 1 tablet (600 mg) by mouth 2 times daily as needed for congestion (Patient not taking: Reported on 4/2/2024) 30 tablet 1    hydrOXYzine (VISTARIL) 25 MG capsule Take 1 capsule (25 mg) by mouth 3 times daily as needed for itching (Patient not taking: Reported on 4/2/2024) 30 capsule 0    hydrOXYzine HCl (ATARAX) 25 MG tablet  (Patient not taking: Reported on 4/2/2024)      ibuprofen (ADVIL/MOTRIN) 200 MG tablet Take 200 mg by mouth every 4 hours as needed (Patient not taking: Reported on 4/2/2024)      ipratropium - albuterol 0.5 mg/2.5 mg/3 mL (DUONEB) 0.5-2.5 (3) MG/3ML neb solution Inhale 3 mLs into the lungs (Patient not taking: Reported on 4/2/2024)      nicotine (NICODERM CQ) 14 MG/24HR 24 hr patch  (Patient not taking: Reported on 4/2/2024)      nicotine (NICODERM CQ) 21 MG/24HR 24 hr patch  (Patient not taking: Reported on 4/2/2024)      nicotine (NICORETTE) 2 MG gum Place 1 each (2 mg) inside  cheek every hour as needed for nicotine withdrawal symptoms (Patient not taking: Reported on 4/2/2024) 100 each 1    PROAIR  (90 Base) MCG/ACT inhaler  (Patient not taking: Reported on 4/2/2024)      Suvorexant (BELSOMRA) 20 MG tablet Take 1 tablet (20 mg) by mouth at bedtime (Patient not taking: Reported on 4/2/2024) 30 tablet 1     No current facility-administered medications for this visit.          Allergies   Allergen Reactions    Hydromorphone Other (See Comments)    Amoxicillin-Pot Clavulanate     Azithromycin      Other reaction(s): Stomach Upset    Black [place] Flavor Itching    Erythromycin Nausea and Vomiting    Pecan Extract Itching    Penicillins Rash     Has tolerated Cefazolin (ANCEF) and other Cephalosporins       Family History   Problem Relation Age of Onset    Osteoporosis Mother        Social History     Socioeconomic History    Marital status:      Spouse name: None    Number of children: None    Years of education: None    Highest education level: None   Tobacco Use    Smoking status: Every Day     Current packs/day: 0.50     Average packs/day: 0.5 packs/day for 38.3 years (19.2 ttl pk-yrs)     Types: Cigarettes     Start date: 1/1/1986     Passive exposure: Current    Smokeless tobacco: Current    Tobacco comments:     Pt denies quit plan 4/29/24   Vaping Use    Vaping status: Former   Substance and Sexual Activity    Alcohol use: Not Currently    Drug use: Yes     Types: Marijuana    Sexual activity: Yes     Partners: Male     Birth control/protection: Female Surgical   Other Topics Concern    Parent/sibling w/ CABG, MI or angioplasty before 65F 55M? No     Social Determinants of Health     Financial Resource Strain: Unknown (1/10/2024)    Financial Resource Strain     Within the past 12 months, have you or your family members you live with been unable to get utilities (heat, electricity) when it was really needed?: Patient declined   Food Insecurity: Low Risk  (1/10/2024)     Food Insecurity     Within the past 12 months, did you worry that your food would run out before you got money to buy more?: No     Within the past 12 months, did the food you bought just not last and you didn t have money to get more?: Patient declined   Recent Concern: Food Insecurity - High Risk (10/23/2023)    Food Insecurity     Within the past 12 months, did you worry that your food would run out before you got money to buy more?: Yes     Within the past 12 months, did the food you bought just not last and you didn t have money to get more?: Yes   Transportation Needs: High Risk (1/10/2024)    Transportation Needs     Within the past 12 months, has lack of transportation kept you from medical appointments, getting your medicines, non-medical meetings or appointments, work, or from getting things that you need?: Yes    Received from [facility], [facility]    Social Connections   Interpersonal Safety: Low Risk  (10/23/2023)    Interpersonal Safety     Do you feel physically and emotionally safe where you currently live?: Yes     Within the past 12 months, have you been hit, slapped, kicked or otherwise physically hurt by someone?: No     Within the past 12 months, have you been humiliated or emotionally abused in other ways by your partner or ex-partner?: No   Housing Stability: Low Risk  (1/10/2024)    Housing Stability     Do you have housing? : Yes     Are you worried about losing your housing?: No       ROS: 10 point ROS neg other than the symptoms noted above in the HPI.  EXAM  Constitutional: healthy, alert, and no distress    Psychiatric: mentation appears normal and affect normal/bright    VASCULAR:  -Dorsalis pedis pulse +2/4 b/l  -Posterior tibial pulse +1/4 b/l  -Capillary refill time < 3 seconds to b/l hallux  NEURO:  -Epicritic and protective sensation diminished to the bilateral foot  DERM:  -Skin temperature, texture and turgor  WNL b/l  -Toenails elongated, thickened, dystrophic and discolored x 10  -Hyperkeratotic lesion on the RIGHT plantar distal second metatarsal head  MSK:  -Lateral deviation of hallux with medial deviation of 1st metatarsal, bilaterally   -Prominent bony prominence to dorsal and medial 1st metatarsal head, bilaterally   -Mild tenderness on the bilateral medial and dorsal 1st MTPJ  -1st MTPJ ROM within normal limits without forefoot loading and limited to less than 20 degrees with forefoot loading  -Muscle strength of ankles +5/5 for dorsiflexion, plantarflexion, ABDUction and ADDuction b/l    RIGHT FOOT RADIOGRAPHS 04/29/2024  Impression:  No evidence of acute or subacute bony abnormality.   Hallux valgus of approximately 42 degrees.  MODESTO DELVALLE MD     LEFT FOOT RADIOGRAPHS 04/29/2024  Impression:  No evidence of acute or subacute bony abnormality.   Hallux valgus of approximately 53 degrees.  MODESTO DELVALLE MD   ============================================================    ASSESSMENT:  (M20.11) Hallux valgus (acquired), right foot  (primary encounter diagnosis)    (M20.12) Hallux valgus (acquired), left foot    (L60.3) Onychodystrophy    (L84) Callus of foot    (E11.9) Diabetes mellitus type 2, noninsulin dependent (H)    (E11.42) Diabetic polyneuropathy associated with type 2 diabetes mellitus (H)    (F17.210) Cigarette nicotine dependence without complication    (Z71.6) Tobacco abuse counseling    (Z13.89) Screening for diabetic peripheral neuropathy        PLAN:  -Patient evaluated and examined. Treatment options discussed with no educational barriers noted.  -High risk toenail debridement x 10 toenails without incident    -Callus pared x 1 to the RIGHT plantar distal second metatarsal head without incident  ---Patient reminded that the callus will likely return due to the underlying, prominent bone causing the callus while the patient is walking.    -Diabetic Foot Education provided. This included  checking the feet daily looking for new new blisters or wounds, wearing shoes at all times when walking including around the house, and avoiding lotion application between the toes. If there are any signs of infection, the patient should present to the ED as soon as possible. Infections of the foot can be life threatening or lead to amputations of the foot or leg.    Diabetic Shoes:  ---Discussed DM shoes and educated the patient as to the advantages to DM shoes. DM shoes have a thicker sole which helps protect the feet from puncture wounds of sharp objects that can puncture through shoes. They also have more depth to the toe box and a wider toe box in attempt to prevent the shoes from rubbing on the toes and causing blisters/wounds. Additionally, the shoes come with a custom molded insert that is designed to also attempt to prevent blisters or ulcers of the foot. Blisters and ulcers can still occur while wearing DM shoes (espeically when the shoes are new), but they are aimed at helping prevent blisters and ulcers. The patient understands the benefits of DM shoes and would like a referral for DM shoes.  ---Referral made for DM shoes through the orthotist, Maxine Barbosa on 04/29/2024 per patient request.    ---------------------------------------    Onychodystrophy  -Discussed etiologies and treatment options for onychodystrophy. There may be fungi in the toenail, but it is not known until a nail culture is performed. Treatment options consist of leaving the toenail as is, treating the nail for fungi (culture would be taken today to confirm nail fungi), a nail avulsion and treating the fungi as the nail grows back in, or removing the toenail permanently. The oral medication can sometimes fluctuate liver values so the patient would need to get a liver blood draw before, during and after the 90 day treatment. Topical anti-fungals can be used, but they do not always full cure the toenail fungi and they must be used daily  and sometimes for 6-12 months before noticing a difference in the toenail. Recurrence rate of toenail fungi is high. After reviewing risks and benefits, patient has decided to proceed with Vicks Vapor Rub. She will consider Lamisil at her follow-up if she had not noticed any difference in the toenails if the toenail culture is positive for fungi  ---Toenail fungal culture obtained on 04/29/2024. It can take between 24 hours and 30 days for the final results. Patient understands this time frame. The patient will be called with the results once they are positive or at the end of the 30 day period once the final results comes back negative.    Hallux valgus / bunions:   -Discussed causes and treatment options for bunion deformities:  ---Conservative treatment options consist of wider shoe gear and orthotics +/- padding/splinting to accommodate the bunion. A crest pad can hold down a dorsiflexed second digit, a toe spacer can help separate the hallux and second digit, and silicone bunion sleeve can help pad the bunion and prevent painful rubbing in shoe gear. This will not correct the bunion deformity, but may help decrease pain.  ---Correcting the biomechanics of the foot may also decrease the progression of a bunion. An orthotic may be considered in attempt to treat the biomechanics of the foot.    ---Discussed surgical treatment options including risks and benefits and post-op periods. Surgery should not be considered until the patient is experiencing daily pain that is limiting daily activities because of the bunion. Most surgeries required at least 6 weeks of NWB. A bumpectomy can be performed which requires less offloading with recovery. It does not fix the bunion but does reduce the prominent bump.  ---Discussed surgical options in detail including a 1st TMTJ fusion,1st MTPJ fusion and an osteotomy correction. All procedures would require 6+ weeks of healing while NWB. She smokes 1 1/2 ppd which places her at a  higher risk of non-union. She is advised to quit smoking 1-2 months before surgery to in crease her chances of healing.  ---Radiographs obtained today of bilateral foot (on 04/29/2024) to evaluate severity of the bilateral hallux valgus. Mild-to-minimal bilateral 1st MTPJ DJD. Moderate increase in ROSEMARIE of bilateral foot but also skewed by the metatarsus adductus bilaterally. Correction of the bunion would require either a 1st TMTJ fusion or a 1st MTPJ fusion. Will call patient with results and review with her in person at her follow-up appointment.     ---Discussed conservative treatment options which should be exhausted first:    -DM shoes and orthotics with a metatarsal pad may reduce the bunion and callus pain. Correcting her biomechanics may also reduce the progression of the bunion.  -A Silicone bunion sleeve will reduce friction of the bunion in shoe gear. These can sometimes be found over-the-counter at locations such as [employer] or [employer]. However, these can also be found online on websites such as [employer] or [employer].com.  -This is an acute, uncomplicated illness/injury with OTC treatment options reviewed.      -Tobacco cessation discussed with patient including the benefits of quitting and the risks of not quitting. The Quit Plan referral was offered and the patient is not interested in the referral today. Patient is not interested in quitting today.    -Patient in agreement with the above treatment plan and all of patient's questions were answered.      Return to clinic 63+ days for a diabetic foot exam, high risk nail debridement, callus paring and to discuss potential bunion surgery         Raissa Winter DPM

## 2024-05-02 ENCOUNTER — OFFICE VISIT (OUTPATIENT)
Dept: FAMILY MEDICINE | Facility: OTHER | Age: 54
End: 2024-05-02
Attending: STUDENT IN AN ORGANIZED HEALTH CARE EDUCATION/TRAINING PROGRAM
Payer: COMMERCIAL

## 2024-05-02 ENCOUNTER — TELEPHONE (OUTPATIENT)
Dept: FAMILY MEDICINE | Facility: OTHER | Age: 54
End: 2024-05-02

## 2024-05-02 VITALS
RESPIRATION RATE: 16 BRPM | BODY MASS INDEX: 33.16 KG/M2 | HEART RATE: 101 BPM | WEIGHT: 180.2 LBS | HEIGHT: 62 IN | TEMPERATURE: 97.7 F | DIASTOLIC BLOOD PRESSURE: 66 MMHG | OXYGEN SATURATION: 92 % | SYSTOLIC BLOOD PRESSURE: 118 MMHG

## 2024-05-02 DIAGNOSIS — K21.9 GASTROESOPHAGEAL REFLUX DISEASE WITHOUT ESOPHAGITIS: ICD-10-CM

## 2024-05-02 DIAGNOSIS — M51.369 DEGENERATION OF LUMBAR INTERVERTEBRAL DISC: ICD-10-CM

## 2024-05-02 DIAGNOSIS — F32.A DEPRESSIVE DISORDER: ICD-10-CM

## 2024-05-02 DIAGNOSIS — E11.9 TYPE 2 DIABETES MELLITUS WITHOUT COMPLICATION, WITHOUT LONG-TERM CURRENT USE OF INSULIN (H): Chronic | ICD-10-CM

## 2024-05-02 DIAGNOSIS — F17.200 TOBACCO USE DISORDER: Chronic | ICD-10-CM

## 2024-05-02 DIAGNOSIS — E78.2 MIXED HYPERLIPIDEMIA: Chronic | ICD-10-CM

## 2024-05-02 DIAGNOSIS — M48.061 NEURAL FORAMINAL STENOSIS OF LUMBAR SPINE: ICD-10-CM

## 2024-05-02 DIAGNOSIS — F41.1 GENERALIZED ANXIETY DISORDER: Primary | ICD-10-CM

## 2024-05-02 DIAGNOSIS — R32 URINARY INCONTINENCE, UNSPECIFIED TYPE: ICD-10-CM

## 2024-05-02 DIAGNOSIS — M48.062 SPINAL STENOSIS OF LUMBAR REGION WITH NEUROGENIC CLAUDICATION: ICD-10-CM

## 2024-05-02 PROCEDURE — 99215 OFFICE O/P EST HI 40 MIN: CPT | Performed by: STUDENT IN AN ORGANIZED HEALTH CARE EDUCATION/TRAINING PROGRAM

## 2024-05-02 RX ORDER — DESVENLAFAXINE 25 MG/1
TABLET, EXTENDED RELEASE ORAL
Qty: 42 TABLET | Refills: 0 | Status: SHIPPED | OUTPATIENT
Start: 2024-05-02 | End: 2024-06-03

## 2024-05-02 ASSESSMENT — ANXIETY QUESTIONNAIRES
7. FEELING AFRAID AS IF SOMETHING AWFUL MIGHT HAPPEN: SEVERAL DAYS
4. TROUBLE RELAXING: SEVERAL DAYS
5. BEING SO RESTLESS THAT IT IS HARD TO SIT STILL: SEVERAL DAYS
3. WORRYING TOO MUCH ABOUT DIFFERENT THINGS: MORE THAN HALF THE DAYS
2. NOT BEING ABLE TO STOP OR CONTROL WORRYING: MORE THAN HALF THE DAYS
6. BECOMING EASILY ANNOYED OR IRRITABLE: SEVERAL DAYS
1. FEELING NERVOUS, ANXIOUS, OR ON EDGE: NEARLY EVERY DAY
GAD7 TOTAL SCORE: 11
IF YOU CHECKED OFF ANY PROBLEMS ON THIS QUESTIONNAIRE, HOW DIFFICULT HAVE THESE PROBLEMS MADE IT FOR YOU TO DO YOUR WORK, TAKE CARE OF THINGS AT HOME, OR GET ALONG WITH OTHER PEOPLE: SOMEWHAT DIFFICULT
8. IF YOU CHECKED OFF ANY PROBLEMS, HOW DIFFICULT HAVE THESE MADE IT FOR YOU TO DO YOUR WORK, TAKE CARE OF THINGS AT HOME, OR GET ALONG WITH OTHER PEOPLE?: SOMEWHAT DIFFICULT
GAD7 TOTAL SCORE: 11
7. FEELING AFRAID AS IF SOMETHING AWFUL MIGHT HAPPEN: SEVERAL DAYS
GAD7 TOTAL SCORE: 11

## 2024-05-02 ASSESSMENT — PATIENT HEALTH QUESTIONNAIRE - PHQ9
10. IF YOU CHECKED OFF ANY PROBLEMS, HOW DIFFICULT HAVE THESE PROBLEMS MADE IT FOR YOU TO DO YOUR WORK, TAKE CARE OF THINGS AT HOME, OR GET ALONG WITH OTHER PEOPLE: SOMEWHAT DIFFICULT
SUM OF ALL RESPONSES TO PHQ QUESTIONS 1-9: 9
SUM OF ALL RESPONSES TO PHQ QUESTIONS 1-9: 9

## 2024-05-02 ASSESSMENT — ENCOUNTER SYMPTOMS: NERVOUS/ANXIOUS: 1

## 2024-05-02 ASSESSMENT — PAIN SCALES - GENERAL: PAINLEVEL: MILD PAIN (2)

## 2024-05-02 NOTE — PATIENT INSTRUCTIONS
Start Pristiq.   Try crestor in the morning.   Take omeprazole DAILY for six weeks.     Helpful things you can do to prevent reflux:   - Elevation of the head of the bed. This can be achieved either by putting six- to eight-inch blocks under the legs at the head of the bed or a Styrofoam wedge under the mattress.  - Refraining from lying down position after meals  - Avoidance of meals two to three hours before bedtime.  - Weight loss, if overweight  - Eliminate trigger foods such as:    - caffeine  - chocolate  - spicy foods  - food with high fat content  - carbonated beverages   - peppermint  - Other things that may be beneficial:    - Avoidance of tight-fitting garments   - Promotion of salivation through oral lozenges/chewing gum to neutralize refluxed acid   - Avoidance of tobacco and alcohol, as both reduce lower esophageal sphincter pressure, leading to the reflux, and smoking also diminishes the amount of saliva you have.

## 2024-05-02 NOTE — PROGRESS NOTES
Assessment & Plan     Generalized anxiety disorder  Depressive disorder  No change from one month ago, forgot to start Pristiq. Reordered now. Recheck in one month.   - desvenlafaxine succinate (PRISTIQ) 25 MG 24 hr tablet; Take 1 tablet (25 mg) by mouth every morning for 14 days, THEN 2 tablets (50 mg) every morning for 14 days.    Type 2 diabetes mellitus without complication, without long-term current use of insulin (H)  Discussed setting up eye exam.   Tolerating restarting Ozempic.   Using dexcom.   Lab Results   Component Value Date    A1C 6.7 2024       Mixed hyperlipidemia  Back on crestor, ?if causing nausea  She will switch to taking in the AM to see if early AM nausea is related  Of note did also resume ozempic which could be contributing  Update LFTs 2024    Gastroesophageal reflux disease without esophagitis  Suspect worsening with resuming ozempic and stress of recent surgeries  Recommend taking omeprazole daily   Reassess at follow up   - omeprazole (PRILOSEC) 20 MG DR capsule; Take 1 capsule (20 mg) by mouth daily    Degeneration of lumbar intervertebral disc  Neural foraminal stenosis of lumbar spine  Spinal stenosis of lumbar region with neurogenic claudication  Long discussion on back pain and limitations.   Reviewed prior MRI and neurosurgery note.   Requesting walk in tub for function and safety through her CADI waiver. Concern for risk of falls and currently limited in ability to get into claw foot tub without support. Do feel appropriate and she would benefit for function and safety.   - Miscellaneous Order for DME - (Use only if a more specific DME order does not already exist    Urinary incontinence, unspecified type  DME sent for incontinence.   - Miscellaneous Order for DME - (Use only if a more specific DME order does not already exist    Tobacco use disorder (30 pack year history plus)  Encouraged cessation - she will check if  patches at home and let me know if  refill needed.       43 minutes spent by me on the date of the encounter doing chart review, history and exam, documentation and further activities per the note. 29 min spent directly in exam room with patientRomina Anthony is a 54 year old, presenting for the following health issues:  Anxiety and Depression        5/2/2024     8:13 AM   Additional Questions   Roomed by April   Accompanied by          5/2/2024     8:13 AM   Patient Reported Additional Medications   Patient reports taking the following new medications none     Anxiety      Depression and Anxiety   How are you doing with your depression since your last visit? No change  How are you doing with your anxiety since your last visit?  No change  Are you having other symptoms that might be associated with depression or anxiety? No  Have you had a significant life event? Transportation Concerns   Do you have any concerns with your use of alcohol or other drugs? No    Forgot to start Pristiq.     Social History     Tobacco Use    Smoking status: Every Day     Current packs/day: 0.50     Average packs/day: 0.5 packs/day for 38.3 years (19.2 ttl pk-yrs)     Types: Cigarettes     Start date: 1/1/1986     Passive exposure: Current    Smokeless tobacco: Current    Tobacco comments:     Pt denies quit plan 4/29/24   Vaping Use    Vaping status: Former   Substance Use Topics    Alcohol use: Not Currently    Drug use: Yes     Types: Marijuana         10/23/2023    10:28 AM 5/2/2024     8:09 AM   PHQ   PHQ-9 Total Score 20 9   Q9: Thoughts of better off dead/self-harm past 2 weeks Not at all Not at all         10/23/2023    10:28 AM 5/2/2024     8:10 AM   REINALDO-7 SCORE   Total Score  11 (moderate anxiety)   Total Score 17 11         5/2/2024     8:09 AM   Last PHQ-9   1.  Little interest or pleasure in doing things 1   2.  Feeling down, depressed, or hopeless 1   3.  Trouble falling or staying asleep, or sleeping too much 3   4.  Feeling tired or having  little energy 3   5.  Poor appetite or overeating 0   6.  Feeling bad about yourself 0   7.  Trouble concentrating 1   8.  Moving slowly or restless 0   Q9: Thoughts of better off dead/self-harm past 2 weeks 0   PHQ-9 Total Score 9         5/2/2024     8:10 AM   REINALDO-7    1. Feeling nervous, anxious, or on edge 3   2. Not being able to stop or control worrying 2   3. Worrying too much about different things 2   4. Trouble relaxing 1   5. Being so restless that it is hard to sit still 1   6. Becoming easily annoyed or irritable 1   7. Feeling afraid, as if something awful might happen 1   REINALDO-7 Total Score 11   If you checked any problems, how difficult have they made it for you to do your work, take care of things at home, or get along with other people? Somewhat difficult     T2DM - back on Dexcom. Went on ozempic again, mounjaro not covered. Will do eye exam in [place].     Back on Crestor. Also on ozempic again. ?if making it sick to her stomach. Waking up at 3am with nausea. Resolves by 6am.     Yesterday had chest pain for an hour, radiate out and into throat. [place] like stabbing, shooting pain and burning pain that went up. Did burp. 1/2 hour later the pain was gone. Took omeprazole and pain went away after she burped. No exertional symptoms. No shortness of breath. Stress test 2/5/2024 normal. Awaiting cardiac MRI for mildly reduced HF. Only doing omeprazole as needed.     Patient has central lumbar stenosis with occasional radiculopathy and neural foraminal narrowing. Has been on restrictions for lifting. Hard to get into bathtub because it's a claw foot tub and she can't put a hand rail because of the curtain nor can she do a bench seat. Needs assistance to get in and out of tub. Wondering about walk in tub through her cadi waiver, reports she talked to them and it would be covered. Following with neurosurgery at [facility] for her back. SO currently helps her get in and out.     History of h pylori. Wondering  "about EGD and cologuard, like when she would be due.     Has patches for smoking cessation. Needs to check box for expiration. Smoking a lot in the morning.       Objective    /66 (BP Location: Left arm, Patient Position: Sitting, Cuff Size: Adult Regular)   Pulse 101   Temp 97.7  F (36.5  C) (Tympanic)   Resp 16   Ht 1.575 m (5' 2\")   Wt 81.7 kg (180 lb 3.2 oz)   SpO2 92%   BMI 32.96 kg/m    Body mass index is 32.96 kg/m .    Physical Exam  Constitutional:       General: She is not in acute distress.     Appearance: Normal appearance. She is not ill-appearing.   Pulmonary:      Effort: Pulmonary effort is normal.   Skin:     General: Skin is warm and dry.   Neurological:      General: No focal deficit present.      Mental Status: She is alert.   Psychiatric:         Mood and Affect: Mood normal.         Behavior: Behavior normal.         Thought Content: Thought content normal.         Judgment: Judgment normal.                  Signed Electronically by: Hannah Eaton MD    Answers submitted by the patient for this visit:  Patient Health Questionnaire (Submitted on 5/2/2024)  If you checked off any problems, how difficult have these problems made it for you to do your work, take care of things at home, or get along with other people?: Somewhat difficult  PHQ9 TOTAL SCORE: 9  REINALDO-7 (Submitted on 5/2/2024)  REINALDO 7 TOTAL SCORE: 11    "

## 2024-05-02 NOTE — TELEPHONE ENCOUNTER
Received PA request from [facility] for desvenlafaxine succinate (PRISTIQ) 25 MG 24 hr tablet.  Submitted on CMM, waiting for response.

## 2024-05-02 NOTE — TELEPHONE ENCOUNTER
Omeprazole (Prilosec) 20 Mg DR Capsule    Last Written Prescription Date:  05/02/2024  Last Fill Quantity: 90,   # refills: 1  Last Office Visit: 05/02/2024

## 2024-05-03 NOTE — TELEPHONE ENCOUNTER
Received DENIAL PA from [facility] for desvenlafaxine succinate (PRISTIQ) 25 MG 24 hr tablet.   Scanned into EPIC, routed to provider.    Reasoning: Non covered DX

## 2024-05-16 ENCOUNTER — TRANSFERRED RECORDS (OUTPATIENT)
Dept: HEALTH INFORMATION MANAGEMENT | Facility: CLINIC | Age: 54
End: 2024-05-16
Payer: COMMERCIAL

## 2024-05-17 DIAGNOSIS — J41.8 MIXED SIMPLE AND MUCOPURULENT CHRONIC BRONCHITIS (H): ICD-10-CM

## 2024-05-17 DIAGNOSIS — J44.1 COPD EXACERBATION (H): ICD-10-CM

## 2024-05-17 RX ORDER — TIOTROPIUM BROMIDE INHALATION SPRAY 3.12 UG/1
2 SPRAY, METERED RESPIRATORY (INHALATION) DAILY
Qty: 4 G | Refills: 4 | Status: SHIPPED | OUTPATIENT
Start: 2024-05-17

## 2024-05-27 LAB — BACTERIA SPEC CULT: NO GROWTH

## 2024-06-03 ENCOUNTER — OFFICE VISIT (OUTPATIENT)
Dept: FAMILY MEDICINE | Facility: OTHER | Age: 54
End: 2024-06-03
Attending: STUDENT IN AN ORGANIZED HEALTH CARE EDUCATION/TRAINING PROGRAM
Payer: COMMERCIAL

## 2024-06-03 VITALS
TEMPERATURE: 98.3 F | DIASTOLIC BLOOD PRESSURE: 70 MMHG | SYSTOLIC BLOOD PRESSURE: 156 MMHG | HEART RATE: 87 BPM | OXYGEN SATURATION: 93 % | WEIGHT: 175.5 LBS | BODY MASS INDEX: 32.1 KG/M2

## 2024-06-03 DIAGNOSIS — R03.0 ELEVATED BLOOD PRESSURE READING: ICD-10-CM

## 2024-06-03 DIAGNOSIS — F41.1 GENERALIZED ANXIETY DISORDER: ICD-10-CM

## 2024-06-03 DIAGNOSIS — R11.0 NAUSEA: ICD-10-CM

## 2024-06-03 DIAGNOSIS — E78.2 MIXED HYPERLIPIDEMIA: ICD-10-CM

## 2024-06-03 DIAGNOSIS — E11.42 TYPE 2 DIABETES MELLITUS WITH DIABETIC POLYNEUROPATHY, WITHOUT LONG-TERM CURRENT USE OF INSULIN (H): ICD-10-CM

## 2024-06-03 DIAGNOSIS — E11.9 TYPE 2 DIABETES MELLITUS WITHOUT COMPLICATION, WITHOUT LONG-TERM CURRENT USE OF INSULIN (H): ICD-10-CM

## 2024-06-03 DIAGNOSIS — F32.A DEPRESSIVE DISORDER: Primary | ICD-10-CM

## 2024-06-03 PROBLEM — R93.1 ABNORMAL ECHOCARDIOGRAM: Status: RESOLVED | Noted: 2024-01-31 | Resolved: 2024-06-03

## 2024-06-03 PROBLEM — I34.0 MILD MITRAL INSUFFICIENCY: Chronic | Status: ACTIVE | Noted: 2024-01-31

## 2024-06-03 PROBLEM — I07.1 MILD TRICUSPID INSUFFICIENCY: Chronic | Status: ACTIVE | Noted: 2024-01-31

## 2024-06-03 PROBLEM — R93.1 ABNORMAL ECHOCARDIOGRAM: Status: ACTIVE | Noted: 2024-01-31

## 2024-06-03 PROBLEM — J98.4 PULMONARY INSUFFICIENCY: Status: ACTIVE | Noted: 2024-01-31

## 2024-06-03 PROBLEM — I51.89 GRADE I DIASTOLIC DYSFUNCTION: Chronic | Status: ACTIVE | Noted: 2024-01-31

## 2024-06-03 PROBLEM — I51.9 DECREASED LEFT VENTRICULAR FUNCTION: Status: RESOLVED | Noted: 2024-02-27 | Resolved: 2024-06-03

## 2024-06-03 PROBLEM — S63.056A: Status: ACTIVE | Noted: 2024-02-15

## 2024-06-03 PROCEDURE — G2211 COMPLEX E/M VISIT ADD ON: HCPCS | Performed by: STUDENT IN AN ORGANIZED HEALTH CARE EDUCATION/TRAINING PROGRAM

## 2024-06-03 PROCEDURE — 99214 OFFICE O/P EST MOD 30 MIN: CPT | Performed by: STUDENT IN AN ORGANIZED HEALTH CARE EDUCATION/TRAINING PROGRAM

## 2024-06-03 RX ORDER — LOSARTAN POTASSIUM 25 MG/1
12.5 TABLET ORAL DAILY
Qty: 30 TABLET | Refills: 1 | Status: SHIPPED | OUTPATIENT
Start: 2024-06-03

## 2024-06-03 RX ORDER — PROCHLORPERAZINE 25 MG/1
SUPPOSITORY RECTAL
Qty: 1 EACH | Refills: 1 | Status: SHIPPED | OUTPATIENT
Start: 2024-06-03

## 2024-06-03 RX ORDER — TRIAMCINOLONE ACETONIDE 5 MG/G
OINTMENT TOPICAL
COMMUNITY
Start: 2024-01-25

## 2024-06-03 RX ORDER — LAMOTRIGINE 25 MG/1
TABLET ORAL
Qty: 42 TABLET | Refills: 0 | Status: SHIPPED | OUTPATIENT
Start: 2024-06-03 | End: 2024-07-01

## 2024-06-03 ASSESSMENT — ANXIETY QUESTIONNAIRES
6. BECOMING EASILY ANNOYED OR IRRITABLE: SEVERAL DAYS
GAD7 TOTAL SCORE: 12
GAD7 TOTAL SCORE: 12
3. WORRYING TOO MUCH ABOUT DIFFERENT THINGS: MORE THAN HALF THE DAYS
5. BEING SO RESTLESS THAT IT IS HARD TO SIT STILL: MORE THAN HALF THE DAYS
4. TROUBLE RELAXING: NEARLY EVERY DAY
7. FEELING AFRAID AS IF SOMETHING AWFUL MIGHT HAPPEN: SEVERAL DAYS
IF YOU CHECKED OFF ANY PROBLEMS ON THIS QUESTIONNAIRE, HOW DIFFICULT HAVE THESE PROBLEMS MADE IT FOR YOU TO DO YOUR WORK, TAKE CARE OF THINGS AT HOME, OR GET ALONG WITH OTHER PEOPLE: SOMEWHAT DIFFICULT
2. NOT BEING ABLE TO STOP OR CONTROL WORRYING: MORE THAN HALF THE DAYS
1. FEELING NERVOUS, ANXIOUS, OR ON EDGE: SEVERAL DAYS
7. FEELING AFRAID AS IF SOMETHING AWFUL MIGHT HAPPEN: SEVERAL DAYS
8. IF YOU CHECKED OFF ANY PROBLEMS, HOW DIFFICULT HAVE THESE MADE IT FOR YOU TO DO YOUR WORK, TAKE CARE OF THINGS AT HOME, OR GET ALONG WITH OTHER PEOPLE?: SOMEWHAT DIFFICULT

## 2024-06-03 ASSESSMENT — PATIENT HEALTH QUESTIONNAIRE - PHQ9
10. IF YOU CHECKED OFF ANY PROBLEMS, HOW DIFFICULT HAVE THESE PROBLEMS MADE IT FOR YOU TO DO YOUR WORK, TAKE CARE OF THINGS AT HOME, OR GET ALONG WITH OTHER PEOPLE: VERY DIFFICULT
SUM OF ALL RESPONSES TO PHQ QUESTIONS 1-9: 18
SUM OF ALL RESPONSES TO PHQ QUESTIONS 1-9: 18

## 2024-06-03 ASSESSMENT — PAIN SCALES - GENERAL: PAINLEVEL: EXTREME PAIN (8)

## 2024-06-03 NOTE — TELEPHONE ENCOUNTER
Losartan 25 mg      Last Written Prescription Date:  6-3-24  Last Fill Quantity: 30,   # refills: 1  Last Office Visit: 6-3-24  Future Office visit:    Next 5 appointments (look out 90 days)      Jul 09, 2024  8:00 AM  (Arrive by 7:45 AM)  Provider Visit with Hannah Eaton MD  [facility] ([facility] [place] ) [address]  [place] [address]  [phone]             ALREADY FILLED TODAY

## 2024-06-03 NOTE — PROGRESS NOTES
Assessment & Plan     Depressive disorder  Question if there is a possible mood disorder/bipolar 2 component, although no obvious jane or hypomania.  Discussed risks and benefits of trial of Lamictal, she was agreeable to proceed.  Plan to wait 1 week to start, as we are starting a blood pressure medicine first.  Discussed monitoring for any rash.  If rash develops, needs to stop and follow-up right away.  Consider addition of Celexa once Lamictal on higher dose.  Did respond well to Celexa in the past.  - lamoTRIgine (LAMICTAL) 25 MG tablet; Take 1 tablet (25 mg) by mouth daily for 14 days, THEN 2 tablets (50 mg) daily for 14 days.    Generalized anxiety disorder  Consider addition of Celexa once Lamictal is titrated up.  Does have symptoms of active anxiety.    Mixed hyperlipidemia  Nausea  Tolerating cholesterol medication now with no nausea.  Update AST/ALT and lipids at follow-up appointment.    Type 2 diabetes mellitus without complication, without long-term current use of insulin (H)  Refilled her Dexcom transmitter, they took her's off during her cardiac MRI and threw it away.  - Continuous Glucose Transmitter (DEXCOM G6 TRANSMITTER) MISC; Change every 3 months.    Elevated blood pressure reading  Suspect hypertension, even though we do not have home monitoring.  She has been frequently elevated.  Her cardiac MRI did also show some left ventricular hypertrophy.  Will plan to cautiously start losartan 12.5 mg daily.  She will monitor blood pressure at home.  Discussed possible side effects.  Recheck BMP at follow-up.  - losartan (COZAAR) 25 MG tablet; Take 0.5 tablets (12.5 mg) by mouth daily    Type 2 diabetes mellitus with diabetic polyneuropathy, without long-term current use of insulin (H)  Plan to titrate dose to 0.5 mg but wait 2 weeks as we are adding additional medications and do not want to confuse any side effects.  - semaglutide (OZEMPIC) 2 MG/3ML pen; Inject 0.5 mg Subcutaneous every 7  days      The longitudinal plan of care for the diagnosis(es)/condition(s) as documented were addressed during this visit. Due to the added complexity in care, I will continue to support Gabrielle in the subsequent management and with ongoing continuity of care.      Subjective   Gabrielle is a 54 year old, presenting for the following health issues:  Depression, Anxiety, and Lipids        6/3/2024    10:18 AM   Additional Questions   Roomed by Amadou Pandey   Accompanied by Self         6/3/2024    10:18 AM   Patient Reported Additional Medications   Patient reports taking the following new medications none       Via the Health Maintenance questionnaire, the patient has reported the following services have been completed -Eye Exam: [facility] 2023-05-18, this information has been sent to the abstraction team.    History of Present Illness       Mental Health Follow-up:  Patient presents to follow-up on Depression & Anxiety.Patient's depression since last visit has been:  Medium  The patient is having other symptoms associated with depression.  Patient's anxiety since last visit has been:  Medium  The patient is having other symptoms associated with anxiety.  Any significant life events: health concerns  Patient is feeling anxious or having panic attacks.  Patient has no concerns about alcohol or drug use.    Diabetes:   She presents for follow up of diabetes.   She is checking home blood glucose with a continuous glucose monitor.   She checks blood glucose before and after meals.  Blood glucose is sometimes over 200 and sometimes under 70. She is aware of hypoglycemia symptoms including shakiness, dizziness and blurred vision.   She is concerned about other.   She is having numbness in feet, excessive thirst and weight gain.  The patient has had a diabetic eye exam in the last 12 months. Eye exam performed on last year. Location of last eye exam [facility].        She eats 0-1 servings of fruits and vegetables daily.She  consumes 1 sweetened beverage(s) daily.She exercises with enough effort to increase her heart rate 10 to 19 minutes per day.  She exercises with enough effort to increase her heart rate 4 days per week. She is missing 1 dose(s) of medications per week.     Reports Pristiq was denied.   Has tried Wellbutrin, Celexa, Lexapro, prozac, abilify, cymbalta, paxil, buspar.  Side effects with Wellbutrin.   Celexa worked the best but was maxed out on dose.  Often gets dizziness.   Mom had bipolar disorder and was schizophrenic   Feels down most days. Not feeling enjoyment in activities over the last few years, gradually worsening. Cries a lot.   Easily stressed, carries a lot of tension, irritable, worrying a lot, trouble sleeping.   Reports episodes of low, then at times elevated mood where she has high mood, feels restless, and spends more money.   No hallucinations   Vivid dreams     Hyperlipidemia Follow-Up    Are you regularly taking any medication or supplement to lower your cholesterol?   Yes- Crestor   Are you having muscle aches or other side effects that you think could be caused by your cholesterol lowering medication?  No    Social History     Tobacco Use    Smoking status: Every Day     Current packs/day: 0.50     Average packs/day: 0.5 packs/day for 38.4 years (19.2 ttl pk-yrs)     Types: Cigarettes     Start date: 1/1/1986     Passive exposure: Current    Smokeless tobacco: Current    Tobacco comments:     Pt denies quit plan 4/29/24   Vaping Use    Vaping status: Former   Substance Use Topics    Alcohol use: Not Currently    Drug use: Yes     Types: Marijuana         10/23/2023    10:28 AM 5/2/2024     8:09 AM 6/3/2024    10:17 AM   PHQ   PHQ-9 Total Score 20 9 18   Q9: Thoughts of better off dead/self-harm past 2 weeks Not at all Not at all Not at all         10/23/2023    10:28 AM 5/2/2024     8:10 AM 6/3/2024    10:09 AM   REINALDO-7 SCORE   Total Score  11 (moderate anxiety) 12 (moderate anxiety)   Total Score  17 11 12         6/3/2024    10:17 AM   Last PHQ-9   1.  Little interest or pleasure in doing things 2   2.  Feeling down, depressed, or hopeless 3   3.  Trouble falling or staying asleep, or sleeping too much 3   4.  Feeling tired or having little energy 3   5.  Poor appetite or overeating 2   6.  Feeling bad about yourself 1   7.  Trouble concentrating 1   8.  Moving slowly or restless 3   Q9: Thoughts of better off dead/self-harm past 2 weeks 0   PHQ-9 Total Score 18         6/3/2024    10:09 AM   REINALDO-7    1. Feeling nervous, anxious, or on edge 1   2. Not being able to stop or control worrying 2   3. Worrying too much about different things 2   4. Trouble relaxing 3   5. Being so restless that it is hard to sit still 2   6. Becoming easily annoyed or irritable 1   7. Feeling afraid, as if something awful might happen 1   REINALDO-7 Total Score 12   If you checked any problems, how difficult have they made it for you to do your work, take care of things at home, or get along with other people? Somewhat difficult           Objective    BP (!) 132/90 (BP Location: Right arm, Patient Position: Sitting, Cuff Size: Adult Regular)   Pulse 87   Temp 98.3  F (36.8  C) (Tympanic)   Wt 79.6 kg (175 lb 8 oz)   SpO2 93%   BMI 32.10 kg/m    Body mass index is 32.1 kg/m .    Physical Exam  Constitutional:       General: She is not in acute distress.     Appearance: Normal appearance.   Pulmonary:      Effort: Pulmonary effort is normal.   Skin:     General: Skin is warm and dry.   Neurological:      General: No focal deficit present.      Mental Status: She is alert and oriented to person, place, and time.   Psychiatric:         Mood and Affect: Mood normal.         Behavior: Behavior normal.         Thought Content: Thought content normal.         Judgment: Judgment normal.                  Signed Electronically by: Hannah Eaton MD

## 2024-06-03 NOTE — PATIENT INSTRUCTIONS
Start losartan 12.5mg once daily (cut tabs in half)   After one week, start mood medicine - lamictal (lamotrigine). Monitor for rash. If rash stop and be seen right away.   Wait to increase ozempic for two weeks (after you've been on lamictal one week)  Follow up one month    no radiation

## 2024-06-05 RX ORDER — LOSARTAN POTASSIUM 25 MG/1
12.5 TABLET ORAL DAILY
Qty: 45 TABLET | OUTPATIENT
Start: 2024-06-05

## 2024-06-29 DIAGNOSIS — F32.A DEPRESSIVE DISORDER: ICD-10-CM

## 2024-06-30 NOTE — PROGRESS NOTES
"Chief complaint: Patient presents with:  Toenail: DFE and callus on the bottom of right foot      History of Present Illness: This 54 year old NIDDM II female is seen with her  at the request of No ref. provider found for evaluation and suggestions of management of a painful callus on the RIGHT foot and bilateral painful bunions.     She received new DM shoes through the orthotist, Maxine Barbosa at the end of June, 2024. The LEFT shoe is rubbing on her bunion, but she has a second pair of shoes coming in within a couple of weeks.     She consistently has burning, tingling, and numbness in her feet. She takes Pregabalin but it doesn't seem to make a difference for the burning, tingling, and numbness in her feet.    She has difficulty trimming her toenails as well because they are too thick. She recently broke a nail clipper while trying to trim her nails because her toenails were too thick for the clippers.    No further pedal complaints today.     Patient smokes 1 1/2 ppd. She is working on quitting on her own.      BP (!) 139/93 (BP Location: Left arm, Cuff Size: Adult Regular)   Pulse 88   Temp 97.4  F (36.3  C) (Tympanic)   Resp 16   Ht 1.575 m (5' 2\")   Wt 79.4 kg (175 lb)   SpO2 94%   BMI 32.01 kg/m      Patient Active Problem List   Diagnosis    Generalized anxiety disorder    Depressive disorder    Hyperlipidemia    Hypothyroidism (acquired)    Insomnia    RLS (restless legs syndrome)    Spondylosis without myelopathy or radiculopathy, cervical region    Tobacco use disorder (30 pack year history plus)    Type 2 diabetes mellitus without complication, without long-term current use of insulin (H)    Chronic pain disorder    Cervical stenosis of spinal canal    Degeneration of lumbar intervertebral disc    Mixed simple and mucopurulent chronic bronchitis (H)    S/P complete hysterectomy    Bilateral arm weakness    History of Helicobacter pylori infection    Prolonged Q-T interval on ECG    Spinal " stenosis of lumbar region with neurogenic claudication    Dislocation of wrist, carpometacarpal    Grade I diastolic dysfunction    Mild intermittent asthma without complication    Mild mitral insufficiency    Mild tricuspid insufficiency    Pulmonary insufficiency       Past Surgical History:   Procedure Laterality Date    ABDOMEN SURGERY      CARPAL TUNNEL RELEASE RT/LT Bilateral     [facility] arthroplasty Left     COLONOSCOPY - HIM SCAN N/A 10/04/2018    [facility] 3 hyperplastic polyps. repeat 10 yr    HYSTERECTOMY, PAP NO LONGER INDICATED N/A 10/10/2018    Cervix removed per [facility] Hysterectomy Path Report.    HYSTERECTOMY, TOTAL, LAPAROSCOPIC, WITH SALPINGO-OOPHORECTOMY, CYSTOSCOPY Bilateral 10/10/2018    [facility].       Current Outpatient Medications   Medication Sig Dispense Refill    ACCU-CHEK GUIDE test strip       acetaminophen (TYLENOL) 325 MG tablet Take 325-650 mg by mouth every 6 hours as needed      blood glucose monitoring (SOFTCLIX) lancets USE TO TEST 1 TIME PER DAY      Blood Glucose Monitoring Suppl (ACCU-CHEK GUIDE) w/Device KIT USE TO TEST BLOOD SUGAR DAILY      clobetasol (TEMOVATE) 0.05 % external cream Apply topically 2 times daily Apply twice daily to affected lesions for up to 10-14 days. 45 g 0    Continuous Blood Gluc  (DEXCOM G6 ) MIKE Use to read blood sugars as per 's instructions. 1 each 1    Continuous Blood Gluc Sensor (DEXCOM G6 SENSOR) MISC CHANGE SENSOR EVERY 10 DAYS 3 each 3    Continuous Glucose Transmitter (DEXCOM G6 TRANSMITTER) MISC Change every 3 months. 1 each 1    estradiol (ESTRACE) 0.1 MG/GM vaginal cream Place 1 g vaginally      fluticasone-salmeterol (ADVAIR) 250-50 MCG/ACT inhaler Inhale 1 puff into the lungs every 12 hours      Glucose 4-6 GM-MG CHEW Take 4 g by mouth once      guaiFENesin (MUCINEX) 600 MG 12 hr tablet Take 1 tablet (600 mg) by mouth 2 times daily as needed for congestion 30 tablet 1    hydrOXYzine  (VISTARIL) 25 MG capsule Take 1 capsule (25 mg) by mouth 3 times daily as needed for itching 30 capsule 0    hydrOXYzine HCl (ATARAX) 25 MG tablet       ibuprofen (ADVIL/MOTRIN) 200 MG tablet Take 200 mg by mouth every 4 hours as needed      ipratropium - albuterol 0.5 mg/2.5 mg/3 mL (DUONEB) 0.5-2.5 (3) MG/3ML neb solution Inhale 3 mLs into the lungs      ketoconazole (NIZORAL) 2 % external cream Apply topically daily      lamoTRIgine (LAMICTAL) 25 MG tablet Take 1 tablet (25 mg) by mouth daily for 14 days, THEN 2 tablets (50 mg) daily for 14 days. 42 tablet 0    levothyroxine (SYNTHROID/LEVOTHROID) 88 MCG tablet Take 88 mcg by mouth daily      losartan (COZAAR) 25 MG tablet Take 0.5 tablets (12.5 mg) by mouth daily 30 tablet 1    nicotine (NICODERM CQ) 14 MG/24HR 24 hr patch       nicotine (NICODERM CQ) 21 MG/24HR 24 hr patch       nicotine (NICORETTE) 2 MG gum Place 1 each (2 mg) inside cheek every hour as needed for nicotine withdrawal symptoms 100 each 1    omeprazole (PRILOSEC) 20 MG DR capsule TAKE 1 CAPSULE(20 MG) BY MOUTH DAILY 90 capsule 1    pramipexole (MIRAPEX) 1.5 MG tablet Take 1.5 mg by mouth      pregabalin (LYRICA) 25 MG capsule       PROAIR  (90 Base) MCG/ACT inhaler       rosuvastatin (CRESTOR) 40 MG tablet Take 1 tablet (40 mg) by mouth daily 90 tablet 0    semaglutide (OZEMPIC) 2 MG/3ML pen Inject 0.5 mg Subcutaneous every 7 days 3 mL 0    SPIRIVA RESPIMAT 2.5 MCG/ACT inhaler INHALE 2 PUFFS INTO THE LUNGS DAILY 4 g 4    traZODone (DESYREL) 150 MG tablet Take 150 mg by mouth at bedtime      triamcinolone (KENALOG) 0.5 % external ointment APPLY TO SKIN TWICE DAILY. TO AFFECTED AREAS ON ARMS AND LEGS. USE UP TO 2 WEEKS AT A TIME       No current facility-administered medications for this visit.          Allergies   Allergen Reactions    Hydromorphone Other (See Comments)    Other Food Allergy Itching    Amoxicillin-Pot Clavulanate     Azithromycin      Other reaction(s): Stomach Upset     Black [place] Flavor Itching    Erythromycin Nausea and Vomiting    Pecan Extract Itching    Other (Do Not Use) Rash     Epoxy Resin    Penicillins Rash     Has tolerated Cefazolin (ANCEF) and other Cephalosporins       Family History   Problem Relation Age of Onset    Osteoporosis Mother     Bipolar Disorder Mother        Social History     Socioeconomic History    Marital status:      Spouse name: None    Number of children: None    Years of education: None    Highest education level: None   Tobacco Use    Smoking status: Every Day     Current packs/day: 0.50     Average packs/day: 0.5 packs/day for 38.3 years (19.2 ttl pk-yrs)     Types: Cigarettes     Start date: 1/1/1986     Passive exposure: Current    Smokeless tobacco: Current    Tobacco comments:     Pt denies quit plan 4/29/24   Vaping Use    Vaping status: Former   Substance and Sexual Activity    Alcohol use: Not Currently    Drug use: Yes     Types: Marijuana    Sexual activity: Yes     Partners: Male     Birth control/protection: Female Surgical   Other Topics Concern    Parent/sibling w/ CABG, MI or angioplasty before 65F 55M? No     Social Determinants of Health     Financial Resource Strain: Unknown (1/10/2024)    Financial Resource Strain     Within the past 12 months, have you or your family members you live with been unable to get utilities (heat, electricity) when it was really needed?: Patient declined   Food Insecurity: Low Risk  (1/10/2024)    Food Insecurity     Within the past 12 months, did you worry that your food would run out before you got money to buy more?: No     Within the past 12 months, did the food you bought just not last and you didn t have money to get more?: Patient declined   Recent Concern: Food Insecurity - High Risk (10/23/2023)    Food Insecurity     Within the past 12 months, did you worry that your food would run out before you got money to buy more?: Yes     Within the past 12 months, did the food you bought  just not last and you didn t have money to get more?: Yes   Transportation Needs: High Risk (1/10/2024)    Transportation Needs     Within the past 12 months, has lack of transportation kept you from medical appointments, getting your medicines, non-medical meetings or appointments, work, or from getting things that you need?: Yes    Received [facility]    Social Connections   Interpersonal Safety: Low Risk  (10/23/2023)    Interpersonal Safety     Do you feel physically and emotionally safe where you currently live?: Yes     Within the past 12 months, have you been hit, slapped, kicked or otherwise physically hurt by someone?: No     Within the past 12 months, have you been humiliated or emotionally abused in other ways by your partner or ex-partner?: No   Housing Stability: Low Risk  (1/10/2024)    Housing Stability     Do you have housing? : Yes     Are you worried about losing your housing?: No       ROS: 10 point ROS neg other than the symptoms noted above in the HPI.  EXAM  Constitutional: healthy, alert, and no distress    Psychiatric: mentation appears normal and affect normal/bright    VASCULAR:  -Dorsalis pedis pulse +2/4 b/l  -Posterior tibial pulse +1/4 b/l  -Capillary refill time < 3 seconds to b/l hallux  NEURO:  -Epicritic and protective sensation diminished to the bilateral foot  DERM:  -Skin temperature, texture and turgor WNL b/l  -Toenails elongated, thickened, dystrophic and discolored x 10  -Hyperkeratotic lesion on the RIGHT plantar distal second metatarsal head  MSK:  -Lateral deviation of hallux with medial deviation of 1st metatarsal, bilaterally   -Prominent bony prominence to dorsal and medial 1st metatarsal head, bilaterally   -Mild tenderness on the bilateral medial and dorsal 1st MTPJ  -1st MTPJ ROM within normal limits without forefoot loading and limited to less than 20 degrees with forefoot  loading  -Muscle strength of ankles +5/5 for dorsiflexion, plantarflexion, ABDUction and ADDuction b/l    RIGHT FOOT RADIOGRAPHS 04/29/2024  Impression:  No evidence of acute or subacute bony abnormality.   Hallux valgus of approximately 42 degrees.  MODESTO DELVALLE MD     LEFT FOOT RADIOGRAPHS 04/29/2024  Impression:  No evidence of acute or subacute bony abnormality.   Hallux valgus of approximately 53 degrees.  MODESTO DELVALLE MD   ============================================================    ASSESSMENT:  (M20.11) Hallux valgus (acquired), right foot  (primary encounter diagnosis)    (M20.12) Hallux valgus (acquired), left foot    (L60.3) Onychodystrophy    (L84) Callus of foot    (E11.9) Diabetes mellitus type 2, noninsulin dependent (H)    (E11.42) Diabetic polyneuropathy associated with type 2 diabetes mellitus (H)    (F17.210) Cigarette nicotine dependence without complication    (Z71.6) Tobacco abuse counseling    (Z13.89) Screening for diabetic peripheral neuropathy        PLAN:  -Patient evaluated and examined. Treatment options discussed with no educational barriers noted.  -High risk toenail debridement x 10 toenails without incident    -Callus pared x 1 to the RIGHT plantar distal second metatarsal head without incident  ---Patient reminded that the callus will likely return due to the underlying, prominent bone causing the callus while the patient is walking.    -Diabetic Foot Education provided. This included checking the feet daily looking for new new blisters or wounds, wearing shoes at all times when walking including around the house, and avoiding lotion application between the toes. If there are any signs of infection, the patient should present to the ED as soon as possible. Infections of the foot can be life threatening or lead to amputations of the foot or leg.    Diabetic Shoes:  ---Dispensed through the orthotist, Maxine Barbosa at the end of June, 2024. She is expecting a second pair in  July, 2024.  ---Sent message to the orthotist, Maxine Barbosa to reduce the prominent metatarsal bar beneath the LEFT 1st metatarsal head    -Tobacco cessation discussed with patient including the benefits of quitting and the risks of not quitting. The Quit Plan referral was offered and the patient is not interested in the referral today. Patient is not interested in quitting today.    -Patient in agreement with the above treatment plan and all of patient's questions were answered.      Return to clinic 63+ days for a diabetic foot exam, high risk nail debridement, callus paring and to discuss potential bunion surgery         Raissa Winter DPM

## 2024-07-01 ENCOUNTER — OFFICE VISIT (OUTPATIENT)
Dept: PODIATRY | Facility: OTHER | Age: 54
End: 2024-07-01
Attending: PODIATRIST
Payer: MEDICARE

## 2024-07-01 VITALS
BODY MASS INDEX: 32.2 KG/M2 | SYSTOLIC BLOOD PRESSURE: 139 MMHG | WEIGHT: 175 LBS | DIASTOLIC BLOOD PRESSURE: 93 MMHG | RESPIRATION RATE: 16 BRPM | HEART RATE: 88 BPM | HEIGHT: 62 IN | OXYGEN SATURATION: 94 % | TEMPERATURE: 97.4 F

## 2024-07-01 DIAGNOSIS — Z71.6 TOBACCO ABUSE COUNSELING: ICD-10-CM

## 2024-07-01 DIAGNOSIS — F17.210 CIGARETTE NICOTINE DEPENDENCE WITHOUT COMPLICATION: ICD-10-CM

## 2024-07-01 DIAGNOSIS — L60.3 ONYCHODYSTROPHY: Primary | ICD-10-CM

## 2024-07-01 DIAGNOSIS — Z13.89 SCREENING FOR DIABETIC PERIPHERAL NEUROPATHY: ICD-10-CM

## 2024-07-01 DIAGNOSIS — E11.9 DIABETES MELLITUS TYPE 2, NONINSULIN DEPENDENT (H): ICD-10-CM

## 2024-07-01 DIAGNOSIS — E11.42 DIABETIC POLYNEUROPATHY ASSOCIATED WITH TYPE 2 DIABETES MELLITUS (H): ICD-10-CM

## 2024-07-01 DIAGNOSIS — L84 CALLUS OF FOOT: ICD-10-CM

## 2024-07-01 PROCEDURE — 99207 PR FOOT EXAM NO CHARGE: CPT | Performed by: PODIATRIST

## 2024-07-01 PROCEDURE — 11721 DEBRIDE NAIL 6 OR MORE: CPT | Performed by: PODIATRIST

## 2024-07-01 PROCEDURE — 11055 PARING/CUTG B9 HYPRKER LES 1: CPT | Mod: GZ | Performed by: PODIATRIST

## 2024-07-01 PROCEDURE — G0463 HOSPITAL OUTPT CLINIC VISIT: HCPCS | Mod: 25

## 2024-07-01 RX ORDER — LAMOTRIGINE 25 MG/1
TABLET ORAL
Qty: 42 TABLET | Refills: 0 | Status: SHIPPED | OUTPATIENT
Start: 2024-07-01 | End: 2024-07-09

## 2024-07-01 ASSESSMENT — PAIN SCALES - GENERAL: PAINLEVEL: MODERATE PAIN (5)

## 2024-07-08 ENCOUNTER — MEDICAL CORRESPONDENCE (OUTPATIENT)
Dept: HEALTH INFORMATION MANAGEMENT | Facility: HOSPITAL | Age: 54
End: 2024-07-08

## 2024-07-09 ENCOUNTER — OFFICE VISIT (OUTPATIENT)
Dept: FAMILY MEDICINE | Facility: OTHER | Age: 54
End: 2024-07-09
Attending: STUDENT IN AN ORGANIZED HEALTH CARE EDUCATION/TRAINING PROGRAM
Payer: MEDICARE

## 2024-07-09 VITALS
TEMPERATURE: 97.3 F | BODY MASS INDEX: 31.65 KG/M2 | OXYGEN SATURATION: 95 % | HEIGHT: 62 IN | WEIGHT: 172 LBS | HEART RATE: 93 BPM | SYSTOLIC BLOOD PRESSURE: 130 MMHG | DIASTOLIC BLOOD PRESSURE: 72 MMHG | RESPIRATION RATE: 18 BRPM

## 2024-07-09 DIAGNOSIS — F17.200 TOBACCO USE DISORDER: Chronic | ICD-10-CM

## 2024-07-09 DIAGNOSIS — E11.42 TYPE 2 DIABETES MELLITUS WITH DIABETIC POLYNEUROPATHY, WITHOUT LONG-TERM CURRENT USE OF INSULIN (H): ICD-10-CM

## 2024-07-09 DIAGNOSIS — F32.A DEPRESSIVE DISORDER: ICD-10-CM

## 2024-07-09 DIAGNOSIS — D72.829 LEUKOCYTOSIS, UNSPECIFIED TYPE: ICD-10-CM

## 2024-07-09 DIAGNOSIS — E53.8 VITAMIN B12 DEFICIENCY (NON ANEMIC): ICD-10-CM

## 2024-07-09 DIAGNOSIS — R10.9 ABDOMINAL DISCOMFORT: ICD-10-CM

## 2024-07-09 DIAGNOSIS — I15.2 HYPERTENSION ASSOCIATED WITH TYPE 2 DIABETES MELLITUS (H): ICD-10-CM

## 2024-07-09 DIAGNOSIS — E03.9 HYPOTHYROIDISM (ACQUIRED): Chronic | ICD-10-CM

## 2024-07-09 DIAGNOSIS — E78.2 MIXED HYPERLIPIDEMIA: ICD-10-CM

## 2024-07-09 DIAGNOSIS — M65.331 TRIGGER MIDDLE FINGER OF RIGHT HAND: ICD-10-CM

## 2024-07-09 DIAGNOSIS — E11.59 HYPERTENSION ASSOCIATED WITH TYPE 2 DIABETES MELLITUS (H): ICD-10-CM

## 2024-07-09 DIAGNOSIS — J41.8 MIXED SIMPLE AND MUCOPURULENT CHRONIC BRONCHITIS (H): Chronic | ICD-10-CM

## 2024-07-09 DIAGNOSIS — Z01.818 PREOP GENERAL PHYSICAL EXAM: Primary | ICD-10-CM

## 2024-07-09 DIAGNOSIS — F41.1 GENERALIZED ANXIETY DISORDER: ICD-10-CM

## 2024-07-09 LAB
ALT SERPL W P-5'-P-CCNC: 20 U/L (ref 0–50)
ANION GAP SERPL CALCULATED.3IONS-SCNC: 13 MMOL/L (ref 7–15)
AST SERPL W P-5'-P-CCNC: 20 U/L (ref 0–45)
ATRIAL RATE - MUSE: 80 BPM
BASOPHILS # BLD AUTO: 0 10E3/UL (ref 0–0.2)
BASOPHILS NFR BLD AUTO: 0 %
BUN SERPL-MCNC: 11 MG/DL (ref 6–20)
CALCIUM SERPL-MCNC: 9.6 MG/DL (ref 8.6–10)
CHLORIDE SERPL-SCNC: 108 MMOL/L (ref 98–107)
CHOLEST SERPL-MCNC: 141 MG/DL
CREAT SERPL-MCNC: 0.87 MG/DL (ref 0.51–0.95)
CREAT UR-MCNC: 68.2 MG/DL
DEPRECATED HCO3 PLAS-SCNC: 22 MMOL/L (ref 22–29)
DIASTOLIC BLOOD PRESSURE - MUSE: NORMAL MMHG
EGFRCR SERPLBLD CKD-EPI 2021: 78 ML/MIN/1.73M2
EOSINOPHIL # BLD AUTO: 0.1 10E3/UL (ref 0–0.7)
EOSINOPHIL NFR BLD AUTO: 1 %
ERYTHROCYTE [DISTWIDTH] IN BLOOD BY AUTOMATED COUNT: 13.3 % (ref 10–15)
EST. AVERAGE GLUCOSE BLD GHB EST-MCNC: 131 MG/DL
FASTING STATUS PATIENT QL REPORTED: NO
FASTING STATUS PATIENT QL REPORTED: NO
FOLATE SERPL-MCNC: 8.3 NG/ML (ref 4.6–34.8)
GLUCOSE SERPL-MCNC: 120 MG/DL (ref 70–99)
HBA1C MFR BLD: 6.2 %
HCT VFR BLD AUTO: 45.4 % (ref 35–47)
HDLC SERPL-MCNC: 42 MG/DL
HGB BLD-MCNC: 14.9 G/DL (ref 11.7–15.7)
IMM GRANULOCYTES # BLD: 0 10E3/UL
IMM GRANULOCYTES NFR BLD: 0 %
INTERPRETATION ECG - MUSE: NORMAL
LDLC SERPL CALC-MCNC: 64 MG/DL
LYMPHOCYTES # BLD AUTO: 3 10E3/UL (ref 0.8–5.3)
LYMPHOCYTES NFR BLD AUTO: 26 %
MCH RBC QN AUTO: 30.8 PG (ref 26.5–33)
MCHC RBC AUTO-ENTMCNC: 32.8 G/DL (ref 31.5–36.5)
MCV RBC AUTO: 94 FL (ref 78–100)
MICROALBUMIN UR-MCNC: <12 MG/L
MICROALBUMIN/CREAT UR: NORMAL MG/G{CREAT}
MONOCYTES # BLD AUTO: 0.7 10E3/UL (ref 0–1.3)
MONOCYTES NFR BLD AUTO: 6 %
NEUTROPHILS # BLD AUTO: 7.5 10E3/UL (ref 1.6–8.3)
NEUTROPHILS NFR BLD AUTO: 66 %
NONHDLC SERPL-MCNC: 99 MG/DL
NRBC # BLD AUTO: 0 10E3/UL
NRBC BLD AUTO-RTO: 0 /100
P AXIS - MUSE: 56 DEGREES
PLATELET # BLD AUTO: 215 10E3/UL (ref 150–450)
POTASSIUM SERPL-SCNC: 4.2 MMOL/L (ref 3.4–5.3)
PR INTERVAL - MUSE: 178 MS
QRS DURATION - MUSE: 90 MS
QT - MUSE: 384 MS
QTC - MUSE: 442 MS
R AXIS - MUSE: -14 DEGREES
RBC # BLD AUTO: 4.83 10E6/UL (ref 3.8–5.2)
SODIUM SERPL-SCNC: 143 MMOL/L (ref 135–145)
SYSTOLIC BLOOD PRESSURE - MUSE: NORMAL MMHG
T AXIS - MUSE: 53 DEGREES
TRIGL SERPL-MCNC: 173 MG/DL
VENTRICULAR RATE- MUSE: 80 BPM
WBC # BLD AUTO: 11.4 10E3/UL (ref 4–11)

## 2024-07-09 PROCEDURE — 84460 ALANINE AMINO (ALT) (SGPT): CPT | Mod: ZL | Performed by: STUDENT IN AN ORGANIZED HEALTH CARE EDUCATION/TRAINING PROGRAM

## 2024-07-09 PROCEDURE — 99214 OFFICE O/P EST MOD 30 MIN: CPT | Performed by: STUDENT IN AN ORGANIZED HEALTH CARE EDUCATION/TRAINING PROGRAM

## 2024-07-09 PROCEDURE — G0463 HOSPITAL OUTPT CLINIC VISIT: HCPCS

## 2024-07-09 PROCEDURE — 80061 LIPID PANEL: CPT | Mod: ZL | Performed by: STUDENT IN AN ORGANIZED HEALTH CARE EDUCATION/TRAINING PROGRAM

## 2024-07-09 PROCEDURE — 82746 ASSAY OF FOLIC ACID SERUM: CPT | Mod: ZL | Performed by: STUDENT IN AN ORGANIZED HEALTH CARE EDUCATION/TRAINING PROGRAM

## 2024-07-09 PROCEDURE — 93010 ELECTROCARDIOGRAM REPORT: CPT | Performed by: INTERNAL MEDICINE

## 2024-07-09 PROCEDURE — 82607 VITAMIN B-12: CPT | Mod: ZL | Performed by: STUDENT IN AN ORGANIZED HEALTH CARE EDUCATION/TRAINING PROGRAM

## 2024-07-09 PROCEDURE — 36415 COLL VENOUS BLD VENIPUNCTURE: CPT | Mod: ZL | Performed by: STUDENT IN AN ORGANIZED HEALTH CARE EDUCATION/TRAINING PROGRAM

## 2024-07-09 PROCEDURE — 82043 UR ALBUMIN QUANTITATIVE: CPT | Mod: ZL | Performed by: STUDENT IN AN ORGANIZED HEALTH CARE EDUCATION/TRAINING PROGRAM

## 2024-07-09 PROCEDURE — 93005 ELECTROCARDIOGRAM TRACING: CPT | Performed by: STUDENT IN AN ORGANIZED HEALTH CARE EDUCATION/TRAINING PROGRAM

## 2024-07-09 PROCEDURE — 80048 BASIC METABOLIC PNL TOTAL CA: CPT | Mod: ZL | Performed by: STUDENT IN AN ORGANIZED HEALTH CARE EDUCATION/TRAINING PROGRAM

## 2024-07-09 PROCEDURE — G2211 COMPLEX E/M VISIT ADD ON: HCPCS | Performed by: STUDENT IN AN ORGANIZED HEALTH CARE EDUCATION/TRAINING PROGRAM

## 2024-07-09 PROCEDURE — 84450 TRANSFERASE (AST) (SGOT): CPT | Mod: ZL | Performed by: STUDENT IN AN ORGANIZED HEALTH CARE EDUCATION/TRAINING PROGRAM

## 2024-07-09 PROCEDURE — 83036 HEMOGLOBIN GLYCOSYLATED A1C: CPT | Mod: ZL | Performed by: STUDENT IN AN ORGANIZED HEALTH CARE EDUCATION/TRAINING PROGRAM

## 2024-07-09 PROCEDURE — 85025 COMPLETE CBC W/AUTO DIFF WBC: CPT | Mod: ZL | Performed by: STUDENT IN AN ORGANIZED HEALTH CARE EDUCATION/TRAINING PROGRAM

## 2024-07-09 RX ORDER — DESVENLAFAXINE 25 MG/1
25 TABLET, EXTENDED RELEASE ORAL DAILY
COMMUNITY
End: 2024-07-09

## 2024-07-09 RX ORDER — NICOTINE 21 MG/24HR
1 PATCH, TRANSDERMAL 24 HOURS TRANSDERMAL EVERY 24 HOURS
Qty: 14 PATCH | Refills: 0 | Status: SHIPPED | OUTPATIENT
Start: 2024-07-09 | End: 2024-07-23

## 2024-07-09 RX ORDER — LAMOTRIGINE 100 MG/1
TABLET ORAL
Qty: 23 TABLET | Refills: 0 | Status: SHIPPED | OUTPATIENT
Start: 2024-07-09 | End: 2024-08-09 | Stop reason: SINTOL

## 2024-07-09 ASSESSMENT — ANXIETY QUESTIONNAIRES
7. FEELING AFRAID AS IF SOMETHING AWFUL MIGHT HAPPEN: NOT AT ALL
GAD7 TOTAL SCORE: 7
7. FEELING AFRAID AS IF SOMETHING AWFUL MIGHT HAPPEN: NOT AT ALL
3. WORRYING TOO MUCH ABOUT DIFFERENT THINGS: MORE THAN HALF THE DAYS
4. TROUBLE RELAXING: NOT AT ALL
6. BECOMING EASILY ANNOYED OR IRRITABLE: NOT AT ALL
3. WORRYING TOO MUCH ABOUT DIFFERENT THINGS: MORE THAN HALF THE DAYS
8. IF YOU CHECKED OFF ANY PROBLEMS, HOW DIFFICULT HAVE THESE MADE IT FOR YOU TO DO YOUR WORK, TAKE CARE OF THINGS AT HOME, OR GET ALONG WITH OTHER PEOPLE?: SOMEWHAT DIFFICULT
7. FEELING AFRAID AS IF SOMETHING AWFUL MIGHT HAPPEN: NOT AT ALL
2. NOT BEING ABLE TO STOP OR CONTROL WORRYING: MORE THAN HALF THE DAYS
2. NOT BEING ABLE TO STOP OR CONTROL WORRYING: MORE THAN HALF THE DAYS
GAD7 TOTAL SCORE: 7
1. FEELING NERVOUS, ANXIOUS, OR ON EDGE: MORE THAN HALF THE DAYS
GAD7 TOTAL SCORE: 7
6. BECOMING EASILY ANNOYED OR IRRITABLE: NOT AT ALL
5. BEING SO RESTLESS THAT IT IS HARD TO SIT STILL: SEVERAL DAYS
IF YOU CHECKED OFF ANY PROBLEMS ON THIS QUESTIONNAIRE, HOW DIFFICULT HAVE THESE PROBLEMS MADE IT FOR YOU TO DO YOUR WORK, TAKE CARE OF THINGS AT HOME, OR GET ALONG WITH OTHER PEOPLE: SOMEWHAT DIFFICULT
5. BEING SO RESTLESS THAT IT IS HARD TO SIT STILL: SEVERAL DAYS
1. FEELING NERVOUS, ANXIOUS, OR ON EDGE: MORE THAN HALF THE DAYS
GAD7 TOTAL SCORE: 7
IF YOU CHECKED OFF ANY PROBLEMS ON THIS QUESTIONNAIRE, HOW DIFFICULT HAVE THESE PROBLEMS MADE IT FOR YOU TO DO YOUR WORK, TAKE CARE OF THINGS AT HOME, OR GET ALONG WITH OTHER PEOPLE: SOMEWHAT DIFFICULT
4. TROUBLE RELAXING: NOT AT ALL

## 2024-07-09 ASSESSMENT — ENCOUNTER SYMPTOMS
CHILLS: 0
SINUS PRESSURE: 0
FATIGUE: 0
WOUND: 0
FEVER: 0
ABDOMINAL PAIN: 0
MYALGIAS: 0
DIZZINESS: 0
WHEEZING: 0
COUGH: 1
DYSURIA: 0
PALPITATIONS: 0
SORE THROAT: 0
DIARRHEA: 1
SHORTNESS OF BREATH: 0
CONSTIPATION: 0
VOMITING: 0

## 2024-07-09 ASSESSMENT — PATIENT HEALTH QUESTIONNAIRE - PHQ9
SUM OF ALL RESPONSES TO PHQ QUESTIONS 1-9: 3
10. IF YOU CHECKED OFF ANY PROBLEMS, HOW DIFFICULT HAVE THESE PROBLEMS MADE IT FOR YOU TO DO YOUR WORK, TAKE CARE OF THINGS AT HOME, OR GET ALONG WITH OTHER PEOPLE: SOMEWHAT DIFFICULT
SUM OF ALL RESPONSES TO PHQ QUESTIONS 1-9: 3
SUM OF ALL RESPONSES TO PHQ QUESTIONS 1-9: 3

## 2024-07-09 ASSESSMENT — PAIN SCALES - GENERAL: PAINLEVEL: NO PAIN (0)

## 2024-07-09 NOTE — PROGRESS NOTES
Preoperative Evaluation  [facility] [place]  [address]  [place] [address]  Phone: [phone]  Primary Provider: Hannah Eaton MD  Pre-op Performing Provider: Hannah Eaton MD  Jul 9, 2024 7/9/2024   Surgical Information   What procedure is being done? trigger release   Facility or Hospital where procedure/surgery will be performed: [facility]   Who is doing the procedure / surgery? Dr. Aden   Date of surgery / procedure: 7/12/2024   Time of surgery / procedure: TBD   Where do you plan to recover after surgery? at home with family      Fax number for surgical facility: [phone]     Assessment & Plan     The proposed surgical procedure is considered INTERMEDIATE risk.    Preop general physical exam  CBC and BMP stable.  EKG reassuring and unchanged. A1C controlled. Optimized for procedure.  - CBC with Platelets & Differential; Future  - Basic metabolic panel; Future  - CBC with Platelets & Differential  - Basic metabolic panel    Trigger middle finger of right hand  Reason for surgery.    Depressive disorder  Overall stable-some mood instability.  Working on finding best agent to manage depression.  Started Lamictal 1 month ago, slowly titrating.  Plan to add Celexa in the future.  - lamoTRIgine (LAMICTAL) 100 MG tablet; Take 0.5 tablets (50 mg) by mouth daily for 14 days, THEN 1 tablet (100 mg) daily for 14 days.    Type 2 diabetes mellitus with diabetic polyneuropathy, without long-term current use of insulin (H)  Well-controlled on Ozempic.  Currently on low-dose, has not tolerated 0.5 mg dose yet.  Last dose was taken last week and she will have been off for more than 7 days prior to surgery.  Lab Results   Component Value Date    A1C 6.2 07/09/2024    A1C 6.7 02/27/2024     - Vitamin B12  - Folate  - Hemoglobin A1c; Future  - Hemoglobin A1c    Hypertension associated with type 2 diabetes mellitus (H)  Controlled.  Rare elevation but did have left ventricular  hypertrophy noted on cardiac MRI this year.  Continues on losartan 12.5 mg daily.  - EKG 12-lead complete w/read - (Clinic Performed)  - Albumin Random Urine Quantitative with Creat Ratio; Future  - Albumin Random Urine Quantitative with Creat Ratio    Mixed simple and mucopurulent chronic bronchitis (H)  Stable with no signs of exacerbation today.  Does have chronic cough but clear lungs and oxygen is at baseline.  Needs better compliance with her Spiriva and Advair inhaler.    Hypothyroidism (acquired)  Stable, continues on Synthroid.    Tobacco use disorder (30 pack year history plus)  At least 1 pack/day presently.  Nicotine patches reordered.  - nicotine (NICODERM CQ) 21 MG/24HR 24 hr patch; Place 1 patch onto the skin every 24 hours for 14 days    Mixed hyperlipidemia  On Crestor, LDL well-controlled.  AST and ALT stable.  - AST  - ALT  - Lipid Profile (Chol, Trig, HDL, LDL calc)    Generalized anxiety disorder  Waxing and waning.  Plan to add Celexa in the future.    Leukocytosis  Chronic, very mild elevation.  Likely related to tobacco use.  Has been stable.  No signs of infection.     History of presumed decreased left ventricular function   Thought to be mildly reduced on echocardiogram but Lexiscan stress test was negative for any inducible ischemia and ejection fraction was normal on stress test.  Had follow-up cardiac MRI with normal ejection fraction and no concerning findings.  Did have mild left ventricular hypertrophy noted.  Was discharged from cardiology.    Abdominal discomfort  Also waxing and waning without severe pain, nausea, vomiting, or fever.  Minimally tender in the right lower quadrant.  Labs stable and reassuring today.  May be related to bowel movements/need to have a bowel movement.  No signs of acute abdomen or appendicitis.  As this has been an ongoing intermittent issue, no reason to delay surgery unless she were to develop fevers, nausea, vomiting, or worsening pain.  Reviewed  signs or symptoms that indicate need for urgent evaluation.  If pain worsens, would need to delay surgery and patient is understanding of this.    Vitamin b12 deficiency  Borderline. Will start 500mcg daily supplementation.     Risks and Recommendations  The patient has the following additional risks and recommendations for perioperative complications:  Social and Substance:    - Active nicotine user, advised smoking cessation    Antiplatelet or Anticoagulation Medication Instructions   - Patient is on no antiplatelet or anticoagulation medications.    Additional Medication Instructions  Take all scheduled medications on the day of surgery EXCEPT for modifications listed below:   - ACE/ARB: DO NOT TAKE on day of surgery (minimum 11 hours for general anesthesia).   - GLP-1 Injectable (exenitide, liraglutide, semaglutide, dulaglutide, etc.): DO NOT TAKE 7 days before surgery    - Herbal medications and vitamins: DO NOT TAKE 14 days prior to surgery.   - Topicals: DO NOT TAKE day of surgery.    Recommendation  Approval given to proceed with proposed procedure, without further diagnostic evaluation.      Anton Anthony is a 54 year old, presenting for the following:  Pre-Op Exam          7/9/2024     7:58 AM   Additional Questions   Roomed by April   Accompanied by          7/9/2024     7:58 AM   Patient Reported Additional Medications   Patient reports taking the following new medications none     Via the Health Maintenance questionnaire, the patient has reported the following services have been completed , this information has been sent to the abstraction team.    HPI related to upcoming procedure: patient has right middle trigger finger. Planning surgery.     Has been feeling well with no fevers, congestion, cough, or other URI symptoms.   No ischemic cardiac history. Had full w/up due to concerned reduced EF on echo but had cardiac MRI 5/15/24 with normal function and minimal LVH.   Denies any shortness of  "breath, chest pain, or dyspnea on exertion.   Is able to function at >4 METS.   Has had prior surgeries with general anesthesia and did well.           7/9/2024   Pre-Op Questionnaire   Have you ever had a heart attack or stroke? No   Have you ever had surgery on your heart or blood vessels, such as a stent placement, a coronary artery bypass, or surgery on an artery in your head, neck, heart, or legs? No   Do you have chest pain with activity? No   Do you have a history of heart failure? No   Do you currently have a cold, bronchitis or symptoms of other infection? No   Do you have a cough, shortness of breath, or wheezing? (!) YES COPD   Do you or anyone in your family have previous history of blood clots? No   Do you or does anyone in your family have a serious bleeding problem such as prolonged bleeding following surgeries or cuts? No   Have you ever had problems with anemia or been told to take iron pills? No   Have you had any abnormal blood loss such as black, tarry or bloody stools, or abnormal vaginal bleeding? No   Have you ever had a blood transfusion? No   Are you willing to have a blood transfusion if it is medically needed before, during, or after your surgery? Yes   Have you or any of your relatives ever had problems with anesthesia? (!) YES - hard to wake up; slow to come out of anesthesia but no history of neuroleptic malignant syndrome or anesthetic complications; \"I sleep like a baby\"   Do you have sleep apnea, excessive snoring or daytime drowsiness? No   Do you have any artifical heart valves or other implanted medical devices like a pacemaker, defibrillator, or continuous glucose monitor? No   Do you have artificial joints? No   Are you allergic to latex? No        Health Care Directive  Patient does not have a Health Care Directive or Living Will: Discussed advance care planning with patient; however, patient declined at this time.    Preoperative Review of    reviewed - controlled " substances reflected in medication list.    Status of Chronic Conditions:  COPD - Patient has a longstanding history of moderate-severe COPD . Patient has been doing well overall noting NO SYMPTOMS and continues on medication regimen consisting of advair and spiriva without adverse reactions or side effects. Forgets to take medication often. Maybe does 3x/week.     DIABETES - Patient has a longstanding history of DiabetesType Type II . Patient is being treated with GLP-1 and denies significant side effects. Control has been good. Complicating factors include but are not limited to: hypertension, hyperlipidemia, and neuropathy.     Lab Results   Component Value Date    A1C 6.7 02/27/2024       HYPERTENSION - Patient has longstanding history of HTN , currently denies any symptoms referable to elevated blood pressure. Specifically denies chest pain, palpitations, dyspnea, orthopnea, PND or peripheral edema. Blood pressure readings have been in normal range. Current medication regimen is as listed below. Patient denies any side effects of medication.     HYPOTHYROIDISM - Patient has a longstanding history of chronic Hypothyroidism. Patient has been doing well, noting no tremor, insomnia, hair loss or changes in skin texture. Continues to take medications as directed, without adverse reactions or side effects. Last TSH   Lab Results   Component Value Date    TSH 2.73 02/27/2024   .      Taking lamictal - only on 25mg daily. Did not increase to 50mg. No rash.     Smoking 15-20 cigs/day.     Some mild right lower abdomen pain this AM. No nausea or vomitting. Very mild. Unsure if BM this morning. Can be chronic intermittent issue.     Patient Active Problem List    Diagnosis Date Noted    Hypertension associated with type 2 diabetes mellitus (H) 07/09/2024     Priority: High    Mixed simple and mucopurulent chronic bronchitis (H) 10/23/2023     Priority: High    Type 2 diabetes mellitus with diabetic polyneuropathy, without  long-term current use of insulin (H) 04/19/2023     Priority: High    Spinal stenosis of lumbar region with neurogenic claudication 05/02/2024     Priority: Medium    Prolonged Q-T interval on ECG 02/27/2024     Priority: Medium    Dislocation of wrist, carpometacarpal 02/15/2024     Priority: Medium    Grade I diastolic dysfunction 01/31/2024     Priority: Medium     Formatting of this note might be different from the original.   1/22/2024:    Left ventricular size is normal.    Left ventricular function is decreased. The ejection fraction is 45-50%    (mildly reduced).    No regional wall motion abnormalities are seen.    Grade I or early diastolic dysfunction.    Global right ventricular function is mildly reduced.    Both atria appear normal.    Trace mitral insufficiency is present.    No aortic stenosis is present.    Mild tricuspid insufficiency is present.    The right ventricular systolic pressure is 22mmHg above the right atrial    pressure.    Trace pulmonic insufficiency is present.  Formatting of this note might be different from the original.   Formatting of this note might be different from the original.    1/22/2024:     Left ventricular size is normal.     Left ventricular function is decreased. The ejection fraction is 45-50%     (mildly reduced).     No regional wall motion abnormalities are seen.     Grade I or early diastolic dysfunction.     Global right ventricular function is mildly reduced.     Both atria appear normal.     Trace mitral insufficiency is present.     No aortic stenosis is present.     Mild tricuspid insufficiency is present.     The right ventricular systolic pressure is 22mmHg above the right atrial     pressure.     Trace pulmonic insufficiency is present.      Mild mitral insufficiency 01/31/2024     Priority: Medium     Formatting of this note might be different from the original.   1/22/2024:    Left ventricular size is normal.    Left ventricular function is decreased.  The ejection fraction is 45-50%    (mildly reduced).    No regional wall motion abnormalities are seen.    Grade I or early diastolic dysfunction.    Global right ventricular function is mildly reduced.    Both atria appear normal.    Trace mitral insufficiency is present.    No aortic stenosis is present.    Mild tricuspid insufficiency is present.    The right ventricular systolic pressure is 22mmHg above the right atrial    pressure.    Trace pulmonic insufficiency is present.  Formatting of this note might be different from the original.   Formatting of this note might be different from the original.    1/22/2024:     Left ventricular size is normal.     Left ventricular function is decreased. The ejection fraction is 45-50%     (mildly reduced).     No regional wall motion abnormalities are seen.     Grade I or early diastolic dysfunction.     Global right ventricular function is mildly reduced.     Both atria appear normal.     Trace mitral insufficiency is present.     No aortic stenosis is present.     Mild tricuspid insufficiency is present.     The right ventricular systolic pressure is 22mmHg above the right atrial     pressure.     Trace pulmonic insufficiency is present.      Mild tricuspid insufficiency 01/31/2024     Priority: Medium     Formatting of this note might be different from the original.   1/22/2024:    Left ventricular size is normal.    Left ventricular function is decreased. The ejection fraction is 45-50%    (mildly reduced).    No regional wall motion abnormalities are seen.    Grade I or early diastolic dysfunction.    Global right ventricular function is mildly reduced.    Both atria appear normal.    Trace mitral insufficiency is present.    No aortic stenosis is present.    Mild tricuspid insufficiency is present.    The right ventricular systolic pressure is 22mmHg above the right atrial    pressure.    Trace pulmonic insufficiency is present.  Formatting of this note might be  different from the original.   Formatting of this note might be different from the original.    1/22/2024:     Left ventricular size is normal.     Left ventricular function is decreased. The ejection fraction is 45-50%     (mildly reduced).     No regional wall motion abnormalities are seen.     Grade I or early diastolic dysfunction.     Global right ventricular function is mildly reduced.     Both atria appear normal.     Trace mitral insufficiency is present.     No aortic stenosis is present.     Mild tricuspid insufficiency is present.     The right ventricular systolic pressure is 22mmHg above the right atrial     pressure.     Trace pulmonic insufficiency is present.      Pulmonary insufficiency 01/31/2024     Priority: Medium    Bilateral arm weakness 11/14/2023     Priority: Medium    Cervical stenosis of spinal canal 10/23/2023     Priority: Medium    Degeneration of lumbar intervertebral disc 10/23/2023     Priority: Medium    Chronic pain disorder 09/12/2023     Priority: Medium    Insomnia 08/11/2022     Priority: Medium     Current Medications: Lunesta - causes a foul taste in her mouth. Not sleeping well still.   Previous medications: Valium, Trazodone, Restoril, Gabapentin, Lyrica       Generalized anxiety disorder 08/10/2021     Priority: Medium    Spondylosis without myelopathy or radiculopathy, cervical region 08/10/2021     Priority: Medium    Hyperlipidemia 09/27/2018     Priority: Medium    Tobacco use disorder (30 pack year history plus) 07/18/2018     Priority: Medium    RLS (restless legs syndrome) 03/08/2018     Priority: Medium    Hypothyroidism (acquired) 06/06/2017     Priority: Medium    Mild intermittent asthma without complication 12/17/2015     Priority: Medium     Formatting of this note might be different from the original.   Last Assessment & Plan:     Formatting of this note might be different from the original.    This is generally been stable.  May be slight worsening recently  with her rash.      Depressive disorder 06/21/2002     Priority: Medium    History of Helicobacter pylori infection 11/14/2023     Priority: Low    S/P complete hysterectomy 10/23/2023     Priority: Low      Past Medical History:   Diagnosis Date    Abnormal echocardiogram 01/31/2024    Normal stress test and cardiac MRI 2024      Arthritis     Decreased left ventricular function 02/27/2024    Had confirmatory MRI - showed normal heart function. No evidence for reduced EF      Depressive disorder     Gastroesophageal reflux disease with esophagitis     H. pylori infection 2018    Hashimoto's disease     Heart failure with reduced ejection fraction, NYHA class I (H) 02/27/2024    Hypothyroidism     Insomnia     Type 2 diabetes mellitus (H)     Umbilical hernia without obstruction and without gangrene     Urticaria     Last Assessment & Plan: Formatting of this note might be different from the original. Patient presented to the emergency room on 3/19/2023 with history of full body rash starting 3 weeks prior with discomfort and itching treated with hydrocortisone cream without relief.  Homeless family member had moved back into the house with prior exposure to bedbugs.  Under breasts lower legs severity moderate     Past Surgical History:   Procedure Laterality Date    ABDOMEN SURGERY      CARPAL TUNNEL RELEASE RT/LT Bilateral     [facility] arthroplasty Left     [facility] - HIM SCAN N/A [facility] 3 hyperplastic polyps. repeat 10 yr    HYSTERECTOMY, PAP NO LONGER INDICATED N/A 10/10/2018    Cervix removed per [facility]'s Hysterectomy Path Report.    HYSTERECTOMY, TOTAL, LAPAROSCOPIC, WITH SALPINGO-OOPHORECTOMY, CYSTOSCOPY Bilateral 10/10/2018    [facility]     Current Outpatient Medications   Medication Sig Dispense Refill    ACCU-CHEK GUIDE test strip       acetaminophen (TYLENOL) 325 MG tablet Take 325-650 mg by mouth every 6 hours as needed      blood glucose monitoring (SOFTCLIX) lancets USE  TO TEST 1 TIME PER DAY      Blood Glucose Monitoring Suppl (ACCU-CHEK GUIDE) w/Device KIT USE TO TEST BLOOD SUGAR DAILY      clobetasol (TEMOVATE) 0.05 % external cream Apply topically 2 times daily Apply twice daily to affected lesions for up to 10-14 days. 45 g 0    Continuous Blood Gluc  (DEXCOM G6 ) MIKE Use to read blood sugars as per 's instructions. 1 each 1    Continuous Blood Gluc Sensor (DEXCOM G6 SENSOR) MISC CHANGE SENSOR EVERY 10 DAYS 3 each 3    Continuous Glucose Transmitter (DEXCOM G6 TRANSMITTER) MISC Change every 3 months. 1 each 1    cyanocobalamin (VITAMIN B-12) 500 MCG tablet Take 1 tablet (500 mcg) by mouth daily 90 tablet 3    estradiol (ESTRACE) 0.1 MG/GM vaginal cream Place 1 g vaginally      fluticasone-salmeterol (ADVAIR) 250-50 MCG/ACT inhaler Inhale 1 puff into the lungs every 12 hours      Glucose 4-6 GM-MG CHEW Take 4 g by mouth once      guaiFENesin (MUCINEX) 600 MG 12 hr tablet Take 1 tablet (600 mg) by mouth 2 times daily as needed for congestion 30 tablet 1    hydrOXYzine (VISTARIL) 25 MG capsule Take 1 capsule (25 mg) by mouth 3 times daily as needed for itching 30 capsule 0    hydrOXYzine HCl (ATARAX) 25 MG tablet       ibuprofen (ADVIL/MOTRIN) 200 MG tablet Take 200 mg by mouth every 4 hours as needed      ipratropium - albuterol 0.5 mg/2.5 mg/3 mL (DUONEB) 0.5-2.5 (3) MG/3ML neb solution Inhale 3 mLs into the lungs      ketoconazole (NIZORAL) 2 % external cream Apply topically daily      lamoTRIgine (LAMICTAL) 100 MG tablet Take 0.5 tablets (50 mg) by mouth daily for 14 days, THEN 1 tablet (100 mg) daily for 14 days. 23 tablet 0    levothyroxine (SYNTHROID/LEVOTHROID) 88 MCG tablet Take 88 mcg by mouth daily      losartan (COZAAR) 25 MG tablet Take 0.5 tablets (12.5 mg) by mouth daily 30 tablet 1    nicotine (NICODERM CQ) 14 MG/24HR 24 hr patch       nicotine (NICODERM CQ) 21 MG/24HR 24 hr patch Place 1 patch onto the skin every 24 hours for 14 days  14 patch 0    nicotine (NICORETTE) 2 MG gum Place 1 each (2 mg) inside cheek every hour as needed for nicotine withdrawal symptoms 100 each 1    omeprazole (PRILOSEC) 20 MG DR capsule TAKE 1 CAPSULE(20 MG) BY MOUTH DAILY 90 capsule 1    pramipexole (MIRAPEX) 1.5 MG tablet Take 1.5 mg by mouth      pregabalin (LYRICA) 25 MG capsule       PROAIR  (90 Base) MCG/ACT inhaler       rosuvastatin (CRESTOR) 40 MG tablet Take 1 tablet (40 mg) by mouth daily 90 tablet 0    semaglutide (OZEMPIC) 2 MG/3ML pen Inject 0.5 mg Subcutaneous every 7 days 3 mL 0    SPIRIVA RESPIMAT 2.5 MCG/ACT inhaler INHALE 2 PUFFS INTO THE LUNGS DAILY 4 g 4    traZODone (DESYREL) 150 MG tablet Take 150 mg by mouth at bedtime      triamcinolone (KENALOG) 0.5 % external ointment APPLY TO SKIN TWICE DAILY. TO AFFECTED AREAS ON ARMS AND LEGS. USE UP TO 2 WEEKS AT A TIME         Allergies   Allergen Reactions    Hydromorphone Other (See Comments)    Other Food Allergy Itching    Amoxicillin-Pot Clavulanate     Azithromycin      Other reaction(s): Stomach Upset    Black Buchanan Flavor Itching    Erythromycin Nausea and Vomiting    Pecan Extract Itching    Other (Do Not Use) Rash     Epoxy Resin    Penicillins Rash     Has tolerated Cefazolin (ANCEF) and other Cephalosporins        Social History     Tobacco Use    Smoking status: Every Day     Current packs/day: 0.50     Average packs/day: 0.5 packs/day for 38.5 years (19.3 ttl pk-yrs)     Types: Cigarettes     Start date: 1/1/1986     Passive exposure: Current    Smokeless tobacco: Current    Tobacco comments:     Pt denies quit plan 4/29/24   Substance Use Topics    Alcohol use: Not Currently     Family History   Problem Relation Age of Onset    Osteoporosis Mother     Bipolar Disorder Mother      History   Drug Use    Types: Marijuana           Review of Systems   Constitutional:  Negative for chills, fatigue and fever.   HENT:  Negative for congestion, sinus pressure and sore throat.   "  Respiratory:  Positive for cough (chronic). Negative for shortness of breath and wheezing.    Cardiovascular:  Negative for chest pain, palpitations and leg swelling.   Gastrointestinal:  Positive for diarrhea (with ozempic at times). Negative for abdominal pain, constipation and vomiting.   Genitourinary:  Negative for dysuria and urgency.   Musculoskeletal:  Negative for myalgias.   Skin:  Negative for rash and wound.   Neurological:  Negative for dizziness and syncope.       Objective    /72 (BP Location: Right arm, Patient Position: Sitting, Cuff Size: Adult Regular)   Pulse 93   Temp 97.3  F (36.3  C) (Tympanic)   Resp 18   Ht 1.575 m (5' 2\")   Wt 78 kg (172 lb)   SpO2 95%   BMI 31.46 kg/m     Estimated body mass index is 31.46 kg/m  as calculated from the following:    Height as of this encounter: 1.575 m (5' 2\").    Weight as of this encounter: 78 kg (172 lb).    Physical Exam  Constitutional:       General: She is not in acute distress.     Appearance: Normal appearance. She is not ill-appearing.   HENT:      Right Ear: Tympanic membrane and ear canal normal.      Left Ear: Tympanic membrane and ear canal normal.      Nose: Nose normal.      Mouth/Throat:      Mouth: Mucous membranes are moist.      Pharynx: Oropharynx is clear. No oropharyngeal exudate or posterior oropharyngeal erythema.   Eyes:      Extraocular Movements: Extraocular movements intact.      Conjunctiva/sclera: Conjunctivae normal.      Pupils: Pupils are equal, round, and reactive to light.   Neck:      Thyroid: No thyroid mass, thyromegaly or thyroid tenderness.   Cardiovascular:      Rate and Rhythm: Normal rate and regular rhythm.      Heart sounds: No murmur heard.  Pulmonary:      Effort: Pulmonary effort is normal.      Breath sounds: Normal breath sounds. No wheezing, rhonchi or rales.   Abdominal:      General: Bowel sounds are normal.      Palpations: Abdomen is soft. There is no hepatomegaly, splenomegaly, mass or " pulsatile mass.      Tenderness: There is no abdominal tenderness. There is no right CVA tenderness, left CVA tenderness, guarding or rebound.   Musculoskeletal:      Cervical back: Neck supple.      Right lower leg: No edema.      Left lower leg: No edema.   Lymphadenopathy:      Cervical: No cervical adenopathy.   Skin:     General: Skin is warm and dry.      Capillary Refill: Capillary refill takes less than 2 seconds.      Findings: No rash.   Neurological:      General: No focal deficit present.      Mental Status: She is alert and oriented to person, place, and time.      Cranial Nerves: No cranial nerve deficit.      Motor: No weakness.      Gait: Gait normal.   Psychiatric:         Mood and Affect: Mood normal.         Behavior: Behavior normal.         Thought Content: Thought content normal.         Judgment: Judgment normal.         Recent Labs   Lab Test 03/19/24  1129 02/27/24  1438   HGB 14.6 15.6    255    141   POTASSIUM 4.1 4.1   CR 0.84 0.87   A1C  --  6.7*        Diagnostics  Recent Results (from the past 24 hour(s))   EKG 12-lead complete w/read - (Clinic Performed)    Collection Time: 07/09/24  8:26 AM   Result Value Ref Range    Systolic Blood Pressure  mmHg    Diastolic Blood Pressure  mmHg    Ventricular Rate 80 BPM    Atrial Rate 80 BPM    ME Interval 178 ms    QRS Duration 90 ms     ms    QTc 442 ms    P Axis 56 degrees    R AXIS -14 degrees    T Axis 53 degrees    Interpretation ECG       Sinus rhythm  Moderate voltage criteria for LVH, may be normal variant ( R in aVL , Reading product )  Borderline ECG  When compared with ECG of 27-FEB-2024 13:39,  No significant change was found  Confirmed by MD Germania, Eric ([id number]) on 7/9/2024 12:34:08 PM     AST    Collection Time: 07/09/24  8:36 AM   Result Value Ref Range    AST 20 0 - 45 U/L   ALT    Collection Time: 07/09/24  8:36 AM   Result Value Ref Range    ALT 20 0 - 50 U/L   Lipid Profile (Chol, Trig, HDL, LDL  calc)    Collection Time: 07/09/24  8:36 AM   Result Value Ref Range    Cholesterol 141 <200 mg/dL    Triglycerides 173 (H) <150 mg/dL    Direct Measure HDL 42 (L) >=50 mg/dL    LDL Cholesterol Calculated 64 <=100 mg/dL    Non HDL Cholesterol 99 <130 mg/dL    Patient Fasting > 8hrs? No    Vitamin B12    Collection Time: 07/09/24  8:36 AM   Result Value Ref Range    Vitamin B12 381 232 - 1,245 pg/mL   Folate    Collection Time: 07/09/24  8:36 AM   Result Value Ref Range    Folic Acid 8.3 4.6 - 34.8 ng/mL   Basic metabolic panel    Collection Time: 07/09/24  8:36 AM   Result Value Ref Range    Sodium 143 135 - 145 mmol/L    Potassium 4.2 3.4 - 5.3 mmol/L    Chloride 108 (H) 98 - 107 mmol/L    Carbon Dioxide (CO2) 22 22 - 29 mmol/L    Anion Gap 13 7 - 15 mmol/L    Urea Nitrogen 11.0 6.0 - 20.0 mg/dL    Creatinine 0.87 0.51 - 0.95 mg/dL    GFR Estimate 78 >60 mL/min/1.73m2    Calcium 9.6 8.6 - 10.0 mg/dL    Glucose 120 (H) 70 - 99 mg/dL    Patient Fasting > 8hrs? No    Albumin Random Urine Quantitative with Creat Ratio    Collection Time: 07/09/24  8:36 AM   Result Value Ref Range    Creatinine Urine mg/dL 68.2 mg/dL    Albumin Urine mg/L <12.0 mg/L    Albumin Urine mg/g Cr     CBC with platelets and differential    Collection Time: 07/09/24  8:36 AM   Result Value Ref Range    WBC Count 11.4 (H) 4.0 - 11.0 10e3/uL    RBC Count 4.83 3.80 - 5.20 10e6/uL    Hemoglobin 14.9 11.7 - 15.7 g/dL    Hematocrit 45.4 35.0 - 47.0 %    MCV 94 78 - 100 fL    MCH 30.8 26.5 - 33.0 pg    MCHC 32.8 31.5 - 36.5 g/dL    RDW 13.3 10.0 - 15.0 %    Platelet Count 215 150 - 450 10e3/uL    % Neutrophils 66 %    % Lymphocytes 26 %    % Monocytes 6 %    % Eosinophils 1 %    % Basophils 0 %    % Immature Granulocytes 0 %    NRBCs per 100 WBC 0 <1 /100    Absolute Neutrophils 7.5 1.6 - 8.3 10e3/uL    Absolute Lymphocytes 3.0 0.8 - 5.3 10e3/uL    Absolute Monocytes 0.7 0.0 - 1.3 10e3/uL    Absolute Eosinophils 0.1 0.0 - 0.7 10e3/uL    Absolute  Basophils 0.0 0.0 - 0.2 10e3/uL    Absolute Immature Granulocytes 0.0 <=0.4 10e3/uL    Absolute NRBCs 0.0 10e3/uL   Hemoglobin A1c    Collection Time: 07/09/24  8:36 AM   Result Value Ref Range    Estimated Average Glucose 131 mg/dL    Hemoglobin A1C 6.2 (H) <5.7 %      EKG: appears normal, NSR, borderline LVH, normal axis, normal intervals, no acute ST/T changes c/w ischemia, unchanged from previous tracings,     Revised Cardiac Risk Index (RCRI)  The patient has the following serious cardiovascular risks for perioperative complications:   - No serious cardiac risks = 0 points     RCRI Interpretation: 0 points: Class I (very low risk - 0.4% complication rate)         Signed Electronically by: Hannah Eaton MD  Copy of this evaluation report is provided to requesting physician.    The longitudinal plan of care for the diagnosis(es)/condition(s) as documented were addressed during this visit. Due to the added complexity in care, I will continue to support Gabrielle in the subsequent management and with ongoing continuity of care.

## 2024-07-09 NOTE — PATIENT INSTRUCTIONS
Before Your Surgery     Call your surgeon if there is any change in your health. This includes signs of a cold or flu (such as a sore throat, runny nose, cough, rash, fever, urinary symptoms).   If you take prescribed drugs: Follow your doctor s orders about which medicines to take and which to stop until after surgery.  HOLD  Supplements 7 days prior  HOLD Ozempic one week prior to surgery   Hold ibuprofen two days prior   Hold guaifenasin day of surgery   Hold topical creams day of surgery   Hold omeprazole day of surgery   Hold losartan day of surgery   Okay to continue   inhalers - bring albuterol to surgery  Lyrica  Lamictal   Trazodone   Pramipexole  Synthroid   Crestor   Do not smoke, drink alcohol or take over the counter medicine (unless your surgeon or primary care doctor tells you to) for the 24 hours before and after surgery. Smoking can increase your risk of an infection. Nicotine patches are safe to use. NSAIDS like ibuprofen (Advil, Motrin) should be held one day before surgery. Celebrex should be held 3 days before surgery. Naproxen (Aleve) should be held four days before surgery.   Do not take supplements/vitamins day prior and morning of surgery. Some supplements/vitamins can interfere with anesthesia.   Eating and drinking prior to surgery: follow the instructions from your surgeon  Take a shower or bath the night before surgery and morning of surgery. Use the soap your surgeon gave you to gently clean your skin night before and morning of surgery. If you do not have soap from your surgeon, use your regular soap.[facility] patients can receive free Chlorhexidine Gluconate 4% soap for surgery at an outpatient [facility] pharmacy.  Do not shave the surgery site.  Wear clean pajamas and have clean sheets on your bed.  Brush teeth morning of surgery to reduce risk of infection.

## 2024-07-09 NOTE — Clinical Note
Please call and  make sure patient holds LOSARTAN day of surgery. I did not specify that medication on her print out prior to her obtaining her AVS. Please let me know when this is done.

## 2024-07-09 NOTE — PROGRESS NOTES
{PROVIDER CHARTING PREFERENCE:745288}    Anton Anthony is a 54 year old, presenting for the following health issues:  Hypertension, Diabetes, and Depression      7/9/2024     7:58 AM   Additional Questions   Roomed by April   Accompanied by          7/9/2024     7:58 AM   Patient Reported Additional Medications   Patient reports taking the following new medications none     Via the Health Maintenance questionnaire, the patient has reported the following services have been completed -Eye Exam: eye clinic 2023-06-11, this information has been sent to the abstraction team.  History of Present Illness       Mental Health Follow-up:  Patient presents to follow-up on Depression & Anxiety.Patient's depression since last visit has been:  Good  The patient is not having other symptoms associated with depression.  Patient's anxiety since last visit has been:  Good  The patient is not having other symptoms associated with anxiety.  Any significant life events: No  Patient is not feeling anxious or having panic attacks.  Patient has no concerns about alcohol or drug use.    Diabetes:   She presents for follow up of diabetes.  She is checking home blood glucose two times daily.   She checks blood glucose before and after meals.  Blood glucose is sometimes over 200 and sometimes under 70. She is aware of hypoglycemia symptoms including shakiness, dizziness, weakness and lethargy.    She has no concerns regarding her diabetes at this time.  She is having numbness in feet and weight gain.  The patient has had a diabetic eye exam in the last 12 months. Eye exam performed on 2023. Location of last eye exam [place].        Hyperlipidemia:  She presents for follow up of hyperlipidemia.   She is taking medication to lower cholesterol. She is not having myalgia or other side effects to statin medications.    Hypertension: She presents for follow up of hypertension.  She does not check blood pressure  regularly outside of  the clinic. Outside blood pressures have been over 140/90. She does not follow a low salt diet.     Hypothyroidism:     Since last visit, patient describes the following symptoms::  Anxiety, Depression, Dry skin, Fatigue, Loose stools and Weight gain    Weight gain::  No weight gain    Vascular Disease:  She presents for follow up of vascular disease.     She never takes nitroglycerin. She is not taking daily aspirin.    She eats 0-1 servings of fruits and vegetables daily.She consumes 2 sweetened beverage(s) daily.She exercises with enough effort to increase her heart rate 10 to 19 minutes per day.  She exercises with enough effort to increase her heart rate 4 days per week.   She is taking medications regularly.       Diabetes Follow-up    How often are you checking your blood sugar? One time daily  What time of day are you checking your blood sugars (select all that apply)?  Before and after meals  Have you had any blood sugars above 200?  Yes   Have you had any blood sugars below 70?  Yes   What symptoms do you notice when your blood sugar is low?  Shaky, Dizzy, and Weak  What concerns do you have today about your diabetes? None   Do you have any of these symptoms? (Select all that apply)  Numbness in feet and Weight gain      BP Readings from Last 2 Encounters:   07/09/24 130/72   07/01/24 (!) 139/93     Hemoglobin A1C (%)   Date Value   02/27/2024 6.7 (H)       {Reference  Diabetes Management Resources  Blood Glucose Log - 3 weeks  Blood Glucose Log with Food and Insulin Record :797770}      Hypertension Follow-up    Do you check your blood pressure regularly outside of the clinic? Yes   Are you following a low salt diet? No  Are your blood pressures ever more than 140 on the top number (systolic) OR more   than 90 on the bottom number (diastolic), for example 140/90? Yes    Depression   How are you doing with your depression since your last visit? Improved   Are you having other symptoms that might be  associated with depression? No  Have you had a significant life event?  No   Are you feeling anxious or having panic attacks?   Yes:  anxious all th time  Do you have any concerns with your use of alcohol or other drugs? No    Social History     Tobacco Use    Smoking status: Every Day     Current packs/day: 0.50     Average packs/day: 0.5 packs/day for 38.5 years (19.3 ttl pk-yrs)     Types: Cigarettes     Start date: 1/1/1986     Passive exposure: Current    Smokeless tobacco: Current    Tobacco comments:     Pt denies quit plan 4/29/24   Vaping Use    Vaping status: Former   Substance Use Topics    Alcohol use: Not Currently    Drug use: Yes     Types: Marijuana         5/2/2024     8:09 AM 6/3/2024    10:17 AM 7/9/2024     7:44 AM   PHQ   PHQ-9 Total Score 9 18 3   Q9: Thoughts of better off dead/self-harm past 2 weeks Not at all Not at all Not at all         5/2/2024     8:10 AM 6/3/2024    10:09 AM 7/9/2024     7:44 AM   REINALDO-7 SCORE   Total Score 11 (moderate anxiety) 12 (moderate anxiety) 7 (mild anxiety)   Total Score 11 12 7         7/9/2024     7:44 AM   Last PHQ-9   1.  Little interest or pleasure in doing things 1   2.  Feeling down, depressed, or hopeless 1   3.  Trouble falling or staying asleep, or sleeping too much 0   4.  Feeling tired or having little energy 1   5.  Poor appetite or overeating 0   6.  Feeling bad about yourself 0   7.  Trouble concentrating 0   8.  Moving slowly or restless 0   Q9: Thoughts of better off dead/self-harm past 2 weeks 0   PHQ-9 Total Score 3         7/9/2024     7:44 AM   REINALDO-7    1. Feeling nervous, anxious, or on edge 2   2. Not being able to stop or control worrying 2   3. Worrying too much about different things 2   4. Trouble relaxing 0   5. Being so restless that it is hard to sit still 1   6. Becoming easily annoyed or irritable 0   7. Feeling afraid, as if something awful might happen 0   REINALDO-7 Total Score 7   If you checked any problems, how difficult have  "they made it for you to do your work, take care of things at home, or get along with other people? Somewhat difficult       Suicide Assessment Five-step Evaluation and Treatment (SAFE-T)  {Provider  Link to Depression Care Package SmartSet :644633}    {additonal problems for provider to add (Optional):617937}    {ROS Picklists (Optional):619370}      Objective    /72 (BP Location: Right arm, Patient Position: Sitting, Cuff Size: Adult Regular)   Pulse 93   Temp 97.3  F (36.3  C) (Tympanic)   Resp 18   Ht 1.575 m (5' 2\")   Wt 78 kg (172 lb)   SpO2 95%   BMI 31.46 kg/m    Body mass index is 31.46 kg/m .  Physical Exam   {Exam List (Optional):876931}    {Diagnostic Test Results (Optional):915067}        Signed Electronically by: Hannah Eaton MD  {Email feedback regarding this note to [email or web address]   :055794}  "

## 2024-07-10 ENCOUNTER — TELEPHONE (OUTPATIENT)
Dept: CARE COORDINATION | Facility: OTHER | Age: 54
End: 2024-07-10

## 2024-07-10 ENCOUNTER — TELEPHONE (OUTPATIENT)
Dept: FAMILY MEDICINE | Facility: OTHER | Age: 54
End: 2024-07-10

## 2024-07-10 LAB — VIT B12 SERPL-MCNC: 381 PG/ML (ref 232–1245)

## 2024-07-10 RX ORDER — UREA 10 %
500 LOTION (ML) TOPICAL DAILY
Qty: 90 TABLET | Refills: 3 | Status: SHIPPED | OUTPATIENT
Start: 2024-07-10

## 2024-07-10 NOTE — TELEPHONE ENCOUNTER
Pre op, EKG faxed to [facility] at [phone]. Re-faxed to [phone] after failed attempts.

## 2024-07-10 NOTE — TELEPHONE ENCOUNTER
LM for patient to return call to clinic.   Please update with provider recommendation with call back.   Thank you.     Hannah Eaton MD  [facility] 2  Please call and  make sure patient holds LOSARTAN day of surgery. I did not specify that medication on her print out prior to her obtaining her AVS. Please let me know when this is done.

## 2024-07-10 NOTE — TELEPHONE ENCOUNTER
Patient notified to hold losartan day of surgery per PCP.   Patient verbalized understanding.   No further concerns at calls end.  Phone call ended pleasantly.

## 2024-07-14 DIAGNOSIS — K21.9 GASTROESOPHAGEAL REFLUX DISEASE WITHOUT ESOPHAGITIS: ICD-10-CM

## 2024-07-15 NOTE — TELEPHONE ENCOUNTER
Omeprazole      Last Written Prescription Date:  5/3/24  Last Fill Quantity: 90,   # refills: 1  Last Office Visit: 7/9/24  Future Office visit:    Next 5 appointments (look out 90 days)      Aug 09, 2024 11:00 AM  (Arrive by 10:45 AM)  Provider Visit with Hannah Eaton MD  [facility] ([facility] ) [address] [place] [id number]  [phone]     Sep 03, 2024 8:00 AM  (Arrive by 7:45 AM)  Return Visit with Raissa Winter DPM  [facility] ([facility] ) [address] [place] [phone]             Routing refill request to provider for review/approval because:

## 2024-07-15 NOTE — TELEPHONE ENCOUNTER
Omeprazole      Last Written Prescription Date:  5/3/24  Last Fill Quantity: 90,   # refills: 1  Last Office Visit: 7/9/24  Future Office visit:    Next 5 appointments (look out 90 days)      Aug 09, 2024 11:00 AM  (Arrive by 10:45 AM)  Provider Visit with Hannah Eaton MD  [facility] ([facility] ) [address] [place] [id number]  [phone]     Sep 03, 2024 8:00 AM  (Arrive by 7:45 AM)  Return Visit with Raissa Winter DPM  [facility] ([facility] ) [address] [place] [phone]             Routing refill request to provider for review/approval because:

## 2024-07-17 ENCOUNTER — MYC MEDICAL ADVICE (OUTPATIENT)
Dept: FAMILY MEDICINE | Facility: OTHER | Age: 54
End: 2024-07-17

## 2024-07-18 ENCOUNTER — MEDICAL CORRESPONDENCE (OUTPATIENT)
Dept: HEALTH INFORMATION MANAGEMENT | Facility: HOSPITAL | Age: 54
End: 2024-07-18

## 2024-07-18 ENCOUNTER — MEDICAL CORRESPONDENCE (OUTPATIENT)
Dept: HEALTH INFORMATION MANAGEMENT | Facility: CLINIC | Age: 54
End: 2024-07-18

## 2024-07-18 NOTE — TELEPHONE ENCOUNTER
Pt called and has been speaking to Javon at [facility] and per pt he is able to get her a 90 day supply at no cost but needs an order for the Dexcom G7.      Pt was provided the fax # for PCP and will reach out to Javon to give him this information and then have him fax over what is needed.  Pt was in agreement with this plan.

## 2024-07-22 ENCOUNTER — MYC MEDICAL ADVICE (OUTPATIENT)
Dept: FAMILY MEDICINE | Facility: OTHER | Age: 54
End: 2024-07-22

## 2024-07-28 ENCOUNTER — HEALTH MAINTENANCE LETTER (OUTPATIENT)
Age: 54
End: 2024-07-28

## 2024-07-29 ENCOUNTER — MEDICAL CORRESPONDENCE (OUTPATIENT)
Dept: HEALTH INFORMATION MANAGEMENT | Facility: HOSPITAL | Age: 54
End: 2024-07-29

## 2024-08-02 ENCOUNTER — TELEPHONE (OUTPATIENT)
Dept: FAMILY MEDICINE | Facility: OTHER | Age: 54
End: 2024-08-02

## 2024-08-02 NOTE — TELEPHONE ENCOUNTER
Received PA request via messages for semaglutide (OZEMPIC) 2 MG/3ML pen.  Submitted on CMM. Response: Drug not covered by plan. No further action can be taken by PA team.        Routed to Provider

## 2024-08-03 DIAGNOSIS — K21.9 GASTROESOPHAGEAL REFLUX DISEASE WITHOUT ESOPHAGITIS: ICD-10-CM

## 2024-08-05 NOTE — TELEPHONE ENCOUNTER
Kamla tillman  You3 days ago     LF  Submitted on CMM, drug not covered by plan. There is no path to approval. Kamla

## 2024-08-07 DIAGNOSIS — F32.A DEPRESSIVE DISORDER: ICD-10-CM

## 2024-08-07 NOTE — TELEPHONE ENCOUNTER
Lamictal       Last Written Prescription Date:  7/09/2024  Last Fill Quantity: 23,   # refills: 0  Last Office Visit: 7/09/2024  Future Office visit:    Next 5 appointments (look out 90 days)      Aug 09, 2024 11:00 AM  (Arrive by 10:45 AM)  Provider Visit with Hannah Eaton MD  [facility] ([facility] ) [address] [place] [id number]  [phone]     Sep 03, 2024 8:00 AM  (Arrive by 7:45 AM)  Return Visit with Raissa Winter DPM  [facility] ([facility] ) [address] [place] [phone]

## 2024-08-09 ENCOUNTER — OFFICE VISIT (OUTPATIENT)
Dept: FAMILY MEDICINE | Facility: OTHER | Age: 54
End: 2024-08-09
Attending: STUDENT IN AN ORGANIZED HEALTH CARE EDUCATION/TRAINING PROGRAM
Payer: MEDICARE

## 2024-08-09 VITALS
BODY MASS INDEX: 31.47 KG/M2 | SYSTOLIC BLOOD PRESSURE: 126 MMHG | OXYGEN SATURATION: 94 % | HEIGHT: 62 IN | DIASTOLIC BLOOD PRESSURE: 78 MMHG | HEART RATE: 95 BPM | TEMPERATURE: 97 F | WEIGHT: 171 LBS | RESPIRATION RATE: 18 BRPM

## 2024-08-09 DIAGNOSIS — F41.1 GENERALIZED ANXIETY DISORDER: Chronic | ICD-10-CM

## 2024-08-09 DIAGNOSIS — E11.42 TYPE 2 DIABETES MELLITUS WITH DIABETIC POLYNEUROPATHY, WITHOUT LONG-TERM CURRENT USE OF INSULIN (H): Chronic | ICD-10-CM

## 2024-08-09 DIAGNOSIS — F32.A DEPRESSIVE DISORDER: Primary | ICD-10-CM

## 2024-08-09 DIAGNOSIS — Z87.891 PERSONAL HISTORY OF NICOTINE DEPENDENCE: ICD-10-CM

## 2024-08-09 DIAGNOSIS — F17.200 TOBACCO USE DISORDER: ICD-10-CM

## 2024-08-09 PROCEDURE — 99214 OFFICE O/P EST MOD 30 MIN: CPT | Performed by: STUDENT IN AN ORGANIZED HEALTH CARE EDUCATION/TRAINING PROGRAM

## 2024-08-09 PROCEDURE — G0463 HOSPITAL OUTPT CLINIC VISIT: HCPCS

## 2024-08-09 PROCEDURE — G2211 COMPLEX E/M VISIT ADD ON: HCPCS | Performed by: STUDENT IN AN ORGANIZED HEALTH CARE EDUCATION/TRAINING PROGRAM

## 2024-08-09 RX ORDER — CITALOPRAM HYDROBROMIDE 10 MG/1
10 TABLET ORAL DAILY
Qty: 30 TABLET | Refills: 0 | Status: SHIPPED | OUTPATIENT
Start: 2024-08-09 | End: 2024-09-23

## 2024-08-09 RX ORDER — LAMOTRIGINE 100 MG/1
TABLET ORAL
Qty: 23 TABLET | Refills: 0 | OUTPATIENT
Start: 2024-08-09

## 2024-08-09 ASSESSMENT — PAIN SCALES - GENERAL: PAINLEVEL: MODERATE PAIN (5)

## 2024-08-09 ASSESSMENT — ANXIETY QUESTIONNAIRES
1. FEELING NERVOUS, ANXIOUS, OR ON EDGE: NEARLY EVERY DAY
3. WORRYING TOO MUCH ABOUT DIFFERENT THINGS: NEARLY EVERY DAY
GAD7 TOTAL SCORE: 18
2. NOT BEING ABLE TO STOP OR CONTROL WORRYING: NEARLY EVERY DAY
IF YOU CHECKED OFF ANY PROBLEMS ON THIS QUESTIONNAIRE, HOW DIFFICULT HAVE THESE PROBLEMS MADE IT FOR YOU TO DO YOUR WORK, TAKE CARE OF THINGS AT HOME, OR GET ALONG WITH OTHER PEOPLE: VERY DIFFICULT
7. FEELING AFRAID AS IF SOMETHING AWFUL MIGHT HAPPEN: NEARLY EVERY DAY
8. IF YOU CHECKED OFF ANY PROBLEMS, HOW DIFFICULT HAVE THESE MADE IT FOR YOU TO DO YOUR WORK, TAKE CARE OF THINGS AT HOME, OR GET ALONG WITH OTHER PEOPLE?: VERY DIFFICULT
5. BEING SO RESTLESS THAT IT IS HARD TO SIT STILL: MORE THAN HALF THE DAYS
7. FEELING AFRAID AS IF SOMETHING AWFUL MIGHT HAPPEN: NEARLY EVERY DAY
6. BECOMING EASILY ANNOYED OR IRRITABLE: MORE THAN HALF THE DAYS
4. TROUBLE RELAXING: MORE THAN HALF THE DAYS
GAD7 TOTAL SCORE: 18

## 2024-08-09 ASSESSMENT — ENCOUNTER SYMPTOMS: NERVOUS/ANXIOUS: 1

## 2024-08-09 NOTE — PROGRESS NOTES
Assessment & Plan     Depressive disorder  Still rule out bipolar 2 but unfortunately did not tolerate Abilify in the past and has now failed lamotrigine due to side effects.  Did quite well on Celexa in the past, we will plan to start cautiously and monitor for any mood changes or mood swings.  Reviewed QTc, was normal on last EKG.  Referral placed for mental health therapy in [place].  Follow-up in 3 to 4 weeks, sooner if needed.  - citalopram (CELEXA) 10 MG tablet; Take 1 tablet (10 mg) by mouth daily  - Adult Mental Health  Referral; Future    Generalized anxiety disorder  Worsening anxiety, many life stressors recently and stress with .  Referral placed for mental health therapy.  Will plan to start Celexa 10 mg once daily.  Did well on this medication in the past.  Follow-up in 3 to 4 weeks, sooner if needed.  - citalopram (CELEXA) 10 MG tablet; Take 1 tablet (10 mg) by mouth daily  - Adult Mental Health  Referral; Future    Tobacco use disorder (30 pack year history plus)  Due for screening in September.  Annual.  - CT Chest Lung Cancer Scrn Low Dose wo; Future    Type 2 diabetes mellitus with diabetic polyneuropathy, without long-term current use of insulin (H)  Possibly having hypoglycemia, although only was noted when she put on the new Dexcom sensor.  Recommend checking with a finger glucose check if reading low.  Tolerating the 0.5 mg Wegovy and she would like to continue this for now.  Consider reduction in the future if side effects worsen.    Personal history of nicotine dependence  - CT Chest Lung Cancer Scrn Low Dose wo; Future      Return in about 4 weeks (around 9/6/2024) for 1mo mental health.    The longitudinal plan of care for the diagnosis(es)/condition(s) as documented were addressed during this visit. Due to the added complexity in care, I will continue to support Gabrielle in the subsequent management and with ongoing continuity of care.    Subjective   Gabrielle is a 54  year old, presenting for the following health issues:  Depression, Anxiety, Lipids, and Diabetes        8/9/2024    10:51 AM   Additional Questions   Roomed by April   Accompanied by self         8/9/2024    10:51 AM   Patient Reported Additional Medications   Patient reports taking the following new medications none     Via the Health Maintenance questionnaire, the patient has reported the following services have been completed -Eye Exam: [facility] 2023-06-05, this information has been sent to the abstraction team.  History of Present Illness       Mental Health Follow-up:  Patient presents to follow-up on Depression & Anxiety.Patient's depression since last visit has been:  Medium  The patient is having other symptoms associated with depression.  Patient's anxiety since last visit has been:  Medium  The patient is having other symptoms associated with anxiety.  Any significant life events: relationship concerns  Patient is not feeling anxious or having panic attacks.  Patient has no concerns about alcohol or drug use.    Diabetes:   She presents for follow up of diabetes.   She is checking home blood glucose with a continuous glucose monitor.   She checks blood glucose before and after meals.  Blood glucose is never over 200 and sometimes under 70. She is aware of hypoglycemia symptoms including shakiness, dizziness, weakness and lethargy.    She has no concerns regarding her diabetes at this time.  She is having numbness in feet and weight gain.  The patient has had a diabetic eye exam in the last 12 months. Eye exam performed on june 2023. Location of last eye exam [facility].        Hyperlipidemia:  She presents for follow up of hyperlipidemia.   She is taking medication to lower cholesterol. She is not having myalgia or other side effects to statin medications.    Hypertension: She presents for follow up of hypertension.  She does check blood pressure  regularly outside of the clinic. Outpatient blood pressures  have not been over 140/90. She follows a low salt diet.     Hypothyroidism:     Since last visit, patient describes the following symptoms::  Anxiety, Constipation, Depression, Dry skin, Fatigue, Loose stools and Weight gain    Weight gain::  No weight gain    She eats 0-1 servings of fruits and vegetables daily.She consumes 2 sweetened beverage(s) daily.She exercises with enough effort to increase her heart rate 10 to 19 minutes per day.  She exercises with enough effort to increase her heart rate 3 or less days per week. She is missing 2 dose(s) of medications per week.       Diabetes Follow-up    How often are you checking your blood sugar? Continuous glucose monitor  What time of day are you checking your blood sugars (select all that apply)?  Before and after meals  Have you had any blood sugars above 200?  No  Have you had any blood sugars below 70?  Yes   What symptoms do you notice when your blood sugar is low?  Shaky, Dizzy, and Weak  What concerns do you have today about your diabetes? None   Do you have any of these symptoms? (Select all that apply)  Numbness in feet    Has dexcom 7 now. Was alarming last night. Had just put new sensor on last night. Was fine before that.   More nausea on 0.5 wegovy. Wants to stay at this dose though. Harder to eat eggs.     BP Readings from Last 2 Encounters:   08/09/24 126/78   07/09/24 130/72     Hemoglobin A1C (%)   Date Value   07/09/2024 6.2 (H)   02/27/2024 6.7 (H)     LDL Cholesterol Calculated (mg/dL)   Date Value   07/09/2024 64       Depression and Anxiety   How are you doing with your depression since your last visit? Worsened  How are you doing with your anxiety since your last visit?  Worsened   Are you having other symptoms that might be associated with depression or anxiety? No  Have you had a significant life event? Relationship Concerns, Job Concerns, Financial Concerns, Housing Concerns, and Transportation Concerns   Do you have any concerns with your  use of alcohol or other drugs? No    Difficulty with  - planning possible divorce. Trying to work it out.   Stopped lamictal. Made her feel sick.   Fear with her anxiety with divorce possibly looming.     Feels down most days. Not feeling enjoyment in activities over the last few years, gradually worsening. Cries a lot.   Easily stressed, carries a lot of tension, irritable, worrying a lot, trouble sleeping.     Qtc 442 on last EKG.     PAST TRIALS/HISTORY:   Pristiq was denied.   Lamictal made her nauseated and feel sick (7/2024)  Has tried Wellbutrin, Celexa, Lexapro, prozac, abilify, cymbalta, paxil, buspar.  Side effects with Wellbutrin.   Celexa worked the best but was maxed out on dose.  Mom had bipolar disorder and was schizophrenic   Reports episodes of low, then at times elevated mood where she has high mood, feels restless, and spends more money.   No hallucinations     Social History     Tobacco Use    Smoking status: Every Day     Current packs/day: 0.50     Average packs/day: 0.5 packs/day for 38.6 years (19.3 ttl pk-yrs)     Types: Cigarettes     Start date: 1/1/1986     Passive exposure: Current    Smokeless tobacco: Current    Tobacco comments:     Pt denies quit plan 4/29/24   Vaping Use    Vaping status: Former   Substance Use Topics    Alcohol use: Not Currently    Drug use: Yes     Types: Marijuana         6/3/2024    10:17 AM 7/9/2024     7:44 AM 7/9/2024     8:10 AM   PHQ   PHQ-9 Total Score 18 3 3   Q9: Thoughts of better off dead/self-harm past 2 weeks Not at all Not at all Not at all         7/9/2024     7:44 AM 7/9/2024     8:10 AM 8/9/2024    10:43 AM   REINALDO-7 SCORE   Total Score  7 (mild anxiety) 18 (severe anxiety)   Total Score 7 7 18         8/9/2024    10:43 AM   REINALDO-7    1. Feeling nervous, anxious, or on edge 3   2. Not being able to stop or control worrying 3   3. Worrying too much about different things 3   4. Trouble relaxing 2   5. Being so restless that it is hard to sit  "still 2   6. Becoming easily annoyed or irritable 2   7. Feeling afraid, as if something awful might happen 3   REINALDO-7 Total Score 18   If you checked any problems, how difficult have they made it for you to do your work, take care of things at home, or get along with other people? Very difficult           Objective    /78 (BP Location: Right arm, Patient Position: Sitting, Cuff Size: Adult Regular)   Pulse 95   Temp 97  F (36.1  C) (Tympanic)   Resp 18   Ht 1.575 m (5' 2\")   Wt 77.6 kg (171 lb)   SpO2 94%   BMI 31.28 kg/m    Body mass index is 31.28 kg/m .  Physical Exam  Constitutional:       General: She is not in acute distress.     Appearance: Normal appearance. She is not ill-appearing.   Pulmonary:      Effort: Pulmonary effort is normal.   Skin:     General: Skin is warm and dry.   Neurological:      General: No focal deficit present.      Mental Status: She is alert.   Psychiatric:         Mood and Affect: Mood normal.         Behavior: Behavior normal.         Thought Content: Thought content normal.         Judgment: Judgment normal.                  Signed Electronically by: Hannah Eaton MD    "

## 2024-08-09 NOTE — PATIENT INSTRUCTIONS
Try celexa 10mg once daily  Referral to [facility] in [place]. They will call.   CT lungs ordered for mid September when you are due

## 2024-08-21 ENCOUNTER — TELEPHONE (OUTPATIENT)
Dept: FAMILY MEDICINE | Facility: OTHER | Age: 54
End: 2024-08-21

## 2024-08-21 NOTE — TELEPHONE ENCOUNTER
Received request for additional info for Ozempic from [employer]. Submitted via fax [phone]. Waiting for response.

## 2024-08-21 NOTE — TELEPHONE ENCOUNTER
Received PA APPROVAL from [facility] for Ozempic 0.25-0.5 mg dose pen. Scanned into EPIC, Dates 08/21/24-12/31/2024

## 2024-08-29 ENCOUNTER — MEDICAL CORRESPONDENCE (OUTPATIENT)
Dept: HEALTH INFORMATION MANAGEMENT | Facility: HOSPITAL | Age: 54
End: 2024-08-29

## 2024-09-23 ENCOUNTER — OFFICE VISIT (OUTPATIENT)
Dept: FAMILY MEDICINE | Facility: OTHER | Age: 54
End: 2024-09-23
Attending: STUDENT IN AN ORGANIZED HEALTH CARE EDUCATION/TRAINING PROGRAM
Payer: MEDICARE

## 2024-09-23 VITALS
HEIGHT: 62 IN | BODY MASS INDEX: 31.54 KG/M2 | TEMPERATURE: 97.2 F | WEIGHT: 171.4 LBS | DIASTOLIC BLOOD PRESSURE: 80 MMHG | RESPIRATION RATE: 17 BRPM | OXYGEN SATURATION: 93 % | HEART RATE: 88 BPM | SYSTOLIC BLOOD PRESSURE: 124 MMHG

## 2024-09-23 DIAGNOSIS — G62.9 NEUROPATHY: ICD-10-CM

## 2024-09-23 DIAGNOSIS — F32.A DEPRESSIVE DISORDER: ICD-10-CM

## 2024-09-23 DIAGNOSIS — Z12.11 COLON CANCER SCREENING: ICD-10-CM

## 2024-09-23 DIAGNOSIS — B37.9 CANDIDIASIS: ICD-10-CM

## 2024-09-23 DIAGNOSIS — Z11.3 SCREEN FOR STD (SEXUALLY TRANSMITTED DISEASE): ICD-10-CM

## 2024-09-23 DIAGNOSIS — F17.200 TOBACCO USE DISORDER: Chronic | ICD-10-CM

## 2024-09-23 DIAGNOSIS — E03.9 HYPOTHYROIDISM (ACQUIRED): Chronic | ICD-10-CM

## 2024-09-23 DIAGNOSIS — E11.42 TYPE 2 DIABETES MELLITUS WITH DIABETIC POLYNEUROPATHY, WITHOUT LONG-TERM CURRENT USE OF INSULIN (H): ICD-10-CM

## 2024-09-23 DIAGNOSIS — R30.0 DYSURIA: ICD-10-CM

## 2024-09-23 DIAGNOSIS — F41.1 GENERALIZED ANXIETY DISORDER: Chronic | ICD-10-CM

## 2024-09-23 DIAGNOSIS — E11.59 HYPERTENSION ASSOCIATED WITH TYPE 2 DIABETES MELLITUS (H): ICD-10-CM

## 2024-09-23 DIAGNOSIS — M25.572 ACUTE LEFT ANKLE PAIN: ICD-10-CM

## 2024-09-23 DIAGNOSIS — F41.1 GENERALIZED ANXIETY DISORDER: ICD-10-CM

## 2024-09-23 DIAGNOSIS — Z78.0 ASYMPTOMATIC MENOPAUSAL STATE: ICD-10-CM

## 2024-09-23 DIAGNOSIS — I15.2 HYPERTENSION ASSOCIATED WITH TYPE 2 DIABETES MELLITUS (H): ICD-10-CM

## 2024-09-23 DIAGNOSIS — Z00.00 ENCOUNTER FOR MEDICARE ANNUAL WELLNESS EXAM: Primary | ICD-10-CM

## 2024-09-23 LAB
ALBUMIN UR-MCNC: NEGATIVE MG/DL
APPEARANCE UR: CLEAR
BACTERIAL VAGINOSIS VAG-IMP: NEGATIVE
BILIRUB UR QL STRIP: NEGATIVE
C TRACH DNA SPEC QL PROBE+SIG AMP: NEGATIVE
CANDIDA DNA VAG QL NAA+PROBE: NOT DETECTED
CANDIDA GLABRATA / CANDIDA KRUSEI DNA: NOT DETECTED
COLOR UR AUTO: ABNORMAL
GLUCOSE UR STRIP-MCNC: NEGATIVE MG/DL
HGB UR QL STRIP: ABNORMAL
KETONES UR STRIP-MCNC: NEGATIVE MG/DL
LEUKOCYTE ESTERASE UR QL STRIP: NEGATIVE
MUCOUS THREADS #/AREA URNS LPF: PRESENT /LPF
N GONORRHOEA DNA SPEC QL NAA+PROBE: NEGATIVE
NITRATE UR QL: NEGATIVE
PH UR STRIP: 5.5 [PH] (ref 4.7–8)
RBC URINE: 2 /HPF
SP GR UR STRIP: 1.01 (ref 1–1.03)
SQUAMOUS EPITHELIAL: 0 /HPF
T VAGINALIS DNA VAG QL NAA+PROBE: NOT DETECTED
UROBILINOGEN UR STRIP-MCNC: NORMAL MG/DL
WBC URINE: 2 /HPF

## 2024-09-23 PROCEDURE — 99214 OFFICE O/P EST MOD 30 MIN: CPT | Mod: 25 | Performed by: STUDENT IN AN ORGANIZED HEALTH CARE EDUCATION/TRAINING PROGRAM

## 2024-09-23 PROCEDURE — G0402 INITIAL PREVENTIVE EXAM: HCPCS | Mod: 25 | Performed by: STUDENT IN AN ORGANIZED HEALTH CARE EDUCATION/TRAINING PROGRAM

## 2024-09-23 PROCEDURE — 0352U MULTIPLEX VAGINAL PANEL BY PCR: CPT | Mod: ZL | Performed by: STUDENT IN AN ORGANIZED HEALTH CARE EDUCATION/TRAINING PROGRAM

## 2024-09-23 PROCEDURE — G0402 INITIAL PREVENTIVE EXAM: HCPCS | Performed by: STUDENT IN AN ORGANIZED HEALTH CARE EDUCATION/TRAINING PROGRAM

## 2024-09-23 PROCEDURE — 87491 CHLMYD TRACH DNA AMP PROBE: CPT | Mod: ZL | Performed by: STUDENT IN AN ORGANIZED HEALTH CARE EDUCATION/TRAINING PROGRAM

## 2024-09-23 PROCEDURE — 81001 URINALYSIS AUTO W/SCOPE: CPT | Mod: ZL | Performed by: STUDENT IN AN ORGANIZED HEALTH CARE EDUCATION/TRAINING PROGRAM

## 2024-09-23 PROCEDURE — 90673 RIV3 VACCINE NO PRESERV IM: CPT

## 2024-09-23 PROCEDURE — G0463 HOSPITAL OUTPT CLINIC VISIT: HCPCS | Mod: 25

## 2024-09-23 RX ORDER — TRAZODONE HYDROCHLORIDE 150 MG/1
150 TABLET ORAL AT BEDTIME
Qty: 30 TABLET | Refills: 3 | Status: SHIPPED | OUTPATIENT
Start: 2024-09-23

## 2024-09-23 RX ORDER — GABAPENTIN 100 MG/1
100 CAPSULE ORAL 2 TIMES DAILY
COMMUNITY
End: 2024-09-23

## 2024-09-23 RX ORDER — GABAPENTIN 100 MG/1
100 CAPSULE ORAL 2 TIMES DAILY
Qty: 60 CAPSULE | Refills: 1 | Status: SHIPPED | OUTPATIENT
Start: 2024-09-23

## 2024-09-23 RX ORDER — CITALOPRAM HYDROBROMIDE 10 MG/1
10 TABLET ORAL DAILY
Qty: 30 TABLET | Refills: 0 | OUTPATIENT
Start: 2024-09-23

## 2024-09-23 RX ORDER — NYSTATIN 100000 [USP'U]/G
POWDER TOPICAL 3 TIMES DAILY PRN
Qty: 60 G | Refills: 0 | Status: SHIPPED | OUTPATIENT
Start: 2024-09-23

## 2024-09-23 RX ORDER — CITALOPRAM HYDROBROMIDE 10 MG/1
10 TABLET ORAL DAILY
Qty: 90 TABLET | Refills: 1 | Status: SHIPPED | OUTPATIENT
Start: 2024-09-23

## 2024-09-23 ASSESSMENT — PATIENT HEALTH QUESTIONNAIRE - PHQ9
10. IF YOU CHECKED OFF ANY PROBLEMS, HOW DIFFICULT HAVE THESE PROBLEMS MADE IT FOR YOU TO DO YOUR WORK, TAKE CARE OF THINGS AT HOME, OR GET ALONG WITH OTHER PEOPLE: SOMEWHAT DIFFICULT
SUM OF ALL RESPONSES TO PHQ QUESTIONS 1-9: 7
SUM OF ALL RESPONSES TO PHQ QUESTIONS 1-9: 7

## 2024-09-23 ASSESSMENT — ANXIETY QUESTIONNAIRES
8. IF YOU CHECKED OFF ANY PROBLEMS, HOW DIFFICULT HAVE THESE MADE IT FOR YOU TO DO YOUR WORK, TAKE CARE OF THINGS AT HOME, OR GET ALONG WITH OTHER PEOPLE?: SOMEWHAT DIFFICULT
7. FEELING AFRAID AS IF SOMETHING AWFUL MIGHT HAPPEN: SEVERAL DAYS
GAD7 TOTAL SCORE: 7

## 2024-09-23 ASSESSMENT — PAIN SCALES - GENERAL: PAINLEVEL: EXTREME PAIN (8)

## 2024-09-23 NOTE — PATIENT INSTRUCTIONS
Patient Education   Call radiology to schedule lung cancer CT. Please call [phone] to schedule your imaging.    Need DEXA scan. Radiology will call.   New colonoscopy ordered. Surgery will call.   Resume gabapentin 100mg at bedtime. Increase after three days to 100mg twice daily.   Call [facility] for eye exam.     Preventive Care Advice   This is general advice given by our system to help you stay healthy. However, your care team may have specific advice just for you. Please talk to your care team about your preventive care needs.  Nutrition  Eat 5 or more servings of fruits and vegetables each day.  Try wheat bread, brown rice and whole grain pasta (instead of white bread, rice, and pasta).  Get enough calcium and vitamin D. Check the label on foods and aim for 100% of the RDA (recommended daily allowance).  Lifestyle  Exercise at least 150 minutes each week  (30 minutes a day, 5 days a week).  Do muscle strengthening activities 2 days a week. These help control your weight and prevent disease.  No smoking.  Wear sunscreen to prevent skin cancer.  Have a dental exam and cleaning every 6 months.  Yearly exams  See your health care team every year to talk about:  Any changes in your health.  Any medicines your care team has prescribed.  Preventive care, family planning, and ways to prevent chronic diseases.  Shots (vaccines)   HPV shots (up to age 26), if you've never had them before.  Hepatitis B shots (up to age 59), if you've never had them before.  COVID-19 shot: Get this shot when it's due.  Flu shot: Get a flu shot every year.  Tetanus shot: Get a tetanus shot every 10 years.  Pneumococcal, hepatitis A, and RSV shots: Ask your care team if you need these based on your risk.  Shingles shot (for age 50 and up)  General health tests  Diabetes screening:  Starting at age 35, Get screened for diabetes at least every 3 years.  If you are younger than age 35, ask your care team if you should be screened for  diabetes.  Cholesterol test: At age 39, start having a cholesterol test every 5 years, or more often if advised.  Bone density scan (DEXA): At age 50, ask your care team if you should have this scan for osteoporosis (brittle bones).  Hepatitis C: Get tested at least once in your life.  STIs (sexually transmitted infections)  Before age 24: Ask your care team if you should be screened for STIs.  After age 24: Get screened for STIs if you're at risk. You are at risk for STIs (including HIV) if:  You are sexually active with more than one person.  You don't use condoms every time.  You or a partner was diagnosed with a sexually transmitted infection.  If you are at risk for HIV, ask about PrEP medicine to prevent HIV.  Get tested for HIV at least once in your life, whether you are at risk for HIV or not.  Cancer screening tests  Cervical cancer screening: If you have a cervix, begin getting regular cervical cancer screening tests starting at age 21.  Breast cancer scan (mammogram): If you've ever had breasts, begin having regular mammograms starting at age 40. This is a scan to check for breast cancer.  Colon cancer screening: It is important to start screening for colon cancer at age 45.  Have a colonoscopy test every 10 years (or more often if you're at risk) Or, ask your provider about stool tests like a FIT test every year or Cologuard test every 3 years.  To learn more about your testing options, visit:   .  For help making a decision, visit:   https://bit.ly/ff81125.  Prostate cancer screening test: If you have a prostate, ask your care team if a prostate cancer screening test (PSA) at age 55 is right for you.  Lung cancer screening: If you are a current or former smoker ages 50 to 80, ask your care team if ongoing lung cancer screenings are right for you.  For informational purposes only. Not to replace the advice of your health care provider. Copyright   2023 [employer]. All rights reserved.  Clinically reviewed by the [facility] Transitions Program. [employer] [id number] - REV 01/24.  Learning About Activities of Daily Living  What are activities of daily living?     Activities of daily living (ADLs) are the basic self-care tasks you do every day. These include eating, bathing, dressing, and moving around.  As you age, and if you have health problems, you may find that it's harder to do some of these tasks. If so, your doctor can suggest ideas that may help.  To measure what kind of help you may need, your doctor will ask how well you are able to do ADLs. Let your doctor know if there are any tasks that you are having trouble doing. This is an important first step to getting help. And when you have the help you need, you can stay as independent as possible.  How will a doctor assess your ADLs?  Asking about ADLs is part of a routine health checkup your doctor will likely do as you age. Your health check might be done in a doctor's office, in your home, or at a hospital. The goal is to find out if you are having any problems that could make it hard to care for yourself or that make it unsafe for you to be on your own.  To measure your ADLs, your doctor will ask how hard it is for you to do routine tasks. Your doctor may also want to know if you have changed the way you do a task because of a health problem. Your doctor may watch how you:  Walk back and forth.  Keep your balance while you stand or walk.  Move from sitting to standing or from a bed to a chair.  Button or unbutton a shirt or sweater.  Remove and put on your shoes.  It's common to feel a little worried or anxious if you find you can't do all the things you used to be able to do. Talking with your doctor about ADLs is a way to make sure you're as safe as possible and able to care for yourself as well as you can. You may want to bring a caregiver, friend, or family member to your checkup. They can help you talk to your doctor.  Follow-up  care is a key part of your treatment and safety. Be sure to make and go to all appointments, and call your doctor if you are having problems. It's also a good idea to know your test results and keep a list of the medicines you take.  Current as of: October 24, 2023  Content Version: 14.1    9296-7768 [employer].   Care instructions adapted under license by your healthcare professional. If you have questions about a medical condition or this instruction, always ask your healthcare professional. [employer] disclaims any warranty or liability for your use of this information.    Preventing Falls: Care Instructions  Injuries and health problems such as trouble walking or poor eyesight can increase your risk of falling. So can some medicines. But there are things you can do to help prevent falls. You can exercise to get stronger. You can also arrange your home to make it safer.    Talk to your doctor about the medicines you take. Ask if any of them increase the risk of falls and whether they can be changed or stopped.   Try to exercise regularly. It can help improve your strength and balance. This can help lower your risk of falling.     Practice fall safety and prevention.    Wear low-heeled shoes that fit well and give your feet good support. Talk to your doctor if you have foot problems that make this hard.  Carry a cellphone or wear a medical alert device that you can use to call for help.  Use stepladders instead of chairs to reach high objects. Don't climb if you're at risk for falls. Ask for help, if needed.  Wear the correct eyeglasses, if you need them.    Make your home safer.    Remove rugs, cords, clutter, and furniture from walkways.  Keep your house well lit. Use night-lights in hallways and bathrooms.  Install and use sturdy handrails on stairways.  Wear nonskid footwear, even inside. Don't walk barefoot or in socks without shoes.    Be safe outside.    Use handrails, curb cuts,  "and ramps whenever possible.  Keep your hands free by using a shoulder bag or backpack.  Try to walk in well-lit areas. Watch out for uneven ground, changes in pavement, and debris.  Be careful in the winter. Walk on the grass or gravel when sidewalks are slippery. Use de-icer on steps and walkways. Add non-slip devices to shoes.    Put grab bars and nonskid mats in your shower or tub and near the toilet. Try to use a shower chair or bath bench when bathing.   Get into a tub or shower by putting in your weaker leg first. Get out with your strong side first. Have a phone or medical alert device in the bathroom with you.   Where can you learn more?  Go to https://www.[employer].net/patiented  Enter G117 in the search box to learn more about \"Preventing Falls: Care Instructions.\"  Current as of: July 17, 2023               Content Version: 14.0    5684-4351 [employer].   Care instructions adapted under license by your healthcare professional. If you have questions about a medical condition or this instruction, always ask your healthcare professional. [employer] disclaims any warranty or liability for your use of this information.      Hearing Loss: Care Instructions  Overview     Hearing loss is a sudden or slow decrease in how well you hear. It can range from slight to profound. Permanent hearing loss can occur with aging. It also can happen when you are exposed long-term to loud noise. Examples include listening to loud music, riding motorcycles, or being around other loud machines.  Hearing loss can affect your work and home life. It can make you feel lonely or depressed. You may feel that you have lost your independence. But hearing aids and other devices can help you hear better and feel connected to others.  Follow-up care is a key part of your treatment and safety. Be sure to make and go to all appointments, and call your doctor if you are having problems. It's also a good idea to " know your test results and keep a list of the medicines you take.  How can you care for yourself at home?  Avoid loud noises whenever possible. This helps keep your hearing from getting worse.  Always wear hearing protection around loud noises.  Wear a hearing aid as directed.  A professional can help you pick a hearing aid that will work best for you.  You can also get hearing aids over the counter for mild to moderate hearing loss.  Have hearing tests as your doctor suggests. They can show whether your hearing has changed. Your hearing aid may need to be adjusted.  Use other devices as needed. These may include:  Telephone amplifiers and hearing aids that can connect to a television, stereo, radio, or microphone.  Devices that use lights or vibrations. These alert you to the doorbell, a ringing telephone, or a baby monitor.  Television closed-captioning. This shows the words at the bottom of the screen. Most new TVs can do this.  TTY (text telephone). This lets you type messages back and forth on the telephone instead of talking or listening. These devices are also called TDD. When messages are typed on the keyboard, they are sent over the phone line to a receiving TTY. The message is shown on a monitor.  Use text messaging, social media, and email if it is hard for you to communicate by telephone.  Try to learn a listening technique called speechreading. It is not lipreading. You pay attention to people's gestures, expressions, posture, and tone of voice. These clues can help you understand what a person is saying. Face the person you are talking to, and have them face you. Make sure the lighting is good. You need to see the other person's face clearly.  Think about counseling if you need help to adjust to your hearing loss.  When should you call for help?  Watch closely for changes in your health, and be sure to contact your doctor if:    You think your hearing is getting worse.     You have new symptoms, such  "as dizziness or nausea.   Where can you learn more?  Go to https://www.[employer].net/patiented  Enter R798 in the search box to learn more about \"Hearing Loss: Care Instructions.\"  Current as of: September 27, 2023               Content Version: 14.0    9938-2632 [employer].   Care instructions adapted under license by your healthcare professional. If you have questions about a medical condition or this instruction, always ask your healthcare professional. [employer] disclaims any warranty or liability for your use of this information.      Learning About Stress  What is stress?     Stress is your body's response to a hard situation. Your body can have a physical, emotional, or mental response. Stress is a fact of life for most people, and it affects everyone differently. What causes stress for you may not be stressful for someone else.  A lot of things can cause stress. You may feel stress when you go on a job interview, take a test, or run a race. This kind of short-term stress is normal and even useful. It can help you if you need to work hard or react quickly. For example, stress can help you finish an important job on time.  Long-term stress is caused by ongoing stressful situations or events. Examples of long-term stress include long-term health problems, ongoing problems at work, or conflicts in your family. Long-term stress can harm your health.  How does stress affect your health?  When you are stressed, your body responds as though you are in danger. It makes hormones that speed up your heart, make you breathe faster, and give you a burst of energy. This is called the fight-or-flight stress response. If the stress is over quickly, your body goes back to normal and no harm is done.  But if stress happens too often or lasts too long, it can have bad effects. Long-term stress can make you more likely to get sick, and it can make symptoms of some diseases worse. If you tense up " when you are stressed, you may develop neck, shoulder, or low back pain. Stress is linked to high blood pressure and heart disease.  Stress also harms your emotional health. It can make you donaldson, tense, or depressed. Your relationships may suffer, and you may not do well at work or school.  What can you do to manage stress?  You can try these things to help manage stress:   Do something active. Exercise or activity can help reduce stress. Walking is a great way to get started. Even everyday activities such as housecleaning or yard work can help.  Try yoga or libia chi. These techniques combine exercise and meditation. You may need some training at first to learn them.  Do something you enjoy. For example, listen to music or go to a movie. Practice your hobby or do volunteer work.  Meditate. This can help you relax, because you are not worrying about what happened before or what may happen in the future.  Do guided imagery. Imagine yourself in any setting that helps you feel calm. You can use online videos, books, or a teacher to guide you.  Do breathing exercises. For example:  From a standing position, bend forward from the waist with your knees slightly bent. Let your arms dangle close to the floor.  Breathe in slowly and deeply as you return to a standing position. Roll up slowly and lift your head last.  Hold your breath for just a few seconds in the standing position.  Breathe out slowly and bend forward from the waist.  Let your feelings out. Talk, laugh, cry, and express anger when you need to. Talking with supportive friends or family, a counselor, or a jovan leader about your feelings is a healthy way to relieve stress. Avoid discussing your feelings with people who make you feel worse.  Write. It may help to write about things that are bothering you. This helps you find out how much stress you feel and what is causing it. When you know this, you can find better ways to cope.  What can you do to prevent  "stress?  You might try some of these things to help prevent stress:  Manage your time. This helps you find time to do the things you want and need to do.  Get enough sleep. Your body recovers from the stresses of the day while you are sleeping.  Get support. Your family, friends, and community can make a difference in how you experience stress.  Limit your news feed. Avoid or limit time on social media or news that may make you feel stressed.  Do something active. Exercise or activity can help reduce stress. Walking is a great way to get started.  Where can you learn more?  Go to https://www.[employer].net/patiented  Enter N032 in the search box to learn more about \"Learning About Stress.\"  Current as of: October 24, 2023               Content Version: 14.0    8463-4717 [employer] ([employer]).   Care instructions adapted under license by your healthcare professional. If you have questions about a medical condition or this instruction, always ask your healthcare professional. [employer] ([employer]) disclaims any warranty or liability for your use of this information.      Learning About Sleeping Well  What does sleeping well mean?     Sleeping well means getting enough sleep to feel good and stay healthy. How much sleep is enough varies among people.  The number of hours you sleep and how you feel when you wake up are both important. If you do not feel refreshed, you probably need more sleep. Another sign of not getting enough sleep is feeling tired during the day.  Experts recommend that adults get at least 7 or more hours of sleep per day. Children and older adults need more sleep.  Why is getting enough sleep important?  Getting enough quality sleep is a basic part of good health. When your sleep suffers, your physical health, mood, and your thoughts can suffer too. You may find yourself feeling more grumpy or stressed. Not getting enough sleep also can lead to serious problems, including injury, accidents, " "anxiety, and depression.  What might cause poor sleeping?  Many things can cause sleep problems, including:  Changes to your sleep schedule.  Stress. Stress can be caused by fear about a single event, such as giving a speech. Or you may have ongoing stress, such as worry about work or school.  Depression, anxiety, and other mental or emotional conditions.  Changes in your sleep habits or surroundings. This includes changes that happen where you sleep, such as noise, light, or sleeping in a different bed. It also includes changes in your sleep pattern, such as having jet lag or working a late shift.  Health problems, such as pain, breathing problems, and restless legs syndrome.  Lack of regular exercise.  Using alcohol, nicotine, or caffeine before bed.  How can you help yourself?  Here are some tips that may help you sleep more soundly and wake up feeling more refreshed.  Your sleeping area   Use your bedroom only for sleeping and sex. A bit of light reading may help you fall asleep. But if it doesn't, do your reading elsewhere in the house. Try not to use your TV, computer, smartphone, or tablet while you are in bed.  Be sure your bed is big enough to stretch out comfortably, especially if you have a sleep partner.  Keep your bedroom quiet, dark, and cool. Use curtains, blinds, or a sleep mask to block out light. To block out noise, use earplugs, soothing music, or a \"white noise\" machine.  Your evening and bedtime routine   Create a relaxing bedtime routine. You might want to take a warm shower or bath, or listen to soothing music.  Go to bed at the same time every night. And get up at the same time every morning, even if you feel tired.  What to avoid   Limit caffeine (coffee, tea, caffeinated sodas) during the day, and don't have any for at least 6 hours before bedtime.  Avoid drinking alcohol before bedtime. Alcohol can cause you to wake up more often during the night.  Try not to smoke or use tobacco, " "especially in the evening. Nicotine can keep you awake.  Limit naps during the day, especially close to bedtime.  Avoid lying in bed awake for too long. If you can't fall asleep or if you wake up in the middle of the night and can't get back to sleep within about 20 minutes, get out of bed and go to another room until you feel sleepy.  Avoid taking medicine right before bed that may keep you awake or make you feel hyper or energized. Your doctor can tell you if your medicine may do this and if you can take it earlier in the day.  If you can't sleep   Imagine yourself in a peaceful, pleasant scene. Focus on the details and feelings of being in a place that is relaxing.  Get up and do a quiet or boring activity until you feel sleepy.  Avoid drinking any liquids before going to bed to help prevent waking up often to use the bathroom.  Where can you learn more?  Go to https://www.[employer].net/patiented  Enter J942 in the search box to learn more about \"Learning About Sleeping Well.\"  Current as of: July 10, 2023  Content Version: 14.1 2006-2024 [employer].   Care instructions adapted under license by your healthcare professional. If you have questions about a medical condition or this instruction, always ask your healthcare professional. [employer] disclaims any warranty or liability for your use of this information.    Bladder Training: Care Instructions  Your Care Instructions     Bladder training is used to treat urge incontinence and stress incontinence. Urge incontinence means that the need to urinate comes on so fast that you can't get to a toilet in time. Stress incontinence means that you leak urine because of pressure on your bladder. For example, it may happen when you laugh, cough, or lift something heavy.  Bladder training can increase how long you can wait before you have to urinate. It can also help your bladder hold more urine. And it can give you better control over the " urge to urinate.  It is important to remember that bladder training takes a few weeks to a few months to make a difference. You may not see results right away, but don't give up.  Follow-up care is a key part of your treatment and safety. Be sure to make and go to all appointments, and call your doctor if you are having problems. It's also a good idea to know your test results and keep a list of the medicines you take.  How can you care for yourself at home?  Work with your doctor to come up with a bladder training program that is right for you. You may use one or more of the following methods.  Delayed urination  In the beginning, try to keep from urinating for 5 minutes after you first feel the need to go.  While you wait, take deep, slow breaths to relax. Kegel exercises can also help you delay the need to go to the bathroom.  After some practice, when you can easily wait 5 minutes to urinate, try to wait 10 minutes before you urinate.  Slowly increase the waiting period until you are able to control when you have to urinate.  Scheduled urination  Empty your bladder when you first wake up in the morning.  Schedule times throughout the day when you will urinate.  Start by going to the bathroom every hour, even if you don't need to go.  Slowly increase the time between trips to the bathroom.  When you have found a schedule that works well for you, keep doing it.  If you wake up during the night and have to urinate, do it. Apply your schedule to waking hours only.  Kegel exercises  These tighten and strengthen pelvic muscles, which can help you control the flow of urine. (If doing these exercises causes pain, stop doing them and talk with your doctor.) To do Kegel exercises:  Squeeze your muscles as if you were trying not to pass gas. Or squeeze your muscles as if you were stopping the flow of urine. Your belly, legs, and buttocks shouldn't move.  Hold the squeeze for 3 seconds, then relax for 5 to 10  "seconds.  Start with 3 seconds, then add 1 second each week until you are able to squeeze for 10 seconds.  Repeat the exercise 10 times a session. Do 3 to 8 sessions a day.  When should you call for help?  Watch closely for changes in your health, and be sure to contact your doctor if:    Your incontinence is getting worse.     You do not get better as expected.   Where can you learn more?  Go to https://www.[employer].net/patiented  Enter V684 in the search box to learn more about \"Bladder Training: Care Instructions.\"  Current as of: November 15, 2023               Content Version: 14.0    9489-3657 [employer].   Care instructions adapted under license by your healthcare professional. If you have questions about a medical condition or this instruction, always ask your healthcare professional. [employer] disclaims any warranty or liability for your use of this information.      Learning About Depression Screening  What is depression screening?  Depression screening is a way to see if you have depression symptoms. It may be done by a doctor or counselor. It's often part of a routine checkup. That's because your mental health is just as important as your physical health.  Depression is a mental health condition that affects how you feel, think, and act. You may:  Have less energy.  Lose interest in your daily activities.  Feel sad and grouchy for a long time.  Depression is very common. It affects people of all ages.  Many things can lead to depression. Some people become depressed after they have a stroke or find out they have a major illness like cancer or heart disease. The death of a loved one or a breakup may lead to depression. It can run in families. Most experts believe that a combination of inherited genes and stressful life events can cause it.  What happens during screening?  You may be asked to fill out a form about your depression symptoms. You and the doctor will discuss your " "answers. The doctor may ask you more questions to learn more about how you think, act, and feel.  What happens after screening?  If you have symptoms of depression, your doctor will talk to you about your options.  Doctors usually treat depression with medicines or counseling. Often, combining the two works best. Many people don't get help because they think that they'll get over the depression on their own. But people with depression may not get better unless they get treatment.  The cause of depression is not well understood. There may be many factors involved. But if you have depression, it's not your fault.  A serious symptom of depression is thinking about death or suicide. If you or someone you care about talks about this or about feeling hopeless, get help right away.  It's important to know that depression can be treated. Medicine, counseling, and self-care may help.  Where can you learn more?  Go to https://www.[employer].Net Element/patiented  Enter T185 in the search box to learn more about \"Learning About Depression Screening.\"  Current as of: June 24, 2023  Content Version: 14.1    6476-4327 [employer].   Care instructions adapted under license by your healthcare professional. If you have questions about a medical condition or this instruction, always ask your healthcare professional. [employer] disclaims any warranty or liability for your use of this information.    Substance Use Disorder: Care Instructions  Overview     You can improve your life and health by stopping your use of alcohol or drugs. When you don't drink or use drugs, you may feel and sleep better. You may get along better with your family, friends, and coworkers. There are medicines and programs that can help with substance use disorder.  How can you care for yourself at home?  Here are some ways to help you stay sober and prevent relapse.  If you have been given medicine to help keep you sober or reduce your " cravings, be sure to take it exactly as prescribed.  Talk to your doctor about programs that can help you stop using drugs or drinking alcohol.  Do not keep alcohol or drugs in your home.  Plan ahead. Think about what you'll say if other people ask you to drink or use drugs. Try not to spend time with people who drink or use drugs.  Use the time and money spent on drinking or drugs to do something that's important to you.  Preventing a relapse  Have a plan to deal with relapse. Learn to recognize changes in your thinking that lead you to drink or use drugs. Get help before you start to drink or use drugs again.  Try to stay away from situations, friends, or places that may lead you to drink or use drugs.  If you feel the need to drink alcohol or use drugs again, seek help right away. Call a trusted friend or family member. Some people get support from organizations such as Narcotics Anonymous or [employer] or from treatment facilities.  If you relapse, get help as soon as you can. Some people make a plan with another person that outlines what they want that person to do for them if they relapse. The plan usually includes how to handle the relapse and who to notify in case of relapse.  Don't give up. Remember that a relapse doesn't mean that you have failed. Use the experience to learn the triggers that lead you to drink or use drugs. Then quit again. Recovery is a lifelong process. Many people have several relapses before they are able to quit for good.  Follow-up care is a key part of your treatment and safety. Be sure to make and go to all appointments, and call your doctor if you are having problems. It's also a good idea to know your test results and keep a list of the medicines you take.  When should you call for help?   Call 911  anytime you think you may need emergency care. For example, call if you or someone else:    Has overdosed or has withdrawal signs. Be sure to tell the emergency workers that you  "are or someone else is using or trying to quit using drugs. Overdose or withdrawal signs may include:  Losing consciousness.  Seizure.  Seeing or hearing things that aren't there (hallucinations).     Is thinking or talking about suicide or harming others.   Where to get help 24 hours a day, 7 days a week   If you or someone you know talks about suicide, self-harm, a mental health crisis, a substance use crisis, or any other kind of emotional distress, get help right away. You can:    Call the Suicide and Crisis Lifeline at 738.     Call 1-652-139-TALK (1-236.102.5594).     Text HOME to 560518 to access the Crisis Text Line.   Consider saving these numbers in your phone.  Go to [employer] for more information or to chat online.  Call your doctor now or seek immediate medical care if:    You are having withdrawal symptoms. These may include nausea or vomiting, sweating, shakiness, and anxiety.   Watch closely for changes in your health, and be sure to contact your doctor if:    You have a relapse.     You need more help or support to stop.   Where can you learn more?  Go to https://www.[employer].net/patiented  Enter H573 in the search box to learn more about \"Substance Use Disorder: Care Instructions.\"  Current as of: November 15, 2023               Content Version: 14.0    3797-2508 [employer].   Care instructions adapted under license by your healthcare professional. If you have questions about a medical condition or this instruction, always ask your healthcare professional. [employer] disclaims any warranty or liability for your use of this information.      Chronic Pain: Care Instructions  Your Care Instructions     Chronic pain is pain that lasts a long time (months or even years) and may or may not have a clear cause. It is different from acute pain, which usually does have a clear cause--like an injury or illness--and gets better over time. Chronic pain:  Lasts over time but " may vary from day to day.  Does not go away despite efforts to end it.  May disrupt your sleep and lead to fatigue.  May cause depression or anxiety.  May make your muscles tense, causing more pain.  Can disrupt your work, hobbies, home life, and relationships with friends and family.  Chronic pain is a very real condition. It is not just in your head. Treatment can help and usually includes several methods used together, such as medicines, physical therapy, exercise, and other treatments. Learning how to relax and changing negative thought patterns can also help you cope.  Chronic pain is complex. Taking an active role in your treatment will help you better manage your pain. Tell your doctor if you have trouble dealing with your pain. You may have to try several things before you find what works best for you.  Follow-up care is a key part of your treatment and safety. Be sure to make and go to all appointments, and call your doctor if you are having problems. It's also a good idea to know your test results and keep a list of the medicines you take.  How can you care for yourself at home?  Pace yourself. Break up large jobs into smaller tasks. Save harder tasks for days when you have less pain, or go back and forth between hard tasks and easier ones. Take rest breaks.  Relax, and reduce stress. Relaxation techniques such as deep breathing or meditation can help.  Keep moving. Gentle, daily exercise can help reduce pain over the long run. Try low- or no-impact exercises such as walking, swimming, and stationary biking. Do stretches to stay flexible.  Try heat, cold packs, and massage.  Get enough sleep. Chronic pain can make you tired and drain your energy. Talk with your doctor if you have trouble sleeping because of pain.  Think positive. Your thoughts can affect your pain level. Do things that you enjoy to distract yourself when you have pain instead of focusing on the pain. See a movie, read a book, listen to  music, or spend time with a friend.  If you think you are depressed, talk to your doctor about treatment.  Keep a daily pain diary. Record how your moods, thoughts, sleep patterns, activities, and medicine affect your pain. You may find that your pain is worse during or after certain activities or when you are feeling a certain emotion. Having a record of your pain can help you and your doctor find the best ways to treat your pain.  Take pain medicines exactly as directed.  If the doctor gave you a prescription medicine for pain, take it as prescribed.  If you are not taking a prescription pain medicine, ask your doctor if you can take an over-the-counter medicine.  Reducing constipation caused by pain medicine  Talk to your doctor about a laxative. If a laxative doesn't work, your doctor may suggest a prescription medicine.  Include fruits, vegetables, beans, and whole grains in your diet each day. These foods are high in fiber.  If your doctor recommends it, get more exercise. Walking is a good choice. Bit by bit, increase the amount you walk every day. Try for at least 30 minutes on most days of the week.  Schedule time each day for a bowel movement. A daily routine may help. Take your time and do not strain when having a bowel movement.  When should you call for help?   Call your doctor now or seek immediate medical care if:    Your pain gets worse or is out of control.     You feel down or blue, or you do not enjoy things like you once did. You may be depressed, which is common in people with chronic pain. Depression can be treated.     You have vomiting or cramps for more than 2 hours.   Watch closely for changes in your health, and be sure to contact your doctor if:    You cannot sleep because of pain.     You are very worried or anxious about your pain.     You have trouble taking your pain medicine.     You have any concerns about your pain medicine.     You have trouble with bowel movements, such as:  No  "bowel movement in 3 days.  Blood in the anal area, in your stool, or on the toilet paper.  Diarrhea for more than 24 hours.   Where can you learn more?  Go to https://www.[employer].net/patiented  Enter N004 in the search box to learn more about \"Chronic Pain: Care Instructions.\"  Current as of: July 10, 2023               Content Version: 14.0    0196-7784 [employer].   Care instructions adapted under license by your healthcare professional. If you have questions about a medical condition or this instruction, always ask your healthcare professional. [employer] disclaims any warranty or liability for your use of this information.         "

## 2024-09-23 NOTE — PROGRESS NOTES
Preventive Care Visit  [facility]  Hannah Eaton MD, Family Medicine  Sep 23, 2024  {Provider  Link to SmartSet :333938}    {PROVIDER CHARTING PREFERENCE:632067}    Anton Anthony is a 54 year old, presenting for the following:  Physical        9/23/2024     9:46 AM   Additional Questions   Roomed by Isabel Jarquin   Accompanied by self         9/23/2024     9:46 AM   Patient Reported Additional Medications   Patient reports taking the following new medications none        Health Care Directive  Patient does not have a Health Care Directive or Living Will: Patient states has Advance Directive and will bring in a copy to clinic.    HPI  ***  Depression and Anxiety   How are you doing with your depression since your last visit? No change- improved a little  How are you doing with your anxiety since your last visit?  Improved   Are you having other symptoms that might be associated with depression or anxiety? No  Have you had a significant life event? No   Do you have any concerns with your use of alcohol or other drugs? No    Social History     Tobacco Use    Smoking status: Every Day     Current packs/day: 0.50     Average packs/day: 0.5 packs/day for 38.7 years (19.4 ttl pk-yrs)     Types: Cigarettes     Start date: 1/1/1986     Passive exposure: Current    Smokeless tobacco: Current    Tobacco comments:     Pt denies quit plan 4/29/24   Vaping Use    Vaping status: Former   Substance Use Topics    Alcohol use: Not Currently    Drug use: Yes     Types: Marijuana         7/9/2024     7:44 AM 7/9/2024     8:10 AM 9/23/2024     9:39 AM   PHQ   PHQ-9 Total Score 3 3 7   Q9: Thoughts of better off dead/self-harm past 2 weeks Not at all Not at all Not at all         7/9/2024     8:10 AM 8/9/2024    10:43 AM 9/23/2024     9:40 AM   REINALDO-7 SCORE   Total Score 7 (mild anxiety) 18 (severe anxiety) 7 (mild anxiety)   Total Score 7 18 7         9/23/2024     9:39 AM   Last PHQ-9   1.  Little interest or pleasure  in doing things 1   2.  Feeling down, depressed, or hopeless 1   3.  Trouble falling or staying asleep, or sleeping too much 3   4.  Feeling tired or having little energy 2   5.  Poor appetite or overeating 0   6.  Feeling bad about yourself 0   7.  Trouble concentrating 0   8.  Moving slowly or restless 0   Q9: Thoughts of better off dead/self-harm past 2 weeks 0   PHQ-9 Total Score 7         9/23/2024     9:40 AM   REINALDO-7    1. Feeling nervous, anxious, or on edge 1   2. Not being able to stop or control worrying 2   3. Worrying too much about different things 1   4. Trouble relaxing 1   5. Being so restless that it is hard to sit still 0   6. Becoming easily annoyed or irritable 1   7. Feeling afraid, as if something awful might happen 1   REINALDO-7 Total Score 7   If you checked any problems, how difficult have they made it for you to do your work, take care of things at home, or get along with other people? Somewhat difficult       Suicide Assessment Five-step Evaluation and Treatment (SAFE-T)  {Provider  Link to Depression Care Package SmartSet :969288}  Hypothyroidism Follow-up    Since last visit, patient describes the following symptoms: dry skin, constipation, loose stools, anxiety, depression, and fatigue    Concern - left ankle   Onset: last Thursday   Description: dropped desk on foot   Intensity: mild  Progression of Symptoms:  improving  Accompanying Signs & Symptoms: bruising, swelling, some areas tender to touch  Previous history of similar problem: no  Precipitating factors:        Worsened by: no  Alleviating factors:        Improved by: no  Therapies tried and outcome: ice and Aleve       9/23/2024   General Health   How would you rate your overall physical health? (!) FAIR   Feel stress (tense, anxious, or unable to sleep) Very much      (!) STRESS CONCERN      9/23/2024   Nutrition   Diet: Diabetic            9/23/2024   Exercise   Days per week of moderate/strenous exercise 3 days             9/23/2024   Social Factors   Frequency of gathering with friends or relatives Patient declined   Worry food won't last until get money to buy more Patient declined   Food not last or not have enough money for food? Patient declined   Do you have housing? (Housing is defined as stable permanent housing and does not include staying ouside in a car, in a tent, in an abandoned building, in an overnight shelter, or couch-surfing.) Patient declined   Are you worried about losing your housing? Patient declined   Lack of transportation? Patient declined   Unable to get utilities (heat,electricity)? Patient declined            9/23/2024   Fall Risk   Fallen 2 or more times in the past year? Yes   Trouble with walking or balance? Yes   Gait Speed Test (Document in seconds) 4             9/23/2024   Activities of Daily Living- Home Safety   Needs help with the following daily activites Transportation    Shopping    Preparing meals    Housework    Laundry   Safety concerns in the home Poor lighting       Multiple values from one day are sorted in reverse-chronological order         9/23/2024   Dental   Dentist two times every year? (!) NO            9/23/2024   Hearing Screening   Hearing concerns? (!) I FEEL THAT PEOPLE ARE MUMBLING OR NOT SPEAKING CLEARLY.    (!) I NEED TO ASK PEOPLE TO SPEAK UP OR REPEAT THEMSELVES.    (!) IT'S HARD TO FOLLOW A CONVERSATION IN A NOISY RESTAURANT OR CROWDED ROOM.    (!) TROUBLE UNDERSTANDING SOFT OR WHISPERED SPEECH.    (!) TROUBLE UNDERSTANDING SPEECH ON THE TELEPHONE       Multiple values from one day are sorted in reverse-chronological order         9/23/2024   Driving Risk Screening   Patient/family members have concerns about driving (!) DECLINE            9/23/2024   General Alertness/Fatigue Screening   Have you been more tired than usual lately? (!) YES            9/23/2024   Urinary Incontinence Screening   Bothered by leaking urine in past 6 months Yes            9/23/2024   TB  Screening   Were you born outside of the [place]? No          Today's PHQ-9 Score:       9/23/2024     9:39 AM   PHQ-9 SCORE   PHQ-9 Total Score MyChart 7 (Mild depression)   PHQ-9 Total Score 7         9/23/2024   Substance Use   Alcohol more than 3/day or more than 7/wk No   Do you have a current opioid prescription? (!) YES   How severe/bad is pain from 1 to 10? 9/10   Do you use any other substances recreationally? (!) CANNABIS PRODUCTS      {Provider  Link to Opioid Risk Tool  Assess risk of opioid use disorder :235041}  {AWV REQUIRED- Pull in ORT Results:386911}  Social History     Tobacco Use    Smoking status: Every Day     Current packs/day: 0.50     Average packs/day: 0.5 packs/day for 38.7 years (19.4 ttl pk-yrs)     Types: Cigarettes     Start date: 1/1/1986     Passive exposure: Current    Smokeless tobacco: Current    Tobacco comments:     Pt denies quit plan 4/29/24   Vaping Use    Vaping status: Former   Substance Use Topics    Alcohol use: Not Currently    Drug use: Yes     Types: Marijuana     {Provider  If there are gaps in the social history shown above, please follow the link to update and then refresh the note Link to Social and Substance History :130434}     {Mammogram Decision Support (Optional):634193}      History of abnormal Pap smear: { :441596}        5/17/2018     7:50 AM   PAP / HPV   PAP-ABSTRACT See Scanned Document           This result is from an external source.     ASCVD Risk   The 10-year ASCVD risk score (Grace DK, et al., 2019) is: 11%    Values used to calculate the score:      Age: 54 years      Sex: Female      Is Non- : No      Diabetic: Yes      Tobacco smoker: Yes      Systolic Blood Pressure: 124 mmHg      Is BP treated: Yes      HDL Cholesterol: 42 mg/dL      Total Cholesterol: 141 mg/dL    {Link to Fracture Risk Assessment Tool (Optional):253026}    {Provider  REQUIRED FOR AWV Use the storyboard to review patient history, after  sections have been marked as reviewed, refresh note to capture documentation:114449}  {Provider   REQUIRED AWV use this link to review and update sexual activity history  after section has been marked as reviewed, refresh note to capture documentation:892456}  Reviewed and updated as needed this visit by Provider                    {HISTORY OPTIONS (Optional):932685}  Current providers sharing in care for this patient include:  Patient Care Team:  Hannah Eaton MD as PCP - General (Family Medicine)  Hannah Eaton MD as Assigned PCP  Hannah Eaton MD as Physician (Family Medicine)  Sofia Andrade NP as Assigned Surgical Provider  Wenceslao Ramirez MD as Assigned Sleep Provider  Carlita Watson PA-C as Assigned Neuroscience Provider  Raissa Winter DPM as Assigned Musculoskeletal Provider    The following health maintenance items are reviewed in Epic and correct as of today:  Health Maintenance   Topic Date Due    HEPATITIS B IMMUNIZATION (1 of 3 - 19+ 3-dose series) Never done    MEDICARE ANNUAL WELLNESS VISIT  04/15/2014    EYE EXAM  05/08/2024    INFLUENZA VACCINE (1) 09/01/2024    COVID-19 Vaccine (1 - 2024-25 season) Never done    LUNG CANCER SCREENING  09/15/2024    NICOTINE/TOBACCO CESSATION COUNSELING Q 1 YR  10/23/2024    A1C  01/09/2025    TSH W/FREE T4 REFLEX  02/27/2025    DIABETIC FOOT EXAM  07/01/2025    BMP  07/09/2025    LIPID  07/09/2025    MICROALBUMIN  07/09/2025    DTAP/TDAP/TD IMMUNIZATION (3 - Td or Tdap) 09/10/2025    MAMMO SCREENING  01/16/2026    COLORECTAL CANCER SCREENING  10/04/2028    ADVANCE CARE PLANNING  07/09/2029    RSV VACCINE (1 - 1-dose 75+ series) 03/03/2043    HPV TEST  Completed    SPIROMETRY  Completed    HEPATITIS C SCREENING  Completed    HIV SCREENING  Completed    COPD ACTION PLAN  Completed    PHQ-2 (once per calendar year)  Completed    Pneumococcal Vaccine: Pediatrics (0 to 5 Years) and At-Risk Patients (6 to 64 Years)  Completed    ZOSTER  "IMMUNIZATION  Completed    HPV IMMUNIZATION  Aged Out    MENINGITIS IMMUNIZATION  Aged Out    RSV MONOCLONAL ANTIBODY  Aged Out    PAP  Discontinued       {ROS Picklists (Optional):240487}     Objective    Exam  /80 (BP Location: Right arm, Patient Position: Sitting, Cuff Size: Adult Large)   Pulse 88   Temp 97.2  F (36.2  C) (Tympanic)   Resp 17   Ht 1.575 m (5' 2\")   Wt 77.7 kg (171 lb 6.4 oz)   SpO2 93%   BMI 31.35 kg/m     Estimated body mass index is 31.35 kg/m  as calculated from the following:    Height as of this encounter: 1.575 m (5' 2\").    Weight as of this encounter: 77.7 kg (171 lb 6.4 oz).    Physical Exam  {Exam Choices (Optional):588763}         9/23/2024   Mini Cog   Clock Draw Score 2 Normal   3 Item Recall 2 objects recalled   Mini Cog Total Score 4        {A Mini-Cog total score of 0-2 suggests the possibility of dementia, score of 3-5 suggests no dementia:870487}    Vision Screen  Reason Vision Screen Not Completed: Parent/Patient declined - Preference  {Provider  Link to Vision and Hearing Results :057294}    Signed Electronically by: Hannah Eaton MD  {Email feedback regarding this note to [email or web address]   :383316}  Answers submitted by the patient for this visit:  Patient Health Questionnaire (Submitted on 9/23/2024)  If you checked off any problems, how difficult have these problems made it for you to do your work, take care of things at home, or get along with other people?: Somewhat difficult  PHQ9 TOTAL SCORE: 7  Patient Health Questionnaire (G7) (Submitted on 9/23/2024)  REINALDO 7 TOTAL SCORE: 7    "

## 2024-09-23 NOTE — PROGRESS NOTES
Preventive Care Visit  [facility]  Hannah Eaton MD, Family Medicine  Sep 23, 2024      Assessment & Plan     Encounter for Medicare annual wellness exam  Healthcare Maintenance  - Mammogram: 1/16/2024  - PAP: total hysterectomy 2018   - Colon cancer screening: due, did not have it done last year   - DEXA: agreeable.   - Lung cancer screening: due, ordered needs to schedule   - Lipids: ldl 64 7/9/2024, on crestor     Depressive disorder  Generalized anxiety disorder  Improved. Gad7 significantly better than starting Celexa.   Will continue Celexa 10mg once daily.   Consider adjusting in the future if mood worsens.   Will reassess again in six weeks.   - citalopram (CELEXA) 10 MG tablet; Take 1 tablet (10 mg) by mouth daily.    Hypertension associated with type 2 diabetes mellitus (H)  Controlled. On losartan 12.5mg daily.   Renal function stable on recent labs.     Type 2 diabetes mellitus with diabetic polyneuropathy, without long-term current use of insulin (H)  Well controlled. Continues on ozempic 0.5mg once weekly.   Due to update eye exam. She will schedule this.   Lab Results   Component Value Date    A1C 6.2 07/09/2024    A1C 6.7 02/27/2024       - semaglutide (OZEMPIC) 2 MG/3ML pen; Inject 0.5 mg subcutaneously every 7 days.    Hypothyroidism (acquired)  Continues on synthroid. TSH within normal limits.   TSH   Date Value Ref Range Status   02/27/2024 2.73 0.30 - 4.20 uIU/mL Final     Tobacco use disorder (30 pack year history plus)  Encouraged cessation.   Needs to schedule low dose CT.     Dysuria  Intermittent, ongoing weeks. ?vaginal irritation with increase in intercourse. UA negative today. Monitor.   - UA with Microscopic reflex to Culture - [place]; Future  - Multiplex Vaginal Panel by PCR; Future  - Multiplex Vaginal Panel by PCR  - UA with Microscopic reflex to Culture - [place]    Screen for STD (sexually transmitted disease)  - GC/Chlamydia by PCR - [place],; Future  - GC/Chlamydia by  "PCR - [place],    Colon cancer screening  Due - did not complete colonoscopy last fall.   New referral placed.   Does have history of polyps.   - Colonoscopy Screening  Referral; Future    Asymptomatic menopausal state  Higher risk with smoking. Will do DEXA now.   - DX Bone Density; Future    Candidiasis  Located under panus. Discussed application of nystatin. Follow up if not improving or worsens.   - nystatin (MYCOSTATIN) 165922 UNIT/GM external powder; Apply topically 3 times daily as needed for other (yeast infection). Apply up to three times daily to areas with active yeast.    Neuropathy  Right hand predominantly, worsened after surgery.   ?CRPS per ortho visit last month.   Will trial gabapentin. Start with 100mg at bedtime then increase to 100mg bid.   Has not been on lyrica - removed from medication list.   Has another ortho consult in two weeks  Follow up six weeks.   - gabapentin (NEURONTIN) 100 MG capsule; Take 1 capsule (100 mg) by mouth 2 times daily.    Acute left ankle pain  Likely bone bruise/soft tissue contusion.   No signs of fracture today   Discussed if not continuing to improve, should follow up for xray and repeat evaluation.       Nicotine/Tobacco Cessation  She reports that she has been smoking cigarettes. She started smoking about 38 years ago. She has a 29 pack-year smoking history. She has been exposed to tobacco smoke. She has never used smokeless tobacco.  Nicotine/Tobacco Cessation Plan  Information offered: Patient not interested at this time      BMI  Estimated body mass index is 31.35 kg/m  as calculated from the following:    Height as of this encounter: 1.575 m (5' 2\").    Weight as of this encounter: 77.7 kg (171 lb 6.4 oz).   Weight management plan: Discussed healthy diet and exercise guidelines    Counseling  Appropriate preventive services were addressed with this patient via screening, questionnaire, or discussion as appropriate for fall prevention, nutrition, " physical activity, Tobacco-use cessation, social engagement, weight loss and cognition.  Checklist reviewing preventive services available has been given to the patient.  Reviewed patient's diet, addressing concerns and/or questions.   She is at risk for lack of exercise and has been provided with information to increase physical activity for the benefit of her well-being.   The patient was instructed to see the dentist every 6 months.   Discussed possible causes of fatigue. Updated plan of care.  Patient reported difficulty with activities of daily living were addressed today.Patient reported safety concerns were addressed today.The patient was provided with written information regarding signs of hearing loss.   Information on urinary incontinence and treatment options given to patient.   The patient's PHQ-9 score is consistent with mild depression. She was provided with information regarding depression.   I have reviewed Opioid Use Disorder and Substance Use Disorder risk factors and made any needed referrals.           Anton Anthony is a 54 year old, presenting for the following:  Physical        9/23/2024     9:46 AM   Additional Questions   Roomed by Isabel Jarquin   Accompanied by self         9/23/2024     9:46 AM   Patient Reported Additional Medications   Patient reports taking the following new medications none     Health Care Directive  Patient does not have a Health Care Directive or Living Will: Discussed advance care planning with patient; however, patient declined at this time.    HPI  Healthcare Maintenance  - Mammogram: 1/16/2024  - PAP: total hysterectomy 2018   - Colon cancer screening: due, did not have it done last year   - DEXA: agreeable.   - Lung cancer screening: due, ordered needs to schedule   - Lipids: ldl 64 7/9/2024, on crestor     Feeling more tired. Had covid exposure two weeks ago. PCA didn't come for a bit. Gets bath tub soon.     Anxiety is much better. Celexa has made a huge  difference.   Does forget to take 2-3x/week.   Sex life is improving.     Thyroid is okay. Does forget frequently. Hard to remember morning medications.     Thinks she may have a UTI. Started burning a few weeks ago. Not terrible.     Having numbness and trouble with control of right hand. Saw surgeon. Thought possible complex regional pain syndrome.     Concern - left ankle   Onset: last Thursday   Description: dropped desk on foot   Intensity: mild  Progression of Symptoms:  improving  Accompanying Signs & Symptoms: bruising, swelling, some areas tender to touch  Previous history of similar problem: no  Precipitating factors:        Worsened by: no  Alleviating factors:        Improved by: no  Therapies tried and outcome: ice and Aleve     Able to walk fine. Swelling has resolved. Some bruising.     Social History     Tobacco Use    Smoking status: Every Day     Current packs/day: 0.75     Average packs/day: 0.8 packs/day for 38.7 years (29.0 ttl pk-yrs)     Types: Cigarettes     Start date: 1/1/1986     Passive exposure: Current    Smokeless tobacco: Never   Vaping Use    Vaping status: Former   Substance Use Topics    Alcohol use: Not Currently    Drug use: Yes     Types: Marijuana         7/9/2024     7:44 AM 7/9/2024     8:10 AM 9/23/2024     9:39 AM   PHQ   PHQ-9 Total Score 3 3 7   Q9: Thoughts of better off dead/self-harm past 2 weeks Not at all Not at all Not at all         7/9/2024     8:10 AM 8/9/2024    10:43 AM 9/23/2024     9:40 AM   REINALDO-7 SCORE   Total Score 7 (mild anxiety) 18 (severe anxiety) 7 (mild anxiety)   Total Score 7 18 7           9/23/2024   General Health   How would you rate your overall physical health? (!) FAIR   Feel stress (tense, anxious, or unable to sleep) Very much      (!) STRESS CONCERN      9/23/2024   Nutrition   Diet: Diabetic            9/23/2024   Exercise   Days per week of moderate/strenous exercise 3 days            9/23/2024   Social Factors   Frequency of gathering  with friends or relatives Patient declined   Worry food won't last until get money to buy more Patient declined   Food not last or not have enough money for food? Patient declined   Do you have housing? (Housing is defined as stable permanent housing and does not include staying ouside in a car, in a tent, in an abandoned building, in an overnight shelter, or couch-surfing.) Patient declined   Are you worried about losing your housing? Patient declined   Lack of transportation? Patient declined   Unable to get utilities (heat,electricity)? Patient declined            9/23/2024   Fall Risk   Fallen 2 or more times in the past year? Yes   Trouble with walking or balance? Yes   Gait Speed Test (Document in seconds) 4            9/23/2024   Activities of Daily Living- Home Safety   Needs help with the following daily activites Transportation    Shopping    Preparing meals    Housework    Laundry   Safety concerns in the home Poor lighting       Multiple values from one day are sorted in reverse-chronological order         9/23/2024   Dental   Dentist two times every year? (!) NO            9/23/2024   Hearing Screening   Hearing concerns? (!) I FEEL THAT PEOPLE ARE MUMBLING OR NOT SPEAKING CLEARLY.    (!) I NEED TO ASK PEOPLE TO SPEAK UP OR REPEAT THEMSELVES.    (!) IT'S HARD TO FOLLOW A CONVERSATION IN A NOISY RESTAURANT OR CROWDED ROOM.    (!) TROUBLE UNDERSTANDING SOFT OR WHISPERED SPEECH.    (!) TROUBLE UNDERSTANDING SPEECH ON THE TELEPHONE       Multiple values from one day are sorted in reverse-chronological order         9/23/2024   Driving Risk Screening   Patient/family members have concerns about driving (!) DECLINE            9/23/2024   General Alertness/Fatigue Screening   Have you been more tired than usual lately? (!) YES            9/23/2024   Urinary Incontinence Screening   Bothered by leaking urine in past 6 months Yes            9/23/2024   TB Screening   Were you born outside of the [place]? No           Today's PHQ-9 Score:       9/23/2024     9:39 AM   PHQ-9 SCORE   PHQ-9 Total Score MyChart 7 (Mild depression)   PHQ-9 Total Score 7         9/23/2024   Substance Use   Alcohol more than 3/day or more than 7/wk No   Do you have a current opioid prescription? (!) YES   How severe/bad is pain from 1 to 10? 9/10   Do you use any other substances recreationally? (!) CANNABIS PRODUCTS          Social History     Tobacco Use    Smoking status: Every Day     Current packs/day: 0.75     Average packs/day: 0.8 packs/day for 38.7 years (29.0 ttl pk-yrs)     Types: Cigarettes     Start date: 1/1/1986     Passive exposure: Current    Smokeless tobacco: Never   Vaping Use    Vaping status: Former   Substance Use Topics    Alcohol use: Not Currently    Drug use: Yes     Types: Marijuana          Mammogram Screening - Mammogram every 1-2 years updated in Health Maintenance based on mutual decision making      History of abnormal Pap smear: Status post hysterectomy with removal of cervix and no history of CIN2 or greater or cervical cancer. Health Maintenance and Surgical History updated.        5/17/2018     7:50 AM   PAP / HPV   PAP-ABSTRACT See Scanned Document           This result is from an external source.     ASCVD Risk   The 10-year ASCVD risk score (Grace DK, et al., 2019) is: 11%    Values used to calculate the score:      Age: 54 years      Sex: Female      Is Non- : No      Diabetic: Yes      Tobacco smoker: Yes      Systolic Blood Pressure: 124 mmHg      Is BP treated: Yes      HDL Cholesterol: 42 mg/dL      Total Cholesterol: 141 mg/dL      Reviewed and updated as needed this visit by Provider   Tobacco   Meds   Med Hx  Surg Hx  Fam Hx  Soc Hx Sexual Activity            Current providers sharing in care for this patient include:  Patient Care Team:  Hannah Eaton MD as PCP - General (Family Medicine)  Hannah Eaton MD as Assigned PCP  Hannah Eaton MD as  "Physician (Family Medicine)  Sofia Andrade NP as Assigned Surgical Provider  Wenceslao Ramirez MD as Assigned Sleep Provider  Carlita Watson PA-C as Assigned Neuroscience Provider  Raissa Winter DPM as Assigned Musculoskeletal Provider    The following health maintenance items are reviewed in Epic and correct as of today:  Health Maintenance   Topic Date Due    HEPATITIS B IMMUNIZATION (1 of 3 - 19+ 3-dose series) Never done    EYE EXAM  05/08/2024    INFLUENZA VACCINE (1) 09/01/2024    COVID-19 Vaccine (1 - 2024-25 season) Never done    LUNG CANCER SCREENING  09/15/2024    NICOTINE/TOBACCO CESSATION COUNSELING Q 1 YR  10/23/2024    A1C  01/09/2025    TSH W/FREE T4 REFLEX  02/27/2025    DIABETIC FOOT EXAM  07/01/2025    BMP  07/09/2025    LIPID  07/09/2025    MICROALBUMIN  07/09/2025    DTAP/TDAP/TD IMMUNIZATION (3 - Td or Tdap) 09/10/2025    MEDICARE ANNUAL WELLNESS VISIT  09/23/2025    MAMMO SCREENING  01/16/2026    COLORECTAL CANCER SCREENING  10/04/2028    ADVANCE CARE PLANNING  09/23/2029    RSV VACCINE (1 - 1-dose 75+ series) 03/03/2043    HPV TEST  Completed    SPIROMETRY  Completed    HEPATITIS C SCREENING  Completed    HIV SCREENING  Completed    COPD ACTION PLAN  Completed    PHQ-2 (once per calendar year)  Completed    Pneumococcal Vaccine: Pediatrics (0 to 5 Years) and At-Risk Patients (6 to 64 Years)  Completed    ZOSTER IMMUNIZATION  Completed    HPV IMMUNIZATION  Aged Out    MENINGITIS IMMUNIZATION  Aged Out    RSV MONOCLONAL ANTIBODY  Aged Out    PAP  Discontinued          Objective    Exam  /80 (BP Location: Right arm, Patient Position: Sitting, Cuff Size: Adult Large)   Pulse 88   Temp 97.2  F (36.2  C) (Tympanic)   Resp 17   Ht 1.575 m (5' 2\")   Wt 77.7 kg (171 lb 6.4 oz)   SpO2 93%   BMI 31.35 kg/m     Estimated body mass index is 31.35 kg/m  as calculated from the following:    Height as of this encounter: 1.575 m (5' 2\").    Weight as of this encounter: 77.7 kg " (171 lb 6.4 oz).    Physical Exam  Constitutional:       General: She is not in acute distress.     Appearance: Normal appearance. She is well-developed. She is not ill-appearing.   HENT:      Head: Normocephalic and atraumatic.      Right Ear: Tympanic membrane and external ear normal.      Left Ear: Tympanic membrane and external ear normal.      Nose: Nose normal.      Mouth/Throat:      Mouth: Mucous membranes are moist.      Pharynx: No oropharyngeal exudate.   Eyes:      Extraocular Movements: Extraocular movements intact.      Conjunctiva/sclera: Conjunctivae normal.   Neck:      Thyroid: No thyroid mass, thyromegaly or thyroid tenderness.   Cardiovascular:      Rate and Rhythm: Normal rate and regular rhythm.      Pulses: Normal pulses.      Heart sounds: Normal heart sounds, S1 normal and S2 normal. No murmur heard.  Pulmonary:      Effort: Pulmonary effort is normal. No respiratory distress.      Breath sounds: Normal breath sounds. No wheezing, rhonchi or rales.   Chest:   Breasts:     Right: Normal. No inverted nipple, mass, skin change or tenderness.      Left: Normal. No inverted nipple, mass, skin change or tenderness.   Abdominal:      General: Bowel sounds are normal. There is no abdominal bruit.      Palpations: Abdomen is soft. There is no mass or pulsatile mass.      Tenderness: There is no abdominal tenderness.   Genitourinary:     General: Normal vulva.      Vagina: Normal.      Comments: Cervix absent   Musculoskeletal:         General: Normal range of motion.      Cervical back: Neck supple.      Right lower leg: No edema.      Left lower leg: No edema.      Comments: Left ankle with abrasion on anterior shin/lower leg. No edema. Minimal tenderness to palpation. Full ROM. Gait is normal.    Lymphadenopathy:      Cervical: No cervical adenopathy.      Upper Body:      Right upper body: No supraclavicular or axillary adenopathy.      Left upper body: No supraclavicular or axillary adenopathy.    Skin:     General: Skin is warm and dry.      Capillary Refill: Capillary refill takes less than 2 seconds.      Findings: No rash.   Neurological:      Mental Status: She is alert and oriented to person, place, and time.      Gait: Gait is intact.   Psychiatric:         Attention and Perception: Attention normal.         Mood and Affect: Mood normal.         Speech: Speech normal.         Behavior: Behavior normal.         Thought Content: Thought content normal.         Cognition and Memory: Cognition normal.         Judgment: Judgment normal.              9/23/2024   Mini Cog   Clock Draw Score 2 Normal   3 Item Recall 2 objects recalled   Mini Cog Total Score 4            Vision Screen  Reason Vision Screen Not Completed: Parent/Patient declined - Preference      Signed Electronically by: Hannah Eaton MD    Answers submitted by the patient for this visit:  Patient Health Questionnaire (Submitted on 9/23/2024)  If you checked off any problems, how difficult have these problems made it for you to do your work, take care of things at home, or get along with other people?: Somewhat difficult  PHQ9 TOTAL SCORE: 7  Patient Health Questionnaire (G7) (Submitted on 9/23/2024)  REINALDO 7 TOTAL SCORE: 7

## 2024-09-24 ENCOUNTER — TELEPHONE (OUTPATIENT)
Dept: FAMILY MEDICINE | Facility: OTHER | Age: 54
End: 2024-09-24

## 2024-09-24 ENCOUNTER — PATIENT OUTREACH (OUTPATIENT)
Dept: GASTROENTEROLOGY | Facility: CLINIC | Age: 54
End: 2024-09-24

## 2024-09-24 DIAGNOSIS — Z12.11 COLON CANCER SCREENING: Primary | ICD-10-CM

## 2024-09-24 NOTE — TELEPHONE ENCOUNTER
Screening Questions for the Scheduling of Screening Colonoscopies     (If Colonoscopy is diagnostic, Provider should review the chart before scheduling.)    Are you younger than 50 or older than 80?  No    Do you take aspirin or fish oil?  No (if yes, tell patient to stop 1 week prior to Colonoscopy)    Do you take warfarin (Coumadin), clopidogrel (Plavix), apixaban (Eliquis), dabigatram (Pradaxa), rivaroxaban (Xarelto) or any blood thinner? No    Do you use oxygen at home?  No    Do you have kidney disease? No    Are you on dialysis? No    Have you had a stroke or heart attack in the last year? No    Have you had a stent in your heart or any blood vessel in the last year? No    Have you had a transplant of any organ?  No    Have you had a colonoscopy or upper endoscopy (EGD) before?  YES. Date-         Date of scheduled Colonoscopy: 10/28/24    Provider: Tahira    [facility]

## 2024-09-26 RX ORDER — BISACODYL 5 MG/1
10 TABLET, DELAYED RELEASE ORAL ONCE
Qty: 2 TABLET | Refills: 0 | Status: SHIPPED | OUTPATIENT
Start: 2024-09-26 | End: 2024-09-26

## 2024-09-26 NOTE — TELEPHONE ENCOUNTER
Patient scheduled for screening colonoscopy on 10/28/24  with Dr. Tahira Alegre bowel preparation and script  sent to [facility] [place].   Patient does need a pre-op.  Guide to your Colonoscopy or Upper GI Endoscopy and prep instructions sent to patient via US Mail.

## 2024-10-11 DIAGNOSIS — R03.0 ELEVATED BLOOD PRESSURE READING: ICD-10-CM

## 2024-10-11 RX ORDER — LOSARTAN POTASSIUM 25 MG/1
12.5 TABLET ORAL DAILY
Qty: 45 TABLET | Refills: 3 | Status: SHIPPED | OUTPATIENT
Start: 2024-10-11 | End: 2024-11-07

## 2024-10-11 NOTE — TELEPHONE ENCOUNTER
Losartan      Last Written Prescription Date:  6/3/24  Last Fill Quantity: 30,   # refills: 1  Last Office Visit: 9/23/24  Future Office visit:    Next 5 appointments (look out 90 days)      Nov 07, 2024 9:00 AM  (Arrive by 8:45 AM)  Provider Visit with Hannah Eaton MD  [facility] [place] ([facility] ) [address]  [place] [address]  [phone]             Routing refill request to provider for review/approval because:

## 2024-10-11 NOTE — TELEPHONE ENCOUNTER
Angiotensin-II Receptors Kjjsjz20/11/2024 03:27 AM   Protocol Details Medication indicated for associated diagnosis

## 2024-10-12 DIAGNOSIS — J41.8 MIXED SIMPLE AND MUCOPURULENT CHRONIC BRONCHITIS (H): ICD-10-CM

## 2024-10-12 DIAGNOSIS — J44.1 COPD EXACERBATION (H): ICD-10-CM

## 2024-10-14 RX ORDER — TIOTROPIUM BROMIDE INHALATION SPRAY 3.12 UG/1
2 SPRAY, METERED RESPIRATORY (INHALATION) DAILY
Qty: 4 G | Refills: 4 | Status: SHIPPED | OUTPATIENT
Start: 2024-10-14

## 2024-10-14 NOTE — TELEPHONE ENCOUNTER
Spiriva      Last Written Prescription Date:  5/17/24  Last Fill Quantity: 4g,   # refills: 4  Last Office Visit: 9/23/24  Future Office visit:    Next 5 appointments (look out 90 days)      Nov 07, 2024 9:00 AM  (Arrive by 8:45 AM)  Provider Visit with Hannah Eaton MD  [facility] [place] ([facility] [place] ) [address]  [place] [address]  [phone]             Routing refill request to provider for review/approval because:

## 2024-10-15 ENCOUNTER — HOSPITAL ENCOUNTER (OUTPATIENT)
Dept: CT IMAGING | Facility: HOSPITAL | Age: 54
Discharge: HOME | End: 2024-10-15
Attending: STUDENT IN AN ORGANIZED HEALTH CARE EDUCATION/TRAINING PROGRAM
Payer: MEDICARE

## 2024-10-15 ENCOUNTER — HOSPITAL ENCOUNTER (OUTPATIENT)
Dept: BONE DENSITY | Facility: HOSPITAL | Age: 54
Discharge: HOME | End: 2024-10-15
Attending: STUDENT IN AN ORGANIZED HEALTH CARE EDUCATION/TRAINING PROGRAM
Payer: MEDICARE

## 2024-10-15 DIAGNOSIS — Z87.891 PERSONAL HISTORY OF NICOTINE DEPENDENCE: ICD-10-CM

## 2024-10-15 DIAGNOSIS — F17.200 TOBACCO USE DISORDER: ICD-10-CM

## 2024-10-15 DIAGNOSIS — Z78.0 ASYMPTOMATIC MENOPAUSAL STATE: ICD-10-CM

## 2024-10-15 PROCEDURE — 71271 CT THORAX LUNG CANCER SCR C-: CPT

## 2024-10-15 PROCEDURE — 77080 DXA BONE DENSITY AXIAL: CPT

## 2024-10-21 ENCOUNTER — OFFICE VISIT (OUTPATIENT)
Dept: FAMILY MEDICINE | Facility: OTHER | Age: 54
End: 2024-10-21
Attending: STUDENT IN AN ORGANIZED HEALTH CARE EDUCATION/TRAINING PROGRAM
Payer: MEDICARE

## 2024-10-21 ENCOUNTER — LAB (OUTPATIENT)
Dept: LAB | Facility: OTHER | Age: 54
End: 2024-10-21
Attending: STUDENT IN AN ORGANIZED HEALTH CARE EDUCATION/TRAINING PROGRAM
Payer: MEDICARE

## 2024-10-21 ENCOUNTER — ANESTHESIA EVENT (OUTPATIENT)
Dept: SURGERY | Facility: HOSPITAL | Age: 54
End: 2024-10-21
Payer: MEDICARE

## 2024-10-21 VITALS
WEIGHT: 167 LBS | TEMPERATURE: 97.5 F | OXYGEN SATURATION: 96 % | HEART RATE: 96 BPM | SYSTOLIC BLOOD PRESSURE: 110 MMHG | BODY MASS INDEX: 30.54 KG/M2 | DIASTOLIC BLOOD PRESSURE: 60 MMHG | RESPIRATION RATE: 16 BRPM

## 2024-10-21 DIAGNOSIS — D72.829 LEUKOCYTOSIS, UNSPECIFIED TYPE: ICD-10-CM

## 2024-10-21 DIAGNOSIS — E11.59 HYPERTENSION ASSOCIATED WITH TYPE 2 DIABETES MELLITUS (H): ICD-10-CM

## 2024-10-21 DIAGNOSIS — E53.8 VITAMIN B12 DEFICIENCY (NON ANEMIC): ICD-10-CM

## 2024-10-21 DIAGNOSIS — E11.42 TYPE 2 DIABETES MELLITUS WITH DIABETIC POLYNEUROPATHY, WITHOUT LONG-TERM CURRENT USE OF INSULIN (H): ICD-10-CM

## 2024-10-21 DIAGNOSIS — R94.31 PROLONGED Q-T INTERVAL ON ECG: ICD-10-CM

## 2024-10-21 DIAGNOSIS — F41.1 GENERALIZED ANXIETY DISORDER: ICD-10-CM

## 2024-10-21 DIAGNOSIS — Z01.818 PREOP GENERAL PHYSICAL EXAM: ICD-10-CM

## 2024-10-21 DIAGNOSIS — E03.9 HYPOTHYROIDISM (ACQUIRED): ICD-10-CM

## 2024-10-21 DIAGNOSIS — Z12.11 SPECIAL SCREENING FOR MALIGNANT NEOPLASMS, COLON: Primary | ICD-10-CM

## 2024-10-21 DIAGNOSIS — F17.200 TOBACCO USE DISORDER: Chronic | ICD-10-CM

## 2024-10-21 DIAGNOSIS — Z01.818 PREOP GENERAL PHYSICAL EXAM: Primary | ICD-10-CM

## 2024-10-21 DIAGNOSIS — G62.9 NEUROPATHY: ICD-10-CM

## 2024-10-21 DIAGNOSIS — I15.2 HYPERTENSION ASSOCIATED WITH TYPE 2 DIABETES MELLITUS (H): ICD-10-CM

## 2024-10-21 DIAGNOSIS — F32.A DEPRESSIVE DISORDER: ICD-10-CM

## 2024-10-21 DIAGNOSIS — Z12.11 COLON CANCER SCREENING: ICD-10-CM

## 2024-10-21 DIAGNOSIS — J41.8 MIXED SIMPLE AND MUCOPURULENT CHRONIC BRONCHITIS (H): ICD-10-CM

## 2024-10-21 PROBLEM — G89.4 CHRONIC PAIN DISORDER: Chronic | Status: ACTIVE | Noted: 2023-09-12

## 2024-10-21 LAB
ANION GAP SERPL CALCULATED.3IONS-SCNC: 12 MMOL/L (ref 7–15)
ATRIAL RATE - MUSE: 82 BPM
BASOPHILS # BLD AUTO: 0.1 10E3/UL (ref 0–0.2)
BASOPHILS NFR BLD AUTO: 0 %
BUN SERPL-MCNC: 11.7 MG/DL (ref 6–20)
CALCIUM SERPL-MCNC: 9 MG/DL (ref 8.8–10.4)
CHLORIDE SERPL-SCNC: 108 MMOL/L (ref 98–107)
CREAT SERPL-MCNC: 1.1 MG/DL (ref 0.51–0.95)
DIASTOLIC BLOOD PRESSURE - MUSE: NORMAL MMHG
EGFRCR SERPLBLD CKD-EPI 2021: 59 ML/MIN/1.73M2
EOSINOPHIL # BLD AUTO: 0.2 10E3/UL (ref 0–0.7)
EOSINOPHIL NFR BLD AUTO: 1 %
ERYTHROCYTE [DISTWIDTH] IN BLOOD BY AUTOMATED COUNT: 13.6 % (ref 10–15)
GLUCOSE SERPL-MCNC: 124 MG/DL (ref 70–99)
HCO3 SERPL-SCNC: 22 MMOL/L (ref 22–29)
HCT VFR BLD AUTO: 43.7 % (ref 35–47)
HGB BLD-MCNC: 14.4 G/DL (ref 11.7–15.7)
IMM GRANULOCYTES # BLD: 0 10E3/UL
IMM GRANULOCYTES NFR BLD: 0 %
INTERPRETATION ECG - MUSE: NORMAL
LYMPHOCYTES # BLD AUTO: 2.8 10E3/UL (ref 0.8–5.3)
LYMPHOCYTES NFR BLD AUTO: 24 %
MCH RBC QN AUTO: 31 PG (ref 26.5–33)
MCHC RBC AUTO-ENTMCNC: 33 G/DL (ref 31.5–36.5)
MCV RBC AUTO: 94 FL (ref 78–100)
MONOCYTES # BLD AUTO: 0.6 10E3/UL (ref 0–1.3)
MONOCYTES NFR BLD AUTO: 5 %
NEUTROPHILS # BLD AUTO: 8.4 10E3/UL (ref 1.6–8.3)
NEUTROPHILS NFR BLD AUTO: 69 %
NRBC # BLD AUTO: 0 10E3/UL
NRBC BLD AUTO-RTO: 0 /100
P AXIS - MUSE: 57 DEGREES
PLATELET # BLD AUTO: 233 10E3/UL (ref 150–450)
POTASSIUM SERPL-SCNC: 4.3 MMOL/L (ref 3.4–5.3)
PR INTERVAL - MUSE: 162 MS
QRS DURATION - MUSE: 86 MS
QT - MUSE: 386 MS
QTC - MUSE: 450 MS
R AXIS - MUSE: -16 DEGREES
RBC # BLD AUTO: 4.64 10E6/UL (ref 3.8–5.2)
RETICS # AUTO: 0.08 10E6/UL (ref 0.03–0.1)
RETICS/RBC NFR AUTO: 1.7 % (ref 0.5–2)
SODIUM SERPL-SCNC: 142 MMOL/L (ref 135–145)
SYSTOLIC BLOOD PRESSURE - MUSE: NORMAL MMHG
T AXIS - MUSE: 57 DEGREES
VENTRICULAR RATE- MUSE: 82 BPM
VIT B12 SERPL-MCNC: 397 PG/ML (ref 232–1245)
WBC # BLD AUTO: 12.1 10E3/UL (ref 4–11)

## 2024-10-21 PROCEDURE — 99214 OFFICE O/P EST MOD 30 MIN: CPT | Performed by: STUDENT IN AN ORGANIZED HEALTH CARE EDUCATION/TRAINING PROGRAM

## 2024-10-21 PROCEDURE — 93005 ELECTROCARDIOGRAM TRACING: CPT | Performed by: STUDENT IN AN ORGANIZED HEALTH CARE EDUCATION/TRAINING PROGRAM

## 2024-10-21 PROCEDURE — 36415 COLL VENOUS BLD VENIPUNCTURE: CPT | Mod: ZL

## 2024-10-21 PROCEDURE — G2211 COMPLEX E/M VISIT ADD ON: HCPCS | Performed by: STUDENT IN AN ORGANIZED HEALTH CARE EDUCATION/TRAINING PROGRAM

## 2024-10-21 PROCEDURE — 85025 COMPLETE CBC W/AUTO DIFF WBC: CPT | Mod: ZL

## 2024-10-21 PROCEDURE — 85060 BLOOD SMEAR INTERPRETATION: CPT | Performed by: PATHOLOGY

## 2024-10-21 PROCEDURE — 85045 AUTOMATED RETICULOCYTE COUNT: CPT | Mod: ZL

## 2024-10-21 PROCEDURE — 80048 BASIC METABOLIC PNL TOTAL CA: CPT | Mod: ZL

## 2024-10-21 PROCEDURE — G0463 HOSPITAL OUTPT CLINIC VISIT: HCPCS

## 2024-10-21 PROCEDURE — 82607 VITAMIN B-12: CPT | Mod: ZL

## 2024-10-21 PROCEDURE — 93010 ELECTROCARDIOGRAM REPORT: CPT | Performed by: INTERNAL MEDICINE

## 2024-10-21 RX ORDER — BISACODYL 5 MG
TABLET, DELAYED RELEASE (ENTERIC COATED) ORAL
Status: ON HOLD | COMMUNITY
Start: 2024-09-26 | End: 2024-10-28

## 2024-10-21 RX ORDER — BISACODYL 5 MG/1
5 TABLET, DELAYED RELEASE ORAL DAILY PRN
Qty: 2 TABLET | Refills: 0 | Status: SHIPPED | OUTPATIENT
Start: 2024-10-21 | End: 2024-10-23

## 2024-10-21 ASSESSMENT — LIFESTYLE VARIABLES: TOBACCO_USE: 1

## 2024-10-21 ASSESSMENT — ENCOUNTER SYMPTOMS: DYSRHYTHMIAS: 1

## 2024-10-21 NOTE — PATIENT INSTRUCTIONS
Before Your Surgery     Call your surgeon if there is any change in your health. This includes signs of a cold or flu (such as a sore throat, runny nose, cough, rash, fever, urinary symptoms).   If you take prescribed drugs: Follow your doctor s orders about which medicines to take and which to stop until after surgery.  Hold all vitamins until after surgery  No ozempic until after surgery.   Day of surgery HOLD: losartan, topical creams, pramipexole  Okay to continue spiriva, advair, crestor, trazodone, synthroid, gabapentin, celexa     Do not smoke, drink alcohol or take over the counter medicine (unless your surgeon or primary care doctor tells you to) for the 24 hours before and after surgery. Smoking can increase your risk of an infection. Nicotine patches are safe to use. NSAIDS like ibuprofen (Advil, Motrin) should be held one day before surgery. Celebrex should be held 3 days before surgery. Naproxen (Aleve) should be held four days before surgery.   Do not take supplements/vitamins day prior and morning of surgery. Some supplements/vitamins can interfere with anesthesia.   Eating and drinking prior to surgery: follow the instructions from your surgeon  Take a shower or bath the night before surgery and morning of surgery. Use the soap your surgeon gave you to gently clean your skin night before and morning of surgery. If you do not have soap from your surgeon, use your regular soap.[facility] patients can receive free Chlorhexidine Gluconate 4% soap for surgery at an outpatient [facility] pharmacy.  Do not shave the surgery site.  Wear clean pajamas and have clean sheets on your bed.  Brush teeth morning of surgery to reduce risk of infection.

## 2024-10-21 NOTE — PROGRESS NOTES
Preoperative Evaluation  [facility] [place]  [address] [place] [address]  Phone: [phone]  Primary Provider: Hannah Eaton MD  Pre-op Performing Provider: Hannah Eaton MD  Oct 21, 2024   {      10/21/2024   Surgical Information   What procedure is being done? Colonoscopy    Facility or Hospital where procedure/surgery will be performed: [facility]   Who is doing the procedure / surgery? Dr Hoffmann   Date of surgery / procedure: 10/28/2024   Time of surgery / procedure: not sure   Where do you plan to recover after surgery? at home with family        Fax number for surgical facility: Note does not need to be faxed, will be available electronically in Epic.    Assessment & Plan     The proposed surgical procedure is considered LOW risk.    Preop general physical exam / Colon cancer screening  - CBC with Platelets & Differential; Future  - Basic metabolic panel; Future  - EKG 12-lead complete w/read - (Clinic Performed)    Mixed simple and mucopurulent chronic bronchitis (H)  Stable.  No signs of exacerbation.  Some rhonchi on exam, cleared with coughing.  This is her baseline.    Depressive disorder  Generalized anxiety disorder  Currently taking Celexa.  QTc stable.    Type 2 diabetes mellitus with diabetic polyneuropathy, without long-term current use of insulin (H)  Well-controlled.  Last A1c 6.2.  Continues on Ozempic, discontinued 2 weeks ago.    Hypertension associated with type 2 diabetes mellitus (H)  Well-controlled.  Only on low-dose losartan for management.  Renal function stable.    Hypothyroidism (acquired)  Continues on Synthroid.  TSH normal.    Neuropathy  Started on gabapentin to help with cervical radiculopathy and possible neuropathy with prior surgeries.    Tobacco use disorder (30 pack year history plus)    Prolonged Q-T interval on ECG  QTc 450  - EKG 12-lead complete w/read - (Clinic Performed)    Leukocytosis, unspecified type  Unclear etiology, possibly  related to smoking.  Will get peripheral smear.  No B symptoms.  If no clear etiology on smear, consider hematology consult.  - Lab Blood Morphology Pathologist Review; Future            - No identified additional risk factors other than previously addressed    Antiplatelet or Anticoagulation Medication Instructions   - Patient is on no antiplatelet or anticoagulation medications.    Additional Medication Instructions  Take all scheduled medications on the day of surgery EXCEPT for modifications listed below:   - ACE/ARB: DO NOT TAKE on day of surgery (minimum 11 hours for general anesthesia).   - Herbal medications and vitamins: DO NOT TAKE 14 days prior to surgery.   - SSRIs, SNRIs, TCAs, Antipsychotics: Continue without modification.    - cannabis, marijuana: DO NOT TAKE night before surgery.   - leukotriene Inhibitors: Continue without modifcation.   - LABA, inhaled corticosteroid, long-acting anticholinergics: Continue without modification.   - rescue Inhaler: Continue PRN. Bring to hospital on the day of surgery.    Recommendation  Approval given to proceed with proposed procedure, without further diagnostic evaluation.        The longitudinal plan of care for the diagnosis(es)/condition(s) as documented were addressed during this visit. Due to the added complexity in care, I will continue to support Gabrielle in the subsequent management and with ongoing continuity of care.    Anton Anthony is a 54 year old, presenting for the following:  Pre-Op Exam          10/21/2024     9:10 AM   Additional Questions   Roomed by Christina WHELAN   Accompanied by PCA, niece     HPI related to upcoming procedure: history of polyps and overdue for colonoscopy.     Has been feeling well with no fevers, congestion, cough, or other URI symptoms.   No ischemic cardiac history. Denies any shortness of breath, chest pain, or dyspnea on exertion.   Is able to function at >4 METS.   Has had prior surgeries with general anesthesia and did  well.         10/21/2024   Pre-Op Questionnaire   Have you ever had a heart attack or stroke? No   Have you ever had surgery on your heart or blood vessels, such as a stent placement, a coronary artery bypass, or surgery on an artery in your head, neck, heart, or legs? No   Do you have chest pain with activity? No   Do you have a history of heart failure? No   Do you currently have a cold, bronchitis or symptoms of other infection? No   Do you have a cough, shortness of breath, or wheezing? No   Do you or anyone in your family have previous history of blood clots? No   Do you or does anyone in your family have a serious bleeding problem such as prolonged bleeding following surgeries or cuts? No   Have you ever had problems with anemia or been told to take iron pills? (!) UNKNOWN    Have you had any abnormal blood loss such as black, tarry or bloody stools, or abnormal vaginal bleeding? No   Have you ever had a blood transfusion? No   Are you willing to have a blood transfusion if it is medically needed before, during, or after your surgery? Yes   Have you or any of your relatives ever had problems with anesthesia? (!) YES, sometimes difficult to wake up in herself   Do you have sleep apnea, excessive snoring or daytime drowsiness? No   Do you have any artifical heart valves or other implanted medical devices like a pacemaker, defibrillator, or continuous glucose monitor? No   Do you have artificial joints? No   Are you allergic to latex? No        Health Care Directive  Patient does not have a Health Care Directive or Living Will: Discussed advance care planning with patient; however, patient declined at this time.    Preoperative Review of    reviewed - controlled substances reflected in medication list.      Status of Chronic Conditions:  COPD - Patient has a longstanding history of moderate-severe COPD . Patient has been doing well overall noting NO SYMPTOMS and continues on medication regimen consisting of  advair and spiriva without adverse reactions or side effects.    HYPERLIPIDEMIA - Patient has a long history of significant Hyperlipidemia requiring medication for treatment with recent good control. Patient reports no problems or side effects with the medication.     HYPERTENSION - Patient has longstanding history of HTN , currently denies any symptoms referable to elevated blood pressure. Specifically denies chest pain, palpitations, dyspnea, orthopnea, PND or peripheral edema. Blood pressure readings have been in normal range. Current medication regimen is as listed below. Patient denies any side effects of medication.     HYPOTHYROIDISM - Patient has a longstanding history of chronic Hypothyroidism. Patient has been doing well, noting no tremor, insomnia, hair loss or changes in skin texture. Continues to take medications as directed, without adverse reactions or side effects. Last TSH   Lab Results   Component Value Date    TSH 2.73 02/27/2024   .      No unintentional weight loss, night sweats or other B symptoms.     T2DM  Last ozempic 2 weeks ago.   Lab Results   Component Value Date    A1C 6.2 07/09/2024    A1C 6.7 02/27/2024     History of presumed decreased left ventricular function   Thought to be mildly reduced on echocardiogram but Lexiscan stress test was negative for any inducible ischemia and ejection fraction was normal on stress test.  Had follow-up cardiac MRI with normal ejection fraction and no concerning findings.  Did have mild left ventricular hypertrophy noted.  Was discharged from cardiology.    Patient Active Problem List    Diagnosis Date Noted    Neuropathy 09/23/2024     Priority: Medium    Hypertension associated with type 2 diabetes mellitus (H) 07/09/2024     Priority: Medium    Spinal stenosis of lumbar region with neurogenic claudication 05/02/2024     Priority: Medium    Prolonged Q-T interval on ECG 02/27/2024     Priority: Medium    Dislocation of wrist, carpometacarpal  02/15/2024     Priority: Medium    Grade I diastolic dysfunction 01/31/2024     Priority: Medium     Formatting of this note might be different from the original.   1/22/2024:    Left ventricular size is normal.    Left ventricular function is decreased. The ejection fraction is 45-50%    (mildly reduced).    No regional wall motion abnormalities are seen.    Grade I or early diastolic dysfunction.    Global right ventricular function is mildly reduced.    Both atria appear normal.    Trace mitral insufficiency is present.    No aortic stenosis is present.    Mild tricuspid insufficiency is present.    The right ventricular systolic pressure is 22mmHg above the right atrial    pressure.    Trace pulmonic insufficiency is present.  Formatting of this note might be different from the original.   Formatting of this note might be different from the original.    1/22/2024:     Left ventricular size is normal.     Left ventricular function is decreased. The ejection fraction is 45-50%     (mildly reduced).     No regional wall motion abnormalities are seen.     Grade I or early diastolic dysfunction.     Global right ventricular function is mildly reduced.     Both atria appear normal.     Trace mitral insufficiency is present.     No aortic stenosis is present.     Mild tricuspid insufficiency is present.     The right ventricular systolic pressure is 22mmHg above the right atrial     pressure.     Trace pulmonic insufficiency is present.      Mild mitral insufficiency 01/31/2024     Priority: Medium     Formatting of this note might be different from the original.   1/22/2024:    Left ventricular size is normal.    Left ventricular function is decreased. The ejection fraction is 45-50%    (mildly reduced).    No regional wall motion abnormalities are seen.    Grade I or early diastolic dysfunction.    Global right ventricular function is mildly reduced.    Both atria appear normal.    Trace mitral insufficiency is  present.    No aortic stenosis is present.    Mild tricuspid insufficiency is present.    The right ventricular systolic pressure is 22mmHg above the right atrial    pressure.    Trace pulmonic insufficiency is present.  Formatting of this note might be different from the original.   Formatting of this note might be different from the original.    1/22/2024:     Left ventricular size is normal.     Left ventricular function is decreased. The ejection fraction is 45-50%     (mildly reduced).     No regional wall motion abnormalities are seen.     Grade I or early diastolic dysfunction.     Global right ventricular function is mildly reduced.     Both atria appear normal.     Trace mitral insufficiency is present.     No aortic stenosis is present.     Mild tricuspid insufficiency is present.     The right ventricular systolic pressure is 22mmHg above the right atrial     pressure.     Trace pulmonic insufficiency is present.      Mild tricuspid insufficiency 01/31/2024     Priority: Medium     Formatting of this note might be different from the original.   1/22/2024:    Left ventricular size is normal.    Left ventricular function is decreased. The ejection fraction is 45-50%    (mildly reduced).    No regional wall motion abnormalities are seen.    Grade I or early diastolic dysfunction.    Global right ventricular function is mildly reduced.    Both atria appear normal.    Trace mitral insufficiency is present.    No aortic stenosis is present.    Mild tricuspid insufficiency is present.    The right ventricular systolic pressure is 22mmHg above the right atrial    pressure.    Trace pulmonic insufficiency is present.  Formatting of this note might be different from the original.   Formatting of this note might be different from the original.    1/22/2024:     Left ventricular size is normal.     Left ventricular function is decreased. The ejection fraction is 45-50%     (mildly reduced).     No regional wall  motion abnormalities are seen.     Grade I or early diastolic dysfunction.     Global right ventricular function is mildly reduced.     Both atria appear normal.     Trace mitral insufficiency is present.     No aortic stenosis is present.     Mild tricuspid insufficiency is present.     The right ventricular systolic pressure is 22mmHg above the right atrial     pressure.     Trace pulmonic insufficiency is present.      Pulmonary insufficiency 01/31/2024     Priority: Medium    Bilateral arm weakness 11/14/2023     Priority: Medium    History of Helicobacter pylori infection 11/14/2023     Priority: Medium    Cervical stenosis of spinal canal 10/23/2023     Priority: Medium    Degeneration of lumbar intervertebral disc 10/23/2023     Priority: Medium    Mixed simple and mucopurulent chronic bronchitis (H) 10/23/2023     Priority: Medium    S/P complete hysterectomy 10/23/2023     Priority: Medium    Chronic pain disorder 09/12/2023     Priority: Medium    Type 2 diabetes mellitus with diabetic polyneuropathy, without long-term current use of insulin (H) 04/19/2023     Priority: Medium    Insomnia 08/11/2022     Priority: Medium     Current Medications: Lunesta - causes a foul taste in her mouth. Not sleeping well still.   Previous medications: Valium, Trazodone, Restoril, Gabapentin, Lyrica       Generalized anxiety disorder 08/10/2021     Priority: Medium    Spondylosis without myelopathy or radiculopathy, cervical region 08/10/2021     Priority: Medium    Hyperlipidemia 09/27/2018     Priority: Medium    Tobacco use disorder (30 pack year history plus) 07/18/2018     Priority: Medium    RLS (restless legs syndrome) 03/08/2018     Priority: Medium    Hypothyroidism (acquired) 06/06/2017     Priority: Medium    Depressive disorder 06/21/2002     Priority: Medium      Past Medical History:   Diagnosis Date    Abnormal echocardiogram 01/31/2024    Normal stress test and cardiac MRI 2024      Arthritis     Decreased  left ventricular function 02/27/2024    Had confirmatory MRI - showed normal heart function. No evidence for reduced EF      Depressive disorder     Gastroesophageal reflux disease with esophagitis     H. pylori infection 2018    Hashimoto's disease     Hypothyroidism     Insomnia     Mild intermittent asthma without complication 12/17/2015    Formatting of this note might be different from the original.   Last Assessment & Plan:     Formatting of this note might be different from the original.    This is generally been stable.  May be slight worsening recently with her rash.      Type 2 diabetes mellitus (H)     Umbilical hernia without obstruction and without gangrene     Urticaria     Last Assessment & Plan: Formatting of this note might be different from the original. Patient presented to the emergency room on 3/19/2023 with history of full body rash starting 3 weeks prior with discomfort and itching treated with hydrocortisone cream without relief.  Homeless family member had moved back into the house with prior exposure to bedbugs.  Under breasts lower legs severity moderate     Past Surgical History:   Procedure Laterality Date    ABDOMEN SURGERY      CARPAL TUNNEL RELEASE RT/LT Bilateral     [facility] arthroplasty Left     COLONOSCOPY - HIM SCAN N/A 10/04/2018    [facility]. 3 hyperplastic polyps. repeat 10 yr    HYSTERECTOMY, PAP NO LONGER INDICATED N/A 10/10/2018    Cervix removed per [facility]'s Hysterectomy Path Report.    HYSTERECTOMY, TOTAL, LAPAROSCOPIC, WITH SALPINGO-OOPHORECTOMY, CYSTOSCOPY Bilateral 10/10/2018    [facility].     Current Outpatient Medications   Medication Sig Dispense Refill    ACCU-CHEK GUIDE test strip       bisacodyl (DULCOLAX) 5 MG EC tablet Take by mouth.      Blood Glucose Monitoring Suppl (ACCU-CHEK GUIDE) w/Device KIT USE TO TEST BLOOD SUGAR DAILY      Continuous Blood Gluc  (DEXCOM G6 ) MIKE Use to read blood sugars as per 's instructions. 1  each 1    Continuous Blood Gluc Sensor (DEXCOM G6 SENSOR) MISC CHANGE SENSOR EVERY 10 DAYS 3 each 3    Continuous Glucose Transmitter (DEXCOM G6 TRANSMITTER) MISC Change every 3 months. 1 each 1    pramipexole (MIRAPEX) 1.5 MG tablet Take 1.5 mg by mouth      traZODone (DESYREL) 150 MG tablet Take 1 tablet (150 mg) by mouth at bedtime. 30 tablet 3    acetaminophen (TYLENOL) 325 MG tablet Take 325-650 mg by mouth every 6 hours as needed (Patient not taking: Reported on 10/21/2024)      blood glucose monitoring (SOFTCLIX) lancets USE TO TEST 1 TIME PER DAY (Patient not taking: Reported on 10/21/2024)      citalopram (CELEXA) 10 MG tablet Take 1 tablet (10 mg) by mouth daily. (Patient not taking: Reported on 10/21/2024) 90 tablet 1    clobetasol (TEMOVATE) 0.05 % external cream Apply topically 2 times daily Apply twice daily to affected lesions for up to 10-14 days. (Patient not taking: Reported on 10/21/2024) 45 g 0    cyanocobalamin (VITAMIN B-12) 500 MCG tablet Take 1 tablet (500 mcg) by mouth daily (Patient not taking: Reported on 10/21/2024) 90 tablet 3    estradiol (ESTRACE) 0.1 MG/GM vaginal cream Place 1 g vaginally (Patient not taking: Reported on 10/21/2024)      fluticasone-salmeterol (ADVAIR) 250-50 MCG/ACT inhaler Inhale 1 puff into the lungs every 12 hours (Patient not taking: Reported on 10/21/2024)      gabapentin (NEURONTIN) 100 MG capsule Take 1 capsule (100 mg) by mouth 2 times daily. (Patient not taking: Reported on 10/21/2024) 60 capsule 1    Glucose 4-6 GM-MG CHEW Take 4 g by mouth once (Patient not taking: Reported on 10/21/2024)      guaiFENesin (MUCINEX) 600 MG 12 hr tablet Take 1 tablet (600 mg) by mouth 2 times daily as needed for congestion (Patient not taking: Reported on 10/21/2024) 30 tablet 1    hydrOXYzine (VISTARIL) 25 MG capsule Take 1 capsule (25 mg) by mouth 3 times daily as needed for itching (Patient not taking: Reported on 10/21/2024) 30 capsule 0    ibuprofen (ADVIL/MOTRIN) 200  MG tablet Take 200 mg by mouth every 4 hours as needed (Patient not taking: Reported on 10/21/2024)      ipratropium - albuterol 0.5 mg/2.5 mg/3 mL (DUONEB) 0.5-2.5 (3) MG/3ML neb solution Inhale 3 mLs into the lungs (Patient not taking: Reported on 10/21/2024)      ketoconazole (NIZORAL) 2 % external cream Apply topically daily (Patient not taking: Reported on 10/21/2024)      levothyroxine (SYNTHROID/LEVOTHROID) 88 MCG tablet Take 88 mcg by mouth daily (Patient not taking: Reported on 10/21/2024)      losartan (COZAAR) 25 MG tablet TAKE 1/2 TABLET(12.5 MG) BY MOUTH DAILY (Patient not taking: Reported on 10/21/2024) 45 tablet 3    nicotine (NICODERM CQ) 14 MG/24HR 24 hr patch  (Patient not taking: Reported on 10/21/2024)      nicotine (NICORETTE) 2 MG gum Place 1 each (2 mg) inside cheek every hour as needed for nicotine withdrawal symptoms (Patient not taking: Reported on 10/21/2024) 100 each 1    nystatin (MYCOSTATIN) 253713 UNIT/GM external powder Apply topically 3 times daily as needed for other (yeast infection). Apply up to three times daily to areas with active yeast. (Patient not taking: Reported on 10/21/2024) 60 g 0    omeprazole (PRILOSEC) 20 MG DR capsule TAKE 1 CAPSULE(20 MG) BY MOUTH DAILY (Patient not taking: Reported on 10/21/2024) 90 capsule 3    PROAIR  (90 Base) MCG/ACT inhaler  (Patient not taking: Reported on 10/21/2024)      rosuvastatin (CRESTOR) 40 MG tablet Take 1 tablet (40 mg) by mouth daily (Patient not taking: Reported on 10/21/2024) 90 tablet 0    semaglutide (OZEMPIC) 2 MG/3ML pen Inject 0.5 mg subcutaneously every 7 days. (Patient not taking: Reported on 10/21/2024) 3 mL 3    SPIRIVA RESPIMAT 2.5 MCG/ACT inhaler INHALE 2 PUFFS INTO THE LUNGS DAILY (Patient not taking: Reported on 10/21/2024) 4 g 4    triamcinolone (KENALOG) 0.5 % external ointment APPLY TO SKIN TWICE DAILY. TO AFFECTED AREAS ON ARMS AND LEGS. USE UP TO 2 WEEKS AT A TIME (Patient not taking: Reported on  "10/21/2024)         Allergies   Allergen Reactions    Hydromorphone Other (See Comments)    Other Food Allergy Itching    Amoxicillin-Pot Clavulanate     Azithromycin      Other reaction(s): Stomach Upset    Black [place] Flavor Itching    Erythromycin Nausea and Vomiting    Pecan Extract Itching    Tramadol Itching and Dizziness    Other (Do Not Use) Rash     Epoxy Resin    Penicillins Rash     Has tolerated Cefazolin (ANCEF) and other Cephalosporins        Social History     Tobacco Use    Smoking status: Every Day     Current packs/day: 0.75     Average packs/day: 0.8 packs/day for 38.8 years (29.1 ttl pk-yrs)     Types: Cigarettes     Start date: 1/1/1986     Passive exposure: Current    Smokeless tobacco: Never   Substance Use Topics    Alcohol use: Not Currently     Family History   Problem Relation Age of Onset    Osteoporosis Mother     Bipolar Disorder Mother      History   Drug Use    Types: Marijuana               Objective    /60 (BP Location: Right arm, Patient Position: Chair, Cuff Size: Adult Large)   Pulse 96   Temp 97.5  F (36.4  C) (Tympanic)   Resp 16   Wt 75.8 kg (167 lb)   SpO2 96%   BMI 30.54 kg/m     Estimated body mass index is 30.54 kg/m  as calculated from the following:    Height as of 9/23/24: 1.575 m (5' 2\").    Weight as of this encounter: 75.8 kg (167 lb).    Physical Exam  Constitutional:       General: She is not in acute distress.     Appearance: Normal appearance. She is not ill-appearing.   HENT:      Right Ear: Tympanic membrane and ear canal normal.      Left Ear: Tympanic membrane and ear canal normal.      Nose: Nose normal.      Mouth/Throat:      Mouth: Mucous membranes are moist.      Pharynx: Oropharynx is clear. No oropharyngeal exudate or posterior oropharyngeal erythema.   Eyes:      Extraocular Movements: Extraocular movements intact.      Conjunctiva/sclera: Conjunctivae normal.      Pupils: Pupils are equal, round, and reactive to light.   Neck:      " Thyroid: No thyroid mass, thyromegaly or thyroid tenderness.   Cardiovascular:      Rate and Rhythm: Normal rate and regular rhythm.      Heart sounds: No murmur heard.  Pulmonary:      Effort: Pulmonary effort is normal.      Breath sounds: No wheezing or rales.      Comments: Initially had rhonchi in the left lower lobe that cleared with a cough  Abdominal:      General: Bowel sounds are normal.      Palpations: Abdomen is soft. There is no hepatomegaly, splenomegaly, mass or pulsatile mass.      Tenderness: There is abdominal tenderness (Mild throughout). There is no right CVA tenderness, left CVA tenderness, guarding or rebound.   Musculoskeletal:      Cervical back: Neck supple.      Right lower leg: No edema.      Left lower leg: No edema.   Lymphadenopathy:      Cervical: No cervical adenopathy.   Skin:     General: Skin is warm and dry.      Capillary Refill: Capillary refill takes less than 2 seconds.      Findings: No rash.   Neurological:      General: No focal deficit present.      Mental Status: She is alert and oriented to person, place, and time.      Cranial Nerves: No cranial nerve deficit.      Motor: No weakness.      Gait: Gait normal.   Psychiatric:         Mood and Affect: Mood normal.         Behavior: Behavior normal.         Thought Content: Thought content normal.         Judgment: Judgment normal.         Recent Labs   Lab Test 07/09/24  0836 03/19/24  1129 02/27/24  1438   HGB 14.9 14.6 15.6    287 255    140 141   POTASSIUM 4.2 4.1 4.1   CR 0.87 0.84 0.87   A1C 6.2*  --  6.7*        Diagnostics  Recent Results (from the past 24 hour(s))   Basic metabolic panel    Collection Time: 10/21/24  9:20 AM   Result Value Ref Range    Sodium 142 135 - 145 mmol/L    Potassium 4.3 3.4 - 5.3 mmol/L    Chloride 108 (H) 98 - 107 mmol/L    Carbon Dioxide (CO2) 22 22 - 29 mmol/L    Anion Gap 12 7 - 15 mmol/L    Urea Nitrogen 11.7 6.0 - 20.0 mg/dL    Creatinine 1.10 (H) 0.51 - 0.95 mg/dL     GFR Estimate 59 (L) >60 mL/min/1.73m2    Calcium 9.0 8.8 - 10.4 mg/dL    Glucose 124 (H) 70 - 99 mg/dL   CBC with platelets and differential    Collection Time: 10/21/24  9:20 AM   Result Value Ref Range    WBC Count 12.1 (H) 4.0 - 11.0 10e3/uL    RBC Count 4.64 3.80 - 5.20 10e6/uL    Hemoglobin 14.4 11.7 - 15.7 g/dL    Hematocrit 43.7 35.0 - 47.0 %    MCV 94 78 - 100 fL    MCH 31.0 26.5 - 33.0 pg    MCHC 33.0 31.5 - 36.5 g/dL    RDW 13.6 10.0 - 15.0 %    Platelet Count 233 150 - 450 10e3/uL    % Neutrophils 69 %    % Lymphocytes 24 %    % Monocytes 5 %    % Eosinophils 1 %    % Basophils 0 %    % Immature Granulocytes 0 %    NRBCs per 100 WBC 0 <1 /100    Absolute Neutrophils 8.4 (H) 1.6 - 8.3 10e3/uL    Absolute Lymphocytes 2.8 0.8 - 5.3 10e3/uL    Absolute Monocytes 0.6 0.0 - 1.3 10e3/uL    Absolute Eosinophils 0.2 0.0 - 0.7 10e3/uL    Absolute Basophils 0.1 0.0 - 0.2 10e3/uL    Absolute Immature Granulocytes 0.0 <=0.4 10e3/uL    Absolute NRBCs 0.0 10e3/uL      EKG normal sinus rhythm with no ischemic findings.  No appreciable hypertrophy.  Normal axis.  QTc is stable at 450.    Revised Cardiac Risk Index (RCRI)  The patient has the following serious cardiovascular risks for perioperative complications:   - No serious cardiac risks = 0 points     RCRI Interpretation: 0 points: Class I (very low risk - 0.4% complication rate)         Signed Electronically by: Hannah Eaton MD  A copy of this evaluation report is provided to the requesting physician.

## 2024-10-21 NOTE — ANESTHESIA PREPROCEDURE EVALUATION
Anesthesia Pre-Procedure Evaluation    Patient: Jami Wang   MRN: [id number] : 1970        Procedure : Procedure(s):  COLONOSCOPY          Past Medical History:   Diagnosis Date     Abnormal echocardiogram 2024    Normal stress test and cardiac MRI        Arthritis      Decreased left ventricular function 2024    Had confirmatory MRI - showed normal heart function. No evidence for reduced EF       Depressive disorder      Gastroesophageal reflux disease with esophagitis      H. pylori infection      Hashimoto's disease      Hypothyroidism      Insomnia      Mild intermittent asthma without complication 2015    Formatting of this note might be different from the original.   Last Assessment & Plan:     Formatting of this note might be different from the original.    This is generally been stable.  May be slight worsening recently with her rash.       Type 2 diabetes mellitus (H)      Umbilical hernia without obstruction and without gangrene      Urticaria     Last Assessment & Plan: Formatting of this note might be different from the original. Patient presented to the emergency room on 3/19/2023 with history of full body rash starting 3 weeks prior with discomfort and itching treated with hydrocortisone cream without relief.  Homeless family member had moved back into the house with prior exposure to bedbugs.  Under breasts lower legs severity moderate      Past Surgical History:   Procedure Laterality Date     ABDOMEN SURGERY       CARPAL TUNNEL RELEASE RT/LT Bilateral      [facility] arthroplasty Left      COLONOSCOPY - HIM SCAN N/A 10/04/2018    [facility]. 3 hyperplastic polyps. repeat 10 yr     HYSTERECTOMY, PAP NO LONGER INDICATED N/A 10/10/2018    Cervix removed per [facility]'s Hysterectomy Path Report.     HYSTERECTOMY, TOTAL, LAPAROSCOPIC, WITH SALPINGO-OOPHORECTOMY, CYSTOSCOPY Bilateral 10/10/2018    [facility].      Allergies   Allergen Reactions     Hydromorphone  Other (See Comments)     Other Food Allergy Itching     Amoxicillin-Pot Clavulanate      Azithromycin      Other reaction(s): Stomach Upset     [place] Flavor Itching     Erythromycin Nausea and Vomiting     Pecan Extract Itching     Tramadol Itching and Dizziness     Other (Do Not Use) Rash     Epoxy Resin     Penicillins Rash     Has tolerated Cefazolin (ANCEF) and other Cephalosporins      Social History     Tobacco Use     Smoking status: Every Day     Current packs/day: 0.75     Average packs/day: 0.8 packs/day for 38.8 years (29.1 ttl pk-yrs)     Types: Cigarettes     Start date: 1/1/1986     Passive exposure: Current     Smokeless tobacco: Never   Substance Use Topics     Alcohol use: Not Currently      Wt Readings from Last 1 Encounters:   10/21/24 75.8 kg (167 lb)        Anesthesia Evaluation   Pt has had prior anesthetic. Type: MAC and General.    History of anesthetic complications  - .  difficult to wake at times.    ROS/MED HX  ENT/Pulmonary: Comment: Mixed simple and mucopurulent chronic bronchitis (H)  Stable.  No signs of exacerbation.  Some rhonchi on exam, cleared with coughing.  This is her baseline. (Per 10/21/24 HP)    Sleep study 1/5/24    Wheezes at night often per patient, wheezing starts when laying down    (+)                tobacco use, Current use, 0.8 packs/day, 29  Pack-Year Hx,  patient smoked within 24 hours, Intermittent, asthma  Treatment: Inhaler daily,   COPD,              Neurologic: Comment: Insomnia  RLS    (+)    peripheral neuropathy, - right hand.                           Cardiovascular: Comment: History of presumed decreased left ventricular function   Thought to be mildly reduced on echocardiogram but Lexiscan stress test was negative for any inducible ischemia and ejection fraction was normal on stress test.  Had follow-up cardiac MRI with normal ejection fraction and no concerning findings.  Did have mild left ventricular hypertrophy noted.  Was discharged from  cardiology.    6/3/24 cardiac MR  Normal left ventricular size and systolic function, LVEF 59%.   Mild concentric left ventricular hypertrophy.   Normal right ventricular size with low normal systolic function, RVEF 50%.   No significant valvular abnormality.   No myocardial edema or fibrosis.   The myocardium is normal on T1 mapping.     Saw cards 2/16/24      (+) Dyslipidemia hypertension-range: 110/60 HP/ -   -  - -      CHF     SCHULTZ.             dysrhythmias, Long QT,  valvular problems/murmurs  Mild mitral and tricuspid valve insufficiency.    Previous cardiac testing   Echo: Date: 1/22/24 Results:  Left ventricular size is normal.   Left ventricular function is decreased. The ejection fraction is 45-50%   (mildly reduced).   No regional wall motion abnormalities are seen.   Grade I or early diastolic dysfunction.   Global right ventricular function is mildly reduced.   Both atria appear normal.   Trace mitral insufficiency is present.   No aortic stenosis is present.   Mild tricuspid insufficiency is present.   The right ventricular systolic pressure is 22mmHg above the right atrial   pressure.   Trace pulmonic insufficiency is present.     Stress Test:  Date: 2/5/24 Results:     The nuclear stress test is negative for inducible myocardial ischemia   or infarction.      Left ventricular function is normal.      The left ventricular ejection fraction at rest is 70%.  The left   ventricular ejection fraction at stress is 75%.      KARINE Ewing About 2 years ago.  She believes they did imaging with the   stress test Prior images were unavailable for comparison review.     ECG Reviewed:  Date: 10/21/24 Results:  HR 82  Sinus rhythm   Moderate voltage criteria for LVH, may be normal variant ( R in aVL , Tramaine product )   Borderline ECG   When compared with ECG of 09-Jul-2024 08:26,   No significant change was found     Cath:  Date: Results:      METS/Exercise Tolerance: >4 METS    Hematologic: Comments:  Leukocytosis, unspecified type  Unclear etiology, possibly related to smoking.  Will get peripheral smear.  No B symptoms.  If no clear etiology on smear, consider hematology consult. (Per 10/21/24 HP)        Musculoskeletal: Comment: Bilateral arm weakness  Cervical stenosis  Lumbar spine degeneration  (+)  arthritis (right hand pain today, surgery march 2024, pain 5/10),             GI/Hepatic: Comment: Umbilical hernia without obstruction and without gangrene    (+) GERD, Asymptomatic on medication,      bowel prep (history of polyps),            Renal/Genitourinary:  - neg Renal ROS     Endo: Comment: Ozempic  CGM    (+)  type II DM, Last HgA1c: 6.2, date: 7/9/24, Not using insulin,    Diabetic complications: neuropathy. thyroid problem, hypothyroidism Hashimoto's disease,    Obesity,       Psychiatric/Substance Use:     (+) psychiatric history depression and anxiety   Recreational drug usage: Cannabis (none in 24 hours).    Infectious Disease: Comment: Hx H pylori      Malignancy:  - neg malignancy ROS     Other:  - neg other ROS          Physical Exam    Airway        Mallampati: II   TM distance: < 3 FB   Neck ROM: full   Mouth opening: > 3 cm    Respiratory Devices and Support         Dental       (+) Removable bridges or other hardware      Cardiovascular   cardiovascular exam normal       Rhythm and rate: regular and normal     Pulmonary   pulmonary exam normal        breath sounds clear to auscultation       OUTSIDE LABS:  CBC:   Lab Results   Component Value Date    WBC 12.1 (H) 10/21/2024    WBC 11.4 (H) 07/09/2024    HGB 14.4 10/21/2024    HGB 14.9 07/09/2024    HCT 43.7 10/21/2024    HCT 45.4 07/09/2024     10/21/2024     07/09/2024     BMP:   Lab Results   Component Value Date     10/21/2024     07/09/2024    POTASSIUM 4.3 10/21/2024    POTASSIUM 4.2 07/09/2024    CHLORIDE 108 (H) 10/21/2024    CHLORIDE 108 (H) 07/09/2024    CO2 22 10/21/2024    CO2 22 07/09/2024    BUN 11.7  "10/21/2024    BUN 11.0 07/09/2024    CR 1.10 (H) 10/21/2024    CR 0.87 07/09/2024     (H) 10/21/2024     (H) 07/09/2024     COAGS: No results found for: \"PTT\", \"INR\", \"FIBR\"  POC: No results found for: \"BGM\", \"HCG\", \"HCGS\"  HEPATIC:   Lab Results   Component Value Date    ALBUMIN 4.0 03/19/2024    PROTTOTAL 7.3 03/19/2024    ALT 20 07/09/2024    AST 20 07/09/2024    ALKPHOS 91 03/19/2024    BILITOTAL 0.2 03/19/2024     OTHER:   Lab Results   Component Value Date    A1C 6.2 (H) 07/09/2024    RADHA 9.0 10/21/2024    TSH 2.73 02/27/2024       Anesthesia Plan    ASA Status:  3    NPO Status:  NPO Appropriate    Anesthesia Type: MAC.              Consents    Anesthesia Plan(s) and associated risks, benefits, and realistic alternatives discussed. Questions answered and patient/representative(s) expressed understanding.     - Discussed:     - Discussed with:  Patient            Postoperative Care            Comments:    Other Comments: Reviewed 10/21  Angelito   Per note: stopped Ozempic 2 weeks ago  Upper lower dentures    Risks and benefits of MAC anesthetic discussed including dental damage, aspiration, loss of airway, conversion to general anesthetic, CV complications, MI, stroke, death. Pt wishes to proceed.            Jacqueline Goyal, APRN CNP    I have reviewed the pertinent notes and labs in the chart from the past 30 days. Any updates or changes from those notes are reflected in this note.      # Hyperchloremia: Highest Cl = 108 mmol/L in last 2 days, will monitor as appropriate              # Hypertension: Noted on problem list         # Obesity: Estimated body mass index is 30.54 kg/m  as calculated from the following:    Height as of 9/23/24: 1.575 m (5' 2\").    Weight as of 10/21/24: 75.8 kg (167 lb).             "

## 2024-10-22 ENCOUNTER — NURSE TRIAGE (OUTPATIENT)
Dept: CARE COORDINATION | Facility: OTHER | Age: 54
End: 2024-10-22

## 2024-10-22 ENCOUNTER — MYC MEDICAL ADVICE (OUTPATIENT)
Dept: FAMILY MEDICINE | Facility: OTHER | Age: 54
End: 2024-10-22

## 2024-10-22 LAB
PATH REPORT.COMMENTS IMP SPEC: NORMAL
PATH REPORT.FINAL DX SPEC: NORMAL
PATH REPORT.MICROSCOPIC SPEC OTHER STN: NORMAL
PATH REPORT.MICROSCOPIC SPEC OTHER STN: NORMAL

## 2024-10-22 NOTE — TELEPHONE ENCOUNTER
Symptom or reason needing to speak to RN: Patient stepped on metal, it went through her shoe and her right foot is swelling.    Best number to return call: [phone]      Best time to return call: ASAP

## 2024-10-22 NOTE — OR NURSING
Call patient for pre-admission/pre-op call.  Patient noted that she was very anxious about what medications to/to not take.  Medications reviewed with patient at length.  Also a copy of the patient's medication list with surgical instructions myCharted to patient per request.  Pt encouraged to call if she had any further questions.

## 2024-10-22 NOTE — TELEPHONE ENCOUNTER
"Per PCP. Patient to be seen in ED/UC for evaluation.   Patient verbalized understanding and will go to UC/ED when able.   Patient concerned with driving and will try to find a .     Reason for Disposition   [1] Puncture wound of foot AND [2] puncture went through shoe (e.g., tennis shoe)    Additional Information   Negative: [1] Puncture wound of head, neck, chest, back, or abdomen AND [2] sounds life-threatening to the triager   Negative: Shock suspected (e.g., cold/pale/clammy skin, too weak to stand, low BP, rapid pulse)   Negative: Sounds like a life-threatening emergency to the triager   Negative: [1] Caused by a needlestick or other sharp object AND [2] possible exposure to another person's body fluids   Negative: Caused by an animal bite   Negative: Caused by a human bite   Negative: Caused by a marine animal sting or bite   Negative: Skin is cut or scraped, not punctured   Negative: Puncture wound of eye or eyelid   Negative: Foreign body is still in the skin (e.g., splinter, sliver, fishhook)   Negative: [1] Puncture wound of head, neck, chest, abdomen, or overlying a joint AND [2] could be deep   Negative: High pressure injection injury (e.g., from grease gun or paint gun, usually work-related)   Negative: Sounds like a serious injury to the triager   Negative: [1] SEVERE pain AND [2] not improved 2 hours after pain medicine   Negative: [1] Puncture wound of foot AND [2] hurts too much to walk on it (i.e., unable to bear weight, severe limp)   Negative: [1] Puncture wound of bare foot (no shoes) AND [2] setting was dirty  (Exception: Shallow puncture.)    Answer Assessment - Initial Assessment Questions  1. LOCATION: \"Where is the puncture located?\"       Right foot, arch heel area  2. OBJECT: \"What was the object that punctured the skin?\"       Gardening post  3. DEPTH: \"How deep do you think the puncture goes?\"       unsure  4. ONSET: \"When did the injury occur?\" (Minutes or hours)      8-830 this " "morning   5. PAIN: \"Is it painful?\" If Yes, ask: \"How bad is the pain?\"  (Scale 1-10; or mild, moderate, severe)      Yes. Moderate-severe, swelling   6. TETANUS: \"When was the last tetanus booster?\"      2015  7. PREGNANCY: \"Is there any chance you are pregnant?\" \"When was your last menstrual period?\"      No    Protocols used: Puncture Wound-A-AH    "

## 2024-10-23 ENCOUNTER — OFFICE VISIT (OUTPATIENT)
Dept: FAMILY MEDICINE | Facility: OTHER | Age: 54
End: 2024-10-23
Attending: STUDENT IN AN ORGANIZED HEALTH CARE EDUCATION/TRAINING PROGRAM
Payer: MEDICARE

## 2024-10-23 VITALS
SYSTOLIC BLOOD PRESSURE: 102 MMHG | DIASTOLIC BLOOD PRESSURE: 74 MMHG | WEIGHT: 167 LBS | OXYGEN SATURATION: 94 % | BODY MASS INDEX: 30.54 KG/M2 | RESPIRATION RATE: 16 BRPM | HEART RATE: 91 BPM | TEMPERATURE: 97.5 F

## 2024-10-23 DIAGNOSIS — S91.331A PUNCTURE WOUND OF RIGHT FOOT, INITIAL ENCOUNTER: Primary | ICD-10-CM

## 2024-10-23 DIAGNOSIS — Z12.11 SPECIAL SCREENING FOR MALIGNANT NEOPLASMS, COLON: ICD-10-CM

## 2024-10-23 DIAGNOSIS — Z23 NEED FOR TDAP VACCINATION: ICD-10-CM

## 2024-10-23 PROCEDURE — G0463 HOSPITAL OUTPT CLINIC VISIT: HCPCS | Mod: 25

## 2024-10-23 PROCEDURE — 90471 IMMUNIZATION ADMIN: CPT

## 2024-10-23 PROCEDURE — 99213 OFFICE O/P EST LOW 20 MIN: CPT | Performed by: STUDENT IN AN ORGANIZED HEALTH CARE EDUCATION/TRAINING PROGRAM

## 2024-10-23 PROCEDURE — G0463 HOSPITAL OUTPT CLINIC VISIT: HCPCS

## 2024-10-23 RX ORDER — BISACODYL 5 MG
5 TABLET, DELAYED RELEASE (ENTERIC COATED) ORAL SEE ADMIN INSTRUCTIONS
Qty: 2 TABLET | Refills: 0 | Status: ON HOLD | OUTPATIENT
Start: 2024-10-23 | End: 2024-10-28

## 2024-10-23 ASSESSMENT — PAIN SCALES - GENERAL: PAINLEVEL_OUTOF10: SEVERE PAIN (6)

## 2024-10-23 NOTE — PROGRESS NOTES
Assessment & Plan     Puncture wound of right foot, initial encounter  Stepped on corner of stabilizing plate of tall garden stake yesterday with heel of right foot.  Approximately 2 mm x 4 mm superficial lesion that appears to be limited to dermal layer without foreign body appreciated on exam (see image below).  Given source of puncture, no concerns for broken off component or retained foreign body.  Did discuss x-ray to further assess for foreign body; she felt this was unnecessary which is reasonable.  No signs of infection/cellulitis and given lack of puncture depth we will hold off on prophylactic antibiotics.  Update Tdap today, discussed wound care recommendations including no further peroxide use.  Reviewed S/S that warrant reevaluation and discussed adding Keflex/Cipro if any signs of local infection.  Will update PCP with any concerns.  - TDAP VACCINE (Adacel, Boostrix)    Need for Tdap vaccination  - TDAP VACCINE (Adacel, Boostrix)    I spent a total of 26 minutes on the day of the visit.  Plan time spent by me doing chart review, history and exam, documentation and further activities per the note    Follow-up as needed.    Anton Anthony is a 54 year old, presenting for the following health issues:  Musculoskeletal Problem (Right foot puncture wound)        10/23/2024    10:41 AM   Additional Questions   Roomed by Jose Fink LPN   Accompanied by Self         10/23/2024    10:41 AM   Patient Reported Additional Medications   Patient reports taking the following new medications None     History of Present Illness       Reason for visit:  I stepped on some metal  Symptom onset:  1-3 days ago She is missing 3 dose(s) of medications per week.       Pain History:  When did you first notice your pain? 1 day   Have you seen anyone else for your pain? No  How has your pain affected your ability to work? Not applicable  Where in your body do you have pain?  Right foot puncture    -Add-on appointment for  puncture wound right foot  -Was putting up, for new dogs yesterday when she stepped on a tall fence stake  -Stake had shield shaped stabilizer plate near the base for stability on the ground  -Stepped on the outside corner of this stabilizing plate and punctured her soft soled shoe and slightly into right heel of foot  -Steak is thick steel and assured that nothing broke off  -Does not feel like any foreign bodies in the foot  -Became quite sore/tender later in the day, was concerned about possible swelling  -Washed her foot multiple times yesterday, also did some peroxide rinses  -Pain is a bit improved today, able to walk on it  -Felt like there was possibly some pus draining when she was washing a yesterday, no ongoing drainage or bleeding  -No extension of symptoms up into the ankle or leg  -Last Tdap 2015      Review of Systems  Constitutional, HEENT, cardiovascular, pulmonary, gi and gu systems are negative, except as otherwise noted.      Objective    /74   Pulse 91   Temp 97.5  F (36.4  C) (Tympanic)   Resp 16   Wt 75.8 kg (167 lb)   SpO2 94%   BMI 30.54 kg/m    Body mass index is 30.54 kg/m .  Physical Exam  Vitals reviewed.   Constitutional:       General: She is not in acute distress.     Appearance: Normal appearance. She is not toxic-appearing.   HENT:      Head: Normocephalic and atraumatic.   Musculoskeletal:         General: Normal range of motion.   Skin:     General: Skin is warm and dry.      Comments: 2 mm x 4 mm shallow area of missing skin plantar surface right heel without surrounding erythema.  Maybe trace surrounding edema?  Region tender to manipulation.  No purulent drainage or bleeding able to be expressed.  Did explore the wound and no indications of penetration beyond dermis level, base of wound seems to be right at dermal/subdermal level.  No retained foreign body. See image below.   Neurological:      General: No focal deficit present.      Mental Status: She is alert and  oriented to person, place, and time.   Psychiatric:         Mood and Affect: Mood normal.         Behavior: Behavior normal.              Signed Electronically by: Hiren Bui MD

## 2024-10-28 ENCOUNTER — HOSPITAL ENCOUNTER (OUTPATIENT)
Facility: HOSPITAL | Age: 54
Discharge: HOME | End: 2024-10-28
Attending: SURGERY | Admitting: SURGERY
Payer: MEDICARE

## 2024-10-28 ENCOUNTER — ANESTHESIA (OUTPATIENT)
Dept: SURGERY | Facility: HOSPITAL | Age: 54
End: 2024-10-28
Payer: MEDICARE

## 2024-10-28 VITALS
HEIGHT: 62 IN | TEMPERATURE: 97.1 F | SYSTOLIC BLOOD PRESSURE: 110 MMHG | OXYGEN SATURATION: 93 % | DIASTOLIC BLOOD PRESSURE: 74 MMHG | HEART RATE: 95 BPM | RESPIRATION RATE: 16 BRPM | BODY MASS INDEX: 30.29 KG/M2 | WEIGHT: 164.6 LBS

## 2024-10-28 LAB — GLUCOSE BLDC GLUCOMTR-MCNC: 105 MG/DL (ref 70–99)

## 2024-10-28 PROCEDURE — 45380 COLONOSCOPY AND BIOPSY: CPT | Mod: PT | Performed by: SURGERY

## 2024-10-28 PROCEDURE — 82962 GLUCOSE BLOOD TEST: CPT

## 2024-10-28 PROCEDURE — 258N000003 HC RX IP 258 OP 636: Performed by: NURSE PRACTITIONER

## 2024-10-28 PROCEDURE — 710N000012 HC RECOVERY PHASE 2, PER MINUTE: Performed by: SURGERY

## 2024-10-28 PROCEDURE — 250N000011 HC RX IP 250 OP 636: Performed by: NURSE ANESTHETIST, CERTIFIED REGISTERED

## 2024-10-28 PROCEDURE — 999N000141 HC STATISTIC PRE-PROCEDURE NURSING ASSESSMENT: Performed by: SURGERY

## 2024-10-28 PROCEDURE — 360N000075 HC SURGERY LEVEL 2, PER MIN: Performed by: SURGERY

## 2024-10-28 PROCEDURE — 45385 COLONOSCOPY W/LESION REMOVAL: CPT | Mod: PT | Performed by: SURGERY

## 2024-10-28 PROCEDURE — 250N000009 HC RX 250: Performed by: NURSE ANESTHETIST, CERTIFIED REGISTERED

## 2024-10-28 PROCEDURE — 45385 COLONOSCOPY W/LESION REMOVAL: CPT | Performed by: NURSE ANESTHETIST, CERTIFIED REGISTERED

## 2024-10-28 PROCEDURE — 272N000001 HC OR GENERAL SUPPLY STERILE: Performed by: SURGERY

## 2024-10-28 PROCEDURE — 88305 TISSUE EXAM BY PATHOLOGIST: CPT | Mod: TC | Performed by: SURGERY

## 2024-10-28 PROCEDURE — 88305 TISSUE EXAM BY PATHOLOGIST: CPT | Mod: 26 | Performed by: PATHOLOGY

## 2024-10-28 PROCEDURE — 370N000017 HC ANESTHESIA TECHNICAL FEE, PER MIN: Performed by: SURGERY

## 2024-10-28 RX ORDER — ONDANSETRON 2 MG/ML
4 INJECTION INTRAMUSCULAR; INTRAVENOUS EVERY 30 MIN PRN
Status: DISCONTINUED | OUTPATIENT
Start: 2024-10-28 | End: 2024-10-28 | Stop reason: HOSPADM

## 2024-10-28 RX ORDER — ACETAMINOPHEN 325 MG/1
650 TABLET ORAL EVERY 4 HOURS PRN
Status: DISCONTINUED | OUTPATIENT
Start: 2024-10-28 | End: 2024-10-28 | Stop reason: HOSPADM

## 2024-10-28 RX ORDER — PROPOFOL 10 MG/ML
INJECTION, EMULSION INTRAVENOUS PRN
Status: DISCONTINUED | OUTPATIENT
Start: 2024-10-28 | End: 2024-10-28

## 2024-10-28 RX ORDER — SODIUM CHLORIDE, SODIUM LACTATE, POTASSIUM CHLORIDE, CALCIUM CHLORIDE 600; 310; 30; 20 MG/100ML; MG/100ML; MG/100ML; MG/100ML
INJECTION, SOLUTION INTRAVENOUS CONTINUOUS
Status: DISCONTINUED | OUTPATIENT
Start: 2024-10-28 | End: 2024-10-28 | Stop reason: HOSPADM

## 2024-10-28 RX ORDER — LIDOCAINE 40 MG/G
CREAM TOPICAL
Status: DISCONTINUED | OUTPATIENT
Start: 2024-10-28 | End: 2024-10-28 | Stop reason: HOSPADM

## 2024-10-28 RX ORDER — LIDOCAINE HYDROCHLORIDE 20 MG/ML
INJECTION, SOLUTION INFILTRATION; PERINEURAL PRN
Status: DISCONTINUED | OUTPATIENT
Start: 2024-10-28 | End: 2024-10-28

## 2024-10-28 RX ORDER — ONDANSETRON 4 MG/1
4 TABLET, ORALLY DISINTEGRATING ORAL EVERY 30 MIN PRN
Status: DISCONTINUED | OUTPATIENT
Start: 2024-10-28 | End: 2024-10-28 | Stop reason: HOSPADM

## 2024-10-28 RX ORDER — DEXAMETHASONE SODIUM PHOSPHATE 10 MG/ML
4 INJECTION, SOLUTION INTRAMUSCULAR; INTRAVENOUS
Status: DISCONTINUED | OUTPATIENT
Start: 2024-10-28 | End: 2024-10-28 | Stop reason: HOSPADM

## 2024-10-28 RX ORDER — NALOXONE HYDROCHLORIDE 0.4 MG/ML
0.1 INJECTION, SOLUTION INTRAMUSCULAR; INTRAVENOUS; SUBCUTANEOUS
Status: DISCONTINUED | OUTPATIENT
Start: 2024-10-28 | End: 2024-10-28 | Stop reason: HOSPADM

## 2024-10-28 RX ADMIN — PROPOFOL 30 MG: 10 INJECTION, EMULSION INTRAVENOUS at 08:40

## 2024-10-28 RX ADMIN — LIDOCAINE HYDROCHLORIDE 80 MG: 20 INJECTION, SOLUTION INFILTRATION; PERINEURAL at 08:26

## 2024-10-28 RX ADMIN — SODIUM CHLORIDE, POTASSIUM CHLORIDE, SODIUM LACTATE AND CALCIUM CHLORIDE: 600; 310; 30; 20 INJECTION, SOLUTION INTRAVENOUS at 08:14

## 2024-10-28 RX ADMIN — PROPOFOL 50 MG: 10 INJECTION, EMULSION INTRAVENOUS at 08:32

## 2024-10-28 RX ADMIN — PROPOFOL 70 MG: 10 INJECTION, EMULSION INTRAVENOUS at 08:26

## 2024-10-28 RX ADMIN — PROPOFOL 50 MG: 10 INJECTION, EMULSION INTRAVENOUS at 08:36

## 2024-10-28 RX ADMIN — PROPOFOL 30 MG: 10 INJECTION, EMULSION INTRAVENOUS at 08:28

## 2024-10-28 ASSESSMENT — COPD QUESTIONNAIRES: COPD: 1

## 2024-10-28 ASSESSMENT — ACTIVITIES OF DAILY LIVING (ADL)
ADLS_ACUITY_SCORE: 0
ADLS_ACUITY_SCORE: 0

## 2024-10-28 NOTE — OP NOTE
Jami Wang MRN# [id number]   YOB: 1970 Age: 54 year old      Date of Admission:  10/28/2024  Date of Service:   10/28/24    Primary care provider: Hannah Eaton    PREOPERATIVE DIAGNOSIS:  Colon Cancer Screening        POSTOPERATIVE DIAGNOSIS:  Colon polyps x 3          PROCEDURE:  Colonoscopy with polypectomy           INDICATIONS:  Screening colonoscopy.      Specimen:   ID Type Source Tests Collected by Time Destination   1 : colon polyp @ 30cm Polyp Large Intestine, Colon SURGICAL PATHOLOGY EXAM Enrique Brambila MD 10/28/2024  8:38 AM    2 : colon polyp @ 25cm Polyp Large Intestine, Colon SURGICAL PATHOLOGY EXAM Enrique Brambila MD 10/28/2024  8:41 AM    3 : rectum polyp Polyp Rectum SURGICAL PATHOLOGY EXAM Enrique Brambila MD 10/28/2024  8:43 AM        SURGEON: Enrique Brambila MD    DESCRIPTION OF PROCEDURE: Jami Wang was brought into the endoscopy suite and placed in the left lateral decubitus position. After preprocedural pause and attended monitored anesthesia was administered, the external anus was inspected and was normal. Digital rectal exam was normal. The colonoscope was inserted and advanced under direct visualization to the level of the cecum which was identified by the appendiceal orifice and the ileocecal valve. The prep was excellent.. Upon slow withdrawal of the colonoscope, approximately 95% of the mucosa was directly visualized. There were 3 polyps the larger one at 25 cm was removed with a cold snare, The other two were small and removed with biopsy forceps.  The rest of the colon was without mucosal abnormality. There was no evidence of further polyps, inflammation, bleeding or AVMs. Retroflexion of the rectum was normal. The extra air was removed from the colon, and the colonoscope withdrawn. The patient tolerated the procedure well and was taken to postanesthesia care unit.     We invite the patient to return in 3-5 years for follow up screening evaluation,  pending pathology related to polyps.    Enrique Brambila MD

## 2024-10-28 NOTE — ANESTHESIA CARE TRANSFER NOTE
Patient: Jami Wang    Procedure: Procedure(s):  COLONOSCOPY WITH POLYPECTOMY       Diagnosis: Colon cancer screening [Z12.11]  Diagnosis Additional Information: No value filed.    Anesthesia Type:   MAC     Note:    Oropharynx: spontaneously breathing  Level of Consciousness: drowsy  Oxygen Supplementation: room air    Independent Airway: airway patency satisfactory and stable  Dentition: dentition unchanged  Vital Signs Stable: post-procedure vital signs reviewed and stable  Report to RN Given: handoff report given  Patient transferred to: Phase II    Handoff Report: Identifed the Patient, Identified the Reponsible Provider, Reviewed the pertinent medical history, Discussed the surgical course, Reviewed Intra-OP anesthesia mangement and issues during anesthesia, Set expectations for post-procedure period and Allowed opportunity for questions and acknowledgement of understanding    Vitals:  Vitals Value Taken Time   BP     Temp     Pulse     Resp     SpO2         Electronically Signed By: JULIAN Gresham CRNA  October 28, 2024  8:45 AM

## 2024-10-28 NOTE — OR NURSING
Patient and responsible adult given discharge instructions with no questions regarding instructions. Raman score 20/20. Pain level 0/10.  Discharged from unit via ambulation. Patient discharged to home.

## 2024-10-28 NOTE — ANESTHESIA POSTPROCEDURE EVALUATION
Patient: Jami Wang    Procedure: Procedure(s):  COLONOSCOPY WITH POLYPECTOMY       Anesthesia Type:  MAC    Note:  Disposition: Outpatient   Postop Pain Control: Uneventful            Sign Out: Well controlled pain   PONV: No   Neuro/Psych: Uneventful            Sign Out: Acceptable/Baseline neuro status   Airway/Respiratory: Uneventful            Sign Out: Acceptable/Baseline resp. status   CV/Hemodynamics: Uneventful            Sign Out: Acceptable CV status; No obvious hypovolemia; No obvious fluid overload   Other NRE: NONE   DID A NON-ROUTINE EVENT OCCUR? No       Last vitals:  Vitals Value Taken Time   /74 10/28/24 0911   Temp 97.1  F (36.2  C) 10/28/24 0910   Pulse 95 10/28/24 0911   Resp 16 10/28/24 0858   SpO2 91 % 10/28/24 0913   Vitals shown include unfiled device data.    Electronically Signed By: JULIAN Gresham CRNA  October 28, 2024  10:59 AM

## 2024-10-28 NOTE — DISCHARGE INSTRUCTIONS
After Anesthesia (Sleep Medicine)  What should I do after anesthesia?  You should rest and relax for the next 24 hours. Avoid risky or difficult (strenuous) activity. A responsible adult should stay with you overnight.  Don't drive or use any heavy equipment for 24 hours. Even if you feel normal, your reactions may be affected by the sleep medicine given to you.  Don't drink alcohol or make any important decisions for 24 hours.  Slowly get back to your regular diet, as you feel able.  How should I expect to feel?  It's normal to feel dizzy, light-headed, or faint for up to a full day after anesthesia or while taking pain medicine. If this happens:   Sit down for a few minutes before standing.  Have someone help you when you get up to walk or use the bathroom.  If you have nausea (feel sick to your stomach) or vomit (throw up):   Drink clear liquids (such as apple juice, ginger ale, broth, or 7UP) until you feel better.  If you feel sick to your stomach, or you keep vomiting for 24 hours, please call the doctor.  What else should I know?  You might have a dry mouth, sore throat, muscle aches, or trouble sleeping. These should go away after 24 hours.  Please contact your doctor if you have any other symptoms that concern you, such as fever, pain, bleeding, fluid drainage, swelling, or headache, or if it's been over 8 to 10 hours and you still aren't able to pee (urinate).  If you have a history of sleep apnea, it's very important to use your CPAP machine for the next 24 hours when you nap or sleep.   For informational purposes only. Not to replace the advice of your health care provider. Copyright   2023 [employer]. All rights reserved. Clinically reviewed by Chris Carbajal MD. [employer] 401422 - REV 09/23.  Colonoscopy: What to Expect at Home  Your Recovery  After a colonoscopy, you'll stay at the clinic until you wake up. Then you can go home. But you'll need to arrange for a ride. Your doctor will  tell you when you can eat and do your other usual activities.  Your doctor will talk to you about when you'll need your next colonoscopy. Your doctor can help you decide how often you need to be checked. This will depend on the results of your test and your risk for colorectal cancer.  After the test, you may be bloated or have gas pains. You may need to pass gas. If a biopsy was done or a polyp was removed, you may have streaks of blood in your stool (feces) for a few days. Problems such as heavy rectal bleeding may not occur until several weeks after the test. This isn't common. But it can happen after polyps are removed.  This care sheet gives you a general idea about how long it will take for you to recover. But each person recovers at a different pace. Follow the steps below to get better as quickly as possible.  How can you care for yourself at home?  Activity    Rest when you feel tired.     You can do your normal activities when it feels okay to do so.   Diet    Follow your doctor's directions for eating.     Unless your doctor has told you not to, drink plenty of fluids. This helps to replace the fluids that were lost during the colon prep.     Do not drink alcohol.   Medicines    Your doctor will tell you if and when you can restart your medicines. You will also be given instructions about taking any new medicines.     If you stopped taking aspirin or some other blood thinner, your doctor will tell you when to start taking it again.     If polyps were removed or a biopsy was done during the test, your doctor may tell you not to take aspirin or other anti-inflammatory medicines for a few days. These include ibuprofen (Advil, Motrin) and naproxen (Aleve).   Other instructions    For your safety, do not drive or operate machinery until the medicine wears off and you can think clearly. Your doctor may tell you not to drive or operate machinery until the day after your test.     Do not sign legal documents or  "make major decisions until the medicine wears off and you can think clearly. The anesthesia can make it hard for you to fully understand what you are agreeing to.   Follow-up care is a key part of your treatment and safety. Be sure to make and go to all appointments, and call your doctor if you are having problems. It's also a good idea to know your test results and keep a list of the medicines you take.  When should you call for help?   Call 911 anytime you think you may need emergency care. For example, call if:    You passed out (lost consciousness).     You pass maroon or bloody stools.     You have trouble breathing.   Call your doctor now or seek immediate medical care if:    You have pain that does not get better after you take pain medicine.     You are sick to your stomach or cannot drink fluids.     You have new or worse belly pain.     You have blood in your stools.     You have a fever.     You cannot pass stools or gas.   Watch closely for changes in your health, and be sure to contact your doctor if you have any problems.  Where can you learn more?  Go to https://www.[employer]/patiented  Enter E264 in the search box to learn more about \"Colonoscopy: What to Expect at Home.\"  Current as of: October 25, 2023  Content Version: 14.2 2024 [facility] [employer].   Care instructions adapted under license by your healthcare professional. If you have questions about a medical condition or this instruction, always ask your healthcare professional. [employer] disclaims any warranty or liability for your use of this information.      "

## 2024-10-31 LAB
PATH REPORT.COMMENTS IMP SPEC: NORMAL
PATH REPORT.FINAL DX SPEC: NORMAL
PATH REPORT.GROSS SPEC: NORMAL
PATH REPORT.MICROSCOPIC SPEC OTHER STN: NORMAL
PATH REPORT.RELEVANT HX SPEC: NORMAL
PHOTO IMAGE: NORMAL

## 2024-11-06 ENCOUNTER — ONCOLOGY VISIT (OUTPATIENT)
Dept: ONCOLOGY | Facility: OTHER | Age: 54
End: 2024-11-06
Attending: STUDENT IN AN ORGANIZED HEALTH CARE EDUCATION/TRAINING PROGRAM
Payer: MEDICARE

## 2024-11-06 VITALS
BODY MASS INDEX: 30.47 KG/M2 | OXYGEN SATURATION: 95 % | DIASTOLIC BLOOD PRESSURE: 80 MMHG | SYSTOLIC BLOOD PRESSURE: 126 MMHG | HEART RATE: 94 BPM | WEIGHT: 165.57 LBS | HEIGHT: 62 IN | TEMPERATURE: 98.2 F | RESPIRATION RATE: 20 BRPM

## 2024-11-06 DIAGNOSIS — D72.829 LEUKOCYTOSIS, UNSPECIFIED TYPE: ICD-10-CM

## 2024-11-06 PROCEDURE — G0463 HOSPITAL OUTPT CLINIC VISIT: HCPCS

## 2024-11-06 PROCEDURE — 99205 OFFICE O/P NEW HI 60 MIN: CPT | Performed by: INTERNAL MEDICINE

## 2024-11-06 ASSESSMENT — PAIN SCALES - GENERAL: PAINLEVEL_OUTOF10: SEVERE PAIN (7)

## 2024-11-06 NOTE — PROGRESS NOTES
HEMATOLOGY CONSULT NOTE  Nov 6, 2024    Reason for consult: Leukocytosis    HISTORY OF PRESENT ILLNESS  Jami Wang is a 54 year old female with PMH as stated below was seen in the hematology clinic for leukocytosis.    Her history in short is as follows:    Ms. Wang was found to have an elevated white count dating back to 2/27/2024 in epic.  Her WBC count on 2/27/2024 was 13.2.  More recently on 10/21/2024 is 12.1.  Review of labs in Care Everywhere also shows intermittent leukocytosis first seen 9/16/2015.  She then again had persistent leukocytosis since 9/15/2023.    Complains of fatigue for the last few years.  She has noticed increased loose stools since 2018.  Her colonoscopy done 10/28/2024.  Underwent polypectomy. Denies any recurrent infections.  Has lost weight over the last few months that she is on Ozempic.  Appetite has been more or less stable.  She is also noticed a rash over her chest which has been present over the last 6 years.      REVIEW OF SYSTEMS  A 12-point ROS negative except as in HPI      Current Outpatient Medications   Medication Sig Dispense Refill    ACCU-CHEK GUIDE test strip       blood glucose monitoring (SOFTCLIX) lancets       Blood Glucose Monitoring Suppl (ACCU-CHEK GUIDE) w/Device KIT USE TO TEST BLOOD SUGAR DAILY      citalopram (CELEXA) 10 MG tablet Take 1 tablet (10 mg) by mouth daily. 90 tablet 1    clobetasol (TEMOVATE) 0.05 % external cream Apply topically 2 times daily Apply twice daily to affected lesions for up to 10-14 days. 45 g 0    Continuous Blood Gluc  (DEXCOM G6 ) MIKE Use to read blood sugars as per 's instructions. 1 each 1    Continuous Blood Gluc Sensor (DEXCOM G6 SENSOR) MISC CHANGE SENSOR EVERY 10 DAYS 3 each 3    Continuous Glucose Transmitter (DEXCOM G6 TRANSMITTER) MISC Change every 3 months. 1 each 1    cyanocobalamin (VITAMIN B-12) 500 MCG tablet Take 1 tablet (500 mcg) by mouth daily 90 tablet 3    estradiol  (ESTRACE) 0.1 MG/GM vaginal cream Place vaginally.      fluticasone-salmeterol (ADVAIR) 250-50 MCG/ACT inhaler Inhale 1 puff into the lungs every 12 hours      gabapentin (NEURONTIN) 100 MG capsule Take 1 capsule (100 mg) by mouth 2 times daily. 60 capsule 1    Glucose 4-6 GM-MG CHEW Take 4 g by mouth once.      guaiFENesin (MUCINEX) 600 MG 12 hr tablet Take 1 tablet (600 mg) by mouth 2 times daily as needed for congestion 30 tablet 1    hydrOXYzine (VISTARIL) 25 MG capsule Take 1 capsule (25 mg) by mouth 3 times daily as needed for itching 30 capsule 0    ibuprofen (ADVIL/MOTRIN) 200 MG tablet Take 200 mg by mouth every 4 hours as needed.      ipratropium - albuterol 0.5 mg/2.5 mg/3 mL (DUONEB) 0.5-2.5 (3) MG/3ML neb solution Inhale 3 mLs into the lungs.      ketoconazole (NIZORAL) 2 % external cream Apply topically daily.      levothyroxine (SYNTHROID/LEVOTHROID) 88 MCG tablet Take 88 mcg by mouth daily      losartan (COZAAR) 25 MG tablet TAKE 1/2 TABLET(12.5 MG) BY MOUTH DAILY 45 tablet 3    nicotine (NICODERM CQ) 14 MG/24HR 24 hr patch       nystatin (MYCOSTATIN) 598282 UNIT/GM external powder Apply topically 3 times daily as needed for other (yeast infection). Apply up to three times daily to areas with active yeast. 60 g 0    omeprazole (PRILOSEC) 20 MG DR capsule TAKE 1 CAPSULE(20 MG) BY MOUTH DAILY 90 capsule 3    pramipexole (MIRAPEX) 1.5 MG tablet Take 1.5 mg by mouth      PROAIR  (90 Base) MCG/ACT inhaler       rosuvastatin (CRESTOR) 40 MG tablet Take 1 tablet (40 mg) by mouth daily 90 tablet 0    semaglutide (OZEMPIC) 2 MG/3ML pen Inject 0.5 mg subcutaneously every 7 days. 3 mL 3    SPIRIVA RESPIMAT 2.5 MCG/ACT inhaler INHALE 2 PUFFS INTO THE LUNGS DAILY 4 g 4    traZODone (DESYREL) 150 MG tablet Take 1 tablet (150 mg) by mouth at bedtime. 30 tablet 3    triamcinolone (KENALOG) 0.5 % external ointment       nicotine (NICORETTE) 2 MG gum Place 1 each (2 mg) inside cheek every hour as needed for  nicotine withdrawal symptoms (Patient not taking: Reported on 10/21/2024) 100 each 1       Allergies   Allergen Reactions    Hydromorphone Other (See Comments)    Other Food Allergy Itching     Walnuts, pecans     Amoxicillin-Pot Clavulanate     Azithromycin      Other reaction(s): Stomach Upset    Black Alexander Flavor Itching    Erythromycin Nausea and Vomiting    Pecan Extract Itching    Tramadol Itching and Dizziness    Other (Do Not Use) Rash     Epoxy Resin    Penicillins Rash     Has tolerated Cefazolin (ANCEF) and other Cephalosporins     Immunization History   Administered Date(s) Administered    Flu, Unspecified 10/10/2017    Influenza Vaccine >6 months,quad, PF 10/10/2017, 09/27/2018, 12/08/2023    Influenza Vaccine Trivalent (FluBlok) 09/23/2024    Meningococcal ACWY (Menactra ) 09/16/2005    Pneumococcal 20 valent Conjugate (Prevnar 20) 08/17/2022    TDAP (Adacel,Boostrix) 07/07/2009, 09/10/2015, 10/23/2024    Zoster recombinant adjuvanted (SHINGRIX) 04/20/2021, 06/22/2021       Past Medical History:   Diagnosis Date    Abnormal echocardiogram 01/31/2024    Normal stress test and cardiac MRI 2024      Arthritis     Decreased left ventricular function 02/27/2024    Had confirmatory MRI - showed normal heart function. No evidence for reduced EF      Depressive disorder     Gastroesophageal reflux disease with esophagitis     H. pylori infection 2018    Hashimoto's disease     Hypothyroidism     Insomnia     Mild intermittent asthma without complication 12/17/2015    Formatting of this note might be different from the original.   Last Assessment & Plan:     Formatting of this note might be different from the original.    This is generally been stable.  May be slight worsening recently with her rash.      Type 2 diabetes mellitus ([facility])     Umbilical hernia without obstruction and without gangrene     Urticaria     Last Assessment & Plan: Formatting of this note might be different from the original. Patient  "presented to the emergency room on 3/19/2023 with history of full body rash starting 3 weeks prior with discomfort and itching treated with hydrocortisone cream without relief.  Homeless family member had moved back into the house with prior exposure to bedbugs.  Under breasts lower legs severity moderate       Past Surgical History:   Procedure Laterality Date    ABDOMEN SURGERY      CARPAL TUNNEL RELEASE RT/LT Bilateral     cmc arthroplasty Left     COLONOSCOPY N/A 10/28/2024    Procedure: COLONOSCOPY WITH POLYPECTOMY;  Surgeon: Enrique Brambila MD;  Location: [facility] OR    COLONOSCOPY - HIM SCAN N/A 10/04/2018    [employer]. 3 hyperplastic polyps. repeat 10 yr    HYSTERECTOMY, PAP NO LONGER INDICATED N/A 10/10/2018    [facility].    HYSTERECTOMY, TOTAL, LAPAROSCOPIC, WITH SALPINGO-OOPHORECTOMY, CYSTOSCOPY Bilateral 10/10/2018    [facility].       SOCIAL HISTORY  History   Smoking Status    Every Day    Types: Cigarettes   Smokeless Tobacco    Never    Social History    Substance and Sexual Activity      Alcohol use: Not Currently     History   Drug Use    Types: Marijuana     Comment: \"rarely\"       FAMILY HISTORY  Family History   Problem Relation Age of Onset    Osteoporosis Mother     Bipolar Disorder Mother        PHYSICAL EXAMINATION  /80   Pulse 94   Temp 98.2  F (36.8  C) (Tympanic)   Resp 20   Ht 1.575 m (5' 2\")   Wt 75.1 kg (165 lb 9.1 oz)   SpO2 95%   BMI 30.28 kg/m    Wt Readings from Last 2 Encounters:   11/06/24 75.1 kg (165 lb 9.1 oz)   10/28/24 74.7 kg (164 lb 9.6 oz)     Physical Exam  Constitutional:       Appearance: Normal appearance.   Pulmonary:      Effort: Pulmonary effort is normal.   Abdominal:      General: Abdomen is flat.   Lymphadenopathy:      Cervical:      Right cervical: No superficial cervical adenopathy.     Left cervical: No superficial cervical adenopathy.      Upper Body:      Right upper body: No supraclavicular " or axillary adenopathy.      Left upper body: No supraclavicular or axillary adenopathy.   Neurological:      General: No focal deficit present.      Mental Status: She is alert and oriented to person, place, and time.         Laboratory and Imaging:     Latest Reference Range & Units 10/21/24 09:20   WBC 4.0 - 11.0 10e3/uL 12.1 (H)   Hemoglobin 11.7 - 15.7 g/dL 14.4   Hematocrit 35.0 - 47.0 % 43.7   Platelet Count 150 - 450 10e3/uL 233   (H): Data is abnormally high      10/21/2024: Peripheral Smear: The erythrocytes are normocytic normochromic. There is no significant anisopoikilocytosis. Polychromasia is present. The leukocyte count is increased and the differential demonstrates a mild neutrophilia.. Neutrophils are normally-granulated mature forms. Lymphocytes are polymorphic. Platelets are present in adequate numbers. They have normal granulation and morphology.     ASSESSMENT AND PLAN    Leukocytosis:    Intermittent leukocytosis seen dating back to 2015 with more persistent leukocytosis seen since 9/2023. Leukocytosis is mostly neutrophilic. There is no left shift. Peripheral smear did not show any abnormality. This is therefore very likely reactive.     She does have some symptoms like chronic lose stools, rash and is a long time smoker. These could be the underlying cause of the reactive leukocytosis. I would recommend monitoring CBCD atleast one a year.     Follow up with hematology as needed.     Total time spent on the patient on day of encounter was 60 minutes doing chart review, review of test results, interpretation of results, patient visit and documentation.       Carmelita Squires MD

## 2024-11-06 NOTE — NURSING NOTE
"Oncology Rooming Note    November 6, 2024 11:21 AM   Jami Wang is a 54 year old female who presents for:    Chief Complaint   Patient presents with    Hematology     Follow up - Leukocytosis     Initial Vitals: /80   Pulse 94   Temp 98.2  F (36.8  C) (Tympanic)   Resp 20   Ht 1.575 m (5' 2\")   Wt 75.1 kg (165 lb 9.1 oz)   SpO2 95%   BMI 30.28 kg/m   Estimated body mass index is 30.28 kg/m  as calculated from the following:    Height as of this encounter: 1.575 m (5' 2\").    Weight as of this encounter: 75.1 kg (165 lb 9.1 oz). Body surface area is 1.81 meters squared.  Severe Pain (7) Comment: back and right hand   No LMP recorded. Patient has had a hysterectomy.  Allergies reviewed: Yes  Medications reviewed: Yes    Medications: Medication refills not needed today.  Pharmacy name entered into EPIC:    [id number] - [place], [place] - [address] [id number] - [place], [place] - [address] AT [facility] [address]  [address]  [facility] - [place], [place] - [address]    Frailty Screening:   Is the patient here for a new oncology consult visit in cancer care? 2. No      Clinical concerns: none      Tahira Corea LPN             "

## 2024-11-07 ENCOUNTER — OFFICE VISIT (OUTPATIENT)
Dept: FAMILY MEDICINE | Facility: OTHER | Age: 54
End: 2024-11-07
Attending: STUDENT IN AN ORGANIZED HEALTH CARE EDUCATION/TRAINING PROGRAM
Payer: MEDICARE

## 2024-11-07 VITALS
RESPIRATION RATE: 16 BRPM | TEMPERATURE: 97.7 F | OXYGEN SATURATION: 96 % | HEIGHT: 62 IN | BODY MASS INDEX: 30.61 KG/M2 | DIASTOLIC BLOOD PRESSURE: 85 MMHG | SYSTOLIC BLOOD PRESSURE: 136 MMHG | WEIGHT: 166.31 LBS

## 2024-11-07 DIAGNOSIS — F41.1 GENERALIZED ANXIETY DISORDER: ICD-10-CM

## 2024-11-07 DIAGNOSIS — G62.9 NEUROPATHY: ICD-10-CM

## 2024-11-07 DIAGNOSIS — E78.2 MIXED HYPERLIPIDEMIA: Chronic | ICD-10-CM

## 2024-11-07 DIAGNOSIS — E03.9 HYPOTHYROIDISM (ACQUIRED): ICD-10-CM

## 2024-11-07 DIAGNOSIS — G25.81 RLS (RESTLESS LEGS SYNDROME): Chronic | ICD-10-CM

## 2024-11-07 DIAGNOSIS — Z86.39 HISTORY OF IRON DEFICIENCY: ICD-10-CM

## 2024-11-07 DIAGNOSIS — F32.A DEPRESSIVE DISORDER: Primary | ICD-10-CM

## 2024-11-07 DIAGNOSIS — L24.89 IRRITANT CONTACT DERMATITIS DUE TO OTHER AGENTS: ICD-10-CM

## 2024-11-07 DIAGNOSIS — F39 MOOD DISORDER (H): ICD-10-CM

## 2024-11-07 DIAGNOSIS — E11.42 TYPE 2 DIABETES MELLITUS WITH DIABETIC POLYNEUROPATHY, WITHOUT LONG-TERM CURRENT USE OF INSULIN (H): ICD-10-CM

## 2024-11-07 DIAGNOSIS — R03.0 ELEVATED BLOOD PRESSURE READING: ICD-10-CM

## 2024-11-07 LAB
ALBUMIN SERPL BCG-MCNC: 4.3 G/DL (ref 3.5–5.2)
ALP SERPL-CCNC: 99 U/L (ref 40–150)
ALT SERPL W P-5'-P-CCNC: 26 U/L (ref 0–50)
AST SERPL W P-5'-P-CCNC: 19 U/L (ref 0–45)
BILIRUB DIRECT SERPL-MCNC: <0.2 MG/DL (ref 0–0.3)
BILIRUB SERPL-MCNC: 0.2 MG/DL
FERRITIN SERPL-MCNC: 217 NG/ML (ref 11–328)
IRON BINDING CAPACITY (ROCHE): 251 UG/DL (ref 240–430)
IRON SATN MFR SERPL: 22 % (ref 15–46)
IRON SERPL-MCNC: 54 UG/DL (ref 37–145)
PROT SERPL-MCNC: 7.2 G/DL (ref 6.4–8.3)

## 2024-11-07 PROCEDURE — G0463 HOSPITAL OUTPT CLINIC VISIT: HCPCS

## 2024-11-07 PROCEDURE — 82248 BILIRUBIN DIRECT: CPT | Mod: ZL | Performed by: STUDENT IN AN ORGANIZED HEALTH CARE EDUCATION/TRAINING PROGRAM

## 2024-11-07 PROCEDURE — G2211 COMPLEX E/M VISIT ADD ON: HCPCS | Performed by: STUDENT IN AN ORGANIZED HEALTH CARE EDUCATION/TRAINING PROGRAM

## 2024-11-07 PROCEDURE — 83550 IRON BINDING TEST: CPT | Mod: ZL | Performed by: STUDENT IN AN ORGANIZED HEALTH CARE EDUCATION/TRAINING PROGRAM

## 2024-11-07 PROCEDURE — 84155 ASSAY OF PROTEIN SERUM: CPT | Mod: ZL | Performed by: STUDENT IN AN ORGANIZED HEALTH CARE EDUCATION/TRAINING PROGRAM

## 2024-11-07 PROCEDURE — 99214 OFFICE O/P EST MOD 30 MIN: CPT | Performed by: STUDENT IN AN ORGANIZED HEALTH CARE EDUCATION/TRAINING PROGRAM

## 2024-11-07 PROCEDURE — 82728 ASSAY OF FERRITIN: CPT | Mod: ZL | Performed by: STUDENT IN AN ORGANIZED HEALTH CARE EDUCATION/TRAINING PROGRAM

## 2024-11-07 PROCEDURE — 36415 COLL VENOUS BLD VENIPUNCTURE: CPT | Mod: ZL | Performed by: STUDENT IN AN ORGANIZED HEALTH CARE EDUCATION/TRAINING PROGRAM

## 2024-11-07 RX ORDER — TRIAMCINOLONE ACETONIDE 5 MG/G
OINTMENT TOPICAL
Qty: 15 G | Refills: 0 | Status: SHIPPED | OUTPATIENT
Start: 2024-11-07

## 2024-11-07 RX ORDER — CITALOPRAM HYDROBROMIDE 10 MG/1
10 TABLET ORAL DAILY
Qty: 90 TABLET | Refills: 1 | Status: SHIPPED | OUTPATIENT
Start: 2024-11-07

## 2024-11-07 RX ORDER — ROSUVASTATIN CALCIUM 40 MG/1
40 TABLET, COATED ORAL DAILY
Qty: 90 TABLET | Refills: 3 | Status: SHIPPED | OUTPATIENT
Start: 2024-11-07

## 2024-11-07 RX ORDER — LEVOTHYROXINE SODIUM 88 UG/1
88 TABLET ORAL DAILY
Qty: 90 TABLET | Refills: 3 | Status: SHIPPED | OUTPATIENT
Start: 2024-11-07

## 2024-11-07 RX ORDER — ROPINIROLE 0.25 MG/1
TABLET, FILM COATED ORAL
Qty: 44 TABLET | Refills: 0 | Status: SHIPPED | OUTPATIENT
Start: 2024-11-07 | End: 2024-11-23

## 2024-11-07 RX ORDER — LOSARTAN POTASSIUM 25 MG/1
12.5 TABLET ORAL DAILY
Qty: 45 TABLET | Refills: 3 | Status: SHIPPED | OUTPATIENT
Start: 2024-11-07

## 2024-11-07 ASSESSMENT — ANXIETY QUESTIONNAIRES
3. WORRYING TOO MUCH ABOUT DIFFERENT THINGS: SEVERAL DAYS
GAD7 TOTAL SCORE: 7
IF YOU CHECKED OFF ANY PROBLEMS ON THIS QUESTIONNAIRE, HOW DIFFICULT HAVE THESE PROBLEMS MADE IT FOR YOU TO DO YOUR WORK, TAKE CARE OF THINGS AT HOME, OR GET ALONG WITH OTHER PEOPLE: NOT DIFFICULT AT ALL
5. BEING SO RESTLESS THAT IT IS HARD TO SIT STILL: NOT AT ALL
1. FEELING NERVOUS, ANXIOUS, OR ON EDGE: SEVERAL DAYS
GAD7 TOTAL SCORE: 7
4. TROUBLE RELAXING: NOT AT ALL
6. BECOMING EASILY ANNOYED OR IRRITABLE: MORE THAN HALF THE DAYS
7. FEELING AFRAID AS IF SOMETHING AWFUL MIGHT HAPPEN: SEVERAL DAYS
2. NOT BEING ABLE TO STOP OR CONTROL WORRYING: MORE THAN HALF THE DAYS

## 2024-11-07 ASSESSMENT — PAIN SCALES - GENERAL: PAINLEVEL_OUTOF10: SEVERE PAIN (7)

## 2024-11-07 ASSESSMENT — PATIENT HEALTH QUESTIONNAIRE - PHQ9: SUM OF ALL RESPONSES TO PHQ QUESTIONS 1-9: 9

## 2024-11-07 NOTE — PATIENT INSTRUCTIONS
Cut Trazodone in half - would take at 1/2 dose for one month, then stop if able. Wait to wean trazodone until restless legs figured out.    Continue celexa.   Check dose on mirapex - send me message with dose. If >0.75mg/day, will need to keep at this dose OR change to requip.   Sent in cream - use twice daily for 10 days. Avoid arms on table.

## 2024-11-07 NOTE — PROGRESS NOTES
Assessment & Plan     Depressive disorder / Generalized anxiety disorder / mood disorder   Continues with some mood instability - more related to easily irritable, on edge, etc. Feel she would benefit from mood stabilizer - ?valproic acid. Didn't tolerate abilify or lamictal in the past. Reassuring LFTs are normal today. Reviewed would need lab monitoring with valproic acid. Option of seroquel - she declined. Plan to review further with psychiatry and will discuss again during phone visit.   - Hepatic panel (Albumin, ALT, AST, Bili, Alk Phos, TP); Future  - citalopram (CELEXA) 10 MG tablet; Take 1 tablet (10 mg) by mouth daily.  - Hepatic panel (Albumin, ALT, AST, Bili, Alk Phos, TP)    Neuropathy  Awaiting EMG testing for CRPS - this is scheduled   Tolerating gabapentin - may need higher dose but await EMG    Mixed hyperlipidemia  Refilled   - rosuvastatin (CRESTOR) 40 MG tablet; Take 1 tablet (40 mg) by mouth daily.    Irritant contact dermatitis due to other agents  Possibly related to epoxy resin, which is coming into direct contact with the area that is inflamed with eczema.  May also just be a flare of eczema, which she did have in the past.  Possibly using Kenalog 0.5% at home already but will reorder.  May need updated prescription.  Can utilize this and hydroxyzine 1-2 times per day for pruritus.  Needs to avoid epoxy resin.  If not improving, will need follow-up.  - triamcinolone (KENALOG) 0.5 % external ointment; Apply pea sized amount to active eczematous lesions on elbows twice daily for 10 days.    Elevated blood pressure reading  Refilled.   - losartan (COZAAR) 25 MG tablet; Take 0.5 tablets (12.5 mg) by mouth daily.    Type 2 diabetes mellitus with diabetic polyneuropathy, without long-term current use of insulin (H)  Refilled   - semaglutide (OZEMPIC) 2 MG/3ML pen; Inject 0.5 mg subcutaneously every 7 days.    RLS (restless legs syndrome)  Worsening.  May be developing some tolerance to  pramipexole but reviewed I am not willing to increase dose further, already on higher than recommended dose for restless legs at 1.5 mg once daily.  Iron level normal.  Discussed option of transition to Requip, she was agreeable.  Will reduce pramipexole to 0.75 mg once daily for 3 nights then transition over to Requip.  Ideally she would reduce even further from 0.75 mg but wants to make the transition sooner.  Will reach out if any issues occur.  Plan to reassess dose titration with phone visit in 2 weeks.  - Ferritin; Future  - Iron and iron binding capacity; Future  - rOPINIRole (REQUIP) 0.25 MG tablet; Take 1 tablet (0.25 mg) by mouth at bedtime for 2 days, THEN 2 tablets (0.5 mg) at bedtime for 7 days, THEN 4 tablets (1 mg) at bedtime for 7 days.  - Ferritin  - Iron and iron binding capacity    Hypothyroidism (acquired)  Refilled   - levothyroxine (SYNTHROID/LEVOTHROID) 88 MCG tablet; Take 1 tablet (88 mcg) by mouth daily.    History of iron deficiency  Recheck today - iron normal.   - Ferritin; Future  - Iron and iron binding capacity; Future  - Ferritin  - Iron and iron binding capacity      The longitudinal plan of care for the diagnosis(es)/condition(s) as documented were addressed during this visit. Due to the added complexity in care, I will continue to support Gabrielle in the subsequent management and with ongoing continuity of care.    Anton Anthony is a 54 year old, presenting for the following health issues:  Recheck Medication (gabapentin), Depression, Anxiety, and Lipids        11/7/2024     8:45 AM   Additional Questions   Roomed by Felisha Mcwilliams   Accompanied by          11/7/2024     8:45 AM   Patient Reported Additional Medications   Patient reports taking the following new medications None     HPI   1 month of lesions on forearm. Both forearms. Enlarging and more pruritic.   No history of this prior. Does have history of eczema.   No lesions elsewhere.   Area of leaning on table  "made with epoxy resin - has allergy   Lesions are directly in those spots   Did try a cream 1-2x/day off/on.     Restless leg symptoms are worsening. Has been taking two of the pramipexole 1.5mg tablets occassionally. This is providing some relief but not full. Tried requip in the past, unsure of benefit. Does not recall dose.     Depression and Anxiety   How are you doing with your depression since your last visit? Improved slightly   How are you doing with your anxiety since your last visit?  Unsure   Are you having other symptoms that might be associated with depression or anxiety? No  Have you had a significant life event? OTHER:      Do you have any concerns with your use of alcohol or other drugs? No    Celexa 10mg is working and going fine.   Still crabby and irritated - overall rare - but extreme when it does     Social History     Tobacco Use    Smoking status: Every Day     Current packs/day: 1.00     Average packs/day: 1 pack/day for 38.8 years (38.8 ttl pk-yrs)     Types: Cigarettes     Start date: 1/1/1986     Passive exposure: Current    Smokeless tobacco: Never   Vaping Use    Vaping status: Former   Substance Use Topics    Alcohol use: Not Currently    Drug use: Yes     Types: Marijuana     Comment: \"rarely\"         7/9/2024     8:10 AM 9/23/2024     9:39 AM 11/7/2024     8:00 AM   PHQ   PHQ-9 Total Score 3 7 9   Q9: Thoughts of better off dead/self-harm past 2 weeks Not at all  Not at all  Not at all       Patient-reported         8/9/2024    10:43 AM 9/23/2024     9:40 AM 11/7/2024     8:00 AM   REINALDO-7 SCORE   Total Score 18 (severe anxiety) 7 (mild anxiety)    Total Score 18 7 7         11/7/2024     8:00 AM   Last PHQ-9   1.  Little interest or pleasure in doing things 1   2.  Feeling down, depressed, or hopeless 1   3.  Trouble falling or staying asleep, or sleeping too much 3   4.  Feeling tired or having little energy 3   5.  Poor appetite or overeating 0   6.  Feeling bad about yourself 1   7. " " Trouble concentrating 0   8.  Moving slowly or restless 0   Q9: Thoughts of better off dead/self-harm past 2 weeks 0   PHQ-9 Total Score 9   Difficulty at work, home, or with people Not difficult at all         11/7/2024     8:00 AM   REINALDO-7    1. Feeling nervous, anxious, or on edge 1   2. Not being able to stop or control worrying 2   3. Worrying too much about different things 1   4. Trouble relaxing 0   5. Being so restless that it is hard to sit still 0   6. Becoming easily annoyed or irritable 2   7. Feeling afraid, as if something awful might happen 1   REINALDO-7 Total Score 7   If you checked any problems, how difficult have they made it for you to do your work, take care of things at home, or get along with other people? Not difficult at all       Hyperlipidemia Follow-Up    Are you regularly taking any medication or supplement to lower your cholesterol?   Yes- rosuvastatin   Are you having muscle aches or other side effects that you think could be caused by your cholesterol lowering medication?  Yes- patient reports she has rashes and she is unsure if related to medication or if it is due to something else     Medication Followup of Gabapentin   Taking Medication as prescribed: yes  Side Effects:  None  Medication Helping Symptoms:  NO          Objective    /85 (BP Location: Right arm, Patient Position: Sitting, Cuff Size: Adult Regular)   Temp 97.7  F (36.5  C) (Tympanic)   Resp 16   Ht 1.575 m (5' 2\")   Wt 75.4 kg (166 lb 5 oz)   SpO2 96%   BMI 30.42 kg/m    Body mass index is 30.42 kg/m .  Physical Exam  Constitutional:       Appearance: Normal appearance.   Pulmonary:      Effort: Pulmonary effort is normal.   Skin:     Comments: Erythematous, thickened skin with in plaques over extensor area above elbow. No crusting. No satellite lesions. No warmth. No lesions elsewhere.   Neurological:      Mental Status: She is alert.   Psychiatric:         Mood and Affect: Mood normal.         Behavior: " Behavior normal.         Thought Content: Thought content normal.         Judgment: Judgment normal.                    Signed Electronically by: Hannah Eaton MD

## 2024-11-21 ENCOUNTER — VIRTUAL VISIT (OUTPATIENT)
Dept: FAMILY MEDICINE | Facility: OTHER | Age: 54
End: 2024-11-21
Attending: STUDENT IN AN ORGANIZED HEALTH CARE EDUCATION/TRAINING PROGRAM
Payer: MEDICARE

## 2024-11-21 DIAGNOSIS — F41.1 GENERALIZED ANXIETY DISORDER: ICD-10-CM

## 2024-11-21 DIAGNOSIS — F32.A DEPRESSIVE DISORDER: ICD-10-CM

## 2024-11-21 DIAGNOSIS — L30.1 DYSHIDROTIC ECZEMA: ICD-10-CM

## 2024-11-21 DIAGNOSIS — G25.81 RLS (RESTLESS LEGS SYNDROME): Primary | ICD-10-CM

## 2024-11-21 DIAGNOSIS — L24.89 IRRITANT CONTACT DERMATITIS DUE TO OTHER AGENTS: ICD-10-CM

## 2024-11-21 RX ORDER — TRAZODONE HYDROCHLORIDE 150 MG/1
75 TABLET ORAL AT BEDTIME
Qty: 45 TABLET | Refills: 1 | Status: SHIPPED | OUTPATIENT
Start: 2024-11-21

## 2024-11-21 RX ORDER — PRAMIPEXOLE DIHYDROCHLORIDE 0.75 MG/1
0.75 TABLET ORAL AT BEDTIME
Qty: 90 TABLET | Refills: 3 | Status: SHIPPED | OUTPATIENT
Start: 2024-11-21

## 2024-11-21 RX ORDER — DIVALPROEX SODIUM 250 MG/1
250 TABLET, FILM COATED, EXTENDED RELEASE ORAL DAILY
Qty: 30 TABLET | Refills: 0 | Status: SHIPPED | OUTPATIENT
Start: 2024-11-21

## 2024-11-21 NOTE — PROGRESS NOTES
Gabrielle is a 54 year old who is being evaluated via a billable telephone visit.    What phone number would you like to be contacted at? [phone]  How would you like to obtain your AVS? Jessica  Originating Location (pt. Location): Home    Distant Location (provider location):  On-site    Assessment & Plan     RLS (restless legs syndrome)  Did not tolerate Requip.  No benefit at low-dose and fatigue/excessive sleep on 0.5 mg.  Did resume pramipexole, tolerating at 0.75 mg daily.  Will continue this.  Okay to take extra 0.375 mg once weekly if needed in the morning for worsening symptoms.  Recent iron level was normal.  - pramipexole (MIRAPEX) 0.75 MG tablet; Take 1 tablet (0.75 mg) by mouth at bedtime.    Depressive disorder  Generalized anxiety disorder  Ongoing issue with mood lability, irritability.  Failed Lamictal and Abilify in the past.  No evidence for bipolar.  Will plan at a different mood stabilizer, Depakote.  Did discuss this with psychiatry.  Start with low-dose, titrate slowly.  Will monitor CBC and LFTs at follow-up.  Consider level if dosing gets to 500 mg daily.  Extensively reviewed monitoring parameters and necessary follow-up with patient.  Reviewed other possible side effects.  Follow-up 1 month, sooner if concerns.  - divalproex sodium extended-release (DEPAKOTE ER) 250 MG 24 hr tablet; Take 1 tablet (250 mg) by mouth daily.    Dyshidrotic eczema  Irritant contact dermatitis due to other agents  Hands are better but now some appearance of lesions on legs.  Unclear trigger.  Will plan referral to allergy for patch testing.  Referral placed to [facility].  - Adult Allergy/Asthma  Referral        Subjective   Gabrielle is a 54 year old, presenting for the following health issues:  Anxiety, Depression, Recheck Medication, Diabetes, Lipids, Hypertension, and Back Pain (Chronic)        11/21/2024     9:19 AM   Additional Questions   Roomed by Jose Fink LPN   Accompanied by Self          11/21/2024     9:19 AM   Patient Reported Additional Medications   Patient reports taking the following new medications Requip, started 1 1/2 weeks ago, stopped medication about 4 days ago due to sleepiness     HPI     Restless legs  Did requip for 3-4 days, didn't help.   Increased to 0.5mg and was too sedated.   Back on pramipexole now, 1/2 tablet is working.wondering about taking extra 1/2 tab once/week as needed when RLS flares or she over does it.   Was able to reduce Trazodone to 75mg at bedtime. Wants to take 150mg if needed - rare.   Nights when SO is gone it's harder to sleep. More anxious.     Had EMG done for wrist. Needs a pain clinic.     Has been avoiding epoxy resin on the table and does feel like her irritant dermatitis and dyshidrotic eczema have improved.  She has noticed some spots on her legs though, unsure where this is coming from.  Hoping to find out the etiology.    Depression and Anxiety   How are you doing with your depression since your last visit? Improved   How are you doing with your anxiety since your last visit?  No change  Are you having other symptoms that might be associated with depression or anxiety? No  Have you had a significant life event? No   Do you have any concerns with your use of alcohol or other drugs? No    Still feeling really irritable and on edge with mood lability.  Wanting to try Depakote.  Did her own research on this.  I also discussed with psychology/psychiatry in terms of monitoring and dose recommendations.  Didn't tolerate abilify or lamictal in the past     Social History     Tobacco Use    Smoking status: Every Day     Current packs/day: 1.00     Average packs/day: 1 pack/day for 38.9 years (38.9 ttl pk-yrs)     Types: Cigarettes     Start date: 1/1/1986     Passive exposure: Current    Smokeless tobacco: Never   Vaping Use    Vaping status: Former   Substance Use Topics    Alcohol use: Not Currently    Drug use: Yes     Types: Marijuana     Comment:  "\"rarely\"         7/9/2024     8:10 AM 9/23/2024     9:39 AM 11/7/2024     8:00 AM   PHQ   PHQ-9 Total Score 3 7 9   Q9: Thoughts of better off dead/self-harm past 2 weeks Not at all  Not at all  Not at all       Patient-reported         8/9/2024    10:43 AM 9/23/2024     9:40 AM 11/7/2024     8:00 AM   REINALDO-7 SCORE   Total Score 18 (severe anxiety) 7 (mild anxiety)    Total Score 18 7 7             Objective    Vitals - Patient Reported  Pain Score: Severe Pain (7)  Pain Loc: Hand (Right hand)    Vitals:  No vitals were obtained today due to virtual visit.    Physical Exam   General: Alert and no distress //Respiratory: No audible wheeze, cough, or shortness of breath // Psychiatric:  Appropriate affect, tone, and pace of words        Phone call duration: 14 minutes  Signed Electronically by: Hannah Eaton MD    "

## 2024-11-21 NOTE — PROGRESS NOTES
Attempt # 1  Outcome: Left Message   Comment: LVM for patient to call to schedule with Dr. Eaton in 3 to 4 weeks with lab first.

## 2024-11-27 ENCOUNTER — TELEPHONE (OUTPATIENT)
Dept: FAMILY MEDICINE | Facility: OTHER | Age: 54
End: 2024-11-27

## 2024-11-27 NOTE — TELEPHONE ENCOUNTER
"Pt started depakote 11/21  States about 5 hours after taking she feels nauseous & generally unwell.    Stopped taking dose about 2 days ago.  Felt \"just fine\" last two days.   Writer in agreement to hold med  Pt has  there - sounds like good support  Pt states she will be ok until f/up with [facility] on 12/17/24    Recommended ED/UC with any acute/rapid decline in condition.  Call back to the clinic with any further questions/concerns  Patient relayed understanding  No further concerns at calls end.    "

## 2024-12-16 ENCOUNTER — MEDICAL CORRESPONDENCE (OUTPATIENT)
Dept: MRI IMAGING | Facility: HOSPITAL | Age: 54
End: 2024-12-16

## 2024-12-17 ENCOUNTER — ANCILLARY PROCEDURE (OUTPATIENT)
Dept: GENERAL RADIOLOGY | Facility: OTHER | Age: 54
End: 2024-12-17
Attending: STUDENT IN AN ORGANIZED HEALTH CARE EDUCATION/TRAINING PROGRAM
Payer: MEDICARE

## 2024-12-17 ENCOUNTER — OFFICE VISIT (OUTPATIENT)
Dept: FAMILY MEDICINE | Facility: OTHER | Age: 54
End: 2024-12-17
Attending: STUDENT IN AN ORGANIZED HEALTH CARE EDUCATION/TRAINING PROGRAM
Payer: MEDICARE

## 2024-12-17 VITALS
WEIGHT: 164.3 LBS | BODY MASS INDEX: 30.24 KG/M2 | DIASTOLIC BLOOD PRESSURE: 80 MMHG | SYSTOLIC BLOOD PRESSURE: 118 MMHG | HEART RATE: 100 BPM | HEIGHT: 62 IN | TEMPERATURE: 96.8 F | OXYGEN SATURATION: 94 %

## 2024-12-17 DIAGNOSIS — M54.50 ACUTE BILATERAL LOW BACK PAIN WITHOUT SCIATICA: ICD-10-CM

## 2024-12-17 DIAGNOSIS — G89.29 CHRONIC BILATERAL LOW BACK PAIN WITHOUT SCIATICA: ICD-10-CM
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
## 2024-12-17 DIAGNOSIS — F41.1 GENERALIZED ANXIETY DISORDER: ICD-10-CM

## 2024-12-17 DIAGNOSIS — F32.A DEPRESSIVE DISORDER: Primary | ICD-10-CM

## 2024-12-17 DIAGNOSIS — M43.17 SPONDYLOLISTHESIS OF LUMBOSACRAL REGION: ICD-10-CM

## 2024-12-17 DIAGNOSIS — L30.9 ECZEMA, UNSPECIFIED TYPE: ICD-10-CM

## 2024-12-17 DIAGNOSIS — M54.50 CHRONIC BILATERAL LOW BACK PAIN WITHOUT SCIATICA: ICD-10-CM

## 2024-12-17 PROCEDURE — 72100 X-RAY EXAM L-S SPINE 2/3 VWS: CPT | Mod: TC

## 2024-12-17 PROCEDURE — G0463 HOSPITAL OUTPT CLINIC VISIT: HCPCS

## 2024-12-17 PROCEDURE — 250N000011 HC RX IP 250 OP 636: Mod: JZ | Performed by: STUDENT IN AN ORGANIZED HEALTH CARE EDUCATION/TRAINING PROGRAM

## 2024-12-17 PROCEDURE — 96372 THER/PROPH/DIAG INJ SC/IM: CPT | Performed by: STUDENT IN AN ORGANIZED HEALTH CARE EDUCATION/TRAINING PROGRAM

## 2024-12-17 RX ORDER — HYDROXYZINE PAMOATE 25 MG/1
25 CAPSULE ORAL 3 TIMES DAILY PRN
Qty: 30 CAPSULE | Refills: 0 | Status: SHIPPED | OUTPATIENT
Start: 2024-12-17

## 2024-12-17 RX ORDER — CEPHALEXIN 500 MG/1
500 CAPSULE ORAL 3 TIMES DAILY
Qty: 15 CAPSULE | Refills: 0 | Status: SHIPPED | OUTPATIENT
Start: 2024-12-17 | End: 2024-12-22

## 2024-12-17 RX ORDER — EMOLLIENT BASE
CREAM (GRAM) TOPICAL 2 TIMES DAILY
Qty: 453 G | Refills: 1 | Status: SHIPPED | OUTPATIENT
Start: 2024-12-17

## 2024-12-17 RX ORDER — TRIAMCINOLONE ACETONIDE 40 MG/ML
40 INJECTION, SUSPENSION INTRA-ARTICULAR; INTRAMUSCULAR ONCE
Status: COMPLETED | OUTPATIENT
Start: 2024-12-17 | End: 2024-12-17

## 2024-12-17 RX ORDER — CLOBETASOL PROPIONATE 0.5 MG/G
CREAM TOPICAL
Qty: 60 G | Refills: 0 | Status: SHIPPED | OUTPATIENT
Start: 2024-12-17

## 2024-12-17 RX ADMIN — TRIAMCINOLONE ACETONIDE 40 MG: 40 INJECTION, SUSPENSION INTRA-ARTICULAR; INTRAMUSCULAR at 09:24

## 2024-12-17 ASSESSMENT — ANXIETY QUESTIONNAIRES
7. FEELING AFRAID AS IF SOMETHING AWFUL MIGHT HAPPEN: SEVERAL DAYS
GAD7 TOTAL SCORE: 3
1. FEELING NERVOUS, ANXIOUS, OR ON EDGE: NOT AT ALL
6. BECOMING EASILY ANNOYED OR IRRITABLE: SEVERAL DAYS
3. WORRYING TOO MUCH ABOUT DIFFERENT THINGS: NOT AT ALL
GAD7 TOTAL SCORE: 3
5. BEING SO RESTLESS THAT IT IS HARD TO SIT STILL: NOT AT ALL
IF YOU CHECKED OFF ANY PROBLEMS ON THIS QUESTIONNAIRE, HOW DIFFICULT HAVE THESE PROBLEMS MADE IT FOR YOU TO DO YOUR WORK, TAKE CARE OF THINGS AT HOME, OR GET ALONG WITH OTHER PEOPLE: NOT DIFFICULT AT ALL
2. NOT BEING ABLE TO STOP OR CONTROL WORRYING: NOT AT ALL

## 2024-12-17 ASSESSMENT — PAIN SCALES - GENERAL: PAINLEVEL_OUTOF10: SEVERE PAIN (7)

## 2024-12-17 ASSESSMENT — PATIENT HEALTH QUESTIONNAIRE - PHQ9
5. POOR APPETITE OR OVEREATING: SEVERAL DAYS
SUM OF ALL RESPONSES TO PHQ QUESTIONS 1-9: 6

## 2024-12-17 NOTE — PATIENT INSTRUCTIONS
Make sure laundry detergent hypoallergic and non scented. No more gain.   Make sure body wash is sensitive - dove  KEEP spouses clothes separate from yours   Apply vanicream twice daily - ESPECIALLY AFTER BATHS/SHOWER. I sent to pharmacy.   If really bad itching at night, use hydroxyzine. Oral pill.   Ordered strong steroid to use as needed for itching.   Consider gloves at night  Steroid shot today - should last 3 weeks  Keflex to cover infection three times daily for five days.

## 2024-12-17 NOTE — RESULT ENCOUNTER NOTE
Results were discussed in clinic but can you please see if patient would like to consider physical therapy, may provide some benefit for her back pain.  They are usually a few weeks out so we could get the referral in now in hopes of getting her scheduled sooner.

## 2024-12-17 NOTE — PROGRESS NOTES
Assessment & Plan     Depressive disorder  Generalized anxiety disorder  Did not tolerate valproic acid.  Fortunately, things feel stable and she is happy to continue on Celexa alone right now.  In the future, discussed option for alternative mood stabilizers such as alternate SGA versus referral to psychiatry.    Eczema, unspecified type  Rash definitely eczematous appearing, also has history of eczema and exposure to a new laundry detergent which may have irritated her skin.  Air is also more dry this time of year.  Plaques do appear infected.  Will treat with Keflex 3 times daily for 5 days.  Will give Kenalog 40 mg IM today to help with itching and inflammation.  Stronger steroid ordered, clobetasol.  Reviewed how to use.  Discussed daily application of Vanicream to ensure moisture.  Recommend stopping gaine laundry detergent.  Follow-up 10 days, sooner if lesions worsen.  - clobetasol propionate (TEMOVATE) 0.05 % external cream; APPLY SMALL AMOUNT TO ACTIVE LESIONS UP TO 10-14 DAYS. MUST TAKE ONE WEEK OFF BETWEEN USE. DO NOT APPLY TO FACE.  - emollient (VANICREAM) external cream; Apply topically 2 times daily.  - hydrOXYzine nia (VISTARIL) 25 MG capsule; Take 1 capsule (25 mg) by mouth 3 times daily as needed for itching.  - cephALEXin (KEFLEX) 500 MG capsule; Take 1 capsule (500 mg) by mouth 3 times daily for 5 days.  - triamcinolone (KENALOG-40) injection 40 mg    Acute bilateral low back pain without sciatica  Fall, initial encounter  Chronic bilateral low back pain without sciatica  Spondylolisthesis of lumbosacral region  Acute on chronic low back pain after a fall a few weeks ago.  No red flag symptoms.  Does have known arthritis and spondylolisthesis of her lumbar region.  This has worsened on x-ray.  For now, recommend supportive care.  Reassuring no fracture.  Could consider physical therapy.  If worsening and not improving going forward, consider updating MRI.  Concerning signs and symptoms reviewed  "that would indicate need for urgent re-evaluation. Patient stated understanding and agreement with the plan.            Anton Anthony is a 54 year old, presenting for the following health issues:  Anxiety and Depression        12/17/2024     8:24 AM   Additional Questions   Roomed by Maryam DECKER   Accompanied by      HPI     Depression and Anxiety   How are you doing with your depression since your last visit? Improved a little bit better   How are you doing with your anxiety since your last visit?  Improved little bit better   Are you having other symptoms that might be associated with depression or anxiety? No  Have you had a significant life event? No   Do you have any concerns with your use of alcohol or other drugs? No    Took depakote for 2-3 days. Had headache, dizziness, nausea.   Less irritable and on edge. Feeling good.   More tired - dogs had her up early.   Wants to wait on another medicine     Social History     Tobacco Use    Smoking status: Every Day     Current packs/day: 1.00     Average packs/day: 1 pack/day for 39.0 years (39.0 ttl pk-yrs)     Types: Cigarettes     Start date: 1/1/1986     Passive exposure: Current    Smokeless tobacco: Never   Vaping Use    Vaping status: Former   Substance Use Topics    Alcohol use: Not Currently    Drug use: Yes     Types: Marijuana     Comment: \"rarely\"         9/23/2024     9:39 AM 11/7/2024     8:00 AM 12/17/2024     8:28 AM   PHQ   PHQ-9 Total Score 7 9 6   Q9: Thoughts of better off dead/self-harm past 2 weeks Not at all  Not at all Not at all       Patient-reported         9/23/2024     9:40 AM 11/7/2024     8:00 AM 12/17/2024     8:28 AM   REINALDO-7 SCORE   Total Score 7 (mild anxiety)     Total Score 7 7 3         12/17/2024     8:28 AM   Last PHQ-9   1.  Little interest or pleasure in doing things 1   2.  Feeling down, depressed, or hopeless 0   3.  Trouble falling or staying asleep, or sleeping too much 3   4.  Feeling tired or having little " "energy 2   5.  Poor appetite or overeating 0   6.  Feeling bad about yourself 0   7.  Trouble concentrating 0   8.  Moving slowly or restless 0   Q9: Thoughts of better off dead/self-harm past 2 weeks 0   PHQ-9 Total Score 6   Difficulty at work, home, or with people Not difficult at all         12/17/2024     8:28 AM   REINALDO-7    1. Feeling nervous, anxious, or on edge 0   2. Not being able to stop or control worrying 0   3. Worrying too much about different things 0   4. Trouble relaxing 1   5. Being so restless that it is hard to sit still 0   6. Becoming easily annoyed or irritable 1   7. Feeling afraid, as if something awful might happen 1   REINALDO-7 Total Score 3   If you checked any problems, how difficult have they made it for you to do your work, take care of things at home, or get along with other people? Not difficult at all     Rash.   Worsening. Very pruritic. Worsened late Nov. No one else at home with rash. No new products. No new medications. No new places.   Did use gain laundry detergent. This was new.    does work with metal.   No fevers. No infectious symptoms.   History of eczema as a child.   Located on back, elbows, extensor arms, upper legs, lower legs.     Wanting an xray. Slipped and fell thanksgiving night. Pain in low back. Not radiating. No weakness. No numbness or tinging. Can be painful to walk.   Fell on ice.  Long history of back pain but this feels different.        Objective    /80 (BP Location: Right arm, Patient Position: Sitting, Cuff Size: Adult Regular)   Pulse 100   Temp 96.8  F (36  C) (Tympanic)   Ht 1.575 m (5' 2\")   Wt 74.5 kg (164 lb 4.8 oz)   SpO2 94%   BMI 30.05 kg/m    Body mass index is 30.05 kg/m .    Physical Exam  Constitutional:       General: She is not in acute distress.     Appearance: Normal appearance. She is not ill-appearing.   Musculoskeletal:      Comments: Gait is normal.  She is focally tender over the lumbar spine and bilateral " paraspinous muscles.  There is no bruising or rash.  Strength is 5/5 with hip flexion, leg extension, leg flexion, plantarflexion, and dorsiflexion.  Sensation is intact to the lower extremities.   Skin:     Comments: Plaques of erythematous excoriations with some crust over her low back, lower legs, thighs, and extensor arms.   Neurological:      Mental Status: She is alert.   Psychiatric:         Mood and Affect: Mood normal.         Behavior: Behavior normal.         Thought Content: Thought content normal.         Judgment: Judgment normal.                                    Results for orders placed or performed in visit on 12/17/24   XR LUMBAR SPINE 2/3 VIEWS (Clinic Performed)     Status: None    Narrative    PROCEDURE: XR LUMBAR SPINE 2/3 VIEWS 12/17/2024 9:09 AM    HISTORY: low back pain after fall three weeks ago; Acute bilateral low  back pain without sciatica; Fall, initial encounter    COMPARISONS: None.    TECHNIQUE: AP and lateral    FINDINGS: The lumbar disks are normal in height. There are advanced  lower lumbar facet joint degenerative changes with nonspondylolytic  spondylolisthesis of L5 on S1. There is forward slippage by 6 mm. The  sacrum and sacroiliac joints appear normal.         Impression    IMPRESSION: Nonspondylolytic spondylolisthesis of L5 on S1. This has  worsened as compared to a MRI scan in February 2024    PHILLIP CUNNINGHAM MD         SYSTEM ID:  [id number]         Signed Electronically by: Hannah Eaton MD

## 2024-12-30 ENCOUNTER — HOSPITAL ENCOUNTER (OUTPATIENT)
Dept: MRI IMAGING | Facility: HOSPITAL | Age: 54
Discharge: HOME | End: 2024-12-30
Attending: ORTHOPAEDIC SURGERY
Payer: MEDICARE

## 2024-12-30 ENCOUNTER — HOSPITAL ENCOUNTER (OUTPATIENT)
Dept: MRI IMAGING | Facility: HOSPITAL | Age: 54
Discharge: HOME | End: 2024-12-30
Attending: STUDENT IN AN ORGANIZED HEALTH CARE EDUCATION/TRAINING PROGRAM
Payer: MEDICARE

## 2024-12-30 DIAGNOSIS — M54.50 CHRONIC BILATERAL LOW BACK PAIN WITHOUT SCIATICA: ICD-10-CM

## 2024-12-30 DIAGNOSIS — G89.18 POST-OPERATIVE PAIN: ICD-10-CM

## 2024-12-30 DIAGNOSIS — M79.609 EXTREMITY PAIN: ICD-10-CM

## 2024-12-30 DIAGNOSIS — G89.29 CHRONIC BILATERAL LOW BACK PAIN WITHOUT SCIATICA: ICD-10-CM

## 2024-12-30 DIAGNOSIS — M43.17 SPONDYLOLISTHESIS OF LUMBOSACRAL REGION: ICD-10-CM

## 2024-12-30 PROCEDURE — 72148 MRI LUMBAR SPINE W/O DYE: CPT | Mod: MF

## 2024-12-30 PROCEDURE — 73221 MRI JOINT UPR EXTREM W/O DYE: CPT | Mod: RT

## 2025-01-02 ENCOUNTER — MEDICAL CORRESPONDENCE (OUTPATIENT)
Dept: HEALTH INFORMATION MANAGEMENT | Facility: CLINIC | Age: 55
End: 2025-01-02

## 2025-01-06 ENCOUNTER — MYC MEDICAL ADVICE (OUTPATIENT)
Dept: FAMILY MEDICINE | Facility: OTHER | Age: 55
End: 2025-01-06

## 2025-01-06 DIAGNOSIS — M48.061 NEURAL FORAMINAL STENOSIS OF LUMBAR SPINE: ICD-10-CM

## 2025-01-06 DIAGNOSIS — M43.17 SPONDYLOLISTHESIS OF LUMBOSACRAL REGION: ICD-10-CM

## 2025-01-06 DIAGNOSIS — M54.50 CHRONIC BILATERAL LOW BACK PAIN WITHOUT SCIATICA: Primary | ICD-10-CM

## 2025-01-06 DIAGNOSIS — G89.29 CHRONIC BILATERAL LOW BACK PAIN WITHOUT SCIATICA: Primary | ICD-10-CM

## 2025-01-06 DIAGNOSIS — M47.816 FACET ARTHRITIS OF LUMBAR REGION: ICD-10-CM

## 2025-01-06 NOTE — TELEPHONE ENCOUNTER
Pt called and would like to stay with [facility] if we have on that comes to [place].  Pt thought that it was mentioned at her last appointment one comes here monthly.   Otherwise she would be willing to travel to [place].      Pended up neurosurgeon referral.  Please review and sign if appropriate.

## 2025-01-19 ENCOUNTER — HEALTH MAINTENANCE LETTER (OUTPATIENT)
Age: 55
End: 2025-01-19

## 2025-01-20 ENCOUNTER — ANCILLARY PROCEDURE (OUTPATIENT)
Dept: MAMMOGRAPHY | Facility: OTHER | Age: 55
End: 2025-01-20
Attending: STUDENT IN AN ORGANIZED HEALTH CARE EDUCATION/TRAINING PROGRAM
Payer: COMMERCIAL

## 2025-01-20 ENCOUNTER — TELEPHONE (OUTPATIENT)
Dept: MAMMOGRAPHY | Facility: HOSPITAL | Age: 55
End: 2025-01-20

## 2025-01-20 DIAGNOSIS — Z12.31 VISIT FOR SCREENING MAMMOGRAM: ICD-10-CM

## 2025-01-20 PROCEDURE — 77063 BREAST TOMOSYNTHESIS BI: CPT | Mod: TC

## 2025-01-20 PROCEDURE — 77067 SCR MAMMO BI INCL CAD: CPT | Mod: TC

## 2025-02-02 DIAGNOSIS — E11.42 TYPE 2 DIABETES MELLITUS WITH DIABETIC POLYNEUROPATHY, WITHOUT LONG-TERM CURRENT USE OF INSULIN (H): ICD-10-CM

## 2025-02-03 ENCOUNTER — OFFICE VISIT (OUTPATIENT)
Dept: FAMILY MEDICINE | Facility: OTHER | Age: 55
End: 2025-02-03
Attending: STUDENT IN AN ORGANIZED HEALTH CARE EDUCATION/TRAINING PROGRAM
Payer: COMMERCIAL

## 2025-02-03 ENCOUNTER — APPOINTMENT (OUTPATIENT)
Dept: LAB | Facility: OTHER | Age: 55
End: 2025-02-03
Payer: COMMERCIAL

## 2025-02-03 VITALS
SYSTOLIC BLOOD PRESSURE: 124 MMHG | DIASTOLIC BLOOD PRESSURE: 72 MMHG | HEART RATE: 94 BPM | RESPIRATION RATE: 16 BRPM | HEIGHT: 62 IN | BODY MASS INDEX: 30.09 KG/M2 | OXYGEN SATURATION: 96 % | TEMPERATURE: 97.9 F | WEIGHT: 163.5 LBS

## 2025-02-03 DIAGNOSIS — F32.A DEPRESSIVE DISORDER: Chronic | ICD-10-CM

## 2025-02-03 DIAGNOSIS — K21.9 GASTROESOPHAGEAL REFLUX DISEASE WITHOUT ESOPHAGITIS: ICD-10-CM

## 2025-02-03 DIAGNOSIS — F41.1 GENERALIZED ANXIETY DISORDER: Chronic | ICD-10-CM

## 2025-02-03 DIAGNOSIS — E78.2 MIXED HYPERLIPIDEMIA: Chronic | ICD-10-CM

## 2025-02-03 DIAGNOSIS — I51.89 GRADE I DIASTOLIC DYSFUNCTION: Chronic | ICD-10-CM

## 2025-02-03 DIAGNOSIS — J44.1 COPD EXACERBATION (H): ICD-10-CM

## 2025-02-03 DIAGNOSIS — J41.8 MIXED SIMPLE AND MUCOPURULENT CHRONIC BRONCHITIS (H): Chronic | ICD-10-CM

## 2025-02-03 DIAGNOSIS — E11.42 TYPE 2 DIABETES MELLITUS WITH DIABETIC POLYNEUROPATHY, WITHOUT LONG-TERM CURRENT USE OF INSULIN (H): Chronic | ICD-10-CM

## 2025-02-03 DIAGNOSIS — E11.59 HYPERTENSION ASSOCIATED WITH TYPE 2 DIABETES MELLITUS (H): Chronic | ICD-10-CM

## 2025-02-03 DIAGNOSIS — R94.31 PROLONGED Q-T INTERVAL ON ECG: ICD-10-CM

## 2025-02-03 DIAGNOSIS — Z01.818 PREOP GENERAL PHYSICAL EXAM: Primary | ICD-10-CM

## 2025-02-03 DIAGNOSIS — L30.9 ECZEMA, UNSPECIFIED TYPE: ICD-10-CM

## 2025-02-03 DIAGNOSIS — E03.9 HYPOTHYROIDISM (ACQUIRED): ICD-10-CM

## 2025-02-03 DIAGNOSIS — G56.01 CARPAL TUNNEL SYNDROME OF RIGHT WRIST: ICD-10-CM

## 2025-02-03 DIAGNOSIS — J06.9 VIRAL URI: ICD-10-CM

## 2025-02-03 DIAGNOSIS — N95.2 VAGINAL ATROPHY: ICD-10-CM

## 2025-02-03 DIAGNOSIS — I15.2 HYPERTENSION ASSOCIATED WITH TYPE 2 DIABETES MELLITUS (H): Chronic | ICD-10-CM

## 2025-02-03 DIAGNOSIS — F17.200 TOBACCO USE DISORDER: Chronic | ICD-10-CM

## 2025-02-03 LAB
ANION GAP SERPL CALCULATED.3IONS-SCNC: 10 MMOL/L (ref 7–15)
BASOPHILS # BLD AUTO: 0.1 10E3/UL (ref 0–0.2)
BASOPHILS NFR BLD AUTO: 0 %
BUN SERPL-MCNC: 11.1 MG/DL (ref 6–20)
CALCIUM SERPL-MCNC: 9.5 MG/DL (ref 8.8–10.4)
CHLORIDE SERPL-SCNC: 108 MMOL/L (ref 98–107)
CREAT SERPL-MCNC: 0.9 MG/DL (ref 0.51–0.95)
EGFRCR SERPLBLD CKD-EPI 2021: 75 ML/MIN/1.73M2
EOSINOPHIL # BLD AUTO: 0.2 10E3/UL (ref 0–0.7)
EOSINOPHIL NFR BLD AUTO: 2 %
ERYTHROCYTE [DISTWIDTH] IN BLOOD BY AUTOMATED COUNT: 13.2 % (ref 10–15)
EST. AVERAGE GLUCOSE BLD GHB EST-MCNC: 128 MG/DL
FLUAV RNA SPEC QL NAA+PROBE: NEGATIVE
FLUBV RNA RESP QL NAA+PROBE: NEGATIVE
GLUCOSE SERPL-MCNC: 127 MG/DL (ref 70–99)
HBA1C MFR BLD: 6.1 %
HCO3 SERPL-SCNC: 25 MMOL/L (ref 22–29)
HCT VFR BLD AUTO: 45.8 % (ref 35–47)
HGB BLD-MCNC: 15.3 G/DL (ref 11.7–15.7)
IMM GRANULOCYTES # BLD: 0.1 10E3/UL
IMM GRANULOCYTES NFR BLD: 0 %
LYMPHOCYTES # BLD AUTO: 3.4 10E3/UL (ref 0.8–5.3)
LYMPHOCYTES NFR BLD AUTO: 27 %
MCH RBC QN AUTO: 31.1 PG (ref 26.5–33)
MCHC RBC AUTO-ENTMCNC: 33.4 G/DL (ref 31.5–36.5)
MCV RBC AUTO: 93 FL (ref 78–100)
MONOCYTES # BLD AUTO: 0.7 10E3/UL (ref 0–1.3)
MONOCYTES NFR BLD AUTO: 6 %
NEUTROPHILS # BLD AUTO: 8.2 10E3/UL (ref 1.6–8.3)
NEUTROPHILS NFR BLD AUTO: 65 %
NRBC # BLD AUTO: 0 10E3/UL
NRBC BLD AUTO-RTO: 0 /100
PLATELET # BLD AUTO: 239 10E3/UL (ref 150–450)
POTASSIUM SERPL-SCNC: 4.1 MMOL/L (ref 3.4–5.3)
RBC # BLD AUTO: 4.92 10E6/UL (ref 3.8–5.2)
RSV RNA SPEC NAA+PROBE: NEGATIVE
SARS-COV-2 RNA RESP QL NAA+PROBE: NEGATIVE
SODIUM SERPL-SCNC: 143 MMOL/L (ref 135–145)
T4 FREE SERPL-MCNC: 0.89 NG/DL (ref 0.9–1.7)
TSH SERPL DL<=0.005 MIU/L-ACNC: 6.45 UIU/ML (ref 0.3–4.2)
WBC # BLD AUTO: 12.6 10E3/UL (ref 4–11)

## 2025-02-03 PROCEDURE — 85004 AUTOMATED DIFF WBC COUNT: CPT | Mod: ZL | Performed by: STUDENT IN AN ORGANIZED HEALTH CARE EDUCATION/TRAINING PROGRAM

## 2025-02-03 PROCEDURE — 84439 ASSAY OF FREE THYROXINE: CPT | Mod: ZL | Performed by: STUDENT IN AN ORGANIZED HEALTH CARE EDUCATION/TRAINING PROGRAM

## 2025-02-03 PROCEDURE — 36415 COLL VENOUS BLD VENIPUNCTURE: CPT | Mod: ZL | Performed by: STUDENT IN AN ORGANIZED HEALTH CARE EDUCATION/TRAINING PROGRAM

## 2025-02-03 PROCEDURE — 84295 ASSAY OF SERUM SODIUM: CPT | Mod: ZL | Performed by: STUDENT IN AN ORGANIZED HEALTH CARE EDUCATION/TRAINING PROGRAM

## 2025-02-03 PROCEDURE — 83036 HEMOGLOBIN GLYCOSYLATED A1C: CPT | Mod: ZL | Performed by: STUDENT IN AN ORGANIZED HEALTH CARE EDUCATION/TRAINING PROGRAM

## 2025-02-03 PROCEDURE — G0463 HOSPITAL OUTPT CLINIC VISIT: HCPCS

## 2025-02-03 PROCEDURE — 80048 BASIC METABOLIC PNL TOTAL CA: CPT | Mod: ZL | Performed by: STUDENT IN AN ORGANIZED HEALTH CARE EDUCATION/TRAINING PROGRAM

## 2025-02-03 PROCEDURE — 87637 SARSCOV2&INF A&B&RSV AMP PRB: CPT | Mod: ZL | Performed by: STUDENT IN AN ORGANIZED HEALTH CARE EDUCATION/TRAINING PROGRAM

## 2025-02-03 PROCEDURE — 84443 ASSAY THYROID STIM HORMONE: CPT | Mod: ZL | Performed by: STUDENT IN AN ORGANIZED HEALTH CARE EDUCATION/TRAINING PROGRAM

## 2025-02-03 PROCEDURE — 85041 AUTOMATED RBC COUNT: CPT | Mod: ZL | Performed by: STUDENT IN AN ORGANIZED HEALTH CARE EDUCATION/TRAINING PROGRAM

## 2025-02-03 RX ORDER — GUAIFENESIN 600 MG/1
600 TABLET, EXTENDED RELEASE ORAL 2 TIMES DAILY PRN
Qty: 30 TABLET | Refills: 1 | Status: SHIPPED | OUTPATIENT
Start: 2025-02-03

## 2025-02-03 RX ORDER — TRAZODONE HYDROCHLORIDE 150 MG/1
150 TABLET ORAL AT BEDTIME
Qty: 90 TABLET | Refills: 1 | Status: SHIPPED | OUTPATIENT
Start: 2025-02-03

## 2025-02-03 RX ORDER — OMEPRAZOLE 20 MG/1
20 CAPSULE, DELAYED RELEASE ORAL DAILY
Qty: 90 CAPSULE | Refills: 3 | Status: SHIPPED | OUTPATIENT
Start: 2025-02-03

## 2025-02-03 RX ORDER — PREDNISONE 20 MG/1
40 TABLET ORAL DAILY
Qty: 10 TABLET | Refills: 0 | Status: SHIPPED | OUTPATIENT
Start: 2025-02-03 | End: 2025-02-08

## 2025-02-03 RX ORDER — CLOBETASOL PROPIONATE 0.5 MG/G
CREAM TOPICAL
Qty: 60 G | Refills: 0 | Status: SHIPPED | OUTPATIENT
Start: 2025-02-03

## 2025-02-03 RX ORDER — PROCHLORPERAZINE 25 MG/1
SUPPOSITORY RECTAL
Qty: 1 EACH | Refills: 1 | Status: SHIPPED | OUTPATIENT
Start: 2025-02-03

## 2025-02-03 RX ORDER — LEVOTHYROXINE SODIUM 100 UG/1
100 TABLET ORAL
Qty: 90 TABLET | Refills: 0 | Status: SHIPPED | OUTPATIENT
Start: 2025-02-03

## 2025-02-03 RX ORDER — ESTRADIOL 0.1 MG/G
1 CREAM VAGINAL WEEKLY
Qty: 42.5 G | Refills: 1 | Status: SHIPPED | OUTPATIENT
Start: 2025-02-03

## 2025-02-03 ASSESSMENT — ENCOUNTER SYMPTOMS
CHILLS: 1
DIZZINESS: 0
VOMITING: 0
PALPITATIONS: 0
FATIGUE: 0
SORE THROAT: 1
FEVER: 0
SHORTNESS OF BREATH: 1
DIARRHEA: 0
WOUND: 0
ABDOMINAL PAIN: 0
MYALGIAS: 0
CONSTIPATION: 0
COUGH: 1
SINUS PRESSURE: 0
WHEEZING: 0
DYSURIA: 0

## 2025-02-03 ASSESSMENT — PAIN SCALES - GENERAL: PAINLEVEL_OUTOF10: SEVERE PAIN (7)

## 2025-02-03 NOTE — PROGRESS NOTES
Preoperative Evaluation  [facility] [place]  [address]  [place] [address]  Phone: [phone]  Primary Provider: Hannah Eaton MD  Pre-op Performing Provider: Hannah Eaton MD  Feb 3, 2025           2/3/2025   Surgical Information   What procedure is being done? Carpal tunnel release    Facility or [facility] where procedure[facility]surgery [facility] be performed[facility]    Who is doing the procedure / surgery? Monica [facility]en    Date of surgery / procedure: 02/14/25    Time of surgery / procedure: TBD    Where do you plan to recover after surgery? at home with family        Proxy-reported     Fax number for surgical facility:     Assessment & Plan     The proposed surgical procedure is considered LOW risk.    Preop general physical exam  CBC and BMP stable.  Has mild chronic leukocytosis with no shift in differential, evaluated by hematology who recommended continuing monitoring.  May be slightly higher than baseline with infection but overall not far from baseline.  Does function over 4 METS without cardiac symptoms.  Is ill today, see below, unable to clear for surgery.  Recommend rescheduling additional 1 to 2 weeks from now due to current respiratory symptoms.  Will need to be reassessed for clearance and follow up made.  - CBC with Platelets & Differential; Future  - Basic metabolic panel; Future  - Basic metabolic panel  - CBC with Platelets & Differential    Carpal tunnel syndrome of right wrist  Reason for surgery.    Type 2 diabetes mellitus with diabetic polyneuropathy, without long-term current use of insulin (H)  Controlled.  A1c 6.1.  Continues on Ozempic.  Currently holding medication.  - Hemoglobin A1c; Future  - Hemoglobin A1c    Mixed simple and mucopurulent chronic bronchitis (H)  Previously stable until recent exacerbation with viral illness.  Recommend she resume Mucinex.  Continue with Advair and Spiriva daily.  Should utilize in a machine.  - guaiFENesin (MUCINEX) 600  MG 12 hr tablet; Take 1 tablet (600 mg) by mouth 2 times daily as needed for congestion.    Hypertension associated with type 2 diabetes mellitus (H)  Controlled.  Has been able to wean off blood pressure medication.  Monitoring.    Hypothyroidism (acquired)  TSH high today.  Increasing Synthroid from 88 to 100 mcg.  Recheck 6 weeks.  Current level would not hinder surgery.  - TSH with free T4 reflex; Future  - TSH with free T4 reflex  - T4 free  - levothyroxine (SYNTHROID/LEVOTHROID) 100 MCG tablet; Take 1 tablet (100 mcg) by mouth every morning (before breakfast).  - TSH with free T4 reflex; Future    Tobacco use disorder (30 pack year history plus)  Ongoing.    Mixed hyperlipidemia  Continues on Crestor 40 mg.    Generalized anxiety disorder  Depressive disorder  Stable.  Continues on Celexa 10 mg and trazodone 150 mg.  - traZODone (DESYREL) 150 MG tablet; Take 1 tablet (150 mg) by mouth at bedtime.    Prolonged Q-T interval on ECG  Has been stable.  Most recent QTc was 450.    Grade I diastolic dysfunction  No evidence for heart failure.  No orthopnea, PND or edema.    Gastroesophageal reflux disease without esophagitis  Stable on omeprazole daily.  Renal function stable.  - omeprazole (PRILOSEC) 20 MG DR capsule; Take 1 capsule (20 mg) by mouth daily.    Eczema, unspecified type  Slight worsening again recently but has not been using clobetasol.  Discussed daily emollient with clobetasol use as needed.  With ongoing eczema, will refer to dermatology.  No signs of bacterial infection today.  - Adult Dermatology  Referral; Future  - clobetasol propionate (TEMOVATE) 0.05 % external cream; APPLY SMALL AMOUNT TO ACTIVE LESIONS UP TO 10-14 DAYS. MUST TAKE ONE WEEK OFF BETWEEN USE. DO NOT APPLY TO FACE.    Vaginal atrophy  Refilled estradiol for patient.  - estradiol (ESTRACE) 0.1 MG/GM vaginal cream; Place 1 g vaginally once a week.    COPD exacerbation (H)  Viral URI  Likely viral URI with symptoms.   Oxygenating well and at her baseline.  Does have some wheezing and with known COPD will plan prednisone.  No evidence for focal pneumonia based on lung examination. reassuring vitals. Start mucinex, neb treatments. Monitor symptoms. Work on smoking cessation. Discussed supportive care.   Concerning signs and symptoms reviewed that would indicate need for urgent re-evaluation. Patient stated understanding and agreement with the plan.   - Influenza A/B, RSV and SARS-CoV2 PCR (COVID-19); Future  - predniSONE (DELTASONE) 20 MG tablet; Take 2 tablets (40 mg) by mouth daily for 5 days.  - Influenza A/B, RSV and SARS-CoV2 PCR (COVID-19) Nose      Possible Sleep Apnea: 6}      2/3/2025     7:14 PM   STOP-Bang Total Score   Total Score 3   Risk Stratification 3 - 4: Moderate Risk for SUKI          Risks and Recommendations  The patient has the following additional risks and recommendations for perioperative complications:  Pulmonary:    - Incentive spirometry post-op   - Active nicotine user, advised smoking cessation  Social and Substance:    - Active nicotine user, advised smoking cessation    Antiplatelet or Anticoagulation Medication Instructions   - Patient is on no antiplatelet or anticoagulation medications.    Additional Medication Instructions  Take all scheduled medications on the day of surgery EXCEPT for modifications listed below:   - Herbal medications and vitamins: DO NOT TAKE 14 days prior to surgery.   - Statins (atorvastatin, simvastatin, pravastatin) : Continue taking on the day of surgery.    - GLP-1 Injectable (exenitide, liraglutide, semaglutide, dulaglutide, etc.): DO NOT TAKE 7 days before surgery    - Estrogens: Continue without modification.    - ibuprofen (Advil, Motrin): DO NOT TAKE 1 day before surgery.    - SSRIs, SNRIs, TCAs, Antipsychotics: Continue without modification.    - cannabis, marijuana: DO NOT TAKE night before surgery.   - cetirizine and first generation antihistamines: DO NOT TAKE  on day of surgery.   - LABA, inhaled corticosteroid, long-acting anticholinergics: Continue without modification.   - rescue Inhaler: Continue PRN. Bring to hospital on the day of surgery.   - Topicals: DO NOT TAKE day of surgery.    Recommendation  Approval given to proceed with proposed procedure PENDING follow up assessment for COPD/viral illness. Follow up scheduled 7-10 days. Will need to move surgery back 2 weeks. Patient will contact surgery dept.         The longitudinal plan of care for the diagnosis(es)/condition(s) as documented were addressed during this visit. Due to the added complexity in care, I will continue to support Gabrielle in the subsequent management and with ongoing continuity of care.    Anton Anthony is a 54 year old, presenting for the following:  Pre-Op Exam          2/3/2025     7:51 AM   Additional Questions   Roomed by Sanam vasquez   Accompanied by self         2/3/2025     7:51 AM   Patient Reported Additional Medications   Patient reports taking the following new medications None     HPI related to upcoming procedure: recurrent carpal tunnel on the right wrist, planning treatment.    Has been congested with cough. Ongoing a few days to a week. Throat has been scratchy. Has sick contacts. Thursday-Friday first day of illness.   No ischemic cardiac history. Denies any shortness of breath, chest pain, or dyspnea on exertion.   Is able to function at >4 METS.   Has had prior surgeries with general anesthesia and did well.           2/3/2025   Pre-Op Questionnaire   Have you ever had a heart attack or stroke? No    Have you ever had surgery on your heart or blood vessels, such as a stent placement, a coronary artery bypass, or surgery on an artery in your head, neck, heart, or legs? No    Do you have chest pain with activity? No    Do you have a history of heart failure? No    Do you currently have a cold, bronchitis or symptoms of other infection? (!) UNKNOWN - cold symptoms     Do you  have a cough, shortness of breath, or wheezing? (!) YES - cough, shortness of breath, wheezing with cold    Do you or anyone in your family have previous history of blood clots? No    Do you or does anyone in your family have a serious bleeding problem such as prolonged bleeding following surgeries or cuts? No    Have you ever had problems with anemia or been told to take iron pills? No    Have you had any abnormal blood loss such as black, tarry or bloody stools, or abnormal vaginal bleeding? No    Have you ever had a blood transfusion? (!) UNKNOWN    Are you willing to have a blood transfusion if it is medically needed before, during, or after your surgery? Yes    Have you or any of your relatives ever had problems with anesthesia? (!) YES had trouble waking up    Do you have sleep apnea, excessive snoring or daytime drowsiness? (!) YES    Do you have a CPAP machine? (!) NO     Do you have any artifical heart valves or other implanted medical devices like a pacemaker, defibrillator, or continuous glucose monitor? No    Do you have artificial joints? No    Are you allergic to latex? No        Proxy-reported     Health Care Directive  Patient does not have a Health Care Directive: Patient states has Advance Directive and will bring in a copy to clinic.    Preoperative Review of    reviewed - controlled substances reflected in medication list.      Status of Chronic Conditions:  COPD - Patient has a longstanding history of moderate-severe COPD . Patient has been doing well overall noting SOB and COUGH and continues on medication regimen consisting of advair, mucinex, spirva without adverse reactions or side effects.    DEPRESSION - Patient has a long history of Depression of moderate severity requiring medication for control with recent symptoms being stable..Current symptoms of depression include none.     DIABETES - Patient has a longstanding history of DiabetesType Type II . Patient is being treated with GLP  and denies significant side effects. Control has been good. Complicating factors include but are not limited to: hypertension and hyperlipidemia.     HYPERLIPIDEMIA - Patient has a long history of significant Hyperlipidemia requiring medication for treatment with recent good control. Patient reports no problems or side effects with the medication.     HYPERTENSION - Patient has longstanding history of HTN , currently denies any symptoms referable to elevated blood pressure. Specifically denies chest pain, palpitations, dyspnea, orthopnea, PND or peripheral edema. Blood pressure readings have been in normal range. Current medication regimen is as listed below. Patient denies any side effects of medication.     HYPOTHYROIDISM - Patient has a longstanding history of chronic Hypothyroidism. Patient has been doing well, noting no tremor, insomnia, hair loss or changes in skin texture. Continues to take medications as directed, without adverse reactions or side effects. Last TSH   Lab Results   Component Value Date    TSH 2.73 02/27/2024   .     History of presumed decreased left ventricular function   Thought to be mildly reduced on echocardiogram but Lexiscan stress test was negative for any inducible ischemia and ejection fraction was normal on stress test.  Had follow-up cardiac MRI with normal ejection fraction and no concerning findings.  Did have mild left ventricular hypertrophy noted.  Was discharged from cardiology.    Eczema flaring again. Has patch test scheduled in March. Not using clobetasol.     Patient Active Problem List    Diagnosis Date Noted    Spondylolisthesis of lumbosacral region 12/17/2024     Priority: Medium    Neuropathy 09/23/2024     Priority: Medium    Hypertension associated with type 2 diabetes mellitus (H) 07/09/2024     Priority: Medium    Spinal stenosis of lumbar region with neurogenic claudication 05/02/2024     Priority: Medium    Prolonged Q-T interval on ECG 02/27/2024      Priority: Medium    Dislocation of wrist, carpometacarpal 02/15/2024     Priority: Medium    Grade I diastolic dysfunction 01/31/2024     Priority: Medium     Formatting of this note might be different from the original.   1/22/2024:    Left ventricular size is normal.    Left ventricular function is decreased. The ejection fraction is 45-50%    (mildly reduced).    No regional wall motion abnormalities are seen.    Grade I or early diastolic dysfunction.    Global right ventricular function is mildly reduced.    Both atria appear normal.    Trace mitral insufficiency is present.    No aortic stenosis is present.    Mild tricuspid insufficiency is present.    The right ventricular systolic pressure is 22mmHg above the right atrial    pressure.    Trace pulmonic insufficiency is present.  Formatting of this note might be different from the original.   Formatting of this note might be different from the original.    1/22/2024:     Left ventricular size is normal.     Left ventricular function is decreased. The ejection fraction is 45-50%     (mildly reduced).     No regional wall motion abnormalities are seen.     Grade I or early diastolic dysfunction.     Global right ventricular function is mildly reduced.     Both atria appear normal.     Trace mitral insufficiency is present.     No aortic stenosis is present.     Mild tricuspid insufficiency is present.     The right ventricular systolic pressure is 22mmHg above the right atrial     pressure.     Trace pulmonic insufficiency is present.      Mild mitral insufficiency 01/31/2024     Priority: Medium     Formatting of this note might be different from the original.   1/22/2024:    Left ventricular size is normal.    Left ventricular function is decreased. The ejection fraction is 45-50%    (mildly reduced).    No regional wall motion abnormalities are seen.    Grade I or early diastolic dysfunction.    Global right ventricular function is mildly reduced.    Both  atria appear normal.    Trace mitral insufficiency is present.    No aortic stenosis is present.    Mild tricuspid insufficiency is present.    The right ventricular systolic pressure is 22mmHg above the right atrial    pressure.    Trace pulmonic insufficiency is present.  Formatting of this note might be different from the original.   Formatting of this note might be different from the original.    1/22/2024:     Left ventricular size is normal.     Left ventricular function is decreased. The ejection fraction is 45-50%     (mildly reduced).     No regional wall motion abnormalities are seen.     Grade I or early diastolic dysfunction.     Global right ventricular function is mildly reduced.     Both atria appear normal.     Trace mitral insufficiency is present.     No aortic stenosis is present.     Mild tricuspid insufficiency is present.     The right ventricular systolic pressure is 22mmHg above the right atrial     pressure.     Trace pulmonic insufficiency is present.      Mild tricuspid insufficiency 01/31/2024     Priority: Medium     Formatting of this note might be different from the original.   1/22/2024:    Left ventricular size is normal.    Left ventricular function is decreased. The ejection fraction is 45-50%    (mildly reduced).    No regional wall motion abnormalities are seen.    Grade I or early diastolic dysfunction.    Global right ventricular function is mildly reduced.    Both atria appear normal.    Trace mitral insufficiency is present.    No aortic stenosis is present.    Mild tricuspid insufficiency is present.    The right ventricular systolic pressure is 22mmHg above the right atrial    pressure.    Trace pulmonic insufficiency is present.  Formatting of this note might be different from the original.   Formatting of this note might be different from the original.    1/22/2024:     Left ventricular size is normal.     Left ventricular function is decreased. The ejection fraction is  45-50%     (mildly reduced).     No regional wall motion abnormalities are seen.     Grade I or early diastolic dysfunction.     Global right ventricular function is mildly reduced.     Both atria appear normal.     Trace mitral insufficiency is present.     No aortic stenosis is present.     Mild tricuspid insufficiency is present.     The right ventricular systolic pressure is 22mmHg above the right atrial     pressure.     Trace pulmonic insufficiency is present.      Bilateral arm weakness 11/14/2023     Priority: Medium    History of Helicobacter pylori infection 11/14/2023     Priority: Medium    Cervical stenosis of spinal canal 10/23/2023     Priority: Medium    Degeneration of lumbar intervertebral disc 10/23/2023     Priority: Medium    Mixed simple and mucopurulent chronic bronchitis (H) 10/23/2023     Priority: Medium    S/P complete hysterectomy 10/23/2023     Priority: Medium    Chronic pain disorder 09/12/2023     Priority: Medium    Type 2 diabetes mellitus with diabetic polyneuropathy, without long-term current use of insulin (H) 04/19/2023     Priority: Medium    Insomnia 08/11/2022     Priority: Medium     Current Medications: Lunesta - causes a foul taste in her mouth. Not sleeping well still.   Previous medications: Valium, Trazodone, Restoril, Gabapentin, Lyrica       Generalized anxiety disorder 08/10/2021     Priority: Medium    Spondylosis without myelopathy or radiculopathy, cervical region 08/10/2021     Priority: Medium    Hyperlipidemia 09/27/2018     Priority: Medium    Tobacco use disorder (30 pack year history plus) 07/18/2018     Priority: Medium    RLS (restless legs syndrome) 03/08/2018     Priority: Medium    Hypothyroidism (acquired) 06/06/2017     Priority: Medium    Depressive disorder 06/21/2002     Priority: Medium      Past Medical History:   Diagnosis Date    Abnormal echocardiogram 01/31/2024    Normal stress test and cardiac MRI 2024      Arthritis     Decreased left  ventricular function 02/27/2024    Had confirmatory MRI - showed normal heart function. No evidence for reduced EF      Depressive disorder     Gastroesophageal reflux disease with esophagitis     H. pylori infection 2018    Hashimoto's disease     Hypothyroidism     Insomnia     Mild intermittent asthma without complication 12/17/2015    Formatting of this note might be different from the original.   Last Assessment & Plan:     Formatting of this note might be different from the original.    This is generally been stable.  May be slight worsening recently with her rash.      Type 2 diabetes mellitus (H)     Umbilical hernia without obstruction and without gangrene     Urticaria     Last Assessment & Plan: Formatting of this note might be different from the original. Patient presented to the emergency room on 3/19/2023 with history of full body rash starting 3 weeks prior with discomfort and itching treated with hydrocortisone cream without relief.  Homeless family member had moved back into the house with prior exposure to bedbugs.  Under breasts lower legs severity moderate     Past Surgical History:   Procedure Laterality Date    ABDOMEN SURGERY      CARPAL TUNNEL RELEASE RT/LT Bilateral     [facility] arthroplasty Left     COLONOSCOPY N/A 10/28/2024    rpt 3 year; Procedure: COLONOSCOPY WITH POLYPECTOMY;  Surgeon: Enrique Brambial MD;  Location: [facility] OR    COLONOSCOPY - HIM SCAN N/A 10/04/2018    [facility]. 3 hyperplastic polyps. repeat 10 yr    HYSTERECTOMY, PAP NO LONGER INDICATED N/A 10/10/2018    Cervix removed per [facility] Hysterectomy Path Report.    HYSTERECTOMY, TOTAL, LAPAROSCOPIC, WITH SALPINGO-OOPHORECTOMY, CYSTOSCOPY Bilateral 10/10/2018    [facility].     Current Outpatient Medications   Medication Sig Dispense Refill    ACCU-CHEK GUIDE test strip       blood glucose monitoring (SOFTCLIX) lancets       Blood Glucose Monitoring Suppl (ACCU-CHEK GUIDE) w/Device KIT USE TO TEST BLOOD SUGAR DAILY       citalopram (CELEXA) 10 MG tablet Take 1 tablet (10 mg) by mouth daily. 90 tablet 1    clobetasol propionate (TEMOVATE) 0.05 % external cream APPLY SMALL AMOUNT TO ACTIVE LESIONS UP TO 10-14 DAYS. MUST TAKE ONE WEEK OFF BETWEEN USE. DO NOT APPLY TO FACE. 60 g 0    Continuous Blood Gluc  (DEXCOM G6 ) MIKE Use to read blood sugars as per 's instructions. 1 each 1    Continuous Blood Gluc Sensor (DEXCOM G6 SENSOR) MISC CHANGE SENSOR EVERY 10 DAYS 3 each 3    Continuous Glucose Transmitter (DEXCOM G6 TRANSMITTER) MISC Change every 3 months. 1 each 1    cyanocobalamin (VITAMIN B-12) 500 MCG tablet Take 1 tablet (500 mcg) by mouth daily 90 tablet 3    diazepam (VALIUM) 5 MG tablet Take one tablet 5mg by mouth 30 min prior to MRI. Must have a . 2 tablet 0    emollient (VANICREAM) external cream Apply topically 2 times daily. 453 g 1    estradiol (ESTRACE) 0.1 MG/GM vaginal cream Place vaginally.      fluticasone-salmeterol (ADVAIR) 250-50 MCG/ACT inhaler Inhale 1 puff into the lungs every 12 hours      gabapentin (NEURONTIN) 100 MG capsule TAKE 1 CAPSULE BY MOUTH TWICE DAILY 60 capsule 3    Glucose 4-6 GM-MG CHEW Take 4 g by mouth once.      guaiFENesin (MUCINEX) 600 MG 12 hr tablet Take 1 tablet (600 mg) by mouth 2 times daily as needed for congestion 30 tablet 1    hydrOXYzine nia (VISTARIL) 25 MG capsule Take 1 capsule (25 mg) by mouth 3 times daily as needed for itching. 30 capsule 0    ibuprofen (ADVIL/MOTRIN) 200 MG tablet Take 200 mg by mouth every 4 hours as needed.      ipratropium - albuterol 0.5 mg/2.5 mg/3 mL (DUONEB) 0.5-2.5 (3) MG/3ML neb solution Inhale 3 mLs into the lungs.      ketoconazole (NIZORAL) 2 % external cream Apply topically daily.      levothyroxine (SYNTHROID/LEVOTHROID) 88 MCG tablet Take 1 tablet (88 mcg) by mouth daily. 90 tablet 3    nicotine (NICODERM CQ) 14 MG/24HR 24 hr patch       nystatin (MYCOSTATIN) 905350 UNIT/GM external powder Apply topically 3  "times daily as needed for other (yeast infection). Apply up to three times daily to areas with active yeast. 60 g 0    omeprazole (PRILOSEC) 20 MG DR capsule TAKE 1 CAPSULE(20 MG) BY MOUTH DAILY 90 capsule 3    pramipexole (MIRAPEX) 0.75 MG tablet Take 1 tablet (0.75 mg) by mouth at bedtime. 90 tablet 3    PROAIR  (90 Base) MCG/ACT inhaler       rosuvastatin (CRESTOR) 40 MG tablet Take 1 tablet (40 mg) by mouth daily. 90 tablet 3    semaglutide (OZEMPIC) 2 MG/3ML pen Inject 0.5 mg subcutaneously every 7 days. 3 mL 3    SPIRIVA RESPIMAT 2.5 MCG/ACT inhaler INHALE 2 PUFFS INTO THE LUNGS DAILY 4 g 4    traZODone (DESYREL) 150 MG tablet Take 0.5 tablets (75 mg) by mouth at bedtime. (Patient taking differently: Take 150 mg by mouth at bedtime.) 45 tablet 1    triamcinolone (KENALOG) 0.5 % external ointment Apply pea sized amount to active eczematous lesions on elbows twice daily for 10 days. 15 g 0       Allergies   Allergen Reactions    Hydromorphone Other (See Comments)    Other Food Allergy Itching     Walnuts, pecans     Amoxicillin-Pot Clavulanate     Azithromycin GI Disturbance     Other reaction(s): Stomach Upset    Black [place] Flavoring Agent (Non-Screening) Itching    Erythromycin Nausea and Vomiting    Nuts Itching     Pecans and walnuts     Pecan Extract Itching    Tramadol Itching and Dizziness    Other (Do Not Use) Rash     Epoxy Resin    Penicillins Rash     Has tolerated Cefazolin (ANCEF) and other Cephalosporins        Social History     Tobacco Use    Smoking status: Every Day     Current packs/day: 1.00     Average packs/day: 1 pack/day for 39.1 years (39.1 ttl pk-yrs)     Types: Cigarettes     Start date: 1/1/1986     Passive exposure: Current    Smokeless tobacco: Never   Substance Use Topics    Alcohol use: Not Currently     Family History   Problem Relation Age of Onset    Osteoporosis Mother     Bipolar Disorder Mother      History   Drug Use    Types: Marijuana     Comment: \"rarely\"       " "    Review of Systems   Constitutional:  Positive for chills. Negative for fatigue and fever.   HENT:  Positive for congestion and sore throat. Negative for sinus pressure.    Respiratory:  Positive for cough and shortness of breath. Negative for wheezing.    Cardiovascular:  Negative for chest pain, palpitations and leg swelling.   Gastrointestinal:  Negative for abdominal pain, constipation, diarrhea and vomiting.   Genitourinary:  Negative for dysuria and urgency.   Musculoskeletal:  Negative for myalgias.   Skin:  Negative for rash and wound.   Neurological:  Negative for dizziness and syncope.         Objective    /72 (BP Location: Left arm, Patient Position: Sitting, Cuff Size: Adult Regular)   Pulse 94   Temp 97.9  F (36.6  C) (Tympanic)   Resp 16   Ht 1.575 m (5' 2\")   Wt 74.2 kg (163 lb 8 oz)   SpO2 96%   BMI 29.90 kg/m     Estimated body mass index is 29.9 kg/m  as calculated from the following:    Height as of this encounter: 1.575 m (5' 2\").    Weight as of this encounter: 74.2 kg (163 lb 8 oz).    Physical Exam  Constitutional:       General: She is not in acute distress.     Appearance: Normal appearance. She is not ill-appearing.   HENT:      Right Ear: Tympanic membrane and ear canal normal.      Left Ear: Tympanic membrane and ear canal normal.      Nose: Nose normal.      Mouth/Throat:      Mouth: Mucous membranes are moist.      Pharynx: Oropharynx is clear. No oropharyngeal exudate or posterior oropharyngeal erythema.   Eyes:      Extraocular Movements: Extraocular movements intact.      Conjunctiva/sclera: Conjunctivae normal.      Pupils: Pupils are equal, round, and reactive to light.   Neck:      Thyroid: No thyroid mass, thyromegaly or thyroid tenderness.   Cardiovascular:      Rate and Rhythm: Normal rate and regular rhythm.      Heart sounds: No murmur heard.  Pulmonary:      Effort: Pulmonary effort is normal.      Breath sounds: Wheezing present. No rhonchi or rales. "   Abdominal:      General: Bowel sounds are normal.      Palpations: Abdomen is soft. There is no hepatomegaly, splenomegaly, mass or pulsatile mass.      Tenderness: There is no abdominal tenderness. There is no right CVA tenderness, left CVA tenderness, guarding or rebound.   Musculoskeletal:      Cervical back: Neck supple.      Right lower leg: No edema.      Left lower leg: No edema.   Lymphadenopathy:      Cervical: No cervical adenopathy.   Skin:     General: Skin is warm and dry.      Capillary Refill: Capillary refill takes less than 2 seconds.      Findings: Lesion (patches of plaques/erythema over left arm (eczema) - no honey colored crust) present. No rash.   Neurological:      General: No focal deficit present.      Mental Status: She is alert and oriented to person, place, and time.      Cranial Nerves: No cranial nerve deficit.      Motor: No weakness.      Gait: Gait normal.   Psychiatric:         Mood and Affect: Mood normal.         Behavior: Behavior normal.         Thought Content: Thought content normal.         Judgment: Judgment normal.           Recent Labs   Lab Test 02/03/25  0747 10/21/24  0920 07/09/24  0836 03/19/24  1129 02/27/24  1438   HGB 15.3 14.4 14.9   < > 15.6    233 215   < > 255   NA  --  142 143   < > 141   POTASSIUM  --  4.3 4.2   < > 4.1   CR  --  1.10* 0.87   < > 0.87   A1C  --   --  6.2*  --  6.7*    < > = values in this interval not displayed.        Diagnostics  Recent Results (from the past 24 hours)   Hemoglobin A1c    Collection Time: 02/03/25  7:47 AM   Result Value Ref Range    Estimated Average Glucose 128 (H) <117 mg/dL    Hemoglobin A1C 6.1 (H) <5.7 %   Basic metabolic panel    Collection Time: 02/03/25  7:47 AM   Result Value Ref Range    Sodium 143 135 - 145 mmol/L    Potassium 4.1 3.4 - 5.3 mmol/L    Chloride 108 (H) 98 - 107 mmol/L    Carbon Dioxide (CO2) 25 22 - 29 mmol/L    Anion Gap 10 7 - 15 mmol/L    Urea Nitrogen 11.1 6.0 - 20.0 mg/dL     Creatinine 0.90 0.51 - 0.95 mg/dL    GFR Estimate 75 >60 mL/min/1.73m2    Calcium 9.5 8.8 - 10.4 mg/dL    Glucose 127 (H) 70 - 99 mg/dL   CBC with platelets and differential    Collection Time: 02/03/25  7:47 AM   Result Value Ref Range    WBC Count 12.6 (H) 4.0 - 11.0 10e3/uL    RBC Count 4.92 3.80 - 5.20 10e6/uL    Hemoglobin 15.3 11.7 - 15.7 g/dL    Hematocrit 45.8 35.0 - 47.0 %    MCV 93 78 - 100 fL    MCH 31.1 26.5 - 33.0 pg    MCHC 33.4 31.5 - 36.5 g/dL    RDW 13.2 10.0 - 15.0 %    Platelet Count 239 150 - 450 10e3/uL    % Neutrophils 65 %    % Lymphocytes 27 %    % Monocytes 6 %    % Eosinophils 2 %    % Basophils 0 %    % Immature Granulocytes 0 %    NRBCs per 100 WBC 0 <1 /100    Absolute Neutrophils 8.2 1.6 - 8.3 10e3/uL    Absolute Lymphocytes 3.4 0.8 - 5.3 10e3/uL    Absolute Monocytes 0.7 0.0 - 1.3 10e3/uL    Absolute Eosinophils 0.2 0.0 - 0.7 10e3/uL    Absolute Basophils 0.1 0.0 - 0.2 10e3/uL    Absolute Immature Granulocytes 0.1 <=0.4 10e3/uL    Absolute NRBCs 0.0 10e3/uL      No EKG required for low risk surgery (cataract, skin procedure, breast biopsy, etc).    Revised Cardiac Risk Index (RCRI)  The patient has the following serious cardiovascular risks for perioperative complications:   - No serious cardiac risks = 0 points     RCRI Interpretation: 0 points: Class I (very low risk - 0.4% complication rate)         Signed Electronically by: Hannah Eaton MD  A copy of this evaluation report is provided to the requesting physician.

## 2025-02-03 NOTE — PATIENT INSTRUCTIONS
Start gabpentin 100mg at bedtime again, can increase to 200mg after 3 nights, then 300mg after 3 more nights.     Need to move surgery back at least one week. Will reassess again next week     Stop vitamins and supplements two weeks prior

## 2025-02-04 RX ORDER — SEMAGLUTIDE 0.68 MG/ML
INJECTION, SOLUTION SUBCUTANEOUS
Qty: 3 ML | Refills: 0 | Status: SHIPPED | OUTPATIENT
Start: 2025-02-04

## 2025-02-04 NOTE — TELEPHONE ENCOUNTER
Ozempic      Last Written Prescription Date:  11.18.24  Last Fill Quantity: #3ml,   # refills: 0  Last Office Visit: 2.3.25  Future Office visit:    Next 5 appointments (look out 90 days)      Feb 13, 2025 1:00 PM  (Arrive by 12:45 PM)  Pre-Operative Physical with Hannah Eaton MD  [facility] [place] ([facility] [address]  [place] [address]  [phone]     Mar 03, 2025 8:00 AM  (Arrive by 7:45 AM)  Provider Visit with Hannah Eaton MD  [facility] ([facility] [place] ) [address]  [place] [address]  [phone]             Routing refill request to provider for review/approval because:

## 2025-02-07 ENCOUNTER — TELEPHONE (OUTPATIENT)
Dept: FAMILY MEDICINE | Facility: OTHER | Age: 55
End: 2025-02-07

## 2025-02-07 NOTE — TELEPHONE ENCOUNTER
Received PA request from [facility] for OZEMPIC, 0.25 OR 0.5 MG/DOSE, 2 MG/3ML pen. Submitted on CMM, waiting for response.

## 2025-02-11 NOTE — TELEPHONE ENCOUNTER
Received PA APPROVAL from [employer] [facility] for OZEMPIC, 0.25 OR 0.5 MG/DOSE, 2 MG/3ML pen. Dates 02/07/25  - 12/31/2025

## 2025-02-11 NOTE — TELEPHONE ENCOUNTER
Pristiq      Last Written Prescription Date:  Historical  Last Fill Quantity: -,   # refills: -  Last Office Visit: 2/3/25  Future Office visit:    Next 5 appointments (look out 90 days)      Feb 13, 2025 1:00 PM  (Arrive by 12:45 PM)  Pre-Operative Physical with Hannah Eaton MD  [facility] [place] ([facility] [place] ) [address]  [place] [address]  [phone]     Mar 03, 2025 8:00 AM  (Arrive by 7:45 AM)  Provider Visit with Hannah Eaton MD  [facility] [place] ([facility] [place] ) [address]  [place] [address]  [phone]             Routing refill request to provider for review/approval because:  Medication is reported/historical

## 2025-02-12 RX ORDER — DESVENLAFAXINE 25 MG/1
25 TABLET, EXTENDED RELEASE ORAL DAILY
Qty: 90 TABLET | Refills: 0 | OUTPATIENT
Start: 2025-02-12

## 2025-02-13 ENCOUNTER — TELEPHONE (OUTPATIENT)
Dept: FAMILY MEDICINE | Facility: OTHER | Age: 55
End: 2025-02-13

## 2025-02-13 ENCOUNTER — OFFICE VISIT (OUTPATIENT)
Dept: FAMILY MEDICINE | Facility: OTHER | Age: 55
End: 2025-02-13
Attending: STUDENT IN AN ORGANIZED HEALTH CARE EDUCATION/TRAINING PROGRAM
Payer: COMMERCIAL

## 2025-02-13 VITALS
SYSTOLIC BLOOD PRESSURE: 128 MMHG | HEIGHT: 62 IN | DIASTOLIC BLOOD PRESSURE: 82 MMHG | TEMPERATURE: 97.7 F | WEIGHT: 166.6 LBS | BODY MASS INDEX: 30.66 KG/M2 | OXYGEN SATURATION: 94 % | HEART RATE: 86 BPM

## 2025-02-13 DIAGNOSIS — G25.81 RLS (RESTLESS LEGS SYNDROME): ICD-10-CM

## 2025-02-13 DIAGNOSIS — F41.1 GENERALIZED ANXIETY DISORDER: ICD-10-CM

## 2025-02-13 DIAGNOSIS — E78.2 MIXED HYPERLIPIDEMIA: ICD-10-CM

## 2025-02-13 DIAGNOSIS — E11.42 TYPE 2 DIABETES MELLITUS WITH DIABETIC POLYNEUROPATHY, WITHOUT LONG-TERM CURRENT USE OF INSULIN (H): ICD-10-CM

## 2025-02-13 DIAGNOSIS — E11.59 HYPERTENSION ASSOCIATED WITH TYPE 2 DIABETES MELLITUS (H): ICD-10-CM

## 2025-02-13 DIAGNOSIS — E03.9 HYPOTHYROIDISM (ACQUIRED): ICD-10-CM

## 2025-02-13 DIAGNOSIS — F32.A DEPRESSIVE DISORDER: ICD-10-CM

## 2025-02-13 DIAGNOSIS — I15.2 HYPERTENSION ASSOCIATED WITH TYPE 2 DIABETES MELLITUS (H): ICD-10-CM

## 2025-02-13 DIAGNOSIS — R94.31 PROLONGED Q-T INTERVAL ON ECG: ICD-10-CM

## 2025-02-13 DIAGNOSIS — J41.8 MIXED SIMPLE AND MUCOPURULENT CHRONIC BRONCHITIS (H): ICD-10-CM

## 2025-02-13 DIAGNOSIS — I51.89 GRADE I DIASTOLIC DYSFUNCTION: ICD-10-CM

## 2025-02-13 DIAGNOSIS — G56.01 CARPAL TUNNEL SYNDROME OF RIGHT WRIST: ICD-10-CM

## 2025-02-13 DIAGNOSIS — Z01.818 PREOP GENERAL PHYSICAL EXAM: Primary | ICD-10-CM

## 2025-02-13 DIAGNOSIS — F17.200 TOBACCO USE DISORDER: ICD-10-CM

## 2025-02-13 ASSESSMENT — ENCOUNTER SYMPTOMS
DYSURIA: 0
SHORTNESS OF BREATH: 0
COUGH: 0
ABDOMINAL PAIN: 0
SORE THROAT: 0
DIARRHEA: 0
PALPITATIONS: 0
WOUND: 0
FATIGUE: 0
DIZZINESS: 0
VOMITING: 0
MYALGIAS: 0
WHEEZING: 0
CHILLS: 0
CONSTIPATION: 0
FEVER: 0
SINUS PRESSURE: 0

## 2025-02-13 ASSESSMENT — PAIN SCALES - GENERAL: PAINLEVEL_OUTOF10: SEVERE PAIN (7)

## 2025-02-13 NOTE — PROGRESS NOTES
Preoperative Evaluation  [facility] [place]  [address]  [place] [address]  Phone: [phone]  Primary Provider: Hannah Eaton MD  Pre-op Performing Provider: Hannah Eaton MD  Feb 13, 2025 2/13/2025   Surgical Information   What procedure is being done? carpal tunnel release    [facility] or [facility] where procedure[facility]    Who is doing the procedure / surgery? Dr. Aden    Date of surgery / procedure: 02/28/2025    Time of surgery / procedure: TBD    Where do you plan to recover after surgery? at home with family        Proxy-reported     Fax number for surgical facility:     Assessment & Plan     The proposed surgical procedure is considered LOW risk.    Preop general physical exam  CBC, BMP stable on 2/3/2025 visit.  Functions at over 4 METS.  Optimized for low risk procedure.    Carpal tunnel syndrome of right wrist  Reason for surgery.    Type 2 diabetes mellitus with diabetic polyneuropathy, without long-term current use of insulin (H)  Well-controlled.  Has been holding her Ozempic.  Last A1c 6.1.    Hypertension associated with type 2 diabetes mellitus (H)  Well-controlled.  Has been able to wean off medication.  Renal function stable.    Hypothyroidism (acquired)  TSH slightly off, was not regular with dosing.  Did increase Synthroid and she feels fine.  No reason to delay surgery.  Continue current Synthroid and will recheck TSH next month.    Tobacco use disorder (30 pack year history plus)  Encouraged cessation.    Mixed hyperlipidemia  Controlled on Crestor daily.    Grade I diastolic dysfunction  No evidence for heart failure or leg swelling.    Prolonged Q-T interval on ECG  Has been stable.  Most recent QTc was 450.    Generalized anxiety disorder  Stable, better on Celexa.    Depressive disorder  Stable.  On Celexa.    Mixed simple and mucopurulent chronic bronchitis (H)  At baseline.  Exacerbation from 2/3/2025 due to  viral illness has resolved.  Lungs are clear and oxygen stable.  Optimized now for surgery.  Continues on inhalers of Advair and Spiriva.    RLS (restless legs syndrome)  Waxing and waning.  Planning to titrate her gabapentin further to 100 mg twice daily.  Can increase to 100 mg 3 times daily after another week.  Follow-up in 3 weeks.            - No identified additional risk factors other than previously addressed    Antiplatelet or Anticoagulation Medication Instructions   - We reviewed the medication list and the patient is not on an antiplatelet or anticoagulation medications.    Additional Medication Instructions  Take all scheduled medications on the day of surgery EXCEPT for modifications listed below:   - Herbal medications and vitamins: DO NOT TAKE 14 days prior to surgery.   - Statins (atorvastatin, simvastatin, pravastatin) : Continue taking on the day of surgery.    - GLP-1 Injectable (exenitide, liraglutide, semaglutide, dulaglutide, etc.): DO NOT TAKE 7 days before surgery    - Estrogens: Continue without modification.    - ibuprofen (Advil, Motrin): DO NOT TAKE 1 day before surgery.    - SSRIs, SNRIs, TCAs, Antipsychotics: Continue without modification.    - cannabis, marijuana: DO NOT TAKE night before surgery.   - cetirizine and first generation antihistamines: DO NOT TAKE on day of surgery.   - LABA, inhaled corticosteroid, long-acting anticholinergics: Continue without modification.   - rescue Inhaler: Continue PRN. Bring to hospital on the day of surgery.   - Topicals: DO NOT TAKE day of surgery.         Recommendation  Approval given to proceed with proposed procedure, without further diagnostic evaluation.        The longitudinal plan of care for the diagnosis(es)/condition(s) as documented were addressed during this visit. Due to the added complexity in care, I will continue to support Gabrielle in the subsequent management and with ongoing continuity of care.    Anton Anthony is a 54 year  old, presenting for the following:  Pre-Op Exam          2/13/2025    12:45 PM   Additional Questions   Roomed by Maryam DECKER   Accompanied by self     HPI related to upcoming procedure: recurrent carpal tunnel on the right wrist, planning treatment.     Has been feeling well with no fevers, congestion, cough, or other URI symptoms. Mild congestion from URI 2/3/25 but improving.   No ischemic cardiac history. Denies any shortness of breath, chest pain, or dyspnea on exertion.   Is able to function at >4 METS. Does light to mod housework. Can walk stairs easily.   Has had prior surgeries with general anesthesia and did well.           2/13/2025   Pre-Op Questionnaire   Have you ever had a heart attack or stroke? No    Have you ever had surgery on your heart or blood vessels, such as a stent placement, a coronary artery bypass, or surgery on an artery in your head, neck, heart, or legs? No    Do you have chest pain with activity? No    Do you have a history of heart failure? No    Do you currently have a cold, bronchitis or symptoms of other infection? (!) YES - nasal congestion but improving, not coughing anymore, no shortness of breath      Do you have a cough, shortness of breath, or wheezing? (!) YES - cough resolving, shortness of breath better    Do you or anyone in your family have previous history of blood clots? No    Do you or does anyone in your family have a serious bleeding problem such as prolonged bleeding following surgeries or cuts? No    Have you ever had problems with anemia or been told to take iron pills? No    Have you had any abnormal blood loss such as black, tarry or bloody stools, or abnormal vaginal bleeding? No    Have you ever had a blood transfusion? No    Are you willing to have a blood transfusion if it is medically needed before, during, or after your surgery? Yes    Have you or any of your relatives ever had problems with anesthesia? (!) YES - history of trouble waking up    Do you  have sleep apnea, excessive snoring or daytime drowsiness? No    Do you have any artifical heart valves or other implanted medical devices like a pacemaker, defibrillator, or continuous glucose monitor? No    Do you have artificial joints? No    Are you allergic to latex? No        Proxy-reported     Health Care Directive  Patient does not have a Health Care Directive: Patient states has Advance Directive and will bring in a copy to clinic.    Preoperative Review of    reviewed - controlled substances reflected in medication list.      Status of Chronic Conditions:  COPD - Patient has a longstanding history of moderate COPD . Patient now feels at her baseline with no cough or shortness of breath. Her medication regimen consisting of advair, mucinex, spirva without adverse reactions or side effects. Finished prednisone by 2/7/25.      DEPRESSION - has been stable. On celexa.      DIABETES - Patient has a longstanding history of DiabetesTypeII . Patient is being treated with GLP and denies significant side effects. Control has been good. Complicating factors include but are not limited to: hypertension and hyperlipidemia. Has been holding ozempic.      HYPERLIPIDEMIA - stable. Patient reports no problems or side effects with the medication.      HYPERTENSION - Patient has longstanding history of HTN , currently denies any symptoms referable to elevated blood pressure. Specifically denies chest pain, palpitations, dyspnea, orthopnea, PND or peripheral edema. Blood pressure readings have been in normal range. Current medication regimen is as listed below. Patient denies any side effects of medication.      HYPOTHYROIDISM - stable. Did have to increase dose recently.     TSH   Date Value Ref Range Status   02/03/2025 6.45 (H) 0.30 - 4.20 uIU/mL Final     History of presumed decreased left ventricular function   Thought to be mildly reduced on echocardiogram but Lexiscan stress test was negative for any inducible  ischemia and ejection fraction was normal on stress test.  Had follow-up cardiac MRI with normal ejection fraction and no concerning findings.  Did have mild left ventricular hypertrophy noted.  Was discharged from cardiology.    Patient Active Problem List    Diagnosis Date Noted    Spondylolisthesis of lumbosacral region 12/17/2024     Priority: Medium    Neuropathy 09/23/2024     Priority: Medium    Hypertension associated with type 2 diabetes mellitus (H) 07/09/2024     Priority: Medium    Spinal stenosis of lumbar region with neurogenic claudication 05/02/2024     Priority: Medium    Prolonged Q-T interval on ECG 02/27/2024     Priority: Medium    Dislocation of wrist, carpometacarpal 02/15/2024     Priority: Medium    Grade I diastolic dysfunction 01/31/2024     Priority: Medium     Formatting of this note might be different from the original.   1/22/2024:    Left ventricular size is normal.    Left ventricular function is decreased. The ejection fraction is 45-50%    (mildly reduced).    No regional wall motion abnormalities are seen.    Grade I or early diastolic dysfunction.    Global right ventricular function is mildly reduced.    Both atria appear normal.    Trace mitral insufficiency is present.    No aortic stenosis is present.    Mild tricuspid insufficiency is present.    The right ventricular systolic pressure is 22mmHg above the right atrial    pressure.    Trace pulmonic insufficiency is present.  Formatting of this note might be different from the original.   Formatting of this note might be different from the original.    1/22/2024:     Left ventricular size is normal.     Left ventricular function is decreased. The ejection fraction is 45-50%     (mildly reduced).     No regional wall motion abnormalities are seen.     Grade I or early diastolic dysfunction.     Global right ventricular function is mildly reduced.     Both atria appear normal.     Trace mitral insufficiency is present.     No  aortic stenosis is present.     Mild tricuspid insufficiency is present.     The right ventricular systolic pressure is 22mmHg above the right atrial     pressure.     Trace pulmonic insufficiency is present.      Mild mitral insufficiency 01/31/2024     Priority: Medium     Formatting of this note might be different from the original.   1/22/2024:    Left ventricular size is normal.    Left ventricular function is decreased. The ejection fraction is 45-50%    (mildly reduced).    No regional wall motion abnormalities are seen.    Grade I or early diastolic dysfunction.    Global right ventricular function is mildly reduced.    Both atria appear normal.    Trace mitral insufficiency is present.    No aortic stenosis is present.    Mild tricuspid insufficiency is present.    The right ventricular systolic pressure is 22mmHg above the right atrial    pressure.    Trace pulmonic insufficiency is present.  Formatting of this note might be different from the original.   Formatting of this note might be different from the original.    1/22/2024:     Left ventricular size is normal.     Left ventricular function is decreased. The ejection fraction is 45-50%     (mildly reduced).     No regional wall motion abnormalities are seen.     Grade I or early diastolic dysfunction.     Global right ventricular function is mildly reduced.     Both atria appear normal.     Trace mitral insufficiency is present.     No aortic stenosis is present.     Mild tricuspid insufficiency is present.     The right ventricular systolic pressure is 22mmHg above the right atrial     pressure.     Trace pulmonic insufficiency is present.      Mild tricuspid insufficiency 01/31/2024     Priority: Medium     Formatting of this note might be different from the original.   1/22/2024:    Left ventricular size is normal.    Left ventricular function is decreased. The ejection fraction is 45-50%    (mildly reduced).    No regional wall motion  abnormalities are seen.    Grade I or early diastolic dysfunction.    Global right ventricular function is mildly reduced.    Both atria appear normal.    Trace mitral insufficiency is present.    No aortic stenosis is present.    Mild tricuspid insufficiency is present.    The right ventricular systolic pressure is 22mmHg above the right atrial    pressure.    Trace pulmonic insufficiency is present.  Formatting of this note might be different from the original.   Formatting of this note might be different from the original.    1/22/2024:     Left ventricular size is normal.     Left ventricular function is decreased. The ejection fraction is 45-50%     (mildly reduced).     No regional wall motion abnormalities are seen.     Grade I or early diastolic dysfunction.     Global right ventricular function is mildly reduced.     Both atria appear normal.     Trace mitral insufficiency is present.     No aortic stenosis is present.     Mild tricuspid insufficiency is present.     The right ventricular systolic pressure is 22mmHg above the right atrial     pressure.     Trace pulmonic insufficiency is present.      Bilateral arm weakness 11/14/2023     Priority: Medium    History of Helicobacter pylori infection 11/14/2023     Priority: Medium    Cervical stenosis of spinal canal 10/23/2023     Priority: Medium    Degeneration of lumbar intervertebral disc 10/23/2023     Priority: Medium    Mixed simple and mucopurulent chronic bronchitis (H) 10/23/2023     Priority: Medium    S/P complete hysterectomy 10/23/2023     Priority: Medium    Chronic pain disorder 09/12/2023     Priority: Medium    Type 2 diabetes mellitus with diabetic polyneuropathy, without long-term current use of insulin (H) 04/19/2023     Priority: Medium    Insomnia 08/11/2022     Priority: Medium     Current Medications: Lunesta - causes a foul taste in her mouth. Not sleeping well still.   Previous medications: Valium, Trazodone, Restoril, Gabapentin,  Lyrica       Generalized anxiety disorder 08/10/2021     Priority: Medium    Spondylosis without myelopathy or radiculopathy, cervical region 08/10/2021     Priority: Medium    Hyperlipidemia 09/27/2018     Priority: Medium    Tobacco use disorder (30 pack year history plus) 07/18/2018     Priority: Medium    RLS (restless legs syndrome) 03/08/2018     Priority: Medium    Hypothyroidism (acquired) 06/06/2017     Priority: Medium    Depressive disorder 06/21/2002     Priority: Medium      Past Medical History:   Diagnosis Date    Abnormal echocardiogram 01/31/2024    Normal stress test and cardiac MRI 2024      Arthritis     Decreased left ventricular function 02/27/2024    Had confirmatory MRI - showed normal heart function. No evidence for reduced EF      Depressive disorder     Gastroesophageal reflux disease with esophagitis     H. pylori infection 2018    Hashimoto's disease     Hypothyroidism     Insomnia     Mild intermittent asthma without complication 12/17/2015    Formatting of this note might be different from the original.   Last Assessment & Plan:     Formatting of this note might be different from the original.    This is generally been stable.  May be slight worsening recently with her rash.      Type 2 diabetes mellitus (H)     Umbilical hernia without obstruction and without gangrene     Urticaria     Last Assessment & Plan: Formatting of this note might be different from the original. Patient presented to the emergency room on 3/19/2023 with history of full body rash starting 3 weeks prior with discomfort and itching treated with hydrocortisone cream without relief.  Homeless family member had moved back into the house with prior exposure to bedbugs.  Under breasts lower legs severity moderate     Past Surgical History:   Procedure Laterality Date    ABDOMEN SURGERY      CARPAL TUNNEL RELEASE RT/LT Bilateral     [facility] arthroplasty Left     COLONOSCOPY N/A 10/28/2024    rpt 3 year; Procedure:  COLONOSCOPY WITH POLYPECTOMY;  Surgeon: Enrique Brambila MD;  Location: [facility] OR    COLONOSCOPY - HIM SCAN N/A 10/04/2018    [facility]. 3 hyperplastic polyps. repeat 10 yr    HYSTERECTOMY, PAP NO LONGER INDICATED N/A 10/10/2018    Cervix removed per [facility]'s Hysterectomy Path Report.    HYSTERECTOMY, TOTAL, LAPAROSCOPIC, WITH SALPINGO-OOPHORECTOMY, CYSTOSCOPY Bilateral 10/10/2018    [facility].     Current Outpatient Medications   Medication Sig Dispense Refill    ACCU-CHEK GUIDE test strip       blood glucose monitoring (SOFTCLIX) lancets       Blood Glucose Monitoring Suppl (ACCU-CHEK GUIDE) w/Device KIT USE TO TEST BLOOD SUGAR DAILY      citalopram (CELEXA) 10 MG tablet Take 1 tablet (10 mg) by mouth daily. 90 tablet 1    clobetasol propionate (TEMOVATE) 0.05 % external cream APPLY SMALL AMOUNT TO ACTIVE LESIONS UP TO 10-14 DAYS. MUST TAKE ONE WEEK OFF BETWEEN USE. DO NOT APPLY TO FACE. 60 g 0    Continuous Blood Gluc  (DEXCOM G6 ) MIKE Use to read blood sugars as per 's instructions. 1 each 1    Continuous Blood Gluc Sensor (DEXCOM G6 SENSOR) MISC CHANGE SENSOR EVERY 10 DAYS 3 each 3    Continuous Glucose Transmitter (DEXCOM G6 TRANSMITTER) MISC Change every 3 months. 1 each 1    cyanocobalamin (VITAMIN B-12) 500 MCG tablet Take 1 tablet (500 mcg) by mouth daily 90 tablet 3    emollient (VANICREAM) external cream Apply topically 2 times daily. 453 g 1    estradiol (ESTRACE) 0.1 MG/GM vaginal cream Place 1 g vaginally once a week. 42.5 g 1    fluticasone-salmeterol (ADVAIR) 250-50 MCG/ACT inhaler Inhale 1 puff into the lungs every 12 hours      gabapentin (NEURONTIN) 100 MG capsule TAKE 1 CAPSULE BY MOUTH TWICE DAILY 60 capsule 3    Glucose 4-6 GM-MG CHEW Take 4 g by mouth once.      guaiFENesin (MUCINEX) 600 MG 12 hr tablet Take 1 tablet (600 mg) by mouth 2 times daily as needed for congestion. 30 tablet 1    hydrOXYzine nia (VISTARIL) 25 MG capsule Take 1 capsule (25 mg)  by mouth 3 times daily as needed for itching. 30 capsule 0    ibuprofen (ADVIL/MOTRIN) 200 MG tablet Take 200 mg by mouth every 4 hours as needed.      ipratropium - albuterol 0.5 mg/2.5 mg/3 mL (DUONEB) 0.5-2.5 (3) MG/3ML neb solution Inhale 3 mLs into the lungs.      ketoconazole (NIZORAL) 2 % external cream Apply topically daily.      levothyroxine (SYNTHROID/LEVOTHROID) 100 MCG tablet Take 1 tablet (100 mcg) by mouth every morning (before breakfast). 90 tablet 0    nicotine (NICODERM CQ) 14 MG/24HR 24 hr patch       nystatin (MYCOSTATIN) 722576 UNIT/GM external powder Apply topically 3 times daily as needed for other (yeast infection). Apply up to three times daily to areas with active yeast. 60 g 0    omeprazole (PRILOSEC) 20 MG DR capsule Take 1 capsule (20 mg) by mouth daily. 90 capsule 3    pramipexole (MIRAPEX) 0.75 MG tablet Take 1 tablet (0.75 mg) by mouth at bedtime. 90 tablet 3    PROAIR  (90 Base) MCG/ACT inhaler       rosuvastatin (CRESTOR) 40 MG tablet Take 1 tablet (40 mg) by mouth daily. 90 tablet 3    SPIRIVA RESPIMAT 2.5 MCG/ACT inhaler INHALE 2 PUFFS INTO THE LUNGS DAILY 4 g 0    traZODone (DESYREL) 150 MG tablet Take 1 tablet (150 mg) by mouth at bedtime. 90 tablet 1    triamcinolone (KENALOG) 0.5 % external ointment Apply pea sized amount to active eczematous lesions on elbows twice daily for 10 days. 15 g 0    OZEMPIC, 0.25 OR 0.5 MG/DOSE, 2 MG/3ML pen INJECT 0.5MG SUBCUTANEOUSLY EVERY 7 DAYS (Patient not taking: Reported on 2/13/2025) 3 mL 0       Allergies   Allergen Reactions    Hydromorphone Other (See Comments)    Other Food Allergy Itching     Walnuts, pecans     Amoxicillin-Pot Clavulanate     Azithromycin GI Disturbance     Other reaction(s): Stomach Upset    Black Stetson Flavoring Agent (Non-Screening) Itching    Erythromycin Nausea and Vomiting    Nuts Itching     Pecans and walnuts     Pecan Extract Itching    Tramadol Itching and Dizziness    Other (Do Not Use) Rash      "Epoxy Resin    Penicillins Rash     Has tolerated Cefazolin (ANCEF) and other Cephalosporins        Social History     Tobacco Use    Smoking status: Every Day     Current packs/day: 1.00     Average packs/day: 1 pack/day for 39.1 years (39.1 ttl pk-yrs)     Types: Cigarettes     Start date: 1/1/1986     Passive exposure: Current    Smokeless tobacco: Never   Substance Use Topics    Alcohol use: Not Currently     Family History   Problem Relation Age of Onset    Osteoporosis Mother     Bipolar Disorder Mother      History   Drug Use    Types: Marijuana     Comment: \"rarely\"           Review of Systems   Constitutional:  Negative for chills, fatigue and fever.   HENT:  Positive for congestion. Negative for sinus pressure and sore throat.    Respiratory:  Negative for cough, shortness of breath and wheezing.    Cardiovascular:  Negative for chest pain, palpitations and leg swelling.   Gastrointestinal:  Negative for abdominal pain, constipation, diarrhea and vomiting.   Genitourinary:  Negative for dysuria and urgency.   Musculoskeletal:  Negative for myalgias.   Skin:  Negative for rash and wound.   Neurological:  Negative for dizziness and syncope.         Objective    /82 (BP Location: Left arm, Patient Position: Sitting, Cuff Size: Adult Large)   Pulse 86   Temp 97.7  F (36.5  C) (Tympanic)   Ht 1.575 m (5' 2\")   Wt 75.6 kg (166 lb 9.6 oz)   SpO2 94%   BMI 30.47 kg/m     Estimated body mass index is 30.47 kg/m  as calculated from the following:    Height as of this encounter: 1.575 m (5' 2\").    Weight as of this encounter: 75.6 kg (166 lb 9.6 oz).  Physical Exam  Constitutional:       General: She is not in acute distress.     Appearance: Normal appearance. She is not ill-appearing.   HENT:      Right Ear: Tympanic membrane and ear canal normal.      Left Ear: Tympanic membrane and ear canal normal.      Nose: Nose normal.      Mouth/Throat:      Mouth: Mucous membranes are moist.      Pharynx: " Oropharynx is clear. No oropharyngeal exudate or posterior oropharyngeal erythema.   Eyes:      Extraocular Movements: Extraocular movements intact.      Conjunctiva/sclera: Conjunctivae normal.      Pupils: Pupils are equal, round, and reactive to light.   Neck:      Thyroid: No thyroid mass, thyromegaly or thyroid tenderness.   Cardiovascular:      Rate and Rhythm: Normal rate and regular rhythm.      Heart sounds: No murmur heard.  Pulmonary:      Effort: Pulmonary effort is normal.      Breath sounds: Normal breath sounds. No wheezing, rhonchi or rales.   Abdominal:      General: Bowel sounds are normal.      Palpations: Abdomen is soft. There is no hepatomegaly, splenomegaly, mass or pulsatile mass.      Tenderness: There is no abdominal tenderness. There is no right CVA tenderness, left CVA tenderness, guarding or rebound.   Musculoskeletal:      Cervical back: Neck supple.      Right lower leg: No edema.      Left lower leg: No edema.   Lymphadenopathy:      Cervical: No cervical adenopathy.   Skin:     General: Skin is warm and dry.      Capillary Refill: Capillary refill takes less than 2 seconds.      Findings: No rash.   Neurological:      General: No focal deficit present.      Mental Status: She is alert and oriented to person, place, and time.      Cranial Nerves: No cranial nerve deficit.      Motor: No weakness.      Gait: Gait normal.   Psychiatric:         Mood and Affect: Mood normal.         Behavior: Behavior normal.         Thought Content: Thought content normal.         Judgment: Judgment normal.         Recent Labs   Lab Test 02/03/25  0747 10/21/24  0920 07/09/24  0836   HGB 15.3 14.4 14.9    233 215    142 143   POTASSIUM 4.1 4.3 4.2   CR 0.90 1.10* 0.87   A1C 6.1*  --  6.2*        Diagnostics  Recent Results (from the past 720 hours)   TSH with free T4 reflex    Collection Time: 02/03/25  7:47 AM   Result Value Ref Range    TSH 6.45 (H) 0.30 - 4.20 uIU/mL   Hemoglobin A1c     Collection Time: 02/03/25  7:47 AM   Result Value Ref Range    Estimated Average Glucose 128 (H) <117 mg/dL    Hemoglobin A1C 6.1 (H) <5.7 %   Basic metabolic panel    Collection Time: 02/03/25  7:47 AM   Result Value Ref Range    Sodium 143 135 - 145 mmol/L    Potassium 4.1 3.4 - 5.3 mmol/L    Chloride 108 (H) 98 - 107 mmol/L    Carbon Dioxide (CO2) 25 22 - 29 mmol/L    Anion Gap 10 7 - 15 mmol/L    Urea Nitrogen 11.1 6.0 - 20.0 mg/dL    Creatinine 0.90 0.51 - 0.95 mg/dL    GFR Estimate 75 >60 mL/min/1.73m2    Calcium 9.5 8.8 - 10.4 mg/dL    Glucose 127 (H) 70 - 99 mg/dL   CBC with platelets and differential    Collection Time: 02/03/25  7:47 AM   Result Value Ref Range    WBC Count 12.6 (H) 4.0 - 11.0 10e3/uL    RBC Count 4.92 3.80 - 5.20 10e6/uL    Hemoglobin 15.3 11.7 - 15.7 g/dL    Hematocrit 45.8 35.0 - 47.0 %    MCV 93 78 - 100 fL    MCH 31.1 26.5 - 33.0 pg    MCHC 33.4 31.5 - 36.5 g/dL    RDW 13.2 10.0 - 15.0 %    Platelet Count 239 150 - 450 10e3/uL    % Neutrophils 65 %    % Lymphocytes 27 %    % Monocytes 6 %    % Eosinophils 2 %    % Basophils 0 %    % Immature Granulocytes 0 %    NRBCs per 100 WBC 0 <1 /100    Absolute Neutrophils 8.2 1.6 - 8.3 10e3/uL    Absolute Lymphocytes 3.4 0.8 - 5.3 10e3/uL    Absolute Monocytes 0.7 0.0 - 1.3 10e3/uL    Absolute Eosinophils 0.2 0.0 - 0.7 10e3/uL    Absolute Basophils 0.1 0.0 - 0.2 10e3/uL    Absolute Immature Granulocytes 0.1 <=0.4 10e3/uL    Absolute NRBCs 0.0 10e3/uL   T4 free    Collection Time: 02/03/25  7:47 AM   Result Value Ref Range    Free T4 0.89 (L) 0.90 - 1.70 ng/dL   Influenza A/B, RSV and SARS-CoV2 PCR (COVID-19) Nose    Collection Time: 02/03/25  8:29 AM    Specimen: Nose; Swab   Result Value Ref Range    Influenza A PCR Negative Negative    Influenza B PCR Negative Negative    RSV PCR Negative Negative    SARS CoV2 PCR Negative Negative      No EKG required for low risk surgery (cataract, skin procedure, breast biopsy, etc).    Revised Cardiac  Risk Index (RCRI)  The patient has the following serious cardiovascular risks for perioperative complications:   - No serious cardiac risks = 0 points     RCRI Interpretation: 0 points: Class I (very low risk - 0.4% complication rate)         Signed Electronically by: Hannah Eaton MD  A copy of this evaluation report is provided to the requesting physician.

## 2025-02-13 NOTE — PATIENT INSTRUCTIONS
Okay to try gabapentin 100mg TWICE daily. After one week, okay to increase to three times daily.     Before Your Surgery     Call your surgeon if there is any change in your health. This includes signs of a cold or flu (such as a sore throat, runny nose, cough, rash, fever, urinary symptoms).   If you take prescribed drugs: Follow your doctor s orders about which medicines to take and which to stop until after surgery.  HOLD   Vitamins and supplements two weeks prior  ibuprofen three day prior to surgery  Continue to hold ozempic until after surgery   Hold topical creams day of surgery   Okay to continue crestor, advair, mucinex, spiriva, trazodone, celexa, synthroid, omeprazole, pramipexole, gabapentin   Do not smoke, drink alcohol or take over the counter medicine (unless your surgeon or primary care doctor tells you to) for the 24 hours before and after surgery. Smoking can increase your risk of an infection. Nicotine patches are safe to use. NSAIDS like ibuprofen (Advil, Motrin) should be held one day before surgery. Celebrex should be held 3 days before surgery. Naproxen (Aleve) should be held four days before surgery.   Do not take supplements/vitamins day prior and morning of surgery. Some supplements/vitamins can interfere with anesthesia.   Eating and drinking prior to surgery: follow the instructions from your surgeon  Take a shower or bath the night before surgery and morning of surgery. Use the soap your surgeon gave you to gently clean your skin night before and morning of surgery. If you do not have soap from your surgeon, use your regular soap.[facility] patients can receive free Chlorhexidine Gluconate 4% soap for surgery at an outpatient [facility] pharmacy.  Do not shave the surgery site.  Wear clean pajamas and have clean sheets on your bed.  Brush teeth morning of surgery to reduce risk of infection.

## 2025-02-17 RX ORDER — DESVENLAFAXINE 25 MG/1
25 TABLET, EXTENDED RELEASE ORAL DAILY
Qty: 90 TABLET | Refills: 0 | OUTPATIENT
Start: 2025-02-17

## 2025-02-17 NOTE — TELEPHONE ENCOUNTER
Pt called and is not currently taking the desvenlafaxine succinate (PRISTIQ) 25 MG which was discontinued by Dr Eaton on 12/27/2024.

## 2025-02-27 ENCOUNTER — TRANSFERRED RECORDS (OUTPATIENT)
Dept: HEALTH INFORMATION MANAGEMENT | Facility: HOSPITAL | Age: 55
End: 2025-02-27

## 2025-03-03 ENCOUNTER — LAB (OUTPATIENT)
Dept: LAB | Facility: OTHER | Age: 55
End: 2025-03-03
Attending: STUDENT IN AN ORGANIZED HEALTH CARE EDUCATION/TRAINING PROGRAM
Payer: COMMERCIAL

## 2025-03-03 ENCOUNTER — OFFICE VISIT (OUTPATIENT)
Dept: FAMILY MEDICINE | Facility: OTHER | Age: 55
End: 2025-03-03
Attending: STUDENT IN AN ORGANIZED HEALTH CARE EDUCATION/TRAINING PROGRAM
Payer: COMMERCIAL

## 2025-03-03 VITALS
HEART RATE: 83 BPM | RESPIRATION RATE: 16 BRPM | WEIGHT: 170.56 LBS | HEIGHT: 62 IN | SYSTOLIC BLOOD PRESSURE: 138 MMHG | OXYGEN SATURATION: 94 % | DIASTOLIC BLOOD PRESSURE: 80 MMHG | TEMPERATURE: 98.8 F | BODY MASS INDEX: 31.38 KG/M2

## 2025-03-03 DIAGNOSIS — E03.9 HYPOTHYROIDISM (ACQUIRED): ICD-10-CM

## 2025-03-03 DIAGNOSIS — I15.2 HYPERTENSION ASSOCIATED WITH TYPE 2 DIABETES MELLITUS (H): Chronic | ICD-10-CM

## 2025-03-03 DIAGNOSIS — E11.42 TYPE 2 DIABETES MELLITUS WITH DIABETIC POLYNEUROPATHY, WITHOUT LONG-TERM CURRENT USE OF INSULIN (H): Primary | ICD-10-CM

## 2025-03-03 DIAGNOSIS — F17.200 NICOTINE DEPENDENCE, UNCOMPLICATED, UNSPECIFIED NICOTINE PRODUCT TYPE: ICD-10-CM

## 2025-03-03 DIAGNOSIS — E11.59 HYPERTENSION ASSOCIATED WITH TYPE 2 DIABETES MELLITUS (H): Chronic | ICD-10-CM

## 2025-03-03 LAB
T4 FREE SERPL-MCNC: 1.01 NG/DL (ref 0.9–1.7)
TSH SERPL DL<=0.005 MIU/L-ACNC: 4.62 UIU/ML (ref 0.3–4.2)

## 2025-03-03 PROCEDURE — 84443 ASSAY THYROID STIM HORMONE: CPT | Mod: ZL

## 2025-03-03 PROCEDURE — 84439 ASSAY OF FREE THYROXINE: CPT | Mod: ZL

## 2025-03-03 PROCEDURE — 99214 OFFICE O/P EST MOD 30 MIN: CPT | Performed by: STUDENT IN AN ORGANIZED HEALTH CARE EDUCATION/TRAINING PROGRAM

## 2025-03-03 PROCEDURE — G2211 COMPLEX E/M VISIT ADD ON: HCPCS | Performed by: STUDENT IN AN ORGANIZED HEALTH CARE EDUCATION/TRAINING PROGRAM

## 2025-03-03 PROCEDURE — 3075F SYST BP GE 130 - 139MM HG: CPT | Performed by: STUDENT IN AN ORGANIZED HEALTH CARE EDUCATION/TRAINING PROGRAM

## 2025-03-03 PROCEDURE — G0463 HOSPITAL OUTPT CLINIC VISIT: HCPCS | Performed by: STUDENT IN AN ORGANIZED HEALTH CARE EDUCATION/TRAINING PROGRAM

## 2025-03-03 PROCEDURE — 36415 COLL VENOUS BLD VENIPUNCTURE: CPT | Mod: ZL

## 2025-03-03 PROCEDURE — 3079F DIAST BP 80-89 MM HG: CPT | Performed by: STUDENT IN AN ORGANIZED HEALTH CARE EDUCATION/TRAINING PROGRAM

## 2025-03-03 PROCEDURE — 1125F AMNT PAIN NOTED PAIN PRSNT: CPT | Performed by: STUDENT IN AN ORGANIZED HEALTH CARE EDUCATION/TRAINING PROGRAM

## 2025-03-03 RX ORDER — NICOTINE 21 MG/24HR
1 PATCH, TRANSDERMAL 24 HOURS TRANSDERMAL EVERY 24 HOURS
Qty: 42 PATCH | Refills: 0 | Status: SHIPPED | OUTPATIENT
Start: 2025-03-03 | End: 2025-04-14

## 2025-03-03 ASSESSMENT — PAIN SCALES - GENERAL: PAINLEVEL_OUTOF10: SEVERE PAIN (7)

## 2025-03-03 NOTE — PROGRESS NOTES
Assessment & Plan     Type 2 diabetes mellitus with diabetic polyneuropathy, without long-term current use of insulin (H)  Well-controlled diabetes on Ozempic.  Does have neuropathy, managed somewhat with gabapentin.  Does not need aspirin, ASCVD 5%.  Continues on statin.  ACE inhibitor was discontinued, blood pressure was well-controlled.  Will continue to closely monitor blood pressure and protein.  Referral to eye clinic [facility] for eye exam.  - Adult Eye  Referral; Future    Hypothyroidism (acquired)  Recheck TSH today.  Synthroid was increased 1 month ago.  TSH has improved, T4 pending.  Will await T4.  If normal, will recheck again in 1 month.    Hypertension associated with type 2 diabetes mellitus (H)  Elevated today but has been controlled.  Has not been on medication.  If persistently elevated, consider repeat addition of losartan.    Nicotine dependence, uncomplicated, unspecified nicotine product type  Working on smoking cessation.  Quit today.  Needs 14 mg and 7 mg patches.  - nicotine (NICODERM CQ) 14 MG/24HR 24 hr patch; Place 1 patch over 24 hours onto the skin every 24 hours.  - nicotine (NICODERM CQ) 7 MG/24HR 24 hr patch; Place 1 patch over 24 hours onto the skin every 24 hours for 14 days.        The longitudinal plan of care for the diagnosis(es)/condition(s) as documented were addressed during this visit. Due to the added complexity in care, I will continue to support Gabrielle in the subsequent management and with ongoing continuity of care.    Anton Anthony is a 55 year old, presenting for the following health issues:  Diabetes and Thyroid Problem        3/3/2025     7:52 AM   Additional Questions   Roomed by Felisha Mcwilliams   Accompanied by significant other         3/3/2025     7:52 AM   Patient Reported Additional Medications   Patient reports taking the following new medications None     HPI      Diabetes Follow-up    How often are you checking your blood sugar? Continuous  "glucose monitor  What time of day are you checking your blood sugars (select all that apply)?  Before and after meals  Have you had any blood sugars above 200?  No  Have you had any blood sugars below 70?  No  What symptoms do you notice when your blood sugar is low?  Shaky, Dizzy, Weak, and Lethargy  What concerns do you have today about your diabetes? None   Do you have any of these symptoms? (Select all that apply)  No numbness or tingling in feet.  No redness, sores or blisters on feet.  No complaints of excessive thirst.  No reports of blurry vision.  No significant changes to weight.  Have you had a diabetic eye exam in the last 12 months? No    Starting ozempic again.     The 10-year ASCVD risk score (Grace STRICKLAND, et al., 2019) is: 4.8%    Values used to calculate the score:      Age: 55 years      Sex: Female      Is Non- : No      Diabetic: Yes      Tobacco smoker: No      Systolic Blood Pressure: 138 mmHg      Is BP treated: No      HDL Cholesterol: 42 mg/dL      Total Cholesterol: 141 mg/dL      BP Readings from Last 2 Encounters:   03/03/25 138/80   02/13/25 128/82     Hemoglobin A1C (%)   Date Value   02/03/2025 6.1 (H)   07/09/2024 6.2 (H)     LDL Cholesterol Calculated (mg/dL)   Date Value   07/09/2024 64       Hypothyroidism Follow-up    Since last visit, patient describes the following symptoms: loose stools    Carpal tunnel surgery went well.   Taking synthroid daily.     TSH   Date Value Ref Range Status   02/03/2025 6.45 (H) 0.30 - 4.20 uIU/mL Final           Objective    /80   Pulse 83   Temp 98.8  F (37.1  C) (Tympanic)   Resp 16   Ht 1.575 m (5' 2\")   Wt 77.4 kg (170 lb 9 oz)   SpO2 94%   BMI 31.20 kg/m    Body mass index is 31.2 kg/m .    Physical Exam  Constitutional:       General: She is not in acute distress.     Appearance: Normal appearance. She is not ill-appearing.   Pulmonary:      Effort: Pulmonary effort is normal.   Skin:     General: Skin is " warm and dry.   Neurological:      General: No focal deficit present.      Mental Status: She is alert.   Psychiatric:         Mood and Affect: Mood normal.         Behavior: Behavior normal.         Thought Content: Thought content normal.         Judgment: Judgment normal.                    Signed Electronically by: Hannah Eaton MD

## 2025-03-03 NOTE — RESULT ENCOUNTER NOTE
Please call and notify patient that thyroid is improving keep Synthroid dose the same and recheck again in 1 month.  Please schedule lab appointment.

## 2025-03-03 NOTE — PATIENT INSTRUCTIONS
Referral to eye clinic [facility] for eye exam.   Will let you on thyroid lab.       Nicotine Transdermal System   Habitrol, Nicoderm C-Q    Uses  For quitting smoking.    Instructions  DO NOT take this medicine by mouth.    Avoid placing the patch near the breast.    Remove the patch after 24 hours.    Keep the medicine at room temperature. Avoid heat and direct light.    This patch should not be cut.    Wash your hands before and after handling this medicine.    Remove old patch before applying new one. Change the location of the new patch.    If you have vivid dreams or trouble sleeping, you may remove the patch before going to sleep.    Ask your doctor or pharmacist about locations on your body where this patch can be used.    Remove the plastic liner that protects the sticky side of the patch before applying to the skin.    Be sure the area of skin is clean and dry before putting on a new patch.    Apply the patch to a clean, dry, hairless area.    Press the patch firmly for a few seconds to make sure it stays in place.    After removing the patch, fold it together and discard it out of reach of children and pets.    Please ask your doctor or pharmacist how you can safely dispose of used patches.    If the skin under the patch becomes irritated, remove the patch. Do not apply a new patch to the area until the skin feels better.    To avoid irritating your skin, use a different location for a new patch.    Apply the patch only to normal looking skin. Avoid areas of the skin that are red, have scrapes, or damaged.    If the patch falls off, apply a new a patch on a different location of the body.    Please tell your doctor and pharmacist about all the medicines you take. Include both prescription and over-the-counter medicines. Also tell them about any vitamins, herbal medicines, or anything else you take for your health.    If you need to stop this medicine, your doctor may wish to gradually reduce the dosage before  stopping.    Do not use more than 1 patch at any one time.    Cautions  Tell your doctor and pharmacist if you ever had an allergic reaction to a medicine. Symptoms of an allergic reaction can include trouble breathing, skin rash, itching, swelling, or severe dizziness.    Do not use the medication any more than instructed.    Avoid smoking while on this medicine. Smoking may increase your risk for stroke, heart attack, blood clots, high blood pressure, and other diseases of the heart and blood vessels.    Tell the doctor or pharmacist if you are pregnant, planning to be pregnant, or breastfeeding.    Ask your pharmacist if this medicine can interact with any of your other medicines. Be sure to tell them about all the medicines you take.    Please tell all your doctors and dentists that you are on this medicine before they provide care.    Side Effects  The following is a list of some common side effects from this medicine. Please speak with your doctor about what you should do if you experience these or other side effects.    skin irritation where medicine is applied    If you have any of the following side effects, you may be getting too much medicine. Please contact your doctor to let them know about these side effects.    diarrhea  dizziness  nausea  rapid heartbeat  vomiting    A few people may have an allergic reaction to this medicine. Symptoms can include difficulty breathing, skin rash, itching, swelling, or severe dizziness. If you notice any of these symptoms, seek medical help quickly.    Extra  Please speak with your doctor, nurse, or pharmacist if you have any questions about this medicine.      [email or web address]  IMPORTANT NOTE: This document tells you briefly how to take your medicine, but it does not tell you all there is to know about it. Your doctor or pharmacist may give you other documents about your medicine. Please talk to them if you have any questions. Always  follow their advice. There is a more complete description of this medicine available in English. Scan this code on your smartphone or tablet or use the web address below. You can also ask your pharmacist for a printout. If you have any questions, please ask your pharmacist.   2021 [employer].      2866-6379 [employer]. All rights reserved. This information is not intended as a substitute for professional medical care. Always follow your healthcare professional's instructions.

## 2025-03-06 DIAGNOSIS — E11.42 TYPE 2 DIABETES MELLITUS WITH DIABETIC POLYNEUROPATHY, WITHOUT LONG-TERM CURRENT USE OF INSULIN (H): ICD-10-CM

## 2025-03-06 DIAGNOSIS — J41.8 MIXED SIMPLE AND MUCOPURULENT CHRONIC BRONCHITIS (H): ICD-10-CM

## 2025-03-06 DIAGNOSIS — J44.1 COPD EXACERBATION (H): ICD-10-CM

## 2025-03-06 RX ORDER — TIOTROPIUM BROMIDE INHALATION SPRAY 3.12 UG/1
2 SPRAY, METERED RESPIRATORY (INHALATION) DAILY
Qty: 4 G | Refills: 5 | Status: SHIPPED | OUTPATIENT
Start: 2025-03-06

## 2025-03-06 RX ORDER — SEMAGLUTIDE 0.68 MG/ML
INJECTION, SOLUTION SUBCUTANEOUS
Qty: 3 ML | Refills: 0 | Status: SHIPPED | OUTPATIENT
Start: 2025-03-06

## 2025-03-14 ENCOUNTER — MEDICAL CORRESPONDENCE (OUTPATIENT)
Dept: HEALTH INFORMATION MANAGEMENT | Facility: CLINIC | Age: 55
End: 2025-03-14

## 2025-03-17 ENCOUNTER — TELEPHONE (OUTPATIENT)
Dept: FAMILY MEDICINE | Facility: OTHER | Age: 55
End: 2025-03-17

## 2025-03-17 NOTE — TELEPHONE ENCOUNTER
11:13 AM    Reason for Call: OVERBOOK    Patient is preop/ [facility] / DOS 4-7 / back surgery / Dr. Hudson     The patient is requesting an appointment for prior to surgery with Dr. Eaton.    Was an appointment offered for this call? No  If yes : Appointment type              Date    Preferred method for responding to this message: Telephone Call  What is your phone number ? [phone]     If we cannot reach you directly, may we leave a detailed response at the number you provided? Yes    Can this message wait until your PCP/provider returns, if unavailable today? Ambreen Billings

## 2025-03-25 ENCOUNTER — TRANSFERRED RECORDS (OUTPATIENT)
Dept: MULTI SPECIALTY CLINIC | Facility: CLINIC | Age: 55
End: 2025-03-25

## 2025-03-25 ENCOUNTER — OFFICE VISIT (OUTPATIENT)
Dept: FAMILY MEDICINE | Facility: OTHER | Age: 55
End: 2025-03-25
Attending: STUDENT IN AN ORGANIZED HEALTH CARE EDUCATION/TRAINING PROGRAM
Payer: COMMERCIAL

## 2025-03-25 ENCOUNTER — LAB (OUTPATIENT)
Dept: LAB | Facility: OTHER | Age: 55
End: 2025-03-25
Attending: STUDENT IN AN ORGANIZED HEALTH CARE EDUCATION/TRAINING PROGRAM
Payer: COMMERCIAL

## 2025-03-25 VITALS
WEIGHT: 172.4 LBS | TEMPERATURE: 98.4 F | OXYGEN SATURATION: 96 % | SYSTOLIC BLOOD PRESSURE: 116 MMHG | BODY MASS INDEX: 31.73 KG/M2 | HEART RATE: 100 BPM | DIASTOLIC BLOOD PRESSURE: 74 MMHG | RESPIRATION RATE: 20 BRPM | HEIGHT: 62 IN

## 2025-03-25 DIAGNOSIS — F32.A DEPRESSIVE DISORDER: Chronic | ICD-10-CM

## 2025-03-25 DIAGNOSIS — E78.2 MIXED HYPERLIPIDEMIA: Chronic | ICD-10-CM

## 2025-03-25 DIAGNOSIS — Z01.818 PREOP GENERAL PHYSICAL EXAM: Primary | ICD-10-CM

## 2025-03-25 DIAGNOSIS — Z01.818 PREOP GENERAL PHYSICAL EXAM: ICD-10-CM

## 2025-03-25 DIAGNOSIS — R94.31 PROLONGED Q-T INTERVAL ON ECG: ICD-10-CM

## 2025-03-25 DIAGNOSIS — J41.8 MIXED SIMPLE AND MUCOPURULENT CHRONIC BRONCHITIS (H): Chronic | ICD-10-CM

## 2025-03-25 DIAGNOSIS — E03.9 HYPOTHYROIDISM (ACQUIRED): Chronic | ICD-10-CM

## 2025-03-25 DIAGNOSIS — I15.2 HYPERTENSION ASSOCIATED WITH TYPE 2 DIABETES MELLITUS (H): Chronic | ICD-10-CM

## 2025-03-25 DIAGNOSIS — F41.1 GENERALIZED ANXIETY DISORDER: Chronic | ICD-10-CM

## 2025-03-25 DIAGNOSIS — M48.062 SPINAL STENOSIS OF LUMBAR REGION WITH NEUROGENIC CLAUDICATION: ICD-10-CM

## 2025-03-25 DIAGNOSIS — D72.829 LEUKOCYTOSIS, UNSPECIFIED TYPE: ICD-10-CM

## 2025-03-25 DIAGNOSIS — I51.7 LVH (LEFT VENTRICULAR HYPERTROPHY): ICD-10-CM

## 2025-03-25 DIAGNOSIS — E11.59 HYPERTENSION ASSOCIATED WITH TYPE 2 DIABETES MELLITUS (H): Chronic | ICD-10-CM

## 2025-03-25 DIAGNOSIS — G89.29 CHRONIC BILATERAL LOW BACK PAIN WITHOUT SCIATICA: ICD-10-CM

## 2025-03-25 DIAGNOSIS — M43.17 SPONDYLOLISTHESIS OF LUMBOSACRAL REGION: ICD-10-CM

## 2025-03-25 DIAGNOSIS — F17.200 TOBACCO USE DISORDER: Chronic | ICD-10-CM

## 2025-03-25 DIAGNOSIS — I51.89 GRADE I DIASTOLIC DYSFUNCTION: Chronic | ICD-10-CM

## 2025-03-25 DIAGNOSIS — E11.42 TYPE 2 DIABETES MELLITUS WITH DIABETIC POLYNEUROPATHY, WITHOUT LONG-TERM CURRENT USE OF INSULIN (H): Chronic | ICD-10-CM

## 2025-03-25 DIAGNOSIS — M54.50 CHRONIC BILATERAL LOW BACK PAIN WITHOUT SCIATICA: ICD-10-CM

## 2025-03-25 DIAGNOSIS — E03.9 HYPOTHYROIDISM (ACQUIRED): ICD-10-CM

## 2025-03-25 LAB
ALBUMIN SERPL BCG-MCNC: 4.2 G/DL (ref 3.5–5.2)
ALP SERPL-CCNC: 100 U/L (ref 40–150)
ALT SERPL W P-5'-P-CCNC: 39 U/L (ref 0–50)
ANION GAP SERPL CALCULATED.3IONS-SCNC: 14 MMOL/L (ref 7–15)
AST SERPL W P-5'-P-CCNC: 22 U/L (ref 0–45)
BASOPHILS # BLD AUTO: 0.1 10E3/UL (ref 0–0.2)
BASOPHILS NFR BLD AUTO: 0 %
BILIRUB SERPL-MCNC: <0.2 MG/DL
BUN SERPL-MCNC: 9.1 MG/DL (ref 6–20)
CALCIUM SERPL-MCNC: 9.7 MG/DL (ref 8.8–10.4)
CHLORIDE SERPL-SCNC: 105 MMOL/L (ref 98–107)
CREAT SERPL-MCNC: 0.83 MG/DL (ref 0.51–0.95)
EGFRCR SERPLBLD CKD-EPI 2021: 82 ML/MIN/1.73M2
EOSINOPHIL # BLD AUTO: 0.3 10E3/UL (ref 0–0.7)
EOSINOPHIL NFR BLD AUTO: 2 %
ERYTHROCYTE [DISTWIDTH] IN BLOOD BY AUTOMATED COUNT: 13.9 % (ref 10–15)
GLUCOSE SERPL-MCNC: 170 MG/DL (ref 70–99)
HCO3 SERPL-SCNC: 23 MMOL/L (ref 22–29)
HCT VFR BLD AUTO: 44.3 % (ref 35–47)
HGB BLD-MCNC: 14.4 G/DL (ref 11.7–15.7)
IMM GRANULOCYTES # BLD: 0 10E3/UL
IMM GRANULOCYTES NFR BLD: 0 %
LYMPHOCYTES # BLD AUTO: 3.4 10E3/UL (ref 0.8–5.3)
LYMPHOCYTES NFR BLD AUTO: 30 %
MCH RBC QN AUTO: 31.3 PG (ref 26.5–33)
MCHC RBC AUTO-ENTMCNC: 32.5 G/DL (ref 31.5–36.5)
MCV RBC AUTO: 96 FL (ref 78–100)
MONOCYTES # BLD AUTO: 0.6 10E3/UL (ref 0–1.3)
MONOCYTES NFR BLD AUTO: 5 %
NEUTROPHILS # BLD AUTO: 7 10E3/UL (ref 1.6–8.3)
NEUTROPHILS NFR BLD AUTO: 62 %
NRBC # BLD AUTO: 0 10E3/UL
NRBC BLD AUTO-RTO: 0 /100
PLATELET # BLD AUTO: 234 10E3/UL (ref 150–450)
POTASSIUM SERPL-SCNC: 3.7 MMOL/L (ref 3.4–5.3)
PROT SERPL-MCNC: 7.3 G/DL (ref 6.4–8.3)
RBC # BLD AUTO: 4.6 10E6/UL (ref 3.8–5.2)
RETINOPATHY: NORMAL
SODIUM SERPL-SCNC: 142 MMOL/L (ref 135–145)
T4 FREE SERPL-MCNC: 0.72 NG/DL (ref 0.9–1.7)
TSH SERPL DL<=0.005 MIU/L-ACNC: 5.51 UIU/ML (ref 0.3–4.2)
WBC # BLD AUTO: 11.4 10E3/UL (ref 4–11)

## 2025-03-25 PROCEDURE — 93005 ELECTROCARDIOGRAM TRACING: CPT | Performed by: STUDENT IN AN ORGANIZED HEALTH CARE EDUCATION/TRAINING PROGRAM

## 2025-03-25 PROCEDURE — 84439 ASSAY OF FREE THYROXINE: CPT | Mod: ZL

## 2025-03-25 PROCEDURE — 84443 ASSAY THYROID STIM HORMONE: CPT | Mod: ZL

## 2025-03-25 PROCEDURE — G0463 HOSPITAL OUTPT CLINIC VISIT: HCPCS

## 2025-03-25 PROCEDURE — 84155 ASSAY OF PROTEIN SERUM: CPT | Mod: ZL

## 2025-03-25 PROCEDURE — 80051 ELECTROLYTE PANEL: CPT | Mod: ZL

## 2025-03-25 PROCEDURE — 36415 COLL VENOUS BLD VENIPUNCTURE: CPT | Mod: ZL

## 2025-03-25 PROCEDURE — 85014 HEMATOCRIT: CPT | Mod: ZL

## 2025-03-25 PROCEDURE — 85004 AUTOMATED DIFF WBC COUNT: CPT | Mod: ZL

## 2025-03-25 PROCEDURE — 82040 ASSAY OF SERUM ALBUMIN: CPT | Mod: ZL

## 2025-03-25 ASSESSMENT — ENCOUNTER SYMPTOMS
SORE THROAT: 0
WHEEZING: 0
ABDOMINAL PAIN: 0
VOMITING: 0
SINUS PRESSURE: 0
DYSURIA: 0
DIZZINESS: 0
WOUND: 0
PALPITATIONS: 0
FATIGUE: 0
CHILLS: 0
CONSTIPATION: 0
DIARRHEA: 0
COUGH: 0
FEVER: 0
SHORTNESS OF BREATH: 0
MYALGIAS: 0

## 2025-03-25 ASSESSMENT — PAIN SCALES - GENERAL: PAINLEVEL_OUTOF10: SEVERE PAIN (8)

## 2025-03-25 NOTE — PROGRESS NOTES
Preoperative Evaluation  [facility] [place]  [address]  [place] [address]  Phone: [phone]  Primary Provider: Hannah Eaton MD  Pre-op Performing Provider: Hannah Eaton MD  Mar 25, 2025         3/25/2025   Surgical Information   What procedure is being done? L5-S1 anterior lumbar interbody fusion    Facility or Hospital where procedure/surgery will be performed: [facility]   Who is doing the procedure / surgery? Dr Hudson and Dr Artis   Date of surgery / procedure: 4/7/25   Time of surgery / procedure: unknown   Where do you plan to recover after surgery? Other - one night stay in hospital      Fax number for surgical facility: [facility]     Assessment & Plan     The proposed surgical procedure is considered INTERMEDIATE risk.    Preop general physical exam  CBC and CMP stable.  Functions at over 4 METS with no anginal symptoms.  EKG stable and unchanged today.  Optimized for procedure.  - CBC with Platelets & Differential; Future  - Comprehensive metabolic panel; Future  - EKG 12-lead complete w/read - Clinics    Chronic bilateral low back pain without sciatica  Spondylolisthesis of lumbosacral region  Spinal stenosis of lumbar region with neurogenic claudication  Reason for surgery.    Type 2 diabetes mellitus with diabetic polyneuropathy, without long-term current use of insulin (H)  Well-controlled.  Continues on Ozempic.  Has been holding over the last week with upcoming surgery.  Lab Results   Component Value Date    A1C 6.1 02/03/2025    A1C 6.2 07/09/2024    A1C 6.7 02/27/2024       Mixed simple and mucopurulent chronic bronchitis (H)  Stable.  Did have flare with bronchitis in February but overall manages with daily Advair and Spiriva with as needed nebulizer use.  Pulmonary function testing completed 11/21/2023.  No obstructive process noted, mild diffusion defect noted.  FEV1 was 80%.    Hypertension associated with type 2 diabetes mellitus (H)  Has been  well-controlled with weight loss over the last few years.  Has weaned off medication.    Generalized anxiety disorder  Depressive disorder  Stable.  Was previously maintained on Celexa but recently discontinued as she did not feel like it was helpful.  Will need to follow-up to discuss alternative medication going forward.    Mixed hyperlipidemia  Controlled with statin.    Hypothyroidism (acquired)  Poor compliance with Synthroid.  Missing dose at least every other day.  TSH elevated today but no symptoms and not severe.  Should be okay to proceed with surgery.  Discussed importance of taking her Synthroid every day and setting reminders so that she does take this medication.  - TSH with free T4 reflex; Future    Tobacco use disorder (30 pack year history plus)  39-pack-year history.  Currently down to 5 cigarettes daily.    Prolonged Q-T interval on ECG  Resolved today.  QTc 440.    Leukocytosis, unspecified type  Chronic.  Past evaluation with hematology.  Thought reactive with her tobacco use.  Recommended monitoring annually.    LVH (left ventricular hypertrophy)  Noted borderline on EKGs today and in the past.  Prior echocardiogram normal but did have cardiac MRI that showed a mild LVH.    Grade I diastolic dysfunction  No history of diastolic heart failure.  No fluid retention.  This was noted on past echocardiogram in 2024.          - No identified additional risk factors other than previously addressed    Antiplatelet or Anticoagulation Medication Instructions   - We reviewed the medication list and the patient is not on an antiplatelet or anticoagulation medications.    Additional Medication Instructions  Take all scheduled medications on the day of surgery EXCEPT for modifications listed below:   - Herbal medications and vitamins: DO NOT TAKE 14 days prior to surgery.   - Statins (atorvastatin, simvastatin, pravastatin) : Continue taking on the day of surgery.    - GLP-1 Injectable (exenitide,  liraglutide, semaglutide, dulaglutide, etc.): DO NOT TAKE 7 days before surgery    - nicotine patches: Continue on day of surgery. Patient to Inform anesthesia of patch location.    - ibuprofen (Advil, Motrin): DO NOT TAKE 1 day before surgery.    - SSRIs, SNRIs, TCAs, Antipsychotics: Continue without modification.    - cannabis, marijuana: DO NOT TAKE night before surgery.   - cetirizine and first generation antihistamines: DO NOT TAKE on day of surgery.   - LABA, inhaled corticosteroid, long-acting anticholinergics: Continue without modification.   - rescue Inhaler: Continue PRN. Bring to hospital on the day of surgery.   - Topicals: DO NOT TAKE day of surgery.    Recommendation  Approval given to proceed with proposed procedure, without further diagnostic evaluation.          The longitudinal plan of care for the diagnosis(es)/condition(s) as documented were addressed during this visit. Due to the added complexity in care, I will continue to support Gabrielle in the subsequent management and with ongoing continuity of care.    Anton Anthony is a 55 year old, presenting for the following:  Pre-Op Exam          3/25/2025     4:19 PM   Additional Questions   Roomed by Moriah DECKER   Accompanied by self         3/25/2025     4:19 PM   Patient Reported Additional Medications   Patient reports taking the following new medications none     Via the Health Maintenance questionnaire, the patient has reported the following services have been completed , this information has been sent to the abstraction team.    HPI: patient has chronic low back pain with stenosis and radiculopathy. Has failed conservative management with injections and physical therapy. Planning surgical intervention.      Has been feeling well with no fevers, congestion, cough, or other URI symptoms.   No [facility] cardiac history. Denies any shortness of breath, chest pain, or dyspnea on exertion.   Is able to function at >4 METS. Does light to moderate  housework. Can walk stairs easily.   Has had prior surgeries with general anesthesia and did well.           3/25/2025   Pre-Op Questionnaire   Have you ever had a heart attack or stroke? No   Have you ever had surgery on your heart or blood vessels, such as a stent placement, a coronary artery bypass, or surgery on an artery in your head, neck, heart, or legs? No   Do you have chest pain with activity? No   Do you have a history of heart failure? No   Do you currently have a cold, bronchitis or symptoms of other infection? No   Do you have a cough, shortness of breath, or wheezing? No   Do you or anyone in your family have previous history of blood clots? (!) YES - dad    Do you or does anyone in your family have a serious bleeding problem such as prolonged bleeding following surgeries or cuts? No   Have you ever had problems with anemia or been told to take iron pills? No   Have you had any abnormal blood loss such as black, tarry or bloody stools, or abnormal vaginal bleeding? No   Have you ever had a blood transfusion? No   Are you willing to have a blood transfusion if it is medically needed before, during, or after your surgery? Yes   Have you or any of your relatives ever had problems with anesthesia? No   Do you have sleep apnea, excessive snoring or daytime drowsiness? No   Do you have any artifical heart valves or other implanted medical devices like a pacemaker, defibrillator, or continuous glucose monitor? No   Do you have artificial joints? No   Are you allergic to latex? No     Health Care Directive  Patient does not have a Health Care Directive: Discussed advance care planning with patient; information given to patient to review.    Preoperative Review of    reviewed - controlled substances reflected in medication list.      Status of Chronic Conditions:  COPD - Patient has a longstanding history of moderate-severe COPD . Patient has been doing well overall noting NO SYMPTOMS and continues on  medication regimen consisting of Advair, spiriva and as needed albuterol without adverse reactions or side effects.    DEPRESSION - depression has been stable. Stopped celexa, didn't like how she felt on it.     HYPERLIPIDEMIA - Patient has a long history of significant Hyperlipidemia requiring medication for treatment with recent good control. Patient reports no problems or side effects with the medication. On Crestor.     HYPERTENSION - Patient has longstanding history of HTN , currently denies any symptoms referable to elevated blood pressure. Specifically denies chest pain, palpitations, dyspnea, orthopnea, PND or peripheral edema. Blood pressure readings have been in normal range. Has weaned off medication.     HYPOTHYROIDISM - has been forgetting thyroid medication.   Lab Results   Component Value Date    TSH 5.51 (H) 03/25/2025   .    History of presumed decreased left ventricular function   Thought to be mildly reduced on echocardiogram but Lexiscan stress test was negative for any inducible ischemia and ejection fraction was normal on stress test.  Had follow-up cardiac MRI with normal ejection fraction and no concerning findings.  Did have mild left ventricular hypertrophy noted.  Was discharged from cardiology.    Down to five cigarettes/day.     Chronic leukocytosis. Saw hematology 11/6/24. Note per hematology below.   Leukocytosis:  Intermittent leukocytosis seen dating back to 2015 with more persistent leukocytosis seen since 9/2023. Leukocytosis is mostly neutrophilic. There is no left shift. Peripheral smear did not show any abnormality. This is therefore very likely reactive.      She does have some symptoms like chronic lose stools, rash and is a long time smoker. These could be the underlying cause of the reactive leukocytosis. I would recommend monitoring CBCD atleast one a year.      Follow up with hematology as needed.     Patient Active Problem List    Diagnosis Date Noted    Hypertension  associated with type 2 diabetes mellitus (H) 07/09/2024     Priority: High    Mixed simple and mucopurulent chronic bronchitis (H) 10/23/2023     Priority: High    Type 2 diabetes mellitus with diabetic polyneuropathy, without long-term current use of insulin (H) 04/19/2023     Priority: High    LVH (left ventricular hypertrophy) 03/26/2025     Priority: Medium     Mild, noted on cardiac MRI      Spondylolisthesis of lumbosacral region 12/17/2024     Priority: Medium    Neuropathy 09/23/2024     Priority: Medium    Spinal stenosis of lumbar region with neurogenic claudication 05/02/2024     Priority: Medium    Prolonged Q-T interval on ECG 02/27/2024     Priority: Medium    Grade I diastolic dysfunction 01/31/2024     Priority: Medium     Echo 2024       Mild mitral insufficiency 01/31/2024     Priority: Medium     Formatting of this note might be different from the original.   1/22/2024:    Left ventricular size is normal.    Left ventricular function is decreased. The ejection fraction is 45-50%    (mildly reduced).    No regional wall motion abnormalities are seen.    Grade I or early diastolic dysfunction.    Global right ventricular function is mildly reduced.    Both atria appear normal.    Trace mitral insufficiency is present.    No aortic stenosis is present.    Mild tricuspid insufficiency is present.    The right ventricular systolic pressure is 22mmHg above the right atrial    pressure.    Trace pulmonic insufficiency is present.  Formatting of this note might be different from the original.   Formatting of this note might be different from the original.    1/22/2024:     Left ventricular size is normal.     Left ventricular function is decreased. The ejection fraction is 45-50%     (mildly reduced).     No regional wall motion abnormalities are seen.     Grade I or early diastolic dysfunction.     Global right ventricular function is mildly reduced.     Both atria appear normal.     Trace mitral  insufficiency is present.     No aortic stenosis is present.     Mild tricuspid insufficiency is present.     The right ventricular systolic pressure is 22mmHg above the right atrial     pressure.     Trace pulmonic insufficiency is present.      Mild tricuspid insufficiency 01/31/2024     Priority: Medium     Formatting of this note might be different from the original.   1/22/2024:    Left ventricular size is normal.    Left ventricular function is decreased. The ejection fraction is 45-50%    (mildly reduced).    No regional wall motion abnormalities are seen.    Grade I or early diastolic dysfunction.    Global right ventricular function is mildly reduced.    Both atria appear normal.    Trace mitral insufficiency is present.    No aortic stenosis is present.    Mild tricuspid insufficiency is present.    The right ventricular systolic pressure is 22mmHg above the right atrial    pressure.    Trace pulmonic insufficiency is present.  Formatting of this note might be different from the original.   Formatting of this note might be different from the original.    1/22/2024:     Left ventricular size is normal.     Left ventricular function is decreased. The ejection fraction is 45-50%     (mildly reduced).     No regional wall motion abnormalities are seen.     Grade I or early diastolic dysfunction.     Global right ventricular function is mildly reduced.     Both atria appear normal.     Trace mitral insufficiency is present.     No aortic stenosis is present.     Mild tricuspid insufficiency is present.     The right ventricular systolic pressure is 22mmHg above the right atrial     pressure.     Trace pulmonic insufficiency is present.      Bilateral arm weakness 11/14/2023     Priority: Medium    Cervical stenosis of spinal canal 10/23/2023     Priority: Medium    Degeneration of lumbar intervertebral disc 10/23/2023     Priority: Medium    Chronic pain disorder 09/12/2023     Priority: Medium    Insomnia  08/11/2022     Priority: Medium     Current Medications: Lunesta - causes a foul taste in her mouth. Not sleeping well still.   Previous medications: Valium, Trazodone, Restoril, Gabapentin, Lyrica       Generalized anxiety disorder 08/10/2021     Priority: Medium    Spondylosis without myelopathy or radiculopathy, cervical region 08/10/2021     Priority: Medium    Hyperlipidemia 09/27/2018     Priority: Medium    Tobacco use disorder (30 pack year history plus) 07/18/2018     Priority: Medium    RLS (restless legs syndrome) 03/08/2018     Priority: Medium    Hypothyroidism (acquired) 06/06/2017     Priority: Medium    Depressive disorder 06/21/2002     Priority: Medium    History of Helicobacter pylori infection 11/14/2023     Priority: Low    S/P complete hysterectomy 10/23/2023     Priority: Low      Past Medical History:   Diagnosis Date    Abnormal echocardiogram 01/31/2024    Normal stress test and cardiac MRI 2024      Arthritis     Decreased left ventricular function 02/27/2024    Had confirmatory MRI - showed normal heart function. No evidence for reduced EF      Depressive disorder     Gastroesophageal reflux disease with esophagitis     H. pylori infection 2018    Hashimoto's disease     Hypothyroidism     Insomnia     Mild intermittent asthma without complication 12/17/2015    Formatting of this note might be different from the original.   Last Assessment & Plan:     Formatting of this note might be different from the original.    This is generally been stable.  May be slight worsening recently with her rash.      Type 2 diabetes mellitus (H)     Umbilical hernia without obstruction and without gangrene     Urticaria     Last Assessment & Plan: Formatting of this note might be different from the original. Patient presented to the emergency room on 3/19/2023 with history of full body rash starting 3 weeks prior with discomfort and itching treated with hydrocortisone cream without relief.  Homeless family  member had moved back into the house with prior exposure to bedbugs.  Under breasts lower legs severity moderate     Past Surgical History:   Procedure Laterality Date    ABDOMEN SURGERY      CARPAL TUNNEL RELEASE RT/LT Bilateral     [facility] arthroplasty Left     COLONOSCOPY N/A 10/28/2024    rpt 3 year; Procedure: COLONOSCOPY WITH POLYPECTOMY;  Surgeon: Enrique Brambila MD;  Location: [facility] OR    COLONOSCOPY - HIM SCAN N/A 10/04/2018    Vendalize. 3 hyperplastic polyps. repeat 10 yr    HYSTERECTOMY, PAP NO LONGER INDICATED N/A 10/10/2018    [facility].    HYSTERECTOMY, TOTAL, LAPAROSCOPIC, WITH SALPINGO-OOPHORECTOMY, CYSTOSCOPY Bilateral 10/10/2018    Vendalize.     Current Outpatient Medications   Medication Sig Dispense Refill    ACCU-CHEK GUIDE test strip       blood glucose monitoring (SOFTCLIX) lancets       Blood Glucose Monitoring Suppl (ACCU-CHEK GUIDE) w/Device KIT       Continuous Blood Gluc  (DEXCOM G6 ) MIKE Use to read blood sugars as per 's instructions. 1 each 1    Continuous Blood Gluc Sensor (DEXCOM G6 SENSOR) MISC CHANGE SENSOR EVERY 10 DAYS 3 each 3    Continuous Glucose Transmitter (DEXCOM G6 TRANSMITTER) MISC Change every 3 months. 1 each 1    fluticasone-salmeterol (ADVAIR) 250-50 MCG/ACT inhaler Inhale 1 puff into the lungs every 12 hours      nystatin (MYCOSTATIN) 451701 UNIT/GM external powder Apply topically 3 times daily as needed for other (yeast infection). Apply up to three times daily to areas with active yeast. 60 g 0    pramipexole (MIRAPEX) 0.75 MG tablet Take 1 tablet (0.75 mg) by mouth at bedtime. 90 tablet 3    tiotropium (SPIRIVA RESPIMAT) 2.5 MCG/ACT inhaler INHALE 2 PUFFS INTO THE LUNGS DAILY 4 g 5    traZODone (DESYREL) 150 MG tablet Take 1 tablet (150 mg) by mouth at bedtime. 90 tablet 1    clobetasol propionate (TEMOVATE) 0.05 % external cream APPLY SMALL AMOUNT TO ACTIVE LESIONS UP TO 10-14  DAYS. MUST TAKE ONE WEEK OFF BETWEEN USE. DO NOT APPLY TO FACE. (Patient not taking: Reported on 3/25/2025) 60 g 0    cyanocobalamin (VITAMIN B-12) 500 MCG tablet Take 1 tablet (500 mcg) by mouth daily (Patient not taking: Reported on 3/25/2025) 90 tablet 3    emollient (VANICREAM) external cream Apply topically 2 times daily. (Patient not taking: Reported on 3/25/2025) 453 g 1    estradiol (ESTRACE) 0.1 MG/GM vaginal cream Place 1 g vaginally once a week. (Patient not taking: Reported on 3/25/2025) 42.5 g 1    Glucose 4-6 GM-MG CHEW Take 4 g by mouth once. (Patient not taking: Reported on 3/25/2025)      guaiFENesin (MUCINEX) 600 MG 12 hr tablet Take 1 tablet (600 mg) by mouth 2 times daily as needed for congestion. (Patient not taking: Reported on 3/25/2025) 30 tablet 1    hydrOXYzine nia (VISTARIL) 25 MG capsule Take 1 capsule (25 mg) by mouth 3 times daily as needed for itching. (Patient not taking: Reported on 3/25/2025) 30 capsule 0    ibuprofen (ADVIL/MOTRIN) 200 MG tablet Take 200 mg by mouth every 4 hours as needed. (Patient not taking: Reported on 3/25/2025)      ipratropium - albuterol 0.5 mg/2.5 mg/3 mL (DUONEB) 0.5-2.5 (3) MG/3ML neb solution Inhale 3 mLs into the lungs. (Patient not taking: Reported on 3/25/2025)      ketoconazole (NIZORAL) 2 % external cream Apply topically daily. (Patient not taking: Reported on 3/25/2025)      levothyroxine (SYNTHROID/LEVOTHROID) 100 MCG tablet Take 1 tablet (100 mcg) by mouth every morning (before breakfast). (Patient not taking: Reported on 3/25/2025) 90 tablet 0    nicotine (NICODERM CQ) 14 MG/24HR 24 hr patch Place 1 patch over 24 hours onto the skin every 24 hours. (Patient not taking: Reported on 3/25/2025) 42 patch 0    nicotine (NICODERM CQ) 14 MG/24HR 24 hr patch  (Patient not taking: Reported on 3/25/2025)      [START ON 4/14/2025] nicotine (NICODERM CQ) 7 MG/24HR 24 hr patch Place 1 patch over 24 hours onto the skin every 24 hours for 14 days. (Patient  "not taking: Reported on 3/25/2025) 14 patch 0    omeprazole (PRILOSEC) 20 MG DR capsule Take 1 capsule (20 mg) by mouth daily. (Patient not taking: Reported on 3/25/2025) 90 capsule 3    PROAIR  (90 Base) MCG/ACT inhaler  (Patient not taking: Reported on 3/25/2025)      rosuvastatin (CRESTOR) 40 MG tablet Take 1 tablet (40 mg) by mouth daily. (Patient not taking: Reported on 3/25/2025) 90 tablet 3    semaglutide (OZEMPIC, 0.25 OR 0.5 MG/DOSE,) 2 MG/3ML pen INJECT 0.5MG SUBCUTANEOUSLY EVERY 7 DAYS (Patient not taking: Reported on 3/25/2025) 3 mL 0    triamcinolone (KENALOG) 0.5 % external ointment Apply pea sized amount to active eczematous lesions on elbows twice daily for 10 days. (Patient not taking: Reported on 3/25/2025) 15 g 0       Allergies   Allergen Reactions    Hydromorphone Other (See Comments)    Other Food Allergy Itching     Walnuts, pecans     Penicillins Rash     Has tolerated Cefazolin (ANCEF) and other Cephalosporins    Amoxicillin-Pot Clavulanate     Azithromycin GI Disturbance     Other reaction(s): Stomach Upset    Black [place] Flavoring Agent (Non-Screening) Itching    Erythromycin Nausea and Vomiting    Nuts Itching     Pecans and walnuts     Oxycodone Other (See Comments)     Mood swings    Pecan Extract Itching    Tramadol Itching and Dizziness    Other (Do Not Use) Rash     Epoxy Resin        Social History     Tobacco Use    Smoking status: Former     Current packs/day: 0.25     Average packs/day: 1 pack/day for 39.2 years (39.2 ttl pk-yrs)     Types: Cigarettes     Start date: 1/1/1986     Quit date: 3/2/2025     Passive exposure: Current    Smokeless tobacco: Never   Substance Use Topics    Alcohol use: Not Currently     Family History   Problem Relation Age of Onset    Osteoporosis Mother     Bipolar Disorder Mother      History   Drug Use    Types: Marijuana     Comment: \"rarely\"           Review of Systems   Constitutional:  Negative for chills, fatigue and fever.   HENT:  " "Negative for congestion, sinus pressure and sore throat.    Respiratory:  Negative for cough, shortness of breath and wheezing.    Cardiovascular:  Negative for chest pain, palpitations and leg swelling.   Gastrointestinal:  Negative for abdominal pain, constipation, diarrhea and vomiting.   Genitourinary:  Negative for dysuria and urgency.   Musculoskeletal:  Negative for myalgias.   Skin:  Negative for rash and wound.   Neurological:  Negative for dizziness and syncope.       Objective    /74 (BP Location: Right arm, Patient Position: Sitting, Cuff Size: Adult Large)   Pulse 100   Temp 98.4  F (36.9  C) (Tympanic)   Resp 20   Ht 1.575 m (5' 2\")   Wt 78.2 kg (172 lb 6.4 oz)   SpO2 96%   BMI 31.53 kg/m     Estimated body mass index is 31.53 kg/m  as calculated from the following:    Height as of this encounter: 1.575 m (5' 2\").    Weight as of this encounter: 78.2 kg (172 lb 6.4 oz).    Physical Exam  Constitutional:       General: She is not in acute distress.     Appearance: Normal appearance. She is not ill-appearing.   HENT:      Right Ear: Tympanic membrane and ear canal normal.      Left Ear: Tympanic membrane and ear canal normal.      Nose: Nose normal.      Mouth/Throat:      Mouth: Mucous membranes are moist.      Pharynx: Oropharynx is clear. No oropharyngeal exudate or posterior oropharyngeal erythema.   Eyes:      Extraocular Movements: Extraocular movements intact.      Conjunctiva/sclera: Conjunctivae normal.      Pupils: Pupils are equal, round, and reactive to light.   Neck:      Thyroid: No thyroid mass, thyromegaly or thyroid tenderness.   Cardiovascular:      Rate and Rhythm: Normal rate and regular rhythm.      Heart sounds: No murmur heard.  Pulmonary:      Effort: Pulmonary effort is normal.      Breath sounds: Normal breath sounds. No wheezing, rhonchi or rales.   Abdominal:      General: Bowel sounds are normal.      Palpations: Abdomen is soft. There is no hepatomegaly, " splenomegaly, mass or pulsatile mass.      Tenderness: There is no abdominal tenderness. There is no right CVA tenderness, left CVA tenderness, guarding or rebound.   Musculoskeletal:      Cervical back: Neck supple.      Right lower leg: No edema.      Left lower leg: No edema.   Lymphadenopathy:      Cervical: No cervical adenopathy.   Skin:     General: Skin is warm and dry.      Capillary Refill: Capillary refill takes less than 2 seconds.      Findings: No rash.   Neurological:      General: No focal deficit present.      Mental Status: She is alert and oriented to person, place, and time.      Cranial Nerves: No cranial nerve deficit.      Motor: No weakness.      Gait: Gait normal.   Psychiatric:         Mood and Affect: Mood normal.         Behavior: Behavior normal.         Thought Content: Thought content normal.         Judgment: Judgment normal.         Recent Labs   Lab Test 03/25/25  1608 02/03/25  0747 10/21/24  0920 07/09/24  0836   HGB 14.4 15.3 14.4 14.9    239 233 215   NA  --  143 142 143   POTASSIUM  --  4.1 4.3 4.2   CR  --  0.90 1.10* 0.87   A1C  --  6.1*  --  6.2*        Diagnostics  Recent Results (from the past 24 hours)   Comprehensive metabolic panel    Collection Time: 03/25/25  4:08 PM   Result Value Ref Range    Sodium 142 135 - 145 mmol/L    Potassium 3.7 3.4 - 5.3 mmol/L    Carbon Dioxide (CO2) 23 22 - 29 mmol/L    Anion Gap 14 7 - 15 mmol/L    Urea Nitrogen 9.1 6.0 - 20.0 mg/dL    Creatinine 0.83 0.51 - 0.95 mg/dL    GFR Estimate 82 >60 mL/min/1.73m2    Calcium 9.7 8.8 - 10.4 mg/dL    Chloride 105 98 - 107 mmol/L    Glucose 170 (H) 70 - 99 mg/dL    Alkaline Phosphatase 100 40 - 150 U/L    AST 22 0 - 45 U/L    ALT 39 0 - 50 U/L    Protein Total 7.3 6.4 - 8.3 g/dL    Albumin 4.2 3.5 - 5.2 g/dL    Bilirubin Total <0.2 <=1.2 mg/dL   TSH with free T4 reflex    Collection Time: 03/25/25  4:08 PM   Result Value Ref Range    TSH 5.51 (H) 0.30 - 4.20 uIU/mL   CBC with platelets and  differential    Collection Time: 03/25/25  4:08 PM   Result Value Ref Range    WBC Count 11.4 (H) 4.0 - 11.0 10e3/uL    RBC Count 4.60 3.80 - 5.20 10e6/uL    Hemoglobin 14.4 11.7 - 15.7 g/dL    Hematocrit 44.3 35.0 - 47.0 %    MCV 96 78 - 100 fL    MCH 31.3 26.5 - 33.0 pg    MCHC 32.5 31.5 - 36.5 g/dL    RDW 13.9 10.0 - 15.0 %    Platelet Count 234 150 - 450 10e3/uL    % Neutrophils 62 %    % Lymphocytes 30 %    % Monocytes 5 %    % Eosinophils 2 %    % Basophils 0 %    % Immature Granulocytes 0 %    NRBCs per 100 WBC 0 <1 /100    Absolute Neutrophils 7.0 1.6 - 8.3 10e3/uL    Absolute Lymphocytes 3.4 0.8 - 5.3 10e3/uL    Absolute Monocytes 0.6 0.0 - 1.3 10e3/uL    Absolute Eosinophils 0.3 0.0 - 0.7 10e3/uL    Absolute Basophils 0.1 0.0 - 0.2 10e3/uL    Absolute Immature Granulocytes 0.0 <=0.4 10e3/uL    Absolute NRBCs 0.0 10e3/uL   T4 free    Collection Time: 03/25/25  4:08 PM   Result Value Ref Range    Free T4 0.72 (L) 0.90 - 1.70 ng/dL   EKG 12-lead complete w/read - Clinics    Collection Time: 03/25/25  4:37 PM   Result Value Ref Range    Systolic Blood Pressure  mmHg    Diastolic Blood Pressure  mmHg    Ventricular Rate 92 BPM    Atrial Rate 92 BPM    OH Interval 170 ms    QRS Duration 86 ms     ms    QTc 440 ms    P Axis 58 degrees    R AXIS -12 degrees    T Axis 55 degrees    Interpretation ECG       Sinus rhythm  Minimal voltage criteria for LVH, may be normal variant ( Tramaine product )  Borderline ECG  When compared with ECG of 21-Oct-2024 10:05,  No significant change was found        EKG: appears normal, NSR, normal axis, normal intervals, no acute ST/T changes c/w ischemia, mild LVH by voltage criteria, unchanged from previous tracings. Qtc 440 today.     Prior echocardiogram 1/22/24 no LVH.     Revised Cardiac Risk Index (RCRI)  The patient has the following serious cardiovascular risks for perioperative complications:   - No serious cardiac risks = 0 points     RCRI Interpretation: 0 points:  Class I (very low risk - 0.4% complication rate)         Signed Electronically by: Hannah Eaton MD      A copy of this evaluation report is provided to the requesting physician.

## 2025-03-25 NOTE — PATIENT INSTRUCTIONS
NEED TO MAKE SURE TAKING THYROID MED DAILY     Before Your Surgery     Call your surgeon if there is any change in your health. This includes signs of a cold or flu (such as a sore throat, runny nose, cough, rash, fever, urinary symptoms).   If you take prescribed drugs: Follow your doctor s orders about which medicines to take and which to stop until after surgery.  HOLD vitamins and supplements two weeks prior  Hold ozempic one week prior   Ibuprofen none for 3 days prior   Continue crestor, inhalers, synthroid, pramipexole.   Hold topical creams day of surgery   Do not smoke, drink alcohol or take over the counter medicine (unless your surgeon or primary care doctor tells you to) for the 24 hours before and after surgery. Smoking can increase your risk of an infection. Nicotine patches are safe to use. NSAIDS like ibuprofen (Advil, Motrin) should be held one day before surgery. Celebrex should be held 3 days before surgery. Naproxen (Aleve) should be held four days before surgery.   Do not take supplements/vitamins day prior and morning of surgery. Some supplements/vitamins can interfere with anesthesia.   Eating and drinking prior to surgery: follow the instructions from your surgeon  Take a shower or bath the night before surgery and morning of surgery. Use the soap your surgeon gave you to gently clean your skin night before and morning of surgery. If you do not have soap from your surgeon, use your regular soap.[place] patients can receive free Chlorhexidine Gluconate 4% soap for surgery at an outpatient [place] pharmacy.  Do not shave the surgery site.  Wear clean pajamas and have clean sheets on your bed.  Brush teeth morning of surgery to reduce risk of infection.

## 2025-03-26 ENCOUNTER — TELEPHONE (OUTPATIENT)
Dept: FAMILY MEDICINE | Facility: OTHER | Age: 55
End: 2025-03-26

## 2025-03-26 PROBLEM — I51.7 LVH (LEFT VENTRICULAR HYPERTROPHY): Chronic | Status: ACTIVE | Noted: 2025-03-26

## 2025-03-26 PROBLEM — I51.7 LVH (LEFT VENTRICULAR HYPERTROPHY): Status: ACTIVE | Noted: 2025-03-26

## 2025-03-26 PROBLEM — I51.89 GRADE I DIASTOLIC DYSFUNCTION: Chronic | Status: ACTIVE | Noted: 2024-01-31

## 2025-03-26 LAB
ATRIAL RATE - MUSE: 92 BPM
DIASTOLIC BLOOD PRESSURE - MUSE: NORMAL MMHG
INTERPRETATION ECG - MUSE: NORMAL
P AXIS - MUSE: 58 DEGREES
PR INTERVAL - MUSE: 170 MS
QRS DURATION - MUSE: 86 MS
QT - MUSE: 356 MS
QTC - MUSE: 440 MS
R AXIS - MUSE: -12 DEGREES
SYSTOLIC BLOOD PRESSURE - MUSE: NORMAL MMHG
T AXIS - MUSE: 55 DEGREES
VENTRICULAR RATE- MUSE: 92 BPM

## 2025-04-03 ENCOUNTER — LAB (OUTPATIENT)
Dept: LAB | Facility: OTHER | Age: 55
End: 2025-04-03
Payer: MEDICARE

## 2025-04-03 DIAGNOSIS — E03.9 HYPOTHYROIDISM (ACQUIRED): ICD-10-CM

## 2025-04-03 LAB — TSH SERPL DL<=0.005 MIU/L-ACNC: 3.08 UIU/ML (ref 0.3–4.2)

## 2025-04-03 PROCEDURE — 84443 ASSAY THYROID STIM HORMONE: CPT | Mod: ZL

## 2025-04-07 ENCOUNTER — TRANSFERRED RECORDS (OUTPATIENT)
Dept: HEALTH INFORMATION MANAGEMENT | Facility: CLINIC | Age: 55
End: 2025-04-07

## 2025-04-07 ENCOUNTER — TELEPHONE (OUTPATIENT)
Dept: FAMILY MEDICINE | Facility: OTHER | Age: 55
End: 2025-04-07

## 2025-04-07 NOTE — TELEPHONE ENCOUNTER
Received PA APPROVAL from [employer] for semaglutide (OZEMPIC, 0.25 OR 0.5 MG/DOSE,) 2 MG/3ML pen. Dates 03/24/2025 - further notice.

## 2025-04-07 NOTE — TELEPHONE ENCOUNTER
Received PA request from [facility] for semaglutide (OZEMPIC, 0.25 OR 0.5 MG/DOSE,) 2 MG/3ML pen. Submitted on CMM, waiting for response.

## 2025-04-08 ENCOUNTER — APPOINTMENT (OUTPATIENT)
Dept: GENERAL RADIOLOGY | Facility: HOSPITAL | Age: 55
End: 2025-04-08
Attending: INTERNAL MEDICINE
Payer: MEDICARE

## 2025-04-08 ENCOUNTER — HOSPITAL ENCOUNTER (INPATIENT)
Facility: HOSPITAL | Age: 55
LOS: 1 days | Discharge: HOME HEALTH | End: 2025-04-10
Attending: INTERNAL MEDICINE | Admitting: INTERNAL MEDICINE
Payer: MEDICARE

## 2025-04-08 DIAGNOSIS — R09.02 HYPOXIA: ICD-10-CM

## 2025-04-08 DIAGNOSIS — J18.9 PNEUMONIA DUE TO INFECTIOUS ORGANISM, UNSPECIFIED LATERALITY, UNSPECIFIED PART OF LUNG: ICD-10-CM

## 2025-04-08 DIAGNOSIS — J98.11 ATELECTASIS: ICD-10-CM

## 2025-04-08 PROCEDURE — 96375 TX/PRO/DX INJ NEW DRUG ADDON: CPT | Performed by: INTERNAL MEDICINE

## 2025-04-08 PROCEDURE — 99285 EMERGENCY DEPT VISIT HI MDM: CPT | Mod: 25 | Performed by: INTERNAL MEDICINE

## 2025-04-08 PROCEDURE — 84145 PROCALCITONIN (PCT): CPT | Performed by: INTERNAL MEDICINE

## 2025-04-08 PROCEDURE — 85379 FIBRIN DEGRADATION QUANT: CPT | Performed by: INTERNAL MEDICINE

## 2025-04-08 PROCEDURE — 84484 ASSAY OF TROPONIN QUANT: CPT | Performed by: INTERNAL MEDICINE

## 2025-04-08 PROCEDURE — 93005 ELECTROCARDIOGRAM TRACING: CPT | Performed by: INTERNAL MEDICINE

## 2025-04-08 PROCEDURE — 85041 AUTOMATED RBC COUNT: CPT | Performed by: INTERNAL MEDICINE

## 2025-04-08 PROCEDURE — 71045 X-RAY EXAM CHEST 1 VIEW: CPT

## 2025-04-08 PROCEDURE — 83605 ASSAY OF LACTIC ACID: CPT | Performed by: INTERNAL MEDICINE

## 2025-04-08 PROCEDURE — 99285 EMERGENCY DEPT VISIT HI MDM: CPT | Performed by: INTERNAL MEDICINE

## 2025-04-08 PROCEDURE — 71045 X-RAY EXAM CHEST 1 VIEW: CPT | Mod: 26 | Performed by: RADIOLOGY

## 2025-04-08 PROCEDURE — 84460 ALANINE AMINO (ALT) (SGPT): CPT | Performed by: INTERNAL MEDICINE

## 2025-04-08 PROCEDURE — 84155 ASSAY OF PROTEIN SERUM: CPT | Performed by: INTERNAL MEDICINE

## 2025-04-08 PROCEDURE — 250N000011 HC RX IP 250 OP 636: Mod: JZ | Performed by: INTERNAL MEDICINE

## 2025-04-08 PROCEDURE — 86140 C-REACTIVE PROTEIN: CPT | Performed by: INTERNAL MEDICINE

## 2025-04-08 PROCEDURE — 36415 COLL VENOUS BLD VENIPUNCTURE: CPT | Performed by: INTERNAL MEDICINE

## 2025-04-08 PROCEDURE — 96374 THER/PROPH/DIAG INJ IV PUSH: CPT | Performed by: INTERNAL MEDICINE

## 2025-04-08 PROCEDURE — 85004 AUTOMATED DIFF WBC COUNT: CPT | Performed by: INTERNAL MEDICINE

## 2025-04-08 RX ORDER — FENTANYL CITRATE 50 UG/ML
100 INJECTION, SOLUTION INTRAMUSCULAR; INTRAVENOUS ONCE
Status: COMPLETED | OUTPATIENT
Start: 2025-04-09 | End: 2025-04-09

## 2025-04-08 RX ORDER — FENTANYL CITRATE 50 UG/ML
50 INJECTION, SOLUTION INTRAMUSCULAR; INTRAVENOUS ONCE
Status: COMPLETED | OUTPATIENT
Start: 2025-04-08 | End: 2025-04-08

## 2025-04-08 RX ADMIN — FENTANYL CITRATE 50 MCG: 50 INJECTION, SOLUTION INTRAMUSCULAR; INTRAVENOUS at 23:03

## 2025-04-08 ASSESSMENT — COLUMBIA-SUICIDE SEVERITY RATING SCALE - C-SSRS
2. HAVE YOU ACTUALLY HAD ANY THOUGHTS OF KILLING YOURSELF IN THE PAST MONTH?: NO
1. IN THE PAST MONTH, HAVE YOU WISHED YOU WERE DEAD OR WISHED YOU COULD GO TO SLEEP AND NOT WAKE UP?: NO
6. HAVE YOU EVER DONE ANYTHING, STARTED TO DO ANYTHING, OR PREPARED TO DO ANYTHING TO END YOUR LIFE?: NO

## 2025-04-08 ASSESSMENT — ACTIVITIES OF DAILY LIVING (ADL): ADLS_ACUITY_SCORE: 41

## 2025-04-09 ENCOUNTER — APPOINTMENT (OUTPATIENT)
Dept: PHYSICAL THERAPY | Facility: HOSPITAL | Age: 55
DRG: 193 | End: 2025-04-09
Attending: INTERNAL MEDICINE
Payer: MEDICARE

## 2025-04-09 ENCOUNTER — APPOINTMENT (OUTPATIENT)
Dept: CT IMAGING | Facility: HOSPITAL | Age: 55
DRG: 193 | End: 2025-04-09
Attending: INTERNAL MEDICINE
Payer: MEDICARE

## 2025-04-09 PROBLEM — R09.02 HYPOXIA: Status: ACTIVE | Noted: 2025-04-09

## 2025-04-09 PROBLEM — J98.11 ATELECTASIS: Status: ACTIVE | Noted: 2025-04-09

## 2025-04-09 PROBLEM — J18.9 PNEUMONIA DUE TO INFECTIOUS ORGANISM, UNSPECIFIED LATERALITY, UNSPECIFIED PART OF LUNG: Status: ACTIVE | Noted: 2025-04-09

## 2025-04-09 LAB
ALBUMIN SERPL BCG-MCNC: 3.6 G/DL (ref 3.5–5.2)
ALP SERPL-CCNC: 74 U/L (ref 40–150)
ALT SERPL W P-5'-P-CCNC: 50 U/L (ref 0–50)
ANION GAP SERPL CALCULATED.3IONS-SCNC: 10 MMOL/L (ref 7–15)
ANION GAP SERPL CALCULATED.3IONS-SCNC: 11 MMOL/L (ref 7–15)
AST SERPL W P-5'-P-CCNC: 121 U/L (ref 0–45)
ATRIAL RATE - MUSE: 101 BPM
BASOPHILS # BLD AUTO: 0 10E3/UL (ref 0–0.2)
BASOPHILS # BLD AUTO: 0 10E3/UL (ref 0–0.2)
BASOPHILS NFR BLD AUTO: 0 %
BASOPHILS NFR BLD AUTO: 0 %
BILIRUB SERPL-MCNC: 0.3 MG/DL
BUN SERPL-MCNC: 11.7 MG/DL (ref 6–20)
BUN SERPL-MCNC: 9.4 MG/DL (ref 6–20)
CALCIUM SERPL-MCNC: 8.9 MG/DL (ref 8.8–10.4)
CALCIUM SERPL-MCNC: 9 MG/DL (ref 8.8–10.4)
CHLORIDE SERPL-SCNC: 102 MMOL/L (ref 98–107)
CHLORIDE SERPL-SCNC: 106 MMOL/L (ref 98–107)
CREAT SERPL-MCNC: 0.78 MG/DL (ref 0.51–0.95)
CREAT SERPL-MCNC: 0.87 MG/DL (ref 0.51–0.95)
CRP SERPL-MCNC: 72.72 MG/L
D DIMER PPP FEU-MCNC: 1.86 UG/ML FEU (ref 0–0.5)
DIASTOLIC BLOOD PRESSURE - MUSE: NORMAL MMHG
EGFRCR SERPLBLD CKD-EPI 2021: 77 ML/MIN/1.73M2
EGFRCR SERPLBLD CKD-EPI 2021: 88 ML/MIN/1.73M2
EOSINOPHIL # BLD AUTO: 0 10E3/UL (ref 0–0.7)
EOSINOPHIL # BLD AUTO: 0 10E3/UL (ref 0–0.7)
EOSINOPHIL NFR BLD AUTO: 0 %
EOSINOPHIL NFR BLD AUTO: 0 %
ERYTHROCYTE [DISTWIDTH] IN BLOOD BY AUTOMATED COUNT: 14.2 % (ref 10–15)
ERYTHROCYTE [DISTWIDTH] IN BLOOD BY AUTOMATED COUNT: 14.2 % (ref 10–15)
GLUCOSE BLDC GLUCOMTR-MCNC: 268 MG/DL (ref 70–99)
GLUCOSE SERPL-MCNC: 129 MG/DL (ref 70–99)
GLUCOSE SERPL-MCNC: 153 MG/DL (ref 70–99)
HCO3 SERPL-SCNC: 26 MMOL/L (ref 22–29)
HCO3 SERPL-SCNC: 26 MMOL/L (ref 22–29)
HCT VFR BLD AUTO: 36.5 % (ref 35–47)
HCT VFR BLD AUTO: 37.7 % (ref 35–47)
HGB BLD-MCNC: 12 G/DL (ref 11.7–15.7)
HGB BLD-MCNC: 12.4 G/DL (ref 11.7–15.7)
HOLD SPECIMEN: NORMAL
IMM GRANULOCYTES # BLD: 0.1 10E3/UL
IMM GRANULOCYTES # BLD: 0.1 10E3/UL
IMM GRANULOCYTES NFR BLD: 0 %
IMM GRANULOCYTES NFR BLD: 1 %
INTERPRETATION ECG - MUSE: NORMAL
LACTATE SERPL-SCNC: 1.1 MMOL/L (ref 0.7–2)
LYMPHOCYTES # BLD AUTO: 1.2 10E3/UL (ref 0.8–5.3)
LYMPHOCYTES # BLD AUTO: 2.9 10E3/UL (ref 0.8–5.3)
LYMPHOCYTES NFR BLD AUTO: 20 %
LYMPHOCYTES NFR BLD AUTO: 8 %
MAGNESIUM SERPL-MCNC: 1.9 MG/DL (ref 1.7–2.3)
MCH RBC QN AUTO: 31.8 PG (ref 26.5–33)
MCH RBC QN AUTO: 32.5 PG (ref 26.5–33)
MCHC RBC AUTO-ENTMCNC: 32.9 G/DL (ref 31.5–36.5)
MCHC RBC AUTO-ENTMCNC: 32.9 G/DL (ref 31.5–36.5)
MCV RBC AUTO: 97 FL (ref 78–100)
MCV RBC AUTO: 99 FL (ref 78–100)
MONOCYTES # BLD AUTO: 0.8 10E3/UL (ref 0–1.3)
MONOCYTES # BLD AUTO: 1 10E3/UL (ref 0–1.3)
MONOCYTES NFR BLD AUTO: 5 %
MONOCYTES NFR BLD AUTO: 7 %
NEUTROPHILS # BLD AUTO: 10.3 10E3/UL (ref 1.6–8.3)
NEUTROPHILS # BLD AUTO: 13.3 10E3/UL (ref 1.6–8.3)
NEUTROPHILS NFR BLD AUTO: 72 %
NEUTROPHILS NFR BLD AUTO: 86 %
NRBC # BLD AUTO: 0 10E3/UL
NRBC # BLD AUTO: 0 10E3/UL
NRBC BLD AUTO-RTO: 0 /100
NRBC BLD AUTO-RTO: 0 /100
P AXIS - MUSE: 44 DEGREES
PLATELET # BLD AUTO: 188 10E3/UL (ref 150–450)
PLATELET # BLD AUTO: 196 10E3/UL (ref 150–450)
POTASSIUM SERPL-SCNC: 4.1 MMOL/L (ref 3.4–5.3)
POTASSIUM SERPL-SCNC: 4.1 MMOL/L (ref 3.4–5.3)
PR INTERVAL - MUSE: 154 MS
PROCALCITONIN SERPL IA-MCNC: 0.07 NG/ML
PROT SERPL-MCNC: 6.7 G/DL (ref 6.4–8.3)
QRS DURATION - MUSE: 90 MS
QT - MUSE: 346 MS
QTC - MUSE: 448 MS
R AXIS - MUSE: 0 DEGREES
RBC # BLD AUTO: 3.77 10E6/UL (ref 3.8–5.2)
RBC # BLD AUTO: 3.82 10E6/UL (ref 3.8–5.2)
SODIUM SERPL-SCNC: 139 MMOL/L (ref 135–145)
SODIUM SERPL-SCNC: 142 MMOL/L (ref 135–145)
SYSTOLIC BLOOD PRESSURE - MUSE: NORMAL MMHG
T AXIS - MUSE: 43 DEGREES
TROPONIN T SERPL HS-MCNC: 9 NG/L
VENTRICULAR RATE- MUSE: 101 BPM
WBC # BLD AUTO: 14.3 10E3/UL (ref 4–11)
WBC # BLD AUTO: 15.5 10E3/UL (ref 4–11)

## 2025-04-09 PROCEDURE — G0378 HOSPITAL OBSERVATION PER HR: HCPCS

## 2025-04-09 PROCEDURE — 120N000001 HC R&B MED SURG/OB

## 2025-04-09 PROCEDURE — 36415 COLL VENOUS BLD VENIPUNCTURE: CPT | Performed by: INTERNAL MEDICINE

## 2025-04-09 PROCEDURE — 96376 TX/PRO/DX INJ SAME DRUG ADON: CPT | Performed by: INTERNAL MEDICINE

## 2025-04-09 PROCEDURE — 80048 BASIC METABOLIC PNL TOTAL CA: CPT | Performed by: INTERNAL MEDICINE

## 2025-04-09 PROCEDURE — 250N000011 HC RX IP 250 OP 636: Performed by: INTERNAL MEDICINE

## 2025-04-09 PROCEDURE — 97530 THERAPEUTIC ACTIVITIES: CPT | Mod: GP | Performed by: PHYSICAL THERAPIST

## 2025-04-09 PROCEDURE — 71275 CT ANGIOGRAPHY CHEST: CPT

## 2025-04-09 PROCEDURE — 250N000011 HC RX IP 250 OP 636: Mod: JZ | Performed by: INTERNAL MEDICINE

## 2025-04-09 PROCEDURE — 96375 TX/PRO/DX INJ NEW DRUG ADDON: CPT | Performed by: INTERNAL MEDICINE

## 2025-04-09 PROCEDURE — 82962 GLUCOSE BLOOD TEST: CPT

## 2025-04-09 PROCEDURE — 71275 CT ANGIOGRAPHY CHEST: CPT | Mod: 26 | Performed by: RADIOLOGY

## 2025-04-09 PROCEDURE — 250N000013 HC RX MED GY IP 250 OP 250 PS 637: Performed by: INTERNAL MEDICINE

## 2025-04-09 PROCEDURE — 999N000157 HC STATISTIC RCP TIME EA 10 MIN

## 2025-04-09 PROCEDURE — 85025 COMPLETE CBC W/AUTO DIFF WBC: CPT | Performed by: INTERNAL MEDICINE

## 2025-04-09 PROCEDURE — 97161 PT EVAL LOW COMPLEX 20 MIN: CPT | Mod: GP | Performed by: PHYSICAL THERAPIST

## 2025-04-09 PROCEDURE — 99222 1ST HOSP IP/OBS MODERATE 55: CPT | Mod: AI | Performed by: INTERNAL MEDICINE

## 2025-04-09 PROCEDURE — 83735 ASSAY OF MAGNESIUM: CPT | Performed by: INTERNAL MEDICINE

## 2025-04-09 RX ORDER — DOXYCYCLINE HYCLATE 100 MG
100 TABLET ORAL 2 TIMES DAILY
Qty: 10 TABLET | Refills: 0 | Status: SHIPPED | OUTPATIENT
Start: 2025-04-09 | End: 2025-04-14

## 2025-04-09 RX ORDER — AMOXICILLIN 250 MG
1 CAPSULE ORAL 2 TIMES DAILY PRN
Status: DISCONTINUED | OUTPATIENT
Start: 2025-04-09 | End: 2025-04-10 | Stop reason: HOSPADM

## 2025-04-09 RX ORDER — ACETAMINOPHEN 650 MG/1
650 SUPPOSITORY RECTAL EVERY 4 HOURS PRN
Status: DISCONTINUED | OUTPATIENT
Start: 2025-04-09 | End: 2025-04-10 | Stop reason: HOSPADM

## 2025-04-09 RX ORDER — DOXYCYCLINE 100 MG/1
100 CAPSULE ORAL EVERY 12 HOURS SCHEDULED
Status: DISCONTINUED | OUTPATIENT
Start: 2025-04-09 | End: 2025-04-09

## 2025-04-09 RX ORDER — NAPROXEN SODIUM 220 MG/1
220 TABLET, FILM COATED ORAL 2 TIMES DAILY PRN
COMMUNITY

## 2025-04-09 RX ORDER — EMOLLIENT BASE
CREAM (GRAM) TOPICAL 2 TIMES DAILY PRN
COMMUNITY

## 2025-04-09 RX ORDER — NALOXONE HYDROCHLORIDE 0.4 MG/ML
0.2 INJECTION, SOLUTION INTRAMUSCULAR; INTRAVENOUS; SUBCUTANEOUS
Status: DISCONTINUED | OUTPATIENT
Start: 2025-04-09 | End: 2025-04-10 | Stop reason: HOSPADM

## 2025-04-09 RX ORDER — BISACODYL 10 MG
10 SUPPOSITORY, RECTAL RECTAL DAILY PRN
Status: DISCONTINUED | OUTPATIENT
Start: 2025-04-09 | End: 2025-04-10 | Stop reason: HOSPADM

## 2025-04-09 RX ORDER — ROSUVASTATIN CALCIUM 40 MG/1
40 TABLET, COATED ORAL AT BEDTIME
COMMUNITY

## 2025-04-09 RX ORDER — PRAMIPEXOLE DIHYDROCHLORIDE 0.25 MG/1
0.75 TABLET ORAL AT BEDTIME
Status: DISCONTINUED | OUTPATIENT
Start: 2025-04-09 | End: 2025-04-10 | Stop reason: HOSPADM

## 2025-04-09 RX ORDER — FENTANYL CITRATE 50 UG/ML
100 INJECTION, SOLUTION INTRAMUSCULAR; INTRAVENOUS ONCE
Status: COMPLETED | OUTPATIENT
Start: 2025-04-09 | End: 2025-04-09

## 2025-04-09 RX ORDER — ACETAMINOPHEN 325 MG/1
650 TABLET ORAL EVERY 4 HOURS PRN
Status: DISCONTINUED | OUTPATIENT
Start: 2025-04-09 | End: 2025-04-10 | Stop reason: HOSPADM

## 2025-04-09 RX ORDER — NALOXONE HYDROCHLORIDE 0.4 MG/ML
0.4 INJECTION, SOLUTION INTRAMUSCULAR; INTRAVENOUS; SUBCUTANEOUS
Status: DISCONTINUED | OUTPATIENT
Start: 2025-04-09 | End: 2025-04-10 | Stop reason: HOSPADM

## 2025-04-09 RX ORDER — ROSUVASTATIN CALCIUM 10 MG/1
40 TABLET, COATED ORAL DAILY
Status: DISCONTINUED | OUTPATIENT
Start: 2025-04-09 | End: 2025-04-10 | Stop reason: HOSPADM

## 2025-04-09 RX ORDER — DOXYCYCLINE 100 MG/10ML
100 INJECTION, POWDER, LYOPHILIZED, FOR SOLUTION INTRAVENOUS EVERY 12 HOURS
Status: DISCONTINUED | OUTPATIENT
Start: 2025-04-09 | End: 2025-04-10 | Stop reason: HOSPADM

## 2025-04-09 RX ORDER — CEFTRIAXONE SODIUM 1 G/50ML
1 INJECTION, SOLUTION INTRAVENOUS EVERY 24 HOURS
Status: DISCONTINUED | OUTPATIENT
Start: 2025-04-09 | End: 2025-04-10 | Stop reason: HOSPADM

## 2025-04-09 RX ORDER — IOPAMIDOL 755 MG/ML
66 INJECTION, SOLUTION INTRAVASCULAR ONCE
Status: COMPLETED | OUTPATIENT
Start: 2025-04-09 | End: 2025-04-09

## 2025-04-09 RX ORDER — CALCIUM CARBONATE 500 MG/1
1000 TABLET, CHEWABLE ORAL 4 TIMES DAILY PRN
Status: DISCONTINUED | OUTPATIENT
Start: 2025-04-09 | End: 2025-04-10 | Stop reason: HOSPADM

## 2025-04-09 RX ORDER — CEFTRIAXONE SODIUM 1 G/50ML
INJECTION, SOLUTION INTRAVENOUS
Status: COMPLETED
Start: 2025-04-09 | End: 2025-04-09

## 2025-04-09 RX ORDER — PANTOPRAZOLE SODIUM 20 MG/1
40 TABLET, DELAYED RELEASE ORAL DAILY
Status: DISCONTINUED | OUTPATIENT
Start: 2025-04-09 | End: 2025-04-10 | Stop reason: HOSPADM

## 2025-04-09 RX ORDER — IPRATROPIUM BROMIDE AND ALBUTEROL SULFATE 2.5; .5 MG/3ML; MG/3ML
3 SOLUTION RESPIRATORY (INHALATION) EVERY 4 HOURS PRN
Status: DISCONTINUED | OUTPATIENT
Start: 2025-04-09 | End: 2025-04-10 | Stop reason: HOSPADM

## 2025-04-09 RX ORDER — OXYCODONE HYDROCHLORIDE 5 MG/1
5 TABLET ORAL EVERY 6 HOURS PRN
Qty: 10 TABLET | Refills: 0 | Status: SHIPPED | OUTPATIENT
Start: 2025-04-09

## 2025-04-09 RX ORDER — AMOXICILLIN 250 MG
2 CAPSULE ORAL 2 TIMES DAILY PRN
Status: DISCONTINUED | OUTPATIENT
Start: 2025-04-09 | End: 2025-04-10 | Stop reason: HOSPADM

## 2025-04-09 RX ORDER — OXYCODONE HYDROCHLORIDE 5 MG/1
5 TABLET ORAL EVERY 6 HOURS PRN
Status: DISCONTINUED | OUTPATIENT
Start: 2025-04-09 | End: 2025-04-10 | Stop reason: HOSPADM

## 2025-04-09 RX ORDER — TRAZODONE HYDROCHLORIDE 150 MG/1
150 TABLET ORAL AT BEDTIME
Status: DISCONTINUED | OUTPATIENT
Start: 2025-04-09 | End: 2025-04-10 | Stop reason: HOSPADM

## 2025-04-09 RX ORDER — OXYCODONE HYDROCHLORIDE 5 MG/1
5 TABLET ORAL EVERY 6 HOURS PRN
COMMUNITY

## 2025-04-09 RX ORDER — FENTANYL CITRATE 50 UG/ML
25 INJECTION, SOLUTION INTRAMUSCULAR; INTRAVENOUS
Status: DISCONTINUED | OUTPATIENT
Start: 2025-04-09 | End: 2025-04-10 | Stop reason: HOSPADM

## 2025-04-09 RX ORDER — FLUTICASONE PROPIONATE AND SALMETEROL 250; 50 UG/1; UG/1
1 POWDER RESPIRATORY (INHALATION) 2 TIMES DAILY
Status: DISCONTINUED | OUTPATIENT
Start: 2025-04-09 | End: 2025-04-10 | Stop reason: HOSPADM

## 2025-04-09 RX ORDER — LEVOTHYROXINE SODIUM 100 UG/1
100 TABLET ORAL
Status: DISCONTINUED | OUTPATIENT
Start: 2025-04-09 | End: 2025-04-10 | Stop reason: HOSPADM

## 2025-04-09 RX ORDER — PREDNISONE 20 MG/1
40 TABLET ORAL DAILY
Status: DISCONTINUED | OUTPATIENT
Start: 2025-04-10 | End: 2025-04-10 | Stop reason: HOSPADM

## 2025-04-09 RX ORDER — FLUTICASONE PROPIONATE AND SALMETEROL 250; 50 UG/1; UG/1
1 POWDER RESPIRATORY (INHALATION) 2 TIMES DAILY
COMMUNITY

## 2025-04-09 RX ORDER — KETOROLAC TROMETHAMINE 30 MG/ML
30 INJECTION, SOLUTION INTRAMUSCULAR; INTRAVENOUS EVERY 6 HOURS PRN
Status: DISCONTINUED | OUTPATIENT
Start: 2025-04-09 | End: 2025-04-10 | Stop reason: HOSPADM

## 2025-04-09 RX ORDER — ESTRADIOL 0.1 MG/G
CREAM VAGINAL WEEKLY
COMMUNITY

## 2025-04-09 RX ORDER — LIDOCAINE 40 MG/G
CREAM TOPICAL
Status: DISCONTINUED | OUTPATIENT
Start: 2025-04-09 | End: 2025-04-10 | Stop reason: HOSPADM

## 2025-04-09 RX ORDER — METHOCARBAMOL 750 MG/1
750 TABLET, FILM COATED ORAL 4 TIMES DAILY PRN
COMMUNITY
Start: 2025-04-08

## 2025-04-09 RX ORDER — METHYLPREDNISOLONE SODIUM SUCCINATE 125 MG/2ML
125 INJECTION INTRAMUSCULAR; INTRAVENOUS ONCE
Status: COMPLETED | OUTPATIENT
Start: 2025-04-09 | End: 2025-04-09

## 2025-04-09 RX ADMIN — IOPAMIDOL 66 ML: 755 INJECTION, SOLUTION INTRAVENOUS at 01:39

## 2025-04-09 RX ADMIN — OXYCODONE HYDROCHLORIDE 5 MG: 5 TABLET ORAL at 20:25

## 2025-04-09 RX ADMIN — SENNOSIDES AND DOCUSATE SODIUM 2 TABLET: 50; 8.6 TABLET ORAL at 11:15

## 2025-04-09 RX ADMIN — CEFTRIAXONE SODIUM 1 G: 1 INJECTION, SOLUTION INTRAVENOUS at 05:33

## 2025-04-09 RX ADMIN — FLUTICASONE PROPIONATE AND SALMETEROL 1 PUFF: 250; 50 POWDER RESPIRATORY (INHALATION) at 11:00

## 2025-04-09 RX ADMIN — ACETAMINOPHEN 650 MG: 325 TABLET, FILM COATED ORAL at 14:00

## 2025-04-09 RX ADMIN — LEVOTHYROXINE SODIUM 100 MCG: 0.1 TABLET ORAL at 09:13

## 2025-04-09 RX ADMIN — ROSUVASTATIN 40 MG: 10 TABLET, FILM COATED ORAL at 09:28

## 2025-04-09 RX ADMIN — DOXYCYCLINE 100 MG: 100 INJECTION, POWDER, LYOPHILIZED, FOR SOLUTION INTRAVENOUS at 16:33

## 2025-04-09 RX ADMIN — FENTANYL CITRATE 100 MCG: 50 INJECTION, SOLUTION INTRAMUSCULAR; INTRAVENOUS at 04:20

## 2025-04-09 RX ADMIN — FENTANYL CITRATE 25 MCG: 50 INJECTION INTRAMUSCULAR; INTRAVENOUS at 11:13

## 2025-04-09 RX ADMIN — PANTOPRAZOLE SODIUM 40 MG: 20 TABLET, DELAYED RELEASE ORAL at 09:27

## 2025-04-09 RX ADMIN — BISACODYL 10 MG: 10 SUPPOSITORY RECTAL at 16:34

## 2025-04-09 RX ADMIN — FENTANYL CITRATE 100 MCG: 50 INJECTION, SOLUTION INTRAMUSCULAR; INTRAVENOUS at 00:13

## 2025-04-09 RX ADMIN — PRAMIPEXOLE DIHYDROCHLORIDE 0.75 MG: 0.25 TABLET ORAL at 20:25

## 2025-04-09 RX ADMIN — METHYLPREDNISOLONE SODIUM SUCCINATE 125 MG: 125 INJECTION, POWDER, FOR SOLUTION INTRAMUSCULAR; INTRAVENOUS at 04:20

## 2025-04-09 RX ADMIN — DOXYCYCLINE HYCLATE 100 MG: 100 CAPSULE ORAL at 03:50

## 2025-04-09 RX ADMIN — ACETAMINOPHEN 650 MG: 325 TABLET, FILM COATED ORAL at 05:44

## 2025-04-09 RX ADMIN — FENTANYL CITRATE 25 MCG: 50 INJECTION INTRAMUSCULAR; INTRAVENOUS at 14:00

## 2025-04-09 RX ADMIN — TRAZODONE HYDROCHLORIDE 150 MG: 150 TABLET ORAL at 20:24

## 2025-04-09 ASSESSMENT — ENCOUNTER SYMPTOMS
SHORTNESS OF BREATH: 0
BLOOD IN STOOL: 0
BACK PAIN: 0
WOUND: 0
DIZZINESS: 0
CHILLS: 0
HEADACHES: 0
MYALGIAS: 0
ABDOMINAL PAIN: 0
DYSURIA: 0
CONFUSION: 0
DIAPHORESIS: 0
FEVER: 0
NECK STIFFNESS: 0
PALPITATIONS: 0
ARTHRALGIAS: 0
LIGHT-HEADEDNESS: 0
NUMBNESS: 0
CHEST TIGHTNESS: 0
VOICE CHANGE: 0
COUGH: 0
FLANK PAIN: 0
ABDOMINAL DISTENTION: 0
COLOR CHANGE: 0
VOMITING: 0
ANAL BLEEDING: 0
NECK PAIN: 0
NAUSEA: 0
WHEEZING: 0
HEMATURIA: 0

## 2025-04-09 ASSESSMENT — ACTIVITIES OF DAILY LIVING (ADL)
ADLS_ACUITY_SCORE: 45
ADLS_ACUITY_SCORE: 50
ADLS_ACUITY_SCORE: 51
ADLS_ACUITY_SCORE: 43
ADLS_ACUITY_SCORE: 42
ADLS_ACUITY_SCORE: 43
ADLS_ACUITY_SCORE: 40
ADLS_ACUITY_SCORE: 45
ADLS_ACUITY_SCORE: 45
ADLS_ACUITY_SCORE: 50
ADLS_ACUITY_SCORE: 45
ADLS_ACUITY_SCORE: 43
ADLS_ACUITY_SCORE: 51
ADLS_ACUITY_SCORE: 42
ADLS_ACUITY_SCORE: 45
ADLS_ACUITY_SCORE: 45

## 2025-04-09 NOTE — PROGRESS NOTES
Assessment completed by visit with Gabrielle and Damian.    LOC: alert, oriented    Dx: Pneumonia   Chronic Disease Management: DMII, hashimoto's     Lives with: Damian  Living at:  home  Transportation: YES Family     Primary PCP: Hannah Eaton  Insurance:  Medicare/[employer]/ [place]       Support System:  Damian  Homecare/PCA: [facility]- PCA services, currently receives 35 hours/wk   /[facility] Services:   [facility] or [facility]; indicates she was supposed to have an assessment soon  : NO      How was the [facility] notification completed: n/a    Health Care Directive: no  Guardian: no  POA: no    Pharmacy: [facility]  Meds management: independently manages    Adequate Resources for needs (housing, utilities, food/med):  is extremely difficult with only her social security disability   Household chores: Damian and PCA manage  Work/community/social activity: YES but limited     ADLs: Damian assists   Ambulation:walker utilized at this time   Falls: yes- last fall on ice on Thanksgiving   Nutrition: no concerns, limited resources to obtain food   Sleep: no concerns     Equipment used: walker, shower chair, sock aide, grab bars, shoe horn      Oxygen supplier: no      Does the supplier have valid oxygen orders: n/a    Mental health: anxiety, depression- working with PCP to manage   Substance abuse: down to 2-3 cigarettes daily, denies additional substance use   Exposure to violence/abuse: no concerns voiced/identified  Stressors: health, finances     Able to Return to Prior Living Arrangements: YES    Choice of Vendor: n/a    Barriers: no barriers identified    MELANIE: low (11%)    Plan: return home via spouse

## 2025-04-09 NOTE — ED TRIAGE NOTES
Patient presents via [facility] c/o back pain. Post op from spinal fusion yesterday. Discharged today. Denies complications with the surgery. States pain had just gotten out of control when she laid down to go to bed.    EMS gave 50mcg fent and 4mg zofran. Prescribed 5mg Oxycodone approx 1 hour ago and Tylenol.

## 2025-04-09 NOTE — MEDICATION SCRIBE - ADMISSION MEDICATION HISTORY
Medication Scribe Admission Medication History    Admission medication history is complete. The information provided in this note is only as accurate as the sources available at the time of the update.    Information Source(s): Patient, Patient's pharmacy, and CareEverywhere/SureScripts via in-person    Pertinent Information:   Patient manages her own medications. Holding many medications for at least 3 weeks in preparation for surgery (vitamins, ozempic, estrace, PRN's) . Unable to specify last doses of home meds she has taken this past week.  Discharged yesterday from [facility] and prescribed oxycodone and methocarbamol which were sent to [facility].  She reports she has not worn her dexcom since before surgery d/t it continuously falling off.   No fill hx on ketoconazole, proair or duonebs and wixela fill date of July 2024, but pt reports she takes as prescribed.    Changes made to PTA medication list:  Added: methocarbamol, oxycodone, aleve  Deleted: ibu  Changed:   Input missing instructions   Advair to wixela  Omeprazole- reports insurance won't cover so she has not been able to obtain  Spiriva- reports taking different than prescribed and takes BID dosing.     Allergies reviewed with patient and updates made in EHR: no    Medication History Completed By: Luz Andujar 4/9/2025 3:32 PM    PTA Med List   Medication Sig Note Last Dose/Taking    ACCU-CHEK GUIDE test strip 1 strip daily as needed (low blood glucose readings from dexcom).  Past Week    albuterol (PROAIR HFA/PROVENTIL HFA/VENTOLIN HFA) 108 (90 Base) MCG/ACT inhaler Inhale 2 puffs into the lungs every 4 hours as needed.  Past Week    blood glucose monitoring (SOFTCLIX) lancets Use to test blood glucose once daily or as directed.  Past Week    Blood Glucose Monitoring Suppl (ACCU-CHEK GUIDE) w/Device KIT Use to test blood glucose once daily or as directed.  Past Week    clobetasol propionate (TEMOVATE) 0.05 % external cream APPLY SMALL AMOUNT  TO ACTIVE LESIONS UP TO 10-14 DAYS. MUST TAKE ONE WEEK OFF BETWEEN USE. DO NOT APPLY TO FACE.  Past Month    Continuous Blood Gluc  (DEXCOM G6 ) MIKE Use to read blood sugars as per 's instructions.  More than a month    Continuous Blood Gluc Sensor (DEXCOM G6 SENSOR) MISC CHANGE SENSOR EVERY 10 DAYS  More than a month    Continuous Glucose Transmitter (DEXCOM G6 TRANSMITTER) MISC Change every 3 months.  More than a month    cyanocobalamin (VITAMIN B-12) 500 MCG tablet Take 1 tablet (500 mcg) by mouth daily  More than a month    doxycycline hyclate (VIBRA-TABS) 100 MG tablet Take 1 tablet (100 mg) by mouth 2 times daily for 5 days.  Taking    emollient (VANICREAM) external cream Apply topically 2 times daily as needed (rash).  Past Month    estradiol (ESTRACE) 0.1 MG/GM vaginal cream Place vaginally once a week.  More than a month    fluticasone-salmeterol (WIXELA INHUB) 250-50 MCG/ACT inhaler Inhale 1 puff into the lungs 2 times daily.  Past Week    Glucose 4-6 GM-MG CHEW Take 4 g by mouth as needed (low blood sugar).  More than a month    guaiFENesin (MUCINEX) 600 MG 12 hr tablet Take 1 tablet (600 mg) by mouth 2 times daily as needed for congestion.  More than a month    hydrOXYzine nia (VISTARIL) 25 MG capsule Take 1 capsule (25 mg) by mouth 3 times daily as needed for itching.  More than a month    ipratropium - albuterol 0.5 mg/2.5 mg/3 mL (DUONEB) 0.5-2.5 (3) MG/3ML neb solution Inhale 3 mLs into the lungs as needed.  Past Month    ketoconazole (NIZORAL) 2 % external cream Apply topically daily as needed.  Unknown    levothyroxine (SYNTHROID/LEVOTHROID) 100 MCG tablet Take 1 tablet (100 mcg) by mouth every morning (before breakfast).  Past Week Morning    naproxen sodium (ANAPROX) 220 MG tablet Take 220 mg by mouth 2 times daily as needed for moderate pain.  More than a month    nystatin (MYCOSTATIN) 930184 UNIT/GM external powder Apply topically 3 times daily as needed for other  (yeast infection). Apply up to three times daily to areas with active yeast.  Past Month    oxyCODONE (ROXICODONE) 5 MG tablet Take 1 tablet (5 mg) by mouth every 6 hours as needed for severe pain.  Taking As Needed    oxyCODONE (ROXICODONE) 5 MG tablet Take 5 mg by mouth every 6 hours as needed for pain. 4/9/2025: Pharmacy set to deliver.  Taking As Needed    pramipexole (MIRAPEX) 0.75 MG tablet Take 1 tablet (0.75 mg) by mouth at bedtime.  Past Week Bedtime    rosuvastatin (CRESTOR) 40 MG tablet Take 40 mg by mouth at bedtime.  Past Week Bedtime    semaglutide (OZEMPIC, 0.25 OR 0.5 MG/DOSE,) 2 MG/3ML pen INJECT 0.5MG SUBCUTANEOUSLY EVERY 7 DAYS  Past Month    tiotropium (SPIRIVA RESPIMAT) 2.5 MCG/ACT inhaler INHALE 2 PUFFS INTO THE LUNGS DAILY (Patient taking differently: Inhale 2 puffs into the lungs 2 times daily.)  4/8/2025    traZODone (DESYREL) 150 MG tablet Take 1 tablet (150 mg) by mouth at bedtime.  Past Week Bedtime    triamcinolone (KENALOG) 0.5 % external ointment Apply pea sized amount to active eczematous lesions on elbows twice daily for 10 days.  Past Month

## 2025-04-09 NOTE — PROGRESS NOTES
04/09/25 1200   Appointment Info   Signing Clinician's Name / Credentials (PT) Isabel Barrera DPT   Quick Adds   Quick Adds Certification   Living Environment   People in Home significant other   Current Living Arrangements house   Home Accessibility stairs to enter home;stairs within home   Number of Stairs, Main Entrance 2   Stair Railings, Main Entrance none   Number of Stairs, Within Home, Primary greater than 10 stairs   Stair Railings, Within Home, Primary railings on both sides of stairs   Transportation Anticipated family or friend will provide   Living Environment Comments Patient reports she can manage on main level of home laundry in basement but spouse can assist with this.   Self-Care   Usual Activity Tolerance moderate   Current Activity Tolerance fair   Equipment Currently Used at Home walker, rolling   Fall history within last six months yes   Number of times patient has fallen within last six months 2   Activity/Exercise/Self-Care Comment Patient reports prior to surgery she was independent with ADLs.  Patient ambulating with front wheeled walker just since surgery on 4/7/2025.  Patient's spouse drives.   General Information   Onset of Illness/Injury or Date of Surgery 04/08/25   Referring Physician Dr. Bang Diaz   Patient/Family Therapy Goals Statement (PT) to go home   Pertinent History of Current Problem (include personal factors and/or comorbidities that impact the POC) Acute hypoxic respiratory failure, with evidence of atelectasis with collapses of the left lower lobe.  Suspected pneumonia. Recent lumbar spinal fusion on 4/7/25.   Existing Precautions/Restrictions spinal   General Observations Pt resting in bed, laying on back brace but not secured.   Cognition   Affect/Mental Status (Cognition) WFL   Orientation Status (Cognition) oriented x 3   Follows Commands (Cognition) WFL   Pain Assessment   Patient Currently in Pain Yes, see Vital Sign flowsheet  (4/10 in back)    Integumentary/Edema   Integumentary/Edema Comments scattered scarring all over body   Posture    Posture Forward head position;Protracted shoulders   Range of Motion (ROM)   Range of Motion ROM is WFL   Strength (Manual Muscle Testing)   Strength (Manual Muscle Testing) strength is WFL   Bed Mobility   Bed Mobility rolling left;sidelying-sit   Rolling Left [place] (Bed Mobility) modified independence   Sidelying-Sit [place] (Bed Mobility) moderate assist (50% patient effort)   Impairments Contributing to Impaired Bed Mobility pain   Assistive Device (Bed Mobility) bed rails   Comment, (Bed Mobility) states her  has been assisting her at home with getting in/out of bed   Transfers   Transfers sit-stand transfer   Sit-Stand Transfer   Sit-Stand [place] (Transfers) supervision   Assistive Device (Sit-Stand Transfers) walker, front-wheeled   Comment, (Sit-Stand Transfer) cues for hand placement   Gait/Stairs (Locomotion)   [place] Level (Gait) supervision   Assistive Device (Gait) walker, front-wheeled   Distance in Feet (Gait) 40'   Pattern (Gait) step-through   Deviations/Abnormal Patterns (Gait) gait speed decreased   Comment, (Gait/Stairs) Ambulation completed on 4L O2, sats 93% after ambulation with mild dyspnea noted   Balance   Balance Comments good with support from walker   Sensory Examination   Sensory Perception patient reports no sensory changes   Clinical Impression   Criteria for Skilled Therapeutic Intervention Yes, treatment indicated   PT Diagnosis (PT) gait disturbance   Influenced by the following impairments pain, decreased activity tolerance   Functional limitations due to impairments decreased tolerance for functional mobility and ADLs   Clinical Presentation (PT Evaluation Complexity) evolving   Clinical Presentation Rationale clinical judgement   Clinical Decision Making (Complexity) low complexity   Planned Therapy Interventions (PT) bed mobility training;gait  training;neuromuscular re-education;patient/family education;stair training;strengthening;transfer training;progressive activity/exercise;risk factor education;home program guidelines   Risk & Benefits of therapy have been explained evaluation/treatment results reviewed;risks/benefits reviewed;care plan/treatment goals reviewed;current/potential barriers reviewed;participants included;patient   Clinical Impression Comments PT evaluation completed.  Pt lives at home with her spouse and was independent prior to back surgery on 4/7/25.  Pt currently demonstrates decreased activity tolerance from basesline and requiring O2.  Anticipate pt will improved during stay and no further skilled PT needs will be needed upon discharge.  Will see during acute care stay to progress mobility.   PT Total Evaluation Time   PT Eval, Low Complexity Minutes (36719) 15   Therapy Certification   Start of care date 04/09/25   Certification date from 04/09/25   Certification date to 04/23/25   Medical Diagnosis hypoxia   Physical Therapy Goals   PT Frequency 6x/week   PT Predicted Duration/Target Date for Goal Attainment 04/23/25   PT Goals Bed Mobility;Transfers;Gait;Stairs   PT: Bed Mobility Modified independent;Supine to/from sit   PT: Transfers Modified independent;Sit to/from stand;Bed to/from chair;Assistive device   PT: Gait Modified independent;Rolling walker;150 feet   PT: Stairs Supervision/stand-by assist;2 stairs   Interventions   Interventions Quick Adds Therapeutic Activity   Therapeutic Activity   Therapeutic Activities: dynamic activities to improve functional performance Minutes (21358) 8   Symptoms Noted During/After Treatment Fatigue;Increased pain   Treatment Detail/Skilled Intervention Pt needing to use commode upon returning to room.  Pt transferred onto commode SBA.  Pt needed total A for hygiene, states her  has been assiting her at home.  Pt transferred sit>stand from commode SBA and completed pivot transfer  to room chair.  Discussed with pt the importance of ambulating frequently throughout the day with nursing, pt verbalized understanding.  pt left sitting in chair with call light in reach.   PT Discharge Planning   PT Plan Progress functional mobility, trial stairs   PT Discharge Recommendation (DC Rec) home with assist   PT Rationale for DC Rec PT evaluation completed. Pt lives at home with her spouse and was independent prior to back surgery on 4/7/25. Pt currently demonstrates decreased activity tolerance from basesline and requiring O2. Anticipate pt will improved during stay and no further skilled PT needs will be needed upon discharge. Will see during acute care stay to progress mobility.   PT Brief overview of current status sup>sit mod A, sit>stand SBA, ambulated 40' with FWW on 4L O2 SBA with sats 93% and mild dyspnea   PT Total Distance Amb During Session (feet) 40   Physical Therapy Time and Intention   Timed Code Treatment Minutes 8   Total Session Time (sum of timed and untimed services) 23     I certify the need for these services furnished under this plan of treatment and while under my care. (Physician co-signature of this document indicates review and certification of the therapy plan).

## 2025-04-09 NOTE — PROGRESS NOTES
"[facility]    Hospitalist Progress Note    Date of Service (when I saw the patient): 04/09/2025    Assessment & Plan   Jami Wang is a 55 year old female who was admitted on 4/8/2025.    Acute hypoxic respiratory failure.  CTA reviewed.  Patient has evidence of atelectasis with collapses of the left lower lobe.  Patient does have elevated white count.  Suspect this could all be driven by atelectasis but will cover with empiric antibiotics given elevated white count.  Will place on Rocephin and doxycycline.  -4/9: Continue empiric ceftriaxone, doxycycline.  Patient is currently on 3 L nasal cannula, wean as able.  Continue every 2 hour incentive spirometer.     Suspected pneumonia, possible aspiration versus hospital-acquired.  Hard to rule out with atelectasis but with elevated wbc, will cover with rocephin and doxycycline.       Recent lumbar spinal fusion.  Pain control. PT consult     Hashimotos. continue Levothyroxine.     GERD.  Acid suppression.     Asthma/copd.  Continue with Advair and rescue inhalers. No wheezing on exam, lungs actually quite clear  -continue steroids x 5 days.     Hyperlipidemia.  Statin.     Obesity: BMI 35.65 kg/m      Disposition.  Pending clinical improvement, weaning of supplemental oxygen    Clinically Significant Risk Factors Present on Admission                   # Hypertension: Noted on problem list           # Obesity: Estimated body mass index is 35.65 kg/m  as calculated from the following:    Height as of 3/25/25: 1.575 m (5' 2\").    Weight as of this encounter: 88.4 kg (194 lb 14.2 oz).       # COPD: noted on problem list          DVT Prophylaxis: Pneumatic Compression Devices    Code Status: Full Code    Disposition: Expected discharge in 1-2 days once clinically improved.    Justin Taylor MD, MD      Interval History   Patient seen in room.  Patient is feeling slightly better compared to admission.  Pain is controlled, worse when she is coughing.  No acute " events since admission, no new symptoms.    -Data reviewed today: I reviewed all new labs and imaging results over the last 24 hours. I personally reviewed imaging reports.    Physical Exam   Temp: 99.3  F (37.4  C) Temp src: Tympanic BP: (!) 142/79 Pulse: 114   Resp: 18 SpO2: 92 % O2 Device: Nasal cannula Oxygen Delivery: 3 LPM  Vitals:    04/08/25 2234   Weight: 88.4 kg (194 lb 14.2 oz)     Vital Signs with Ranges  Temp:  [98.2  F (36.8  C)-99.4  F (37.4  C)] 99.3  F (37.4  C)  Pulse:  [] 114  Resp:  [18-20] 18  BP: (117-156)/(70-97) 142/79  SpO2:  [86 %-97 %] 92 %    Intake/Output Summary (Last 24 hours) at 4/9/2025 1631  Last data filed at 4/9/2025 1059  Gross per 24 hour   Intake 3 ml   Output 1750 ml   Net -1747 ml       Peripheral IV 04/08/25 Anterior;Right Lower forearm (Active)   Site Assessment WDL 04/09/25 0800   Line Status Saline locked 04/09/25 0800   Dressing Transparent 04/09/25 0800   Dressing Status dry;reinforced;clean 04/09/25 0800   Infiltration? no 04/09/25 0800   Number of days: 1     No line/device    Constitutional - AA, NAD  HEENT - atraumatic, normocephalic  Neck - supple, no masses, no JVD  CVS - S1 S2 tachycardic, no murmurs, rubs, gallops  Respiratory - relatively CTA b/l, no rhonchi, no wheezes  GI - soft, NT, ND, + bowel sounds, no organomegaly  Musculoskeletal - no LE edema, no lesions  Neuro - oriented x 3, no gross focal deficits      Medications   Current Facility-Administered Medications   Medication Dose Route Frequency Provider Last Rate Last Admin     Current Facility-Administered Medications   Medication Dose Route Frequency Provider Last Rate Last Admin    cefTRIAXone in d5w (ROCEPHIN) intermittent infusion 1 g  1 g Intravenous Q24H Bang Diaz  mL/hr at 04/09/25 0533 1 g at 04/09/25 0533    doxycycline (VIBRAMYCIN) 100 mg vial to attach to  mL bag  100 mg Intravenous Q12H Bang Diaz MD        fluticasone-salmeterol (WIXELA) 250-50  MCG/ACT diskus inhaler 1 puff  1 puff Inhalation BID Bang Diaz MD   1 puff at 04/09/25 1100    levothyroxine (SYNTHROID/LEVOTHROID) tablet 100 mcg  100 mcg Oral QAM AC Bang Diaz MD   100 mcg at 04/09/25 0913    pantoprazole (PROTONIX) EC tablet 40 mg  40 mg Oral Daily Bang Diaz MD   40 mg at 04/09/25 0927    pramipexole (MIRAPEX) tablet 0.75 mg  0.75 mg Oral At Bedtime Bang Diaz MD        [START ON 4/10/2025] predniSONE (DELTASONE) tablet 40 mg  40 mg Oral Daily Bang Diaz MD        rosuvastatin (CRESTOR) tablet 40 mg  40 mg Oral Daily Bang Diaz MD   40 mg at 04/09/25 0928    sodium chloride (PF) 0.9% PF flush 3 mL  3 mL Intracatheter Q8H RADHA Bang Diaz MD   3 mL at 04/09/25 1406    tiotropium (SPIRIVA RESPIMAT) inhalation aerosol 2 puff  2 puff Inhalation Daily Bang Diaz MD   2 puff at 04/09/25 1100    traZODone (DESYREL) tablet 150 mg  150 mg Oral At Bedtime Bang Diaz MD           Data   Recent Labs   Lab 04/09/25  1128 04/09/25  0608 04/08/25  2359   WBC  --  15.5* 14.3*   HGB  --  12.0 12.4   MCV  --  97 99   PLT  --  196 188   NA  --  139 142   POTASSIUM  --  4.1 4.1   CHLORIDE  --  102 106   CO2  --  26 26   BUN  --  9.4 11.7   CR  --  0.78 0.87   ANIONGAP  --  11 10   RADHA  --  8.9 9.0   * 153* 129*   ALBUMIN  --   --  3.6   PROTTOTAL  --   --  6.7   BILITOTAL  --   --  0.3   ALKPHOS  --   --  74   ALT  --   --  50   AST  --   --  121*     Lactic Acid   Date Value Ref Range Status   04/08/2025 1.1 0.7 - 2.0 mmol/L Final       Recent Results (from the past 24 hours)   XR Chest Port 1 View    Narrative    EXAM: XR CHEST PORT 1 VIEW  LOCATION: [facility]  DATE: 4/8/2025    INDICATION: painful breathing  COMPARISON: 2/16/2023.    FINDINGS: The heart size is normal. There is a right basilar infiltrate. Probable left retrocardiac infiltrate. The lungs are otherwise clear. No pneumothorax.       Impression    IMPRESSION: Right basilar pneumonia. Possible left retrocardiac pneumonia. Follow-up recommended.   CTA Chest with Contrast    Narrative    EXAM: CTA CHEST WITH CONTRAST  LOCATION: [facility]  DATE: 4/9/2025    INDICATION: Chest pain. Concern for pulmonary embolus.  COMPARISON: Chest radiograph 4/8/2025. CT of the chest 10/15/2024 and 9/15/2023.  TECHNIQUE: Helical acquisition through the chest was performed during the arterial phase of contrast enhancement. 2D and 3D reconstructions performed by the CT technologist. Dose reduction techniques were used.  CONTRAST: Isovue 370 60ml    CT ANGIOGRAM CHEST: No pulmonary embolus. No aortic aneurysm or dissection.    LUNGS AND PLEURA: Collapse of the left lower lobe. Partial atelectasis of the right lower lobe. Mild patchy groundglass opacity in the left upper lobe has an inflammatory appearance. Mild emphysema.    MEDIASTINUM/AXILLAE: Normal    CORONARY ARTERY CALCIFICATION: Mild.    UPPER ABDOMEN: Normal    MUSCULOSKELETAL: Mild degenerative changes of the lower thoracic spine.      Impression    IMPRESSION:  1.  No pulmonary embolus.  2.  Collapse of the left lower lobe.  3.  Partial atelectasis of the right lower lobe.  4.  Mild inflammatory groundglass opacity in the left upper lobe.  5.  Mild emphysema.         Justin Taylor MD

## 2025-04-09 NOTE — DISCHARGE INSTRUCTIONS
[facility] will be calling with follow up appointment information.  If you do not receive a phone call from the clinic by tomorrow afternoon, please call [phone]

## 2025-04-09 NOTE — PHARMACY-MEDICATION REGIMEN REVIEW
Pharmacy Antimicrobial Stewardship Assessment     Current Antimicrobial Therapy:                      Recommendations/Interventions:  1. Add subQ insulin coverage. Starting steroids, last glucose reading was 268.    Jazzmine Aden RPH  April 9, 2025

## 2025-04-09 NOTE — ED PROVIDER NOTES
History     Chief Complaint   Patient presents with    Back Pain     The history is provided by the patient.   Chest Pain  Pain location:  L lateral chest  Pain quality: sharp    Pain severity:  Severe  Onset quality:  Gradual  Duration:  2 hours  Progression:  Worsening  Chronicity:  New  Context: breathing    Associated symptoms: no abdominal pain, no back pain, no cough, no diaphoresis, no dizziness, no fever, no headache, no nausea, no numbness, no palpitations, no shortness of breath and no vomiting          Allergies:  Allergies   Allergen Reactions    Hydromorphone Other (See Comments)    Other Food Allergy Itching     Walnuts, pecans     Penicillins Rash     Has tolerated Cefazolin (ANCEF) and other Cephalosporins    Amoxicillin-Pot Clavulanate     Azithromycin GI Disturbance     Other reaction(s): Stomach Upset    [place] Flavoring Agent (Non-Screening) Itching    Erythromycin Nausea and Vomiting    Nuts Itching     Pecans and walnuts     Oxycodone Other (See Comments)     Mood swings    Pecan Extract Itching    Tramadol Itching and Dizziness    Other (Do Not Use) Rash     Epoxy Resin       Problem List:    Patient Active Problem List    Diagnosis Date Noted    Atelectasis 04/09/2025     Priority: Medium    Hypoxia 04/09/2025     Priority: Medium    Pneumonia due to infectious organism, unspecified laterality, unspecified part of lung 04/09/2025     Priority: Medium    LVH (left ventricular hypertrophy) 03/26/2025     Priority: Medium     Mild, noted on cardiac MRI      Spondylolisthesis of lumbosacral region 12/17/2024     Priority: Medium    Neuropathy 09/23/2024     Priority: Medium    Hypertension associated with type 2 diabetes mellitus (H) 07/09/2024     Priority: Medium    Spinal stenosis of lumbar region with neurogenic claudication 05/02/2024     Priority: Medium    Prolonged Q-T interval on ECG 02/27/2024     Priority: Medium    Grade I diastolic dysfunction 01/31/2024     Priority: Medium      Echo 2024       Mild mitral insufficiency 01/31/2024     Priority: Medium     Formatting of this note might be different from the original.   1/22/2024:    Left ventricular size is normal.    Left ventricular function is decreased. The ejection fraction is 45-50%    (mildly reduced).    No regional wall motion abnormalities are seen.    Grade I or early diastolic dysfunction.    Global right ventricular function is mildly reduced.    Both atria appear normal.    Trace mitral insufficiency is present.    No aortic stenosis is present.    Mild tricuspid insufficiency is present.    The right ventricular systolic pressure is 22mmHg above the right atrial    pressure.    Trace pulmonic insufficiency is present.  Formatting of this note might be different from the original.   Formatting of this note might be different from the original.    1/22/2024:     Left ventricular size is normal.     Left ventricular function is decreased. The ejection fraction is 45-50%     (mildly reduced).     No regional wall motion abnormalities are seen.     Grade I or early diastolic dysfunction.     Global right ventricular function is mildly reduced.     Both atria appear normal.     Trace mitral insufficiency is present.     No aortic stenosis is present.     Mild tricuspid insufficiency is present.     The right ventricular systolic pressure is 22mmHg above the right atrial     pressure.     Trace pulmonic insufficiency is present.      Mild tricuspid insufficiency 01/31/2024     Priority: Medium     Formatting of this note might be different from the original.   1/22/2024:    Left ventricular size is normal.    Left ventricular function is decreased. The ejection fraction is 45-50%    (mildly reduced).    No regional wall motion abnormalities are seen.    Grade I or early diastolic dysfunction.    Global right ventricular function is mildly reduced.    Both atria appear normal.    Trace mitral insufficiency is present.    No aortic  stenosis is present.    Mild tricuspid insufficiency is present.    The right ventricular systolic pressure is 22mmHg above the right atrial    pressure.    Trace pulmonic insufficiency is present.  Formatting of this note might be different from the original.   Formatting of this note might be different from the original.    1/22/2024:     Left ventricular size is normal.     Left ventricular function is decreased. The ejection fraction is 45-50%     (mildly reduced).     No regional wall motion abnormalities are seen.     Grade I or early diastolic dysfunction.     Global right ventricular function is mildly reduced.     Both atria appear normal.     Trace mitral insufficiency is present.     No aortic stenosis is present.     Mild tricuspid insufficiency is present.     The right ventricular systolic pressure is 22mmHg above the right atrial     pressure.     Trace pulmonic insufficiency is present.      Bilateral arm weakness 11/14/2023     Priority: Medium    History of Helicobacter pylori infection 11/14/2023     Priority: Medium    Cervical stenosis of spinal canal 10/23/2023     Priority: Medium    Degeneration of lumbar intervertebral disc 10/23/2023     Priority: Medium    Mixed simple and mucopurulent chronic bronchitis (H) 10/23/2023     Priority: Medium    S/P complete hysterectomy 10/23/2023     Priority: Medium    Chronic pain disorder 09/12/2023     Priority: Medium    Type 2 diabetes mellitus with diabetic polyneuropathy, without long-term current use of insulin (H) 04/19/2023     Priority: Medium    Insomnia 08/11/2022     Priority: Medium     Current Medications: Lunesta - causes a foul taste in her mouth. Not sleeping well still.   Previous medications: Valium, Trazodone, Restoril, Gabapentin, Lyrica       Generalized anxiety disorder 08/10/2021     Priority: Medium    Spondylosis without myelopathy or radiculopathy, cervical region 08/10/2021     Priority: Medium    Hyperlipidemia 09/27/2018      Priority: Medium    Tobacco use disorder (30 pack year history plus) 07/18/2018     Priority: Medium    RLS (restless legs syndrome) 03/08/2018     Priority: Medium    Hypothyroidism (acquired) 06/06/2017     Priority: Medium    Depressive disorder 06/21/2002     Priority: Medium        Past Medical History:    Past Medical History:   Diagnosis Date    Abnormal echocardiogram 01/31/2024    Arthritis     Decreased left ventricular function 02/27/2024    Depressive disorder     Gastroesophageal reflux disease with esophagitis     H. pylori infection 2018    Hashimoto's disease     Hypothyroidism     Insomnia     Mild intermittent asthma without complication 12/17/2015    Type 2 diabetes mellitus (H)     Umbilical hernia without obstruction and without gangrene     Urticaria        Past Surgical History:    Past Surgical History:   Procedure Laterality Date    ABDOMEN SURGERY      CARPAL TUNNEL RELEASE RT/LT Bilateral     cmc arthroplasty Left     COLONOSCOPY N/A 10/28/2024    rpt 3 year; Procedure: COLONOSCOPY WITH POLYPECTOMY;  Surgeon: Enrique Brambila MD;  Location: [facility] OR    COLONOSCOPY - HIM SCAN N/A 10/04/2018    [facility]. 3 hyperplastic polyps. repeat 10 yr    HYSTERECTOMY, PAP NO LONGER INDICATED N/A 10/10/2018    Cervix removed per [facility]'s Hysterectomy Path Report.    HYSTERECTOMY, TOTAL, LAPAROSCOPIC, WITH SALPINGO-OOPHORECTOMY, CYSTOSCOPY Bilateral [facility]10/2018    [facility]    lumbar 5 - sacral 1 anterior lumbar interbody fusion with plate   04/07/2025    [facility]- surgeon- Mandeep Artis DO       Family History:    Family History   Problem Relation Age of Onset    Osteoporosis Mother     Bipolar Disorder Mother        Social History:  Marital Status:   [2]  Social History     Tobacco Use    Smoking status: Former     Current packs/day: 0.25     Average packs/day: 1 pack/day for 39.2 years (39.2 ttl pk-yrs)     Types: Cigarettes     Start date: 1/1/1986     Quit date:  "3/2/2025     Passive exposure: Current    Smokeless tobacco: Never   Vaping Use    Vaping status: Former   Substance Use Topics    Alcohol use: Not Currently    Drug use: Yes     Types: Marijuana     Comment: \"rarely\"        Medications:    doxycycline hyclate (VIBRA-TABS) 100 MG tablet  oxyCODONE (ROXICODONE) 5 MG tablet  ACCU-CHEK GUIDE test strip  blood glucose monitoring (SOFTCLIX) lancets  Blood Glucose Monitoring Suppl (ACCU-CHEK GUIDE) w/Device KIT  clobetasol propionate (TEMOVATE) 0.05 % external cream  Continuous Blood Gluc  (DEXCOM G6 ) MIKE  Continuous Blood Gluc Sensor (DEXCOM G6 SENSOR) MISC  Continuous Glucose Transmitter (DEXCOM G6 TRANSMITTER) MISC  cyanocobalamin (VITAMIN B-12) 500 MCG tablet  emollient (VANICREAM) external cream  estradiol (ESTRACE) 0.1 MG/GM vaginal cream  fluticasone-salmeterol (ADVAIR) 250-50 MCG/ACT inhaler  Glucose 4-6 GM-MG CHEW  guaiFENesin (MUCINEX) 600 MG 12 hr tablet  hydrOXYzine nia (VISTARIL) 25 MG capsule  ibuprofen (ADVIL/MOTRIN) 200 MG tablet  ipratropium - albuterol 0.5 mg/2.5 mg/3 mL (DUONEB) 0.5-2.5 (3) MG/3ML neb solution  ketoconazole (NIZORAL) 2 % external cream  levothyroxine (SYNTHROID/LEVOTHROID) 100 MCG tablet  nicotine (NICODERM CQ) 14 MG/24HR 24 hr patch  nicotine (NICODERM CQ) 14 MG/24HR 24 hr patch  [START ON 4/14/2025] nicotine (NICODERM CQ) 7 MG/24HR 24 hr patch  nystatin (MYCOSTATIN) 658322 UNIT/GM external powder  omeprazole (PRILOSEC) 20 MG DR capsule  pramipexole (MIRAPEX) 0.75 MG tablet  PROAIR  (90 Base) MCG/ACT inhaler  rosuvastatin (CRESTOR) 40 MG tablet  semaglutide (OZEMPIC, 0.25 OR 0.5 MG/DOSE,) 2 MG/3ML pen  tiotropium (SPIRIVA RESPIMAT) 2.5 MCG/ACT inhaler  traZODone (DESYREL) 150 MG tablet  triamcinolone (KENALOG) 0.5 % external ointment          Review of Systems   Constitutional:  Negative for chills, diaphoresis and fever.   HENT:  Negative for voice change.    Eyes:  Negative for visual disturbance. "   Respiratory:  Negative for cough, chest tightness, shortness of breath and wheezing.    Cardiovascular:  Positive for chest pain. Negative for palpitations and leg swelling.   Gastrointestinal:  Negative for abdominal distention, abdominal pain, anal bleeding, blood in stool, nausea and vomiting.   Genitourinary:  Negative for decreased urine volume, dysuria, flank pain and hematuria.   Musculoskeletal:  Negative for arthralgias, back pain, gait problem, myalgias, neck pain and neck stiffness.   Skin:  Negative for color change, pallor, rash and wound.   Neurological:  Negative for dizziness, syncope, light-headedness, numbness and headaches.   Psychiatric/Behavioral:  Negative for confusion and suicidal ideas.        Physical Exam   BP: (!) 130/97  Pulse: 104  Temp: 98.2  F (36.8  C)  Resp: 18  Weight: 88.4 kg (194 lb 14.2 oz)  SpO2: 93 %      Physical Exam  Vitals and nursing note reviewed.   Constitutional:       Appearance: She is well-developed.   HENT:      Head: Normocephalic and atraumatic.      Mouth/Throat:      Pharynx: No oropharyngeal exudate.   Eyes:      Conjunctiva/sclera: Conjunctivae normal.      Pupils: Pupils are equal, round, and reactive to light.   Neck:      Thyroid: No thyromegaly.      Vascular: No JVD.      Trachea: No tracheal deviation.   Cardiovascular:      Rate and Rhythm: Normal rate and regular rhythm.      Heart sounds: Normal heart sounds. No murmur heard.     No friction rub. No gallop.   Pulmonary:      Effort: Pulmonary effort is normal. No respiratory distress.      Breath sounds: No stridor. Examination of the right-lower field reveals decreased breath sounds. Examination of the left-lower field reveals decreased breath sounds. Decreased breath sounds present. No wheezing or rales.   Chest:      Chest wall: No tenderness.   Abdominal:      General: Bowel sounds are normal. There is no distension.      Palpations: Abdomen is soft. There is no mass.      Tenderness: There is  no abdominal tenderness. There is no guarding or rebound.   Musculoskeletal:         General: No tenderness. Normal range of motion.      Cervical back: Normal range of motion and neck supple.   Lymphadenopathy:      Cervical: No cervical adenopathy.   Skin:     General: Skin is warm and dry.      Coloration: Skin is not pale.      Findings: No erythema or rash.   Neurological:      Mental Status: She is alert and oriented to person, place, and time.   Psychiatric:         Behavior: Behavior normal.         ED Course        Procedures                  Results for orders placed or performed during the hospital encounter of 04/08/25 (from the past 24 hours)   XR Chest Port 1 View    Narrative    EXAM: XR CHEST PORT 1 VIEW  LOCATION: [facility]  DATE: 4/8/2025    INDICATION: painful breathing  COMPARISON: 2/16/2023.    FINDINGS: The heart size is normal. There is a right basilar infiltrate. Probable left retrocardiac infiltrate. The lungs are otherwise clear. No pneumothorax.      Impression    IMPRESSION: Right basilar pneumonia. Possible left retrocardiac pneumonia. Follow-up recommended.   Comprehensive metabolic panel   Result Value Ref Range    Sodium 142 135 - 145 mmol/L    Potassium 4.1 3.4 - 5.3 mmol/L    Carbon Dioxide (CO2) 26 22 - 29 mmol/L    Anion Gap 10 7 - 15 mmol/L    Urea Nitrogen 11.7 6.0 - 20.0 mg/dL    Creatinine 0.87 0.51 - 0.95 mg/dL    GFR Estimate 77 >60 mL/min/1.73m2    Calcium 9.0 8.8 - 10.4 mg/dL    Chloride 106 98 - 107 mmol/L    Glucose 129 (H) 70 - 99 mg/dL    Alkaline Phosphatase 74 40 - 150 U/L     (H) 0 - 45 U/L    ALT 50 0 - 50 U/L    Protein Total 6.7 6.4 - 8.3 g/dL    Albumin 3.6 3.5 - 5.2 g/dL    Bilirubin Total 0.3 <=1.2 mg/dL   CBC with Platelets & Differential    Narrative    The following orders were created for panel order CBC with Platelets & Differential.  Procedure                               Abnormality         Status                     ---------                                -----------         ------                     CBC with platelets and ...[[id number]]  Abnormal            Final result                 Please view results for these tests on the individual orders.   CRP inflammation   Result Value Ref Range    CRP Inflammation 72.72 (H) <5.00 mg/L   Lactic acid whole blood   Result Value Ref Range    Lactic Acid 1.1 0.7 - 2.0 mmol/L   Procalcitonin   Result Value Ref Range    Procalcitonin 0.07 <0.50 ng/mL   D dimer quantitative   Result Value Ref Range    D-Dimer Quantitative 1.86 (H) 0.00 - 0.50 ug/mL FEU    Narrative    This D-dimer assay is intended for use in conjunction with a clinical pretest probability assessment model to exclude pulmonary embolism (PE) and deep venous thrombosis (DVT) in outpatients suspected of PE or DVT. The cut-off value is 0.50 ug/mL FEU.    For patients 50 years of age or older, the application of age-adjusted cut-off values for D-Dimer may increase the specificity without significant effect on sensitivity. The literature suggested calculation age adjusted cut-off in ug/L = age in years x 10 ug/L. The results in this laboratory are reported as ug/mL rather than ug/L. The calculation for age adjusted cut off in ug/mL= age in years x 0.01 ug/mL. For example, the cut off for a 76 year old male is 74 x 0.01 ug/mL = 0.76 ug/mL (760 ug/L).    M Brody et al. Age adjusted D-dimer cut-off levels to rule out pulmonary embolism: The ADJUST-PE Study. ANTHONY 2014;311:4707-0429.; CARLOS [place] et al. Diagnostic accuracy of conventional or age adjusted D-dimer cutoff values in older patients with suspected venous thromboembolism. Systemic review and meta-analysis. BMJ 2013:346:f2492.   CBC with platelets and differential   Result Value Ref Range    WBC Count 14.3 (H) 4.0 - 11.0 10e3/uL    RBC Count 3.82 3.80 - 5.20 10e6/uL    Hemoglobin 12.4 11.7 - 15.7 g/dL    Hematocrit 37.7 35.0 - 47.0 %    MCV 99 78 - 100 fL    MCH 32.5 26.5 - 33.0 pg     MCHC 32.9 31.5 - 36.5 g/dL    RDW 14.2 10.0 - 15.0 %    Platelet Count 188 150 - 450 10e3/uL    % Neutrophils 72 %    % Lymphocytes 20 %    % Monocytes 7 %    % Eosinophils 0 %    % Basophils 0 %    % Immature Granulocytes 0 %    NRBCs per 100 WBC 0 <1 /100    Absolute Neutrophils 10.3 (H) 1.6 - 8.3 10e3/uL    Absolute Lymphocytes 2.9 0.8 - 5.3 10e3/uL    Absolute Monocytes 1.0 0.0 - 1.3 10e3/uL    Absolute Eosinophils 0.0 0.0 - 0.7 10e3/uL    Absolute Basophils 0.0 0.0 - 0.2 10e3/uL    Absolute Immature Granulocytes 0.1 <=0.4 10e3/uL    Absolute NRBCs 0.0 10e3/uL   Extra Tube    Narrative    The following orders were created for panel order Extra Tube.  Procedure                               Abnormality         Status                     ---------                               -----------         ------                     Extra Red Top Tube[[id number]]                              Final result                 Please view results for these tests on the individual orders.   Extra Red Top Tube   Result Value Ref Range    Hold Specimen JIC    Troponin T, High Sensitivity   Result Value Ref Range    Troponin T, High Sensitivity 9 <=14 ng/L   EKG 12 lead   Result Value Ref Range    Systolic Blood Pressure  mmHg    Diastolic Blood Pressure  mmHg    Ventricular Rate 101 BPM    Atrial Rate 101 BPM    AR Interval 154 ms    QRS Duration 90 ms     ms    QTc 448 ms    P Axis 44 degrees    R AXIS 0 degrees    T Axis 43 degrees    Interpretation ECG       Sinus tachycardia  Moderate voltage criteria for LVH, may be normal variant ( R in aVL , [place] product )  Borderline ECG  When compared with ECG of 25-Mar-2025 16:37,  No significant change was found     CTA Chest with Contrast    Narrative    EXAM: CTA CHEST WITH CONTRAST  LOCATION: [facility]  DATE: 4/9/2025    INDICATION: Chest pain. Concern for pulmonary embolus.  COMPARISON: Chest radiograph 4/8/2025. CT of the chest 10/15/2024 and  9/15/2023.  TECHNIQUE: Helical acquisition through the chest was performed during the arterial phase of contrast enhancement. 2D and 3D reconstructions performed by the CT technologist. Dose reduction techniques were used.  CONTRAST: Isovue 370 60ml    CT ANGIOGRAM CHEST: No pulmonary embolus. No aortic aneurysm or dissection.    LUNGS AND PLEURA: Collapse of the left lower lobe. Partial atelectasis of the right lower lobe. Mild patchy groundglass opacity in the left upper lobe has an inflammatory appearance. Mild emphysema.    MEDIASTINUM/AXILLAE: Normal    CORONARY ARTERY CALCIFICATION: Mild.    UPPER ABDOMEN: Normal    MUSCULOSKELETAL: Mild degenerative changes of the lower thoracic spine.      Impression    IMPRESSION:  1.  No pulmonary embolus.  2.  Collapse of the left lower lobe.  3.  Partial atelectasis of the right lower lobe.  4.  Mild inflammatory groundglass opacity in the left upper lobe.  5.  Mild emphysema.         Medications   doxycycline hyclate (VIBRAMYCIN) capsule 100 mg (100 mg Oral $Given 4/9/25 0350)   fentaNYL (PF) (SUBLIMAZE) injection 50 mcg (50 mcg Intravenous $Given 4/8/25 2303)   fentaNYL (PF) (SUBLIMAZE) injection 100 mcg (100 mcg Intravenous $Given 4/9/25 0013)   iopamidol (ISOVUE-370) solution 66 mL (66 mLs Intravenous $Given 4/9/25 0139)   sodium chloride (PF) 0.9% PF flush 100 mL (100 mLs Intravenous $Given 4/9/25 0139)       Assessments & Plan (with Medical Decision Making)   Sever left sided , left flank pain with breathing   Hx of back surgery 2 days ago in [facility] by Dr Negro Garcia , discharged today    Labs reviewed  CXR: RLL infiltration ?  CTA chest : bilateral atelectasia , ground glass patchy infiltration , mild emphysema      1 dose of zithro and IV steroid given, initial plan was to be discharged home after instructing incentive spirometry, but patient persisted to be hypoxic, spo2 low 80s on RA and after few steps of walking dropped to 70s    I spoke to   walker , accepted for admission for observation       I have reviewed the nursing notes.    I have reviewed the findings, diagnosis, plan and need for follow up with the patient.      New Prescriptions    DOXYCYCLINE HYCLATE (VIBRA-TABS) 100 MG TABLET    Take 1 tablet (100 mg) by mouth 2 times daily for 5 days.    OXYCODONE (ROXICODONE) 5 MG TABLET    Take 1 tablet (5 mg) by mouth every 6 hours as needed for severe pain.       Final diagnoses:   Pneumonia due to infectious organism, unspecified laterality, unspecified part of lung   Atelectasis   Hypoxia       4/8/2025   [facility]       Robe Valdivia MD  04/09/25 0266

## 2025-04-09 NOTE — H&P
HOSPITALIST TELEMED ADMISSION H&P    Service Date : 4/9/2025  IDr. Diaz, am located in [place].   Jami Wang is located in [place] [facility]   The RN or tech on duty in the ED is assisting me today with this patient.    chief complaint:  sob, hypoxia    History of Present Illness:  Jami Wang is a 55 year old female presenting to ED today with complaints of back and SOB.  Patient has history of spinal fusion that was done on April 7 and she was discharged home on April 8.  She reports that her surgery was uncomplicated.  She presented to the ED with complaints of pain.  She was given pain meds and route.  Patient evaluated in the emergency room and found to be hypoxic.  Her lab workup was significant for elevated white count 14 and elevated D-dimer.  Procalcitonin was normal.  She underwent a CTA of her chest which revealed collapse of the left lower lobe and partial atelectasis of the right lower lobe.  Patient did ambulate in the ED and her sats dropped to 79% on room air and she was complained of shortness of breath.  She had be placed on nasal cannula.  We are consulted for admission.    Patient seen on the floor.  Reports some subjective chills.  No fevers.  Has a cough that is productive.  Still sob.  Reports back pain.  No chest pain.  Denies any n/v.  No diarrhea.  Has been constipated.    Patient does not have a living well.  She is a full code.her  is her surrogate decision maker.      Emergency Room Course - see ed notes    Past Medical History  Past Medical History:   Diagnosis Date    Abnormal echocardiogram 01/31/2024    Normal stress test and cardiac MRI 2024      Arthritis     Decreased left ventricular function 02/27/2024    Had confirmatory MRI - showed normal heart function. No evidence for reduced EF      Depressive disorder     Gastroesophageal reflux disease with esophagitis     H. pylori infection 2018    Hashimoto's disease     Hypothyroidism     Insomnia     Mild  intermittent asthma without complication 12/17/2015    Formatting of this note might be different from the original.   Last Assessment & Plan:     Formatting of this note might be different from the original.    This is generally been stable.  May be slight worsening recently with her rash.      Type 2 diabetes mellitus (H)     Umbilical hernia without obstruction and without gangrene     Urticaria     Last Assessment & Plan: Formatting of this note might be different from the original. Patient presented to the emergency room on 3/19/2023 with history of full body rash starting 3 weeks prior with discomfort and itching treated with hydrocortisone cream without relief.  Homeless family member had moved back into the house with prior exposure to bedbugs.  Under breasts lower legs severity moderate      Patient Active Problem List   Diagnosis    Generalized anxiety disorder    Depressive disorder    Hyperlipidemia    Hypothyroidism (acquired)    Insomnia    RLS (restless legs syndrome)    Spondylosis without myelopathy or radiculopathy, cervical region    Tobacco use disorder (30 pack year history plus)    Type 2 diabetes mellitus with diabetic polyneuropathy, without long-term current use of insulin (H)    Chronic pain disorder    Cervical stenosis of spinal canal    Degeneration of lumbar intervertebral disc    Mixed simple and mucopurulent chronic bronchitis (H)    S/P complete hysterectomy    Bilateral arm weakness    History of Helicobacter pylori infection    Prolonged Q-T interval on ECG    Spinal stenosis of lumbar region with neurogenic claudication    Grade I diastolic dysfunction    Mild mitral insufficiency    Mild tricuspid insufficiency    Hypertension associated with type 2 diabetes mellitus (H)    Neuropathy    Spondylolisthesis of lumbosacral region    LVH (left ventricular hypertrophy)         Past Surgical History  Past Surgical History:   Procedure Laterality Date    ABDOMEN SURGERY      CARPAL  TUNNEL RELEASE RT/LT Bilateral     [facility] arthroplasty Left     COLONOSCOPY N/A 10/28/2024    rpt 3 year; Procedure: COLONOSCOPY WITH POLYPECTOMY;  Surgeon: Enrique Brambila MD;  Location: [facility]    [facility]A [facility]    HYSTERECTOMY, PAP NO LONGER INDICATED N/A 10/10/2018    Cervix removed per [facility]'s Hysterectomy Path Report.    HYSTERECTOMY, TOTAL, LAPAROSCOPIC, WITH SALPINGO-OOPHORECTOMY, CYSTOSCOPY Bilateral 10/10/2018    [facility].    lumbar 5 - sacral 1 anterior lumbar interbody fusion with plate   04/07/2025    [facility]- surgeon- Mandeep Artis DO      Social History  Jami  reports that she quit smoking about 5 weeks ago. Her smoking use included cigarettes. She started smoking about 39 years ago. She has a 39.2 pack-year smoking history. She has been exposed to tobacco smoke. She has never used smokeless tobacco. She reports that she does not currently use alcohol. She reports current drug use. Drug: Marijuana. Seldom etoh use    Family History  Jami's family history includes Bipolar Disorder in her mother; Osteoporosis in her mother.  Heart problems    Home Medications  Current Outpatient Medications   Medication Instructions    ACCU-CHEK GUIDE test strip     blood glucose monitoring (SOFTCLIX) lancets     Blood Glucose Monitoring Suppl (ACCU-CHEK GUIDE) w/Device KIT     clobetasol propionate (TEMOVATE) 0.05 % external cream APPLY SMALL AMOUNT TO ACTIVE LESIONS UP TO 10-14 DAYS. MUST TAKE ONE WEEK OFF BETWEEN USE. DO NOT APPLY TO FACE.    Continuous Blood Gluc  (DEXCOM G6 ) MIKE Use to read blood sugars as per 's instructions.    Continuous Blood Gluc Sensor (DEXCOM G6 SENSOR) MISC CHANGE SENSOR EVERY 10 DAYS    Continuous Glucose Transmitter (DEXCOM G6 TRANSMITTER) MISC Change every 3 months.    cyanocobalamin (VITAMIN B-12) 500 mcg, Oral, DAILY    doxycycline hyclate  (VIBRA-TABS) 100 mg, Oral, 2 TIMES DAILY    emollient (VANICREAM) external cream Topical, 2 TIMES DAILY    estradiol (ESTRACE) 1 g, Vaginal, WEEKLY    fluticasone-salmeterol (ADVAIR) 250-50 MCG/ACT inhaler 1 puff, EVERY 12 HOURS    Glucose 4-6 GM-MG CHEW 4 g, ONCE    guaiFENesin (MUCINEX) 600 mg, Oral, 2 TIMES DAILY PRN    hydrOXYzine nia (VISTARIL) 25 mg, Oral, 3 TIMES DAILY PRN    ibuprofen (ADVIL/MOTRIN) 200 mg, EVERY 4 HOURS PRN    ipratropium - albuterol 0.5 mg/2.5 mg/3 mL (DUONEB) 0.5-2.5 (3) MG/3ML neb solution 3 mLs    ketoconazole (NIZORAL) 2 % external cream DAILY    levothyroxine (SYNTHROID/LEVOTHROID) 100 mcg, Oral, EVERY MORNING BEFORE BREAKFAST    nicotine (NICODERM CQ) 14 MG/24HR 24 hr patch No dose, route, or frequency recorded.    nicotine (NICODERM CQ) 14 MG/24HR 24 hr patch 1 patch, Transdermal, EVERY 24 HOURS    [START ON 4/14/2025] nicotine (NICODERM CQ) 7 MG/24HR 24 hr patch 1 patch, Transdermal, EVERY 24 HOURS    nystatin (MYCOSTATIN) 644881 UNIT/GM external powder Topical, 3 TIMES DAILY PRN, Apply up to three times daily to areas with active yeast.    omeprazole (PRILOSEC) 20 mg, Oral, DAILY    oxyCODONE (ROXICODONE) 5 mg, Oral, EVERY 6 HOURS PRN    pramipexole (MIRAPEX) 0.75 mg, Oral, AT BEDTIME    PROAIR  (90 Base) MCG/ACT inhaler     rosuvastatin (CRESTOR) 40 mg, Oral, DAILY    semaglutide (OZEMPIC, 0.25 OR 0.5 MG/DOSE,) 2 MG/3ML pen INJECT 0.5MG SUBCUTANEOUSLY EVERY 7 DAYS    tiotropium (SPIRIVA RESPIMAT) 2.5 MCG/ACT inhaler 2 puffs, Inhalation, DAILY    traZODone (DESYREL) 150 mg, Oral, AT BEDTIME    triamcinolone (KENALOG) 0.5 % external ointment Apply pea sized amount to active eczematous lesions on elbows twice daily for 10 days.       Allergies  Allergies   Allergen Reactions    Hydromorphone Other (See Comments)    Other Food Allergy Itching     Walnuts, pecans     Penicillins Rash     Has tolerated Cefazolin (ANCEF) and other Cephalosporins    Amoxicillin-Pot Clavulanate      Azithromycin GI Disturbance     Other reaction(s): Stomach Upset    Black [place] Flavoring Agent (Non-Screening) Itching    Erythromycin Nausea and Vomiting    Nuts Itching     Pecans and walnuts     Oxycodone Other (See Comments)     Mood swings    Pecan Extract Itching    Tramadol Itching and Dizziness    Other (Do Not Use) Rash     Epoxy Resin        10 pt ROS neg except as noted in HPI    Physical Exam:  I performed all aspects of the physical examination via Telemedicine associated with two way audio and video communication.  Stethoscope on cart not working so nurse performed auscultation and relayed results to me    Vital Signs: Blood pressure 136/90, pulse 98, temperature 98.2  F (36.8  C), temperature source Oral, resp. rate 18, weight 88.4 kg (194 lb 14.2 oz), SpO2 95%.    Physical Exam    Gen:  Well-developed, well-nourished, in no acute distress, lying semi-supine in hospital stretcher  HEENT:  Anicteric sclera, PER, hearing intact to voice  Resp:  No accessory muscle use, breath sounds clear; no wheezes no rales no rhonchi  Card:  No murmur, normal S1, S2, no edema  Abd:  Soft per RN exam, no TTP, non-distended, normoactive bowel sounds are present. obese  Musc:  Normal strength and movement of the major muscle groups without obvious deformity  Psych:  Good insight, oriented to person, place and time, not anxious, not agitated    DATA  Labs:  I have personally reviewed the following studies:  Recent Labs   Lab 04/08/25  2359   POTASSIUM 4.1   CHLORIDE 106   CO2 26   BUN 11.7   ALBUMIN 3.6   ALKPHOS 74   *   ALT 50      Recent Labs   Lab 04/08/25  2359 04/03/25  0924   WBC 14.3*  --    HGB 12.4  --    HCT 37.7  --      --    TSH  --  3.08       Imaging: ER imaging reviewed    ASSESSMENT/PLAN:   Acute hypoxic respiratory failure.  CTA reviewed.  Patient has evidence of atelectasis with collapses of the left lower lobe.  Patient does have elevated white count.  Suspect this could all be driven  by atelectasis but will cover with empiric antibiotics given elevated white count.  Will place on Rocephin and doxycycline.    Atelectasis.  Encourage incentive spirometry.    Suspected pneumonia.  Hard to rule out with atelectasis but with elevated wbc, will cover with rocephin and doxycycline.      Recent lumbar spinal fusion.  Pain control. PT consult    Hashimotos. continue Levothyroxine.    GERD.  Acid suppression.    Asthma/copd.  Continue with Advair and rescue inhalers. No wheezing on exam, but quite dimnished per nurses, will start oral steroids x 5 days.    Hyperlipidemia.  Statin.    Obesity    Disposition.  Patient will be placed in observation overnight for treatment as detailed above.      Misc  DVT prophylaxis: SCDs  Code Status: Full code     The plan was discussed with - Patient

## 2025-04-10 ENCOUNTER — TELEPHONE (OUTPATIENT)
Dept: FAMILY MEDICINE | Facility: OTHER | Age: 55
End: 2025-04-10

## 2025-04-10 ENCOUNTER — APPOINTMENT (OUTPATIENT)
Dept: PHYSICAL THERAPY | Facility: HOSPITAL | Age: 55
DRG: 193 | End: 2025-04-10
Payer: MEDICARE

## 2025-04-10 ENCOUNTER — APPOINTMENT (OUTPATIENT)
Dept: GENERAL RADIOLOGY | Facility: HOSPITAL | Age: 55
DRG: 193 | End: 2025-04-10
Attending: INTERNAL MEDICINE
Payer: MEDICARE

## 2025-04-10 VITALS
DIASTOLIC BLOOD PRESSURE: 70 MMHG | BODY MASS INDEX: 35.65 KG/M2 | OXYGEN SATURATION: 93 % | RESPIRATION RATE: 18 BRPM | WEIGHT: 194.89 LBS | TEMPERATURE: 98.1 F | SYSTOLIC BLOOD PRESSURE: 112 MMHG | HEART RATE: 98 BPM

## 2025-04-10 LAB
CRP SERPL-MCNC: 140.77 MG/L
ERYTHROCYTE [DISTWIDTH] IN BLOOD BY AUTOMATED COUNT: 14 % (ref 10–15)
HCT VFR BLD AUTO: 35.3 % (ref 35–47)
HGB BLD-MCNC: 11.6 G/DL (ref 11.7–15.7)
MAGNESIUM SERPL-MCNC: 2 MG/DL (ref 1.7–2.3)
MCH RBC QN AUTO: 31.8 PG (ref 26.5–33)
MCHC RBC AUTO-ENTMCNC: 32.9 G/DL (ref 31.5–36.5)
MCV RBC AUTO: 97 FL (ref 78–100)
PLATELET # BLD AUTO: 210 10E3/UL (ref 150–450)
RBC # BLD AUTO: 3.65 10E6/UL (ref 3.8–5.2)
WBC # BLD AUTO: 15.6 10E3/UL (ref 4–11)

## 2025-04-10 PROCEDURE — 71046 X-RAY EXAM CHEST 2 VIEWS: CPT | Mod: 26 | Performed by: RADIOLOGY

## 2025-04-10 PROCEDURE — 97530 THERAPEUTIC ACTIVITIES: CPT | Mod: GP

## 2025-04-10 PROCEDURE — 250N000012 HC RX MED GY IP 250 OP 636 PS 637: Performed by: INTERNAL MEDICINE

## 2025-04-10 PROCEDURE — 250N000013 HC RX MED GY IP 250 OP 250 PS 637: Performed by: INTERNAL MEDICINE

## 2025-04-10 PROCEDURE — 85041 AUTOMATED RBC COUNT: CPT | Performed by: INTERNAL MEDICINE

## 2025-04-10 PROCEDURE — 250N000011 HC RX IP 250 OP 636: Performed by: INTERNAL MEDICINE

## 2025-04-10 PROCEDURE — 71046 X-RAY EXAM CHEST 2 VIEWS: CPT

## 2025-04-10 PROCEDURE — 85014 HEMATOCRIT: CPT | Performed by: INTERNAL MEDICINE

## 2025-04-10 PROCEDURE — 83735 ASSAY OF MAGNESIUM: CPT | Performed by: INTERNAL MEDICINE

## 2025-04-10 PROCEDURE — 99239 HOSP IP/OBS DSCHRG MGMT >30: CPT | Performed by: INTERNAL MEDICINE

## 2025-04-10 PROCEDURE — 36415 COLL VENOUS BLD VENIPUNCTURE: CPT | Performed by: INTERNAL MEDICINE

## 2025-04-10 PROCEDURE — 86140 C-REACTIVE PROTEIN: CPT | Performed by: INTERNAL MEDICINE

## 2025-04-10 PROCEDURE — 250N000011 HC RX IP 250 OP 636: Mod: JZ | Performed by: INTERNAL MEDICINE

## 2025-04-10 RX ORDER — PREDNISONE 20 MG/1
40 TABLET ORAL DAILY
Qty: 8 TABLET | Refills: 0 | Status: SHIPPED | OUTPATIENT
Start: 2025-04-11 | End: 2025-04-15

## 2025-04-10 RX ORDER — DOXYCYCLINE 100 MG/1
100 CAPSULE ORAL 2 TIMES DAILY
Qty: 10 CAPSULE | Refills: 0 | Status: SHIPPED | OUTPATIENT
Start: 2025-04-10 | End: 2025-04-10

## 2025-04-10 RX ORDER — IPRATROPIUM BROMIDE AND ALBUTEROL SULFATE 2.5; .5 MG/3ML; MG/3ML
3 SOLUTION RESPIRATORY (INHALATION) EVERY 4 HOURS PRN
Qty: 90 ML | Refills: 3 | Status: SHIPPED | OUTPATIENT
Start: 2025-04-10

## 2025-04-10 RX ADMIN — SENNOSIDES AND DOCUSATE SODIUM 2 TABLET: 50; 8.6 TABLET ORAL at 08:47

## 2025-04-10 RX ADMIN — PANTOPRAZOLE SODIUM 40 MG: 20 TABLET, DELAYED RELEASE ORAL at 08:28

## 2025-04-10 RX ADMIN — FLUTICASONE PROPIONATE AND SALMETEROL 1 PUFF: 250; 50 POWDER RESPIRATORY (INHALATION) at 08:37

## 2025-04-10 RX ADMIN — FENTANYL CITRATE 25 MCG: 50 INJECTION INTRAMUSCULAR; INTRAVENOUS at 00:02

## 2025-04-10 RX ADMIN — OXYCODONE HYDROCHLORIDE 5 MG: 5 TABLET ORAL at 11:53

## 2025-04-10 RX ADMIN — CEFTRIAXONE SODIUM 1 G: 1 INJECTION, SOLUTION INTRAVENOUS at 05:57

## 2025-04-10 RX ADMIN — OXYCODONE HYDROCHLORIDE 5 MG: 5 TABLET ORAL at 04:23

## 2025-04-10 RX ADMIN — LEVOTHYROXINE SODIUM 100 MCG: 0.1 TABLET ORAL at 05:56

## 2025-04-10 RX ADMIN — ROSUVASTATIN 40 MG: 10 TABLET, FILM COATED ORAL at 08:28

## 2025-04-10 RX ADMIN — PREDNISONE 40 MG: 20 TABLET ORAL at 08:28

## 2025-04-10 RX ADMIN — DOXYCYCLINE 100 MG: 100 INJECTION, POWDER, LYOPHILIZED, FOR SOLUTION INTRAVENOUS at 04:32

## 2025-04-10 ASSESSMENT — ACTIVITIES OF DAILY LIVING (ADL)
ADLS_ACUITY_SCORE: 50

## 2025-04-10 NOTE — PROGRESS NOTES
04/10/25 1200   Appointment Info   Signing Clinician's Name / Credentials (PT) Maddie Alatorre DPT   Therapeutic Activity   Therapeutic Activities: dynamic activities to improve functional performance Minutes (32508) 15   Symptoms Noted During/After Treatment Fatigue;Increased pain   Treatment Detail/Skilled Intervention Pt seated in recliner at start of session and agreeable to PT. Completed sit to stand mod I c FWW. Pt ambulated 250' c FWW and supervision. Pt steady and safe c ambulation. Declined trialing stairs today. Pt transferred into recliner at end of session. Pt resting c all button in lap   PT Discharge Planning   PT Plan Progress functional mobility, trial stairs   PT Discharge Recommendation (DC Rec) home with assist   PT Rationale for DC Rec Pt plans to d/c home with family. No functional mobility safety concerns   PT Brief overview of current status SBA amb. 250' c FWW   PT Total Distance Amb During Session (feet) 250   Physical Therapy Time and Intention   Timed Code Treatment Minutes 15   Total Session Time (sum of timed and untimed services) 15

## 2025-04-10 NOTE — PLAN OF CARE
Face to face report given with opportunity to observe patient.    Report given to Fadia SEPULVEDA and Leanna SEPULVEDA.     Zahra Randolph RN   4/9/2025  7:30 AM    
Goal Outcome Evaluation:      Plan of Care Reviewed With: patient          Outcome Evaluation: pt will progress in all goal areas.    Report obtained from Nicole ED RN.  Pt arrived via cart to room 4244 at 0445 on 3 L of oxygen.  Pt oriented to room and call system.  Admission vitals and assessment complete.  BP (!) 142/83   Pulse 105   Temp 99.4  F (37.4  C)   Resp 20   Wt 88.4 kg (194 lb 14.2 oz)   SpO2 92%   BMI 35.65 kg/m    Pt alert and oriented.  Pt reported increased pain to lower abd and lower back from back surgery on 4/7, pt also reported having a headache.  Tylenol given.  Incision to lower abd and 2 incisions to lower back on each side of her spine.  Incisions appear WDL.  Pt has a back brace in place.  SCDS applied.  IS at the bedside and encouraged.  Pt tachy at times, on 3 L of oxygen inially upon arrival from ED, sats dropping to 90% so increased to 3.5 L.  Sats greater than 90%.  Pt up to the bedside commode with assist of 2, steady on her feet.  Pt's off oxygen briefly and sats dropped to 80% and HR up to the 120's.  Oxygen re-applied and sats quickly improved.  IV antibiotics per orders.  Will continue to monitor pt and provide cares as needed.    
Goal Outcome Evaluation:      Plan of Care Reviewed With: patient    Overall Patient Progress: no change    Patient brought down to med/surg floor from OB at approximately 1430. Vital signs and assessment as charted. Remains on 3 LPM O2 via NC. IV saline locked and patent. Patient requested a suppository which was given but patient did not retain and it was found in commode still solid, no results. Voiding without difficulty. IV Rocephin and IV doxycycline for antibiotics. SCD's were on, removed for safety reasons. LORENZO socks on. Up in chair. Significant other in room. Patient using and walker and significant other helping patient up to the commode. Call light within reach, room near nurse's station.     Face to face report given with opportunity to observe patient.    Report given to Salud Felipe RN   4/9/2025  7:40 PM             
Goal Outcome Evaluation:      Plan of Care Reviewed With: patient, spouse        Pt has been afebrile through the night, VS as charted.  Her O2 sats are in the low 90's on 3 LPM via NC, lung sounds with with expiratory wheezing and crackles in the bases at the start of the shift and then very diminished with fine crackles in the bases upon recheck.  She does have an infrequent congested cough. She does get SCHULTZ - did ambulate around the unit prior to bedtime with assist 1 - and walker.  She does have a back brace she is to wear while out of bed.  She does have 2 incisions to lower back (one on each side of her spine) and then one under her panus - all 3 are open to air, scabbing and bruising noted. She does rate her back pain 5/10 and has received PO and IV pain medication - with adequate pain control. She is on a regular diet, continues on IV antibiotics and prednisone. She has remained free of falls, call light within reach - does make her needs known, frequent rounding done to assess needs - her  did stay the night - very attentive to pt needs.         Face to face report given with opportunity to observe patient.    Report given to Milagro SEPULVEDA and Mathew Cohen RN   4/10/2025  7:29 AM    
Patient discharged at 12:10 PM via wheel chair accompanied by spouse and staff. Prescriptions sent to patients preferred pharmacy. All belongings sent with patient.     Discharge instructions reviewed with patient. Belongings gathered and returned to patient.     Patient discharged to home.       [facility]  Follow up appointment made: No pt is to receive call back from clinic with f/u appt.   Home medications returned to patient: Yes Inhaler  Patient reports pain was well managed at discharge: Yes       Pt is alert and oriented. Moderate to severe pain. PRN oxy given. Weaned to RA. Tolerated well. Walked with therapy and did well with no reports of SOB needing breaks. VS and assessment as charted. Call light in reach. Uses appropriately.   
Patient transferred to the med/surg floor room 3108 at this time accompanied by RN. Sats 87% on room air upon arrival to floor. Placed patient back on 3 LPM o2 via nasal cannula                        
Pt. Reporting continued 10-20/10 L rib area chest pain, 10-20/10 pain in lower back, and 6/10 headache pain. Received tylenol at 0640 with no relief. O2 sats 91% on 3 Lnc at rest. When up to the commode sats 86% on room air and increased shortness of breath. Has her own back brace. Declines ice at this time. Assisted back into bed. SCD's on. Call light within reach.       Goal Outcome Evaluation:      Plan of Care Reviewed With: patient    Overall Patient Progress: improvingOverall Patient Progress: improving    Outcome Evaluation: Pt. will progress in all goal areas      
Report give to Hannah SEPULVEDA for admit to rm 3108. Patient transferred via walking and wheelchair at 1430.  
05-Jun-2023 09:54

## 2025-04-10 NOTE — PROGRESS NOTES
Assessment & Plan     Hospital discharge follow-up  Community acquired pneumonia of left lower lobe of lung  Mixed simple and mucopurulent chronic bronchitis (H)  Reviewed hospitalization.  Still some shortness of breath but cough has improved.  No fevers.  Vital stable.  Oxygen remains improved, no hypoxemia.  Lungs are clear.  Normal work of breathing.  Appears at baseline on assessment today.  Should continue with prednisone.  Unfortunately did not start antibiotics till yesterday, should again Finish 5-day course.  Continue with DuoNebs at least twice daily.  Okay to add in Mucinex.  Would expect further improvement in the next few days to week.  If worsening, more shortness of breath, worsening cough, fevers, would need repeat evaluation.  - guaiFENesin (MUCINEX) 600 MG 12 hr tablet; Take 1 tablet (600 mg) by mouth 2 times daily as needed for congestion.    S/P lumbar fusion  Some constipation and pain but is managing.  Using oxycodone once daily.  Did order senna to help with constipation as needed.  - senna-docusate (SENOKOT-S/PERICOLACE) 8.6-50 MG tablet; Take 1 tablet by mouth daily as needed for constipation. Take while oxycodone/opioids.  - Raised Toilet Seat Order for DME - ONLY FOR DME    Encounter for post surgical wound check  Requesting I look at her abdominal wound,  thought it was opening a bit.  On assessment today there is some superficial separation of the skin but no surrounding erythema, overt tenderness, drainage or abnormal vitals.  She is tender along the course of the incision but nothing extreme above or below and there is no palpable abscess.  With her overall benign exam and reassuring status, recommend close monitoring.  If there is any spreading redness, drainage or swelling in 1 area, increased tenderness, would need urgent follow-up right away.  Is scheduled for follow-up with her surgeon next week. If she has any slight changes or questions, recommend she reach out to his  office and follow up up with me as well.         MED REC REQUIRED{  Post Medication Reconciliation Status: discharge medications reconciled, continue medications without change        Subjective   Gabrielle is a 55 year old, presenting for the following health issues:  Hospital F/U        4/15/2025    10:44 AM   Additional Questions   Roomed by Maryam DECKER   Accompanied by self     HPI      Wondering about getting a toilet seat riser     Hospital Follow-up Visit:    Hospital/Nursing Home/IP Rehab Facility: [facility]  Most Recent Admission Date: 4/8/2025   Most Recent Admission Diagnosis: Atelectasis - J98.11  Hypoxia - R09.02  Pneumonia due to infectious organism, unspecified laterality, unspecified part of lung - J18.9     Most Recent Discharge Date: 4/10/2025   Most Recent Discharge Diagnosis: Pneumonia due to infectious organism, unspecified laterality, unspecified part of lung - J18.9  Atelectasis - J98.11  Hypoxia - R09.02   Was the patient in the ICU or did the patient experience delirium during hospitalization?  No  Do you have any other stressors you would like to discuss with your provider? No    Problems taking medications regularly:  forgetting to take     Medication changes since discharge: Doxycycline 100 mg BID x 5 days, oxycodone 5 mg q 6 hrs prn for pain, prednisone 20 mg x 4 days    Changed medications: Duoneb neb solution: 1 vial every 4 hours prn for SOB, wheezing or cough    Problems adhering to non-medication therapy:  None    Summary of hospitalization:  [facility] discharge summary reviewed  Diagnostic Tests/Treatments reviewed.  Follow up needed: none  Other Healthcare Providers Involved in Patient s Care:         Specialist appointment - neurosurgery  Update since discharge: fluctuating course.      Plan of care communicated with patient           Shortness of breath ongoing. A little pain on the left side. Symptoms similar to when she left the hospital.   Prednisone  "40/day. Just started them yesterday.   Started abx yesterday. Was supposed to continue from 4/10 but didn't   Doing duonebs.   Cough is less.   Not doing mucinex.     Wanting me to look at surgical incision.     Was one week without bowel movement.   Finally had one - was hard.   Taking oxycodone one tablet at bedtime.     Though incision appeared open yesterday on belly. No drainage. No fever. No spreading redness. Feels swollen over last few days there. Unsure if appearance changed, didn't look at it. No change in pain. No bulging. No focal tenderness, diffuse along incision. No darkness to skin, warm and well perfused.   Sees Tristan next week         Objective    /82 (BP Location: Right arm, Patient Position: Sitting, Cuff Size: Adult Regular)   Pulse 107   Temp 98.1  F (36.7  C) (Tympanic)   Ht 1.575 m (5' 2\")   Wt 80.1 kg (176 lb 9.6 oz)   SpO2 94%   BMI 32.30 kg/m    Body mass index is 32.3 kg/m .      Physical Exam  Constitutional:       General: She is not in acute distress.     Appearance: Normal appearance. She is not ill-appearing or diaphoretic.      Comments: Well-appearing   Cardiovascular:      Rate and Rhythm: Normal rate and regular rhythm.      Heart sounds: No murmur heard.  Pulmonary:      Effort: Pulmonary effort is normal.      Breath sounds: Normal breath sounds. No wheezing, rhonchi or rales.      Comments: Lungs clear with good air entry throughout.  Talking in complete sentences with normal work of breathing.  Abdominal:      General: Bowel sounds are normal.      Palpations: Abdomen is soft.      Tenderness: There is abdominal tenderness (Mild tenderness, near incision). There is no guarding or rebound.      Comments: There is some mild separation superficially of her abdominal incision with no deep separation.  Does appear to be healing well by secondary intention.  There is no surrounding redness, drainage or overt pockets of tenderness.  It is mildly tender along the course " of the incision.  There is no bulging or abscess palpable above or below the incision.   Skin:     General: Skin is warm and dry.   Neurological:      General: No focal deficit present.      Mental Status: She is alert.   Psychiatric:         Mood and Affect: Mood normal.         Behavior: Behavior normal.                  No results found for any visits on 04/15/25.          Signed Electronically by: Hannah Eaton MD

## 2025-04-10 NOTE — TELEPHONE ENCOUNTER
11:21 AM    Reason for Call: OVERBOOK    [place] [facility] [place] (hospital called on this, did not speak to pt)    The patient is requesting an appointment for within 7 days of discharge with Dr. Eaton. (Hospital called on this did not speak to pt)    Was an appointment offered for this call? Yes 4-28 but hospital stated within 7 days   If yes : Appointment type              Date    Preferred method for responding to this message: Telephone Call  What is your phone number ? [phone] (per chart)    If we cannot reach you directly, may we leave a detailed response at the number you provided?  Hospital called on this did not speak to pt    Can this message wait until your PCP/provider returns, if unavailable today? No,     Katy Billings

## 2025-04-10 NOTE — PROGRESS NOTES
Physical Therapy Discharge Summary    Reason for therapy discharge:    Discharged to home.    Progress towards therapy goal(s). See goals on Care Plan in [facility] electronic health record for goal details.  Goals met    Therapy recommendation(s):    Pt functionally safe to discharge home. Recommend pt continue ambulating at home as tolerated

## 2025-04-10 NOTE — DISCHARGE SUMMARY
[facility]    Discharge Summary  Hospitalist    Date of Admission:  4/8/2025  Date of Discharge:  4/10/2025  Discharging Provider: Justin Taylor MD, MD  Date of Service (when I saw the patient): 04/10/25    Discharge Diagnoses   Principal Problem:    Hypoxia  Active Problems:    Atelectasis    Pneumonia due to infectious organism, unspecified laterality, unspecified part of lung      History of Present Illness   Jami Wang is an 55 year old female who presented with increased pain from lumbar fusion surgery.  Please see admission H+P for additional details.    Hospital Course   Jami Wang was admitted on 4/8/2025.  55-year-old female with a history of asthma/COPD, obesity, hyperlipidemia, Hashimoto's on levothyroxine, who recently had a lumbar spinal fusion surgery and was discharged 4/7, presents to the emergency department with pain.  There she was found to be hypoxic, workup included a CTA which showed evidence of atelectasis and collapse of left lower lobe as well as evidence of infiltrate suggestive of pneumonia.  Patient was treated empirically for community-acquired pneumonia with ceftriaxone and doxycycline.  Patient was requiring as much as 3 L nasal cannula of supplemental oxygen, however this was weaned off prior to discharge.  Patient was also treated with incentive spirometry, morning of discharge patient is satting low 90s on room air.  She feels much better, pain under control as well.  She is now stable to discharge from acute care.  Will have her finish off short course of steroids as well as doxycycline.  She will follow-up with her primary care physician within the week to assess for continued wellbeing, and follow-up with neurosurgery as scheduled.    Justin Taylor MD      Significant Results and Procedures   See below    Pending Results   These results will be followed up by Hannah Eaton    Unresulted Labs Ordered in the Past 30 Days of this Admission       No orders  found from 3/9/2025 to 4/9/2025.            Code Status   Full Code       Primary Care Physician   Hannah Eaton    Physical Exam   Temp: 98.1  F (36.7  C) Temp src: Tympanic BP: 112/70 Pulse: 98   Resp: 18 SpO2: 93 % O2 Device: None (Room air) Oxygen Delivery: 3 LPM  Vitals:    04/08/25 2234   Weight: 88.4 kg (194 lb 14.2 oz)     Vital Signs with Ranges  Temp:  [98.1  F (36.7  C)-99.3  F (37.4  C)] 98.1  F (36.7  C)  Pulse:  [] 98  Resp:  [18-20] 18  BP: (112-152)/(70-85) 112/70  SpO2:  [87 %-94 %] 93 %  I/O last 3 completed shifts:  In: 240 [P.O.:240]  Out: 1650 [Urine:1650]    Constitutional - AA, NAD  HEENT - atraumatic, normocephalic  Neck - supple, no masses, no JVD  CVS - S1 S2 tachycardic, no murmurs, rubs, gallops  Respiratory - relatively CTA b/l, no rhonchi, no wheezes  GI - soft, NT, ND, + bowel sounds, no organomegaly  Musculoskeletal - no LE edema, no lesions  Neuro - oriented x 3, no gross focal deficits      Discharge Disposition   Discharged to home  Condition at discharge: Stable    Consultations This Hospital Stay   PHYSICAL THERAPY ADULT IP CONSULT    Time Spent on this Encounter   IJustin MD, personally saw the patient today and spent greater than 30 minutes discharging this patient.    Discharge Orders      Reason for your hospital stay    Pneumonia     Follow-up and recommended labs and tests     Follow up with primary care provider, Hannah Eaton, within 7 days for hospital follow- up.  No follow up labs or test are needed.     Activity    Your activity upon discharge: activity as tolerated     Diet    Follow this diet upon discharge: Current Diet:Orders Placed This Encounter      Combination Diet Regular Diet Adult     Discharge Medications   Current Discharge Medication List        START taking these medications    Details   doxycycline hyclate (VIBRA-TABS) 100 MG tablet Take 1 tablet (100 mg) by mouth 2 times daily for 5 days.  Qty: 10 tablet, Refills: 0       doxycycline hyclate (VIBRAMYCIN) 100 MG capsule Take 1 capsule (100 mg) by mouth 2 times daily for 5 days.  Qty: 10 capsule, Refills: 0    Associated Diagnoses: Pneumonia due to infectious organism, unspecified laterality, unspecified part of lung      !! oxyCODONE (ROXICODONE) 5 MG tablet Take 1 tablet (5 mg) by mouth every 6 hours as needed for severe pain.  Qty: 10 tablet, Refills: 0      predniSONE (DELTASONE) 20 MG tablet Take 2 tablets (40 mg) by mouth daily for 4 days.  Qty: 8 tablet, Refills: 0    Associated Diagnoses: Pneumonia due to infectious organism, unspecified laterality, unspecified part of lung       !! - Potential duplicate medications found. Please discuss with provider.        CONTINUE these medications which have CHANGED    Details   ipratropium - albuterol 0.5 mg/2.5 mg/3 mL (DUONEB) 0.5-2.5 (3) MG/3ML neb solution Inhale 1 vial (3 mLs) into the lungs every 4 hours as needed for shortness of breath, wheezing or cough.  Qty: 90 mL, Refills: 3    Associated Diagnoses: Pneumonia due to infectious organism, unspecified laterality, unspecified part of lung           CONTINUE these medications which have NOT CHANGED    Details   ACCU-CHEK GUIDE test strip 1 strip daily as needed (low blood glucose readings from dexcom).      albuterol (PROAIR HFA/PROVENTIL HFA/VENTOLIN HFA) 108 (90 Base) MCG/ACT inhaler Inhale 2 puffs into the lungs every 4 hours as needed.      blood glucose monitoring (SOFTCLIX) lancets Use to test blood glucose once daily or as directed.      Blood Glucose Monitoring Suppl (ACCU-CHEK GUIDE) w/Device KIT Use to test blood glucose once daily or as directed.      clobetasol propionate (TEMOVATE) 0.05 % external cream APPLY SMALL AMOUNT TO ACTIVE LESIONS UP TO 10-14 DAYS. MUST TAKE ONE WEEK OFF BETWEEN USE. DO NOT APPLY TO FACE.  Qty: 60 g, Refills: 0    Associated Diagnoses: Eczema, unspecified type      Continuous Blood Gluc  (DEXCOM G6 ) MIKE Use to read blood  sugars as per 's instructions.  Qty: 1 each, Refills: 1    Associated Diagnoses: Type 2 diabetes mellitus without complication, without long-term current use of insulin (H)      Continuous Blood Gluc Sensor (DEXCOM G6 SENSOR) MISC CHANGE SENSOR EVERY 10 DAYS  Qty: 3 each, Refills: 3    Associated Diagnoses: Type 2 diabetes mellitus without complication, without long-term current use of insulin (H)      Continuous Glucose Transmitter (DEXCOM G6 TRANSMITTER) MISC Change every 3 months.  Qty: 1 each, Refills: 1      cyanocobalamin (VITAMIN B-12) 500 MCG tablet Take 1 tablet (500 mcg) by mouth daily  Qty: 90 tablet, Refills: 3    Associated Diagnoses: Vitamin B12 deficiency (non anemic)      emollient (VANICREAM) external cream Apply topically 2 times daily as needed (rash).      estradiol (ESTRACE) 0.1 MG/GM vaginal cream Place vaginally once a week.      fluticasone-salmeterol (WIXELA INHUB) 250-50 MCG/ACT inhaler Inhale 1 puff into the lungs 2 times daily.      Glucose 4-6 GM-MG CHEW Take 4 g by mouth as needed (low blood sugar).      guaiFENesin (MUCINEX) 600 MG 12 hr tablet Take 1 tablet (600 mg) by mouth 2 times daily as needed for congestion.  Qty: 30 tablet, Refills: 1    Associated Diagnoses: Mixed simple and mucopurulent chronic bronchitis (H)      hydrOXYzine nia (VISTARIL) 25 MG capsule Take 1 capsule (25 mg) by mouth 3 times daily as needed for itching.  Qty: 30 capsule, Refills: 0    Associated Diagnoses: Eczema, unspecified type      ketoconazole (NIZORAL) 2 % external cream Apply topically daily as needed.      levothyroxine (SYNTHROID/LEVOTHROID) 100 MCG tablet Take 1 tablet (100 mcg) by mouth every morning (before breakfast).  Qty: 90 tablet, Refills: 0    Associated Diagnoses: Hypothyroidism (acquired)      naproxen sodium (ANAPROX) 220 MG tablet Take 220 mg by mouth 2 times daily as needed for moderate pain.      nystatin (MYCOSTATIN) 485007 UNIT/GM external powder Apply topically 3 times  daily as needed for other (yeast infection). Apply up to three times daily to areas with active yeast.  Qty: 60 g, Refills: 0    Associated Diagnoses: Candidiasis      !! oxyCODONE (ROXICODONE) 5 MG tablet Take 5 mg by mouth every 6 hours as needed for pain.      pramipexole (MIRAPEX) 0.75 MG tablet Take 1 tablet (0.75 mg) by mouth at bedtime.  Qty: 90 tablet, Refills: 3    Associated Diagnoses: RLS (restless legs syndrome)      rosuvastatin (CRESTOR) 40 MG tablet Take 40 mg by mouth at bedtime.      semaglutide (OZEMPIC, 0.25 OR 0.5 MG/DOSE,) 2 MG/3ML pen INJECT 0.5MG SUBCUTANEOUSLY EVERY 7 DAYS  Qty: 3 mL, Refills: 0    Associated Diagnoses: Type 2 diabetes mellitus with diabetic polyneuropathy, without long-term current use of insulin (H)      tiotropium (SPIRIVA RESPIMAT) 2.5 MCG/ACT inhaler INHALE 2 PUFFS INTO THE LUNGS DAILY  Qty: 4 g, Refills: 5    Associated Diagnoses: Mixed simple and mucopurulent chronic bronchitis (H); COPD exacerbation (H)      traZODone (DESYREL) 150 MG tablet Take 1 tablet (150 mg) by mouth at bedtime.  Qty: 90 tablet, Refills: 1    Associated Diagnoses: Depressive disorder      triamcinolone (KENALOG) 0.5 % external ointment Apply pea sized amount to active eczematous lesions on elbows twice daily for 10 days.  Qty: 15 g, Refills: 0    Associated Diagnoses: Irritant contact dermatitis due to other agents      methocarbamol (ROBAXIN) 750 MG tablet Take 750 mg by mouth 4 times daily as needed.      nicotine (NICODERM CQ) 14 MG/24HR 24 hr patch Place 1 patch over 24 hours onto the skin every 24 hours.  Qty: 42 patch, Refills: 0    Associated Diagnoses: Nicotine dependence, uncomplicated, unspecified nicotine product type      nicotine (NICODERM CQ) 7 MG/24HR 24 hr patch Place 1 patch over 24 hours onto the skin every 24 hours for 14 days.  Qty: 14 patch, Refills: 0    Associated Diagnoses: Nicotine dependence, uncomplicated, unspecified nicotine product type      omeprazole (PRILOSEC) 20  MG DR capsule Take 1 capsule (20 mg) by mouth daily.  Qty: 90 capsule, Refills: 3    Associated Diagnoses: Gastroesophageal reflux disease without esophagitis       !! - Potential duplicate medications found. Please discuss with provider.        Allergies   Allergies   Allergen Reactions    Hydromorphone Other (See Comments)    Other Food Allergy Itching     Walnuts, pecans     Penicillins Rash     Has tolerated Cefazolin (ANCEF) and other Cephalosporins    Amoxicillin-Pot Clavulanate     Azithromycin GI Disturbance     Other reaction(s): Stomach Upset    [place] Flavoring Agent (Non-Screening) Itching    Erythromycin Nausea and Vomiting    Nuts Itching     Pecans and walnuts     Oxycodone Other (See Comments)     Mood swings    Pecan Extract Itching    Tramadol Itching and Dizziness    Other (Do Not Use) Rash     Epoxy Resin     Data   Most Recent 3 CBC's:  Recent Labs   Lab Test 04/10/25  0520 04/09/25  0608 04/08/25  2359   WBC 15.6* 15.5* 14.3*   HGB 11.6* 12.0 12.4   MCV 97 97 99    196 188      Most Recent 3 BMP's:  Recent Labs   Lab Test 04/09/25  1128 04/09/25  0608 04/08/25  2359 03/25/25  1608   NA  --  139 142 142   POTASSIUM  --  4.1 4.1 3.7   CHLORIDE  --  102 106 105   CO2  --  26 26 23   BUN  --  9.4 11.7 9.1   CR  --  0.78 0.87 0.83   ANIONGAP  --  11 10 14   RADHA  --  8.9 9.0 9.7   * 153* 129* 170*     Most Recent 2 LFT's:  Recent Labs   Lab Test 04/08/25  2359 03/25/25  1608   * 22   ALT 50 39   ALKPHOS 74 100   BILITOTAL 0.3 <0.2     Most Recent INR's and Anticoagulation Dosing History:  Anticoagulation Dose History           No data to display              Most Recent 3 Troponin's:No lab results found.  Most Recent Cholesterol Panel:  Recent Labs   Lab Test 07/09/24  0836   CHOL 141   LDL 64   HDL 42*   TRIG 173*     Most Recent 6 Bacteria Isolates From Any Culture (See EPIC Reports for Culture Details):No lab results found.  Most Recent TSH, T4 and A1c Labs:  Recent Labs    Lab Test 04/03/25  0924 03/25/25  1608 03/03/25  0749 02/03/25  0747   TSH 3.08 5.51*   < > 6.45*   T4  --  0.72*   < > 0.89*   A1C  --   --   --  6.1*    < > = values in this interval not displayed.     Results for orders placed or performed during the hospital encounter of 04/08/25   XR Chest Port 1 View    Narrative    EXAM: XR CHEST PORT 1 VIEW  LOCATION: [facility]  DATE: 4/8/2025    INDICATION: painful breathing  COMPARISON: 2/16/2023.    FINDINGS: The heart size is normal. There is a right basilar infiltrate. Probable left retrocardiac infiltrate. The lungs are otherwise clear. No pneumothorax.      Impression    IMPRESSION: Right basilar pneumonia. Possible left retrocardiac pneumonia. Follow-up recommended.   CTA Chest with Contrast    Narrative    EXAM: CTA CHEST WITH CONTRAST  LOCATION: [facility]  DATE: 4/9/2025    INDICATION: Chest pain. Concern for pulmonary embolus.  COMPARISON: Chest radiograph 4/8/2025. CT of the chest 10/15/2024 and 9/15/2023.  TECHNIQUE: Helical acquisition through the chest was performed during the arterial phase of contrast enhancement. 2D and 3D reconstructions performed by the CT technologist. Dose reduction techniques were used.  CONTRAST: Isovue 370 60ml    CT ANGIOGRAM CHEST: No pulmonary embolus. No aortic aneurysm or dissection.    LUNGS AND PLEURA: Collapse of the left lower lobe. Partial atelectasis of the right lower lobe. Mild patchy groundglass opacity in the left upper lobe has an inflammatory appearance. Mild emphysema.    MEDIASTINUM/AXILLAE: Normal    CORONARY ARTERY CALCIFICATION: Mild.    UPPER ABDOMEN: Normal    MUSCULOSKELETAL: Mild degenerative changes of the lower thoracic spine.      Impression    IMPRESSION:  1.  No pulmonary embolus.  2.  Collapse of the left lower lobe.  3.  Partial atelectasis of the right lower lobe.  4.  Mild inflammatory groundglass opacity in the left upper lobe.  5.  Mild emphysema.     XR Chest 2 Views     Narrative    PROCEDURE:  XR CHEST 2 VIEWS    HISTORY:  f/u collapsed left lower lobe.     COMPARISON:  4/8/2025    FINDINGS:   The cardiac silhouette is normal in size. The pulmonary vasculature is  normal.  There is bibasal areas of increased density stable and  unchanged from 4/8/2025. No pleural effusion or pneumothorax.      Impression    IMPRESSION:  No change from 4/8/2025      PHILLIP CUNNINGHAM MD         SYSTEM ID:  [id number]

## 2025-04-15 ENCOUNTER — OFFICE VISIT (OUTPATIENT)
Dept: FAMILY MEDICINE | Facility: OTHER | Age: 55
End: 2025-04-15
Attending: STUDENT IN AN ORGANIZED HEALTH CARE EDUCATION/TRAINING PROGRAM
Payer: MEDICARE

## 2025-04-15 VITALS
SYSTOLIC BLOOD PRESSURE: 122 MMHG | WEIGHT: 176.6 LBS | TEMPERATURE: 98.1 F | DIASTOLIC BLOOD PRESSURE: 82 MMHG | OXYGEN SATURATION: 94 % | HEIGHT: 62 IN | HEART RATE: 87 BPM | BODY MASS INDEX: 32.5 KG/M2

## 2025-04-15 DIAGNOSIS — Z48.89 ENCOUNTER FOR POST SURGICAL WOUND CHECK: ICD-10-CM

## 2025-04-15 DIAGNOSIS — Z98.1 S/P LUMBAR FUSION: ICD-10-CM

## 2025-04-15 DIAGNOSIS — Z09 HOSPITAL DISCHARGE FOLLOW-UP: Primary | ICD-10-CM

## 2025-04-15 DIAGNOSIS — J18.9 COMMUNITY ACQUIRED PNEUMONIA OF LEFT LOWER LOBE OF LUNG: ICD-10-CM

## 2025-04-15 DIAGNOSIS — J41.8 MIXED SIMPLE AND MUCOPURULENT CHRONIC BRONCHITIS (H): Chronic | ICD-10-CM

## 2025-04-15 PROCEDURE — G0463 HOSPITAL OUTPT CLINIC VISIT: HCPCS

## 2025-04-15 RX ORDER — AMOXICILLIN 250 MG
1 CAPSULE ORAL DAILY PRN
Qty: 20 TABLET | Refills: 0 | Status: SHIPPED | OUTPATIENT
Start: 2025-04-15

## 2025-04-15 RX ORDER — LIDOCAINE 50 MG/G
PATCH TOPICAL EVERY 24 HOURS
COMMUNITY
Start: 2025-04-08

## 2025-04-15 RX ORDER — GUAIFENESIN 600 MG/1
600 TABLET, EXTENDED RELEASE ORAL 2 TIMES DAILY PRN
Qty: 30 TABLET | Refills: 1 | Status: SHIPPED | OUTPATIENT
Start: 2025-04-15

## 2025-04-15 ASSESSMENT — PAIN SCALES - GENERAL: PAINLEVEL_OUTOF10: SEVERE PAIN (9)

## 2025-04-15 NOTE — PATIENT INSTRUCTIONS
Continue doxycycline and prednisone.   Duonebs at least twice daily.   If fever, worsening shortness of breath - need follow up   If incision has drainage, spreading redness, worsening swelling or pain, need follow up right away.

## 2025-04-30 DIAGNOSIS — E03.9 HYPOTHYROIDISM (ACQUIRED): ICD-10-CM

## 2025-04-30 DIAGNOSIS — G62.9 NEUROPATHY: Primary | ICD-10-CM

## 2025-04-30 NOTE — TELEPHONE ENCOUNTER
gabapentin (NEURONTIN) 100 MG         Routing refill request to provider for review/approval because:  Drug not on the [facility], [facility] or [facility] refill protocol or controlled substance  Medication is reported/historical      levothyroxine (SYNTHROID/LEVOTHROID) 100 MCG         Routing refill request to provider for review/approval because:  Medication is reported/historical

## 2025-04-30 NOTE — TELEPHONE ENCOUNTER
Gabapentin       Last Written Prescription Date:  12/06/2024  Last Fill Quantity: 60,   # refills: 3  Last Office Visit: 4/15/2025    Levothyroxine       Last Written Prescription Date:  2/0/2025  Last Fill Quantity: 90,   # refills: 0  Last Office Visit: 4/15/2025  Future Office visit:    Next 5 appointments (look out 90 days)      Jul 08, 2025 8:00 AM  (Arrive by 7:45 AM)  Provider Visit with Hannah Eaton MD  [facility] ([facility] [place] ) [address]  [place] [address]  [phone]

## 2025-05-01 RX ORDER — LEVOTHYROXINE SODIUM 100 UG/1
100 TABLET ORAL
Qty: 90 TABLET | Refills: 0 | Status: SHIPPED | OUTPATIENT
Start: 2025-05-01

## 2025-05-01 RX ORDER — GABAPENTIN 100 MG/1
100 CAPSULE ORAL 2 TIMES DAILY
Qty: 60 CAPSULE | Refills: 2 | Status: SHIPPED | OUTPATIENT
Start: 2025-05-01

## 2025-05-22 ENCOUNTER — TELEPHONE (OUTPATIENT)
Dept: FAMILY MEDICINE | Facility: OTHER | Age: 55
End: 2025-05-22

## 2025-05-22 DIAGNOSIS — I15.2 HYPERTENSION ASSOCIATED WITH TYPE 2 DIABETES MELLITUS (H): Chronic | ICD-10-CM

## 2025-05-22 DIAGNOSIS — G25.81 RLS (RESTLESS LEGS SYNDROME): Primary | Chronic | ICD-10-CM

## 2025-05-22 DIAGNOSIS — G62.9 NEUROPATHY: ICD-10-CM

## 2025-05-22 DIAGNOSIS — E11.59 HYPERTENSION ASSOCIATED WITH TYPE 2 DIABETES MELLITUS (H): Chronic | ICD-10-CM

## 2025-05-22 RX ORDER — GABAPENTIN 300 MG/1
300 CAPSULE ORAL AT BEDTIME
Qty: 30 CAPSULE | Refills: 1 | Status: SHIPPED | OUTPATIENT
Start: 2025-05-22

## 2025-05-22 NOTE — TELEPHONE ENCOUNTER
11:39 AM    Reason for Call: Phone Call    Description: Patient called and states that since her back surgery in April, her RLS has been really bad. She states she seen her neurologist today and was told it was normal for that to happen after back surgery, but patient is wondering if she can increase her pramipexole in the meantime to help? She states she has had to take up to 1 1/2 tablets (which helped) because it has gotten so bad. I offered to schedule an appointment for her but there are no appointments until the middle of July. Patient hoping to get some help before then. Please advise and let her know.     Was an appointment offered for this call? No    Preferred method for responding to this message: Telephone Call  What is your phone number ? [phone]     If we cannot reach you directly, may we leave a detailed response at the number you provided? Yes    Can this message wait until your PCP/provider returns, if available today? Not applicable, PCP in office today     Mi Pichardo

## 2025-05-22 NOTE — TELEPHONE ENCOUNTER
She is willing to come in to have the lab checked. She is not taking any iron supplement. An appointment is made for this tomorrow.      She is also not taking gabapentin, stating it never did anything. She was on 100 mg bid.     She also notes she stopped the muscle relaxer because it made her nauseas.

## 2025-05-22 NOTE — TELEPHONE ENCOUNTER
Notified patient   She will watch for dizziness and report to the office.  She does have awakenings at night to use the bathroom.     She notes that the only way she gets the movement to stop is be moving during the day. At night it pulls at her surgery site and pain is so bad it almost makes her cry.     She will come in for labs.

## 2025-05-22 NOTE — TELEPHONE ENCOUNTER
We could recheck a hemoglobin and iron level.  Sometimes low iron after surgery can cause restless legs.  Is she taking any iron supplement?  If so, have her hold the morning of the lab.  Pramipexole is at max dose and really should only be taking 0.75 mg daily.  Cannot increase this.  Could increase her gabapentin, this also helps restless legs.  If she taking this right now and if so what is her dose?

## 2025-05-23 ENCOUNTER — RESULTS FOLLOW-UP (OUTPATIENT)
Dept: FAMILY MEDICINE | Facility: OTHER | Age: 55
End: 2025-05-23

## 2025-05-23 ENCOUNTER — LAB (OUTPATIENT)
Dept: LAB | Facility: OTHER | Age: 55
End: 2025-05-23
Payer: MEDICARE

## 2025-05-23 DIAGNOSIS — G62.9 NEUROPATHY: ICD-10-CM

## 2025-05-23 DIAGNOSIS — E11.59 HYPERTENSION ASSOCIATED WITH TYPE 2 DIABETES MELLITUS (H): ICD-10-CM

## 2025-05-23 DIAGNOSIS — I15.2 HYPERTENSION ASSOCIATED WITH TYPE 2 DIABETES MELLITUS (H): ICD-10-CM

## 2025-05-23 DIAGNOSIS — G25.81 RLS (RESTLESS LEGS SYNDROME): ICD-10-CM

## 2025-05-23 LAB
BASOPHILS # BLD AUTO: 0.1 10E3/UL (ref 0–0.2)
BASOPHILS NFR BLD AUTO: 1 %
EOSINOPHIL # BLD AUTO: 0.2 10E3/UL (ref 0–0.7)
EOSINOPHIL NFR BLD AUTO: 2 %
ERYTHROCYTE [DISTWIDTH] IN BLOOD BY AUTOMATED COUNT: 13.2 % (ref 10–15)
FERRITIN SERPL-MCNC: 270 NG/ML (ref 11–328)
HCT VFR BLD AUTO: 44.1 % (ref 35–47)
HGB BLD-MCNC: 14.6 G/DL (ref 11.7–15.7)
IMM GRANULOCYTES # BLD: 0 10E3/UL
IMM GRANULOCYTES NFR BLD: 0 %
IRON BINDING CAPACITY (ROCHE): 245 UG/DL (ref 240–430)
IRON SATN MFR SERPL: 18 % (ref 15–46)
IRON SERPL-MCNC: 44 UG/DL (ref 37–145)
LYMPHOCYTES # BLD AUTO: 2.9 10E3/UL (ref 0.8–5.3)
LYMPHOCYTES NFR BLD AUTO: 27 %
MCH RBC QN AUTO: 31.5 PG (ref 26.5–33)
MCHC RBC AUTO-ENTMCNC: 33.1 G/DL (ref 31.5–36.5)
MCV RBC AUTO: 95 FL (ref 78–100)
MONOCYTES # BLD AUTO: 0.6 10E3/UL (ref 0–1.3)
MONOCYTES NFR BLD AUTO: 6 %
NEUTROPHILS # BLD AUTO: 7 10E3/UL (ref 1.6–8.3)
NEUTROPHILS NFR BLD AUTO: 65 %
NRBC # BLD AUTO: 0 10E3/UL
NRBC BLD AUTO-RTO: 0 /100
PLATELET # BLD AUTO: 265 10E3/UL (ref 150–450)
RBC # BLD AUTO: 4.64 10E6/UL (ref 3.8–5.2)
WBC # BLD AUTO: 10.8 10E3/UL (ref 4–11)

## 2025-05-23 PROCEDURE — 83550 IRON BINDING TEST: CPT | Mod: ZL

## 2025-05-23 PROCEDURE — 82728 ASSAY OF FERRITIN: CPT | Mod: ZL

## 2025-05-23 PROCEDURE — 36415 COLL VENOUS BLD VENIPUNCTURE: CPT | Mod: ZL

## 2025-05-23 PROCEDURE — 85004 AUTOMATED DIFF WBC COUNT: CPT | Mod: ZL

## 2025-05-27 DIAGNOSIS — E11.42 TYPE 2 DIABETES MELLITUS WITH DIABETIC POLYNEUROPATHY, WITHOUT LONG-TERM CURRENT USE OF INSULIN (H): ICD-10-CM

## 2025-05-28 NOTE — TELEPHONE ENCOUNTER
Pt called pt reports currently taking Ozempic 0.5 mg. Pt denies wanting to increase next dose. Pt wants to stay on same dose. Pt reports she is feeling well denies any concerns. Pt did request to be scheduled for a diabetic follow-up. Pt scheduled as she requested.   Yaritza Pedro RN

## 2025-05-28 NOTE — TELEPHONE ENCOUNTER
Ozempic       Last Written Prescription Date:  3/06/2025  Last Fill Quantity: 3mL,   # refills: 0  Last Office Visit: 4/15/2025  Future Office visit:    Next 5 appointments (look out 90 days)      Jul 08, 2025 8:00 AM  (Arrive by 7:45 AM)  Provider Visit with Hannah Eaton MD  [facility] ([facility] [place] ) [address]  [place] [address]  [phone]

## 2025-05-29 RX ORDER — SEMAGLUTIDE 0.68 MG/ML
INJECTION, SOLUTION SUBCUTANEOUS
Qty: 3 ML | Refills: 0 | Status: SHIPPED | OUTPATIENT
Start: 2025-05-29

## 2025-06-02 ENCOUNTER — LAB (OUTPATIENT)
Dept: LAB | Facility: OTHER | Age: 55
End: 2025-06-02
Attending: STUDENT IN AN ORGANIZED HEALTH CARE EDUCATION/TRAINING PROGRAM
Payer: MEDICARE

## 2025-06-02 ENCOUNTER — RESULTS FOLLOW-UP (OUTPATIENT)
Dept: FAMILY MEDICINE | Facility: OTHER | Age: 55
End: 2025-06-02

## 2025-06-02 ENCOUNTER — OFFICE VISIT (OUTPATIENT)
Dept: FAMILY MEDICINE | Facility: OTHER | Age: 55
End: 2025-06-02
Attending: STUDENT IN AN ORGANIZED HEALTH CARE EDUCATION/TRAINING PROGRAM
Payer: MEDICARE

## 2025-06-02 VITALS
SYSTOLIC BLOOD PRESSURE: 126 MMHG | BODY MASS INDEX: 31.06 KG/M2 | HEART RATE: 111 BPM | TEMPERATURE: 98.1 F | WEIGHT: 168.8 LBS | HEIGHT: 62 IN | OXYGEN SATURATION: 96 % | RESPIRATION RATE: 18 BRPM | DIASTOLIC BLOOD PRESSURE: 84 MMHG

## 2025-06-02 DIAGNOSIS — E03.9 HYPOTHYROIDISM (ACQUIRED): ICD-10-CM

## 2025-06-02 DIAGNOSIS — E11.42 TYPE 2 DIABETES MELLITUS WITH DIABETIC POLYNEUROPATHY, WITHOUT LONG-TERM CURRENT USE OF INSULIN (H): Primary | ICD-10-CM

## 2025-06-02 DIAGNOSIS — J41.8 MIXED SIMPLE AND MUCOPURULENT CHRONIC BRONCHITIS (H): Chronic | ICD-10-CM

## 2025-06-02 DIAGNOSIS — M81.0 AGE-RELATED OSTEOPOROSIS WITHOUT CURRENT PATHOLOGICAL FRACTURE: Primary | Chronic | ICD-10-CM

## 2025-06-02 DIAGNOSIS — G62.9 NEUROPATHY: ICD-10-CM

## 2025-06-02 DIAGNOSIS — G25.81 RLS (RESTLESS LEGS SYNDROME): ICD-10-CM

## 2025-06-02 DIAGNOSIS — M81.0 AGE-RELATED OSTEOPOROSIS WITHOUT CURRENT PATHOLOGICAL FRACTURE: ICD-10-CM

## 2025-06-02 DIAGNOSIS — E11.42 TYPE 2 DIABETES MELLITUS WITH DIABETIC POLYNEUROPATHY, WITHOUT LONG-TERM CURRENT USE OF INSULIN (H): ICD-10-CM

## 2025-06-02 DIAGNOSIS — Z87.01 HISTORY OF PNEUMONIA: ICD-10-CM

## 2025-06-02 PROBLEM — J18.9 PNEUMONIA DUE TO INFECTIOUS ORGANISM, UNSPECIFIED LATERALITY, UNSPECIFIED PART OF LUNG: Status: RESOLVED | Noted: 2025-04-09 | Resolved: 2025-06-02

## 2025-06-02 PROBLEM — J98.11 ATELECTASIS: Status: RESOLVED | Noted: 2025-04-09 | Resolved: 2025-06-02

## 2025-06-02 LAB
EST. AVERAGE GLUCOSE BLD GHB EST-MCNC: 123 MG/DL
HBA1C MFR BLD: 5.9 %
TSH SERPL DL<=0.005 MIU/L-ACNC: 2.48 UIU/ML (ref 0.3–4.2)

## 2025-06-02 PROCEDURE — 84443 ASSAY THYROID STIM HORMONE: CPT | Mod: ZL

## 2025-06-02 PROCEDURE — G0463 HOSPITAL OUTPT CLINIC VISIT: HCPCS

## 2025-06-02 PROCEDURE — 3044F HG A1C LEVEL LT 7.0%: CPT

## 2025-06-02 PROCEDURE — 36415 COLL VENOUS BLD VENIPUNCTURE: CPT | Mod: ZL

## 2025-06-02 PROCEDURE — 83036 HEMOGLOBIN GLYCOSYLATED A1C: CPT | Mod: ZL

## 2025-06-02 RX ORDER — HEPARIN SODIUM,PORCINE 10 UNIT/ML
5-20 VIAL (ML) INTRAVENOUS DAILY PRN
OUTPATIENT
Start: 2025-06-02

## 2025-06-02 RX ORDER — DIPHENHYDRAMINE HYDROCHLORIDE 50 MG/ML
25 INJECTION, SOLUTION INTRAMUSCULAR; INTRAVENOUS
Start: 2025-06-02

## 2025-06-02 RX ORDER — GUAIFENESIN 600 MG/1
600 TABLET, EXTENDED RELEASE ORAL 2 TIMES DAILY PRN
Qty: 30 TABLET | Refills: 1 | Status: SHIPPED | OUTPATIENT
Start: 2025-06-02

## 2025-06-02 RX ORDER — METHYLPREDNISOLONE SODIUM SUCCINATE 40 MG/ML
40 INJECTION INTRAMUSCULAR; INTRAVENOUS
Start: 2025-06-02

## 2025-06-02 RX ORDER — MEPERIDINE HYDROCHLORIDE 25 MG/ML
25 INJECTION INTRAMUSCULAR; INTRAVENOUS; SUBCUTANEOUS
OUTPATIENT
Start: 2025-06-02

## 2025-06-02 RX ORDER — ZOLEDRONIC ACID 0.05 MG/ML
5 INJECTION, SOLUTION INTRAVENOUS ONCE
Start: 2025-06-02

## 2025-06-02 RX ORDER — DIPHENHYDRAMINE HYDROCHLORIDE 50 MG/ML
50 INJECTION, SOLUTION INTRAMUSCULAR; INTRAVENOUS
Start: 2025-06-02

## 2025-06-02 RX ORDER — PROCHLORPERAZINE 25 MG/1
SUPPOSITORY RECTAL
Qty: 1 EACH | Refills: 3 | Status: SHIPPED | OUTPATIENT
Start: 2025-06-02

## 2025-06-02 RX ORDER — PREGABALIN 25 MG/1
25 CAPSULE ORAL 2 TIMES DAILY
Qty: 60 CAPSULE | Refills: 0 | Status: SHIPPED | OUTPATIENT
Start: 2025-06-02

## 2025-06-02 RX ORDER — HEPARIN SODIUM (PORCINE) LOCK FLUSH IV SOLN 100 UNIT/ML 100 UNIT/ML
5 SOLUTION INTRAVENOUS
OUTPATIENT
Start: 2025-06-02

## 2025-06-02 RX ORDER — LEVOTHYROXINE SODIUM 100 MCG
100 TABLET ORAL
Qty: 90 TABLET | Refills: 3 | Status: SHIPPED | OUTPATIENT
Start: 2025-06-02

## 2025-06-02 RX ORDER — ACETAMINOPHEN 325 MG/1
650 TABLET ORAL
OUTPATIENT
Start: 2025-06-02

## 2025-06-02 RX ORDER — ALBUTEROL SULFATE 0.83 MG/ML
2.5 SOLUTION RESPIRATORY (INHALATION)
OUTPATIENT
Start: 2025-06-02

## 2025-06-02 RX ORDER — ALBUTEROL SULFATE 90 UG/1
1-2 INHALANT RESPIRATORY (INHALATION)
Start: 2025-06-02

## 2025-06-02 RX ORDER — EPINEPHRINE 1 MG/ML
0.3 INJECTION, SOLUTION INTRAMUSCULAR; SUBCUTANEOUS EVERY 5 MIN PRN
OUTPATIENT
Start: 2025-06-02

## 2025-06-02 ASSESSMENT — PAIN SCALES - GENERAL: PAINLEVEL_OUTOF10: MODERATE PAIN (4)

## 2025-06-02 NOTE — PROGRESS NOTES
Assessment & Plan     Type 2 diabetes mellitus with diabetic polyneuropathy, without long-term current use of insulin (H)  Await A1c, has been well-controlled.  Mild neuropathy on foot exam.  Multifactorial with diabetes and lumbar neuropathy.  Will continue with Ozempic 0.5 mg.  Reorder Dexcom for patient.  Helps with diet changes.  Recheck again in 6 months.  - Hemoglobin A1c; Future  - Continuous Glucose Transmitter (DEXCOM G6 TRANSMITTER) MISC; Change every 3 months.    Hypothyroidism (acquired)  TSH normal again today, taking levothyroxine.  She feels her eczema is worsened when she takes levothyroxine.  May have allergen in the levothyroxine.  Will trial brand-name Synthroid.  Reassuring no hives or dyspnea.  Does meet with dermatology tomorrow.  Follow-up 1 month.  - TSH with free T4 reflex; Future  - SYNTHROID 100 MCG tablet; Take 1 tablet (100 mcg) by mouth every morning (before breakfast).    RLS (restless legs syndrome)  Neuropathy  Worsening neuropathy and restless legs after lumbar fusion.  Gabapentin makes her dizzy.  Will trial Lyrica 25 mg twice daily.  Took Lyrica in the past and does not recall any adverse effects.  Follow-up 1 month.  - pregabalin (LYRICA) 25 MG capsule; Take 1 capsule (25 mg) by mouth 2 times daily.    Age-related osteoporosis without current pathological fracture  Reviewed previous DEXA scan showing osteoporosis.  Currently getting adequate calcium and vitamin D.  Working on resuming activity.  Discussed risks and benefits of IV Reclast.  Would avoid oral bisphosphonate with GERD.  Reviewed risks of hypocalcemia, flulike illness, osteonecrosis of the jaw.  Would plan to do 3 years of treatment.  Would update DEXA scan in June 2027.  Reclast ordered.    History of pneumonia  Pneumonia in April after lumbar fusion.  Still intermittent rhonchi, worse on the left lower aspect of lungs where the pneumonia and atelectasis was.  Do appreciate rhonchi on exam today.  Vital  stable.  Will update chest x-ray.  If concerning finding, will need repeat CT.  - XR CHEST 2 VW (Clinic Performed); Future    Mixed simple and mucopurulent chronic bronchitis (H)  Continues on daily LABA-LAMA, and steroid inhaler.  Recommend restarting Mucinex.  Work on deep breathing to ensure adequate aeration into her lower lobes.  - guaiFENesin (MUCINEX) 600 MG 12 hr tablet; Take 1 tablet (600 mg) by mouth 2 times daily as needed for congestion.        The longitudinal plan of care for the diagnosis(es)/condition(s) as documented were addressed during this visit. Due to the added complexity in care, I will continue to support Gabrielle in the subsequent management and with ongoing continuity of care.    Anton Anthony is a 55 year old, presenting for the following health issues:  Diabetes and Thyroid Problem        6/2/2025     8:15 AM   Additional Questions   Roomed by Maryam DECKER   Accompanied by self     HPI      Diabetes Follow-up    How often are you checking your blood sugar? One time daily  What time of day are you checking your blood sugars (select all that apply)?  In the morning   Have you had any blood sugars above 200?  No  Have you had any blood sugars below 70?  No  What symptoms do you notice when your blood sugar is low?  Dizzy and hot and sweaty   What concerns do you have today about your diabetes? Other: on ozempic    Do you have any of these symptoms? (Select all that apply)  Numbness in feet, Burning in feet, and Weight gain    Ozempic 0.5mg weekly   Numbness in feet from neuropathy from back    Diabetic Foot Screen:  Any complaints of increased pain or numbness ?  YES worse now from back   Is there a foot ulcer now or a history of foot ulcer? No  Does the foot have an abnormal shape? No  Are the nails thick, too long or ingrown? No  Are there any redness or open areas? No         Sensation Testing done at all points on the diagram with monofilament     Right Foot: Sensation Absent at the  "following points , 7, 8, and 9  Left Foot: Sensation Absent at the following points , 1, 2, 3, 8, and 9     Risk Category: 1- Loss of protective sensation with normal appearing foot (no weakness, deformity, callous, pre-ulcer or h/o ulceration)  Performed by Hannah Eaton MD       BP Readings from Last 2 Encounters:   06/02/25 126/84   04/15/25 122/82     Hemoglobin A1C (%)   Date Value   02/03/2025 6.1 (H)   07/09/2024 6.2 (H)     LDL Cholesterol Calculated (mg/dL)   Date Value   07/09/2024 64       Developed a rash from taking levothyroxine, stopped taking for a month started back up and the rash came back.   Thinks her eczema is caused by the levothyroxine additivies.   Reports eczema worse when she started taking it again  Sees dermatology tomorrow   Rash on both elbows.   Using vanicream on elbows and as needed steroid   No change in laundry detergent     Hypothyroidism Follow-up    Since last visit, patient describes the following symptoms: weight gain of 30-40 lbs, dry skin, constipation, loose stools, tremors, and fatigue    RLS worse and neuropathy worse since surgery. Nerves misfiring after recent lumbar surgery.   Ferritin/iron normal recently.   Took gabapentin one night, was dizzy.     Taking calcium and vitamin D.     Pneumonia in early April. After surgery.   Sometimes still hard to take deep breath on the left  Sometimes rhonchi in middle of night/early morning - intermittent           Objective    /84 (BP Location: Right arm, Patient Position: Sitting, Cuff Size: Adult Regular)   Pulse 111   Temp 98.1  F (36.7  C) (Tympanic)   Resp 18   Ht 1.575 m (5' 2\")   Wt 76.6 kg (168 lb 12.8 oz)   SpO2 96%   BMI 30.87 kg/m    Body mass index is 30.87 kg/m .    Physical Exam  Constitutional:       General: She is not in acute distress.     Appearance: Normal appearance.   Cardiovascular:      Rate and Rhythm: Normal rate and regular rhythm.      Pulses:           Dorsalis pedis pulses are 1+ on " the right side and 1+ on the left side.      Heart sounds: No murmur heard.  Pulmonary:      Effort: Pulmonary effort is normal.      Breath sounds: Rhonchi (bilateral base, worse on left lower) present. No wheezing or rales.   Musculoskeletal:      Right lower leg: No edema.      Left lower leg: No edema.   Neurological:      General: No focal deficit present.      Mental Status: She is alert and oriented to person, place, and time.   Psychiatric:         Mood and Affect: Mood normal.         Behavior: Behavior normal.                    Signed Electronically by: Hannah Eaton MD

## 2025-06-02 NOTE — PATIENT INSTRUCTIONS
See what derm things  Will change to synthroid name brand for thyroid  Lyrica 25mg twice daily for neuropathy and restless legs     Radiology will call to schedule Reclast infusion for bones.     To help keep bones strong, in addition to routine cardio exercise and weight based exercise, you should get 1000 units Vitamin D daily and 1200 mg of Calcium from diet AND supplement COMBINED. Calcium should be taken in two divided doses, max dose at one time is 600mg. These can be found in women's multivitamins or as an additional supplement. Please check the label to ensure that your vitamin has enough of each and also check what you are eating to determine calcium supplement needed.    Foods and drinks with calcium  Food Calcium in milligrams   Milk (skim, 2%, or whole; 8 oz [240 mL]) 300   Yogurt (6 oz [168 g]) 250   Orange juice (with calcium; 8 oz [240 mL]) 300   Tofu with calcium (0.5 cup [113 g]) 435   Cheese (1 oz [28 g]) 195 to 335 (hard cheese = higher calcium)   Cottage cheese (0.5 cup [113 g]) 130   Ice cream or frozen yogurt (0.5 cup [113 g]) 100   Fortified non-dairy milks (soy, oat, almond; 8 oz [240 mL]) 300 to 450   Beans (0.5 cup cooked [113 g]) 60 to 80   Dark, leafy green vegetables (0.5 cup cooked [113 g]) 50 to 135   Almonds (24 whole) 70   Orange (1 medium) 60

## 2025-06-03 ENCOUNTER — OFFICE VISIT (OUTPATIENT)
Dept: DERMATOLOGY | Facility: OTHER | Age: 55
End: 2025-06-03
Attending: DERMATOLOGY
Payer: MEDICARE

## 2025-06-03 ENCOUNTER — ANCILLARY PROCEDURE (OUTPATIENT)
Dept: GENERAL RADIOLOGY | Facility: OTHER | Age: 55
End: 2025-06-03
Attending: STUDENT IN AN ORGANIZED HEALTH CARE EDUCATION/TRAINING PROGRAM
Payer: MEDICARE

## 2025-06-03 ENCOUNTER — LAB (OUTPATIENT)
Dept: LAB | Facility: OTHER | Age: 55
End: 2025-06-03
Attending: DERMATOLOGY
Payer: MEDICARE

## 2025-06-03 VITALS
DIASTOLIC BLOOD PRESSURE: 84 MMHG | SYSTOLIC BLOOD PRESSURE: 135 MMHG | RESPIRATION RATE: 16 BRPM | HEART RATE: 99 BPM | OXYGEN SATURATION: 95 %

## 2025-06-03 DIAGNOSIS — L30.9 ECZEMA, UNSPECIFIED TYPE: ICD-10-CM

## 2025-06-03 DIAGNOSIS — T50.905A FIXED DRUG REACTION: Primary | ICD-10-CM

## 2025-06-03 DIAGNOSIS — M81.0 AGE-RELATED OSTEOPOROSIS WITHOUT CURRENT PATHOLOGICAL FRACTURE: Chronic | ICD-10-CM

## 2025-06-03 DIAGNOSIS — Z87.01 HISTORY OF PNEUMONIA: ICD-10-CM

## 2025-06-03 LAB
ALBUMIN SERPL BCG-MCNC: 4.2 G/DL (ref 3.5–5.2)
CALCIUM SERPL-MCNC: 9.9 MG/DL (ref 8.8–10.4)
CREAT SERPL-MCNC: 0.87 MG/DL (ref 0.51–0.95)
EGFRCR SERPLBLD CKD-EPI 2021: 77 ML/MIN/1.73M2

## 2025-06-03 PROCEDURE — 82040 ASSAY OF SERUM ALBUMIN: CPT | Mod: ZL

## 2025-06-03 PROCEDURE — 36415 COLL VENOUS BLD VENIPUNCTURE: CPT | Mod: ZL

## 2025-06-03 PROCEDURE — 1125F AMNT PAIN NOTED PAIN PRSNT: CPT | Performed by: DERMATOLOGY

## 2025-06-03 PROCEDURE — 82310 ASSAY OF CALCIUM: CPT | Mod: ZL

## 2025-06-03 PROCEDURE — 3075F SYST BP GE 130 - 139MM HG: CPT | Performed by: DERMATOLOGY

## 2025-06-03 PROCEDURE — 99202 OFFICE O/P NEW SF 15 MIN: CPT | Performed by: DERMATOLOGY

## 2025-06-03 PROCEDURE — 71046 X-RAY EXAM CHEST 2 VIEWS: CPT | Mod: TC

## 2025-06-03 PROCEDURE — 3079F DIAST BP 80-89 MM HG: CPT | Performed by: DERMATOLOGY

## 2025-06-03 PROCEDURE — 82565 ASSAY OF CREATININE: CPT | Mod: ZL

## 2025-06-03 PROCEDURE — G0463 HOSPITAL OUTPT CLINIC VISIT: HCPCS | Mod: 25

## 2025-06-03 PROCEDURE — 71046 X-RAY EXAM CHEST 2 VIEWS: CPT | Mod: 26 | Performed by: RADIOLOGY

## 2025-06-03 RX ORDER — TRIAMCINOLONE ACETONIDE 0.25 MG/G
OINTMENT TOPICAL
Qty: 464 G | Refills: 2 | Status: SHIPPED | OUTPATIENT
Start: 2025-06-03

## 2025-06-03 ASSESSMENT — PAIN SCALES - GENERAL: PAINLEVEL_OUTOF10: MODERATE PAIN (5)

## 2025-06-03 NOTE — Clinical Note
6/3/2025       RE: Jami Wang  [address] [facility] [address]     Dear Colleague,    Thank you for referring your patient, Jami Wang, to the [facility]. Please see a copy of my visit note below.    No notes on file    Again, thank you for allowing me to participate in the care of your patient.      Sincerely,    CHARITO Soriano MD

## 2025-06-10 ENCOUNTER — INFUSION THERAPY VISIT (OUTPATIENT)
Dept: INFUSION THERAPY | Facility: OTHER | Age: 55
End: 2025-06-10
Attending: NURSE PRACTITIONER
Payer: MEDICARE

## 2025-06-10 VITALS
WEIGHT: 170.86 LBS | OXYGEN SATURATION: 98 % | SYSTOLIC BLOOD PRESSURE: 119 MMHG | RESPIRATION RATE: 16 BRPM | DIASTOLIC BLOOD PRESSURE: 79 MMHG | HEIGHT: 63 IN | HEART RATE: 101 BPM | BODY MASS INDEX: 30.27 KG/M2 | TEMPERATURE: 97.4 F

## 2025-06-10 DIAGNOSIS — M81.0 AGE-RELATED OSTEOPOROSIS WITHOUT CURRENT PATHOLOGICAL FRACTURE: Primary | ICD-10-CM

## 2025-06-10 PROCEDURE — 250N000011 HC RX IP 250 OP 636: Mod: JZ | Performed by: STUDENT IN AN ORGANIZED HEALTH CARE EDUCATION/TRAINING PROGRAM

## 2025-06-10 RX ORDER — DIPHENHYDRAMINE HYDROCHLORIDE 50 MG/ML
25 INJECTION, SOLUTION INTRAMUSCULAR; INTRAVENOUS
Start: 2026-06-10

## 2025-06-10 RX ORDER — EPINEPHRINE 1 MG/ML
0.3 INJECTION, SOLUTION, CONCENTRATE INTRAVENOUS EVERY 5 MIN PRN
OUTPATIENT
Start: 2026-06-10

## 2025-06-10 RX ORDER — ALBUTEROL SULFATE 90 UG/1
1-2 INHALANT RESPIRATORY (INHALATION)
Start: 2026-06-10

## 2025-06-10 RX ORDER — DIPHENHYDRAMINE HYDROCHLORIDE 50 MG/ML
50 INJECTION, SOLUTION INTRAMUSCULAR; INTRAVENOUS
Start: 2026-06-10

## 2025-06-10 RX ORDER — HEPARIN SODIUM (PORCINE) LOCK FLUSH IV SOLN 100 UNIT/ML 100 UNIT/ML
5 SOLUTION INTRAVENOUS
OUTPATIENT
Start: 2026-06-10

## 2025-06-10 RX ORDER — ACETAMINOPHEN 325 MG/1
650 TABLET ORAL
OUTPATIENT
Start: 2026-06-10

## 2025-06-10 RX ORDER — METHYLPREDNISOLONE SODIUM SUCCINATE 40 MG/ML
40 INJECTION INTRAMUSCULAR; INTRAVENOUS
Start: 2026-06-10

## 2025-06-10 RX ORDER — ZOLEDRONIC ACID 0.05 MG/ML
5 INJECTION, SOLUTION INTRAVENOUS ONCE
Start: 2026-06-10

## 2025-06-10 RX ORDER — ZOLEDRONIC ACID 0.05 MG/ML
5 INJECTION, SOLUTION INTRAVENOUS ONCE
Status: COMPLETED | OUTPATIENT
Start: 2025-06-10 | End: 2025-06-10

## 2025-06-10 RX ORDER — HEPARIN SODIUM,PORCINE 10 UNIT/ML
5-20 VIAL (ML) INTRAVENOUS DAILY PRN
OUTPATIENT
Start: 2026-06-10

## 2025-06-10 RX ORDER — MEPERIDINE HYDROCHLORIDE 25 MG/ML
25 INJECTION INTRAMUSCULAR; INTRAVENOUS; SUBCUTANEOUS
OUTPATIENT
Start: 2026-06-10

## 2025-06-10 RX ORDER — ALBUTEROL SULFATE 0.83 MG/ML
2.5 SOLUTION RESPIRATORY (INHALATION)
OUTPATIENT
Start: 2026-06-10

## 2025-06-10 RX ADMIN — ZOLEDRONIC ACID 5 MG: 5 INJECTION, SOLUTION INTRAVENOUS at 09:13

## 2025-06-10 RX ADMIN — Medication 250 ML: at 09:12

## 2025-06-10 ASSESSMENT — PAIN SCALES - GENERAL: PAINLEVEL_OUTOF10: MODERATE PAIN (6)

## 2025-06-10 NOTE — PATIENT INSTRUCTIONS
Thank you for scheduling your appointment with us today. If you have any questions or concerns related to procedure/medication at today's visit, please contact your primary care provider, or the provider who ordered today's procedure/medication. If you have any questions related to scheduling with our unit, or if you are having trouble getting in contact with your ordering provider, we can be reached at [phone].      When taking Reclast, there is a built in general recommendation to consider taking calcium/vitamin D supplements: 1 tablet PO BID. Each tablet should have 500-600mg elemental calcium and 400 units of vitamin D. However, your doctor should have directed you to start this when they discussed ordering of this medication. Discuss with your provider before starting this regimen of calcium/vitamin D.

## 2025-06-10 NOTE — PROGRESS NOTES
Infusion Nursing Note:  Jami Wang presents today for Reclast.    Patient seen by provider today: No but saw PCP 6-2-25 when this infusion was ordered, and denies any sx, changes or concerns since that assessment.    present during visit today: Not Applicable.    Note: N/A.        Treatment Conditions:  Lab Results   Component Value Date     04/09/2025    POTASSIUM 4.1 04/09/2025    MAG 2.0 04/10/2025    CR 0.87 06/03/2025    RADHA 9.9 06/03/2025    BILITOTAL 0.3 04/08/2025    ALBUMIN 4.2 06/03/2025    ALT 50 04/08/2025     (H) 04/08/2025       Results reviewed, labs MET treatment parameters, ok to proceed with treatment.    Patient educated on recommendation in plan re calcium/vitamin D supplementation. Advised levels for today's treatment WDL, and to discuss with PCP before starting regimen.     Post Infusion Assessment:  Patient tolerated infusion without incident.  Post infusion vitals, IV removal and subsequent discharge delegated to Melody DECKER.

## 2025-06-11 ENCOUNTER — NURSE TRIAGE (OUTPATIENT)
Dept: NURSING | Facility: CLINIC | Age: 55
End: 2025-06-11

## 2025-06-11 ENCOUNTER — APPOINTMENT (OUTPATIENT)
Dept: CT IMAGING | Facility: HOSPITAL | Age: 55
End: 2025-06-11
Attending: INTERNAL MEDICINE
Payer: MEDICARE

## 2025-06-11 ENCOUNTER — APPOINTMENT (OUTPATIENT)
Dept: GENERAL RADIOLOGY | Facility: HOSPITAL | Age: 55
End: 2025-06-11
Attending: INTERNAL MEDICINE
Payer: MEDICARE

## 2025-06-11 ENCOUNTER — HOSPITAL ENCOUNTER (EMERGENCY)
Facility: HOSPITAL | Age: 55
Discharge: HOME | End: 2025-06-11
Attending: INTERNAL MEDICINE
Payer: MEDICARE

## 2025-06-11 VITALS
RESPIRATION RATE: 23 BRPM | HEART RATE: 97 BPM | WEIGHT: 171.74 LBS | DIASTOLIC BLOOD PRESSURE: 91 MMHG | SYSTOLIC BLOOD PRESSURE: 131 MMHG | BODY MASS INDEX: 31.6 KG/M2 | OXYGEN SATURATION: 92 % | TEMPERATURE: 99 F | HEIGHT: 62 IN

## 2025-06-11 DIAGNOSIS — R07.9 CHEST PAIN, UNSPECIFIED TYPE: ICD-10-CM

## 2025-06-11 LAB
ALBUMIN SERPL BCG-MCNC: 4 G/DL (ref 3.5–5.2)
ALP SERPL-CCNC: 99 U/L (ref 40–150)
ALT SERPL W P-5'-P-CCNC: 14 U/L (ref 0–50)
ANION GAP SERPL CALCULATED.3IONS-SCNC: 12 MMOL/L (ref 7–15)
AST SERPL W P-5'-P-CCNC: 14 U/L (ref 0–45)
ATRIAL RATE - MUSE: 111 BPM
BASOPHILS # BLD AUTO: 0 10E3/UL (ref 0–0.2)
BASOPHILS NFR BLD AUTO: 0 %
BILIRUB SERPL-MCNC: 0.3 MG/DL
BUN SERPL-MCNC: 9.9 MG/DL (ref 6–20)
CALCIUM SERPL-MCNC: 9.1 MG/DL (ref 8.8–10.4)
CHLORIDE SERPL-SCNC: 107 MMOL/L (ref 98–107)
CREAT SERPL-MCNC: 0.88 MG/DL (ref 0.51–0.95)
CRP SERPL-MCNC: 10.44 MG/L
DIASTOLIC BLOOD PRESSURE - MUSE: NORMAL MMHG
EGFRCR SERPLBLD CKD-EPI 2021: 76 ML/MIN/1.73M2
EOSINOPHIL # BLD AUTO: 0.2 10E3/UL (ref 0–0.7)
EOSINOPHIL NFR BLD AUTO: 1 %
ERYTHROCYTE [DISTWIDTH] IN BLOOD BY AUTOMATED COUNT: 13.2 % (ref 10–15)
GLUCOSE SERPL-MCNC: 135 MG/DL (ref 70–99)
HCO3 SERPL-SCNC: 22 MMOL/L (ref 22–29)
HCT VFR BLD AUTO: 43.2 % (ref 35–47)
HGB BLD-MCNC: 14.2 G/DL (ref 11.7–15.7)
HOLD SPECIMEN: NORMAL
IMM GRANULOCYTES # BLD: 0 10E3/UL
IMM GRANULOCYTES NFR BLD: 0 %
INTERPRETATION ECG - MUSE: NORMAL
LACTATE SERPL-SCNC: 1.5 MMOL/L (ref 0.7–2)
LIPASE SERPL-CCNC: 32 U/L (ref 13–60)
LYMPHOCYTES # BLD AUTO: 1.8 10E3/UL (ref 0.8–5.3)
LYMPHOCYTES NFR BLD AUTO: 16 %
MCH RBC QN AUTO: 31.6 PG (ref 26.5–33)
MCHC RBC AUTO-ENTMCNC: 32.9 G/DL (ref 31.5–36.5)
MCV RBC AUTO: 96 FL (ref 78–100)
MONOCYTES # BLD AUTO: 0.5 10E3/UL (ref 0–1.3)
MONOCYTES NFR BLD AUTO: 5 %
NEUTROPHILS # BLD AUTO: 8.9 10E3/UL (ref 1.6–8.3)
NEUTROPHILS NFR BLD AUTO: 78 %
NRBC # BLD AUTO: 0 10E3/UL
NRBC BLD AUTO-RTO: 0 /100
P AXIS - MUSE: 63 DEGREES
PLATELET # BLD AUTO: 249 10E3/UL (ref 150–450)
POTASSIUM SERPL-SCNC: 3.9 MMOL/L (ref 3.4–5.3)
PR INTERVAL - MUSE: 154 MS
PROT SERPL-MCNC: 7 G/DL (ref 6.4–8.3)
QRS DURATION - MUSE: 80 MS
QT - MUSE: 336 MS
QTC - MUSE: 456 MS
R AXIS - MUSE: -19 DEGREES
RBC # BLD AUTO: 4.49 10E6/UL (ref 3.8–5.2)
SODIUM SERPL-SCNC: 141 MMOL/L (ref 135–145)
SYSTOLIC BLOOD PRESSURE - MUSE: NORMAL MMHG
T AXIS - MUSE: 82 DEGREES
TROPONIN T SERPL HS-MCNC: 7 NG/L
TROPONIN T SERPL HS-MCNC: 8 NG/L
VENTRICULAR RATE- MUSE: 111 BPM
WBC # BLD AUTO: 11.4 10E3/UL (ref 4–11)

## 2025-06-11 PROCEDURE — 96374 THER/PROPH/DIAG INJ IV PUSH: CPT | Mod: XU

## 2025-06-11 PROCEDURE — 71045 X-RAY EXAM CHEST 1 VIEW: CPT

## 2025-06-11 PROCEDURE — 99285 EMERGENCY DEPT VISIT HI MDM: CPT | Mod: 25

## 2025-06-11 PROCEDURE — 84484 ASSAY OF TROPONIN QUANT: CPT | Performed by: INTERNAL MEDICINE

## 2025-06-11 PROCEDURE — 71045 X-RAY EXAM CHEST 1 VIEW: CPT | Mod: 26 | Performed by: RADIOLOGY

## 2025-06-11 PROCEDURE — 85025 COMPLETE CBC W/AUTO DIFF WBC: CPT | Performed by: INTERNAL MEDICINE

## 2025-06-11 PROCEDURE — 83605 ASSAY OF LACTIC ACID: CPT | Performed by: INTERNAL MEDICINE

## 2025-06-11 PROCEDURE — 93005 ELECTROCARDIOGRAM TRACING: CPT

## 2025-06-11 PROCEDURE — 84155 ASSAY OF PROTEIN SERUM: CPT | Performed by: INTERNAL MEDICINE

## 2025-06-11 PROCEDURE — 250N000011 HC RX IP 250 OP 636: Performed by: INTERNAL MEDICINE

## 2025-06-11 PROCEDURE — 250N000013 HC RX MED GY IP 250 OP 250 PS 637: Performed by: INTERNAL MEDICINE

## 2025-06-11 PROCEDURE — 93010 ELECTROCARDIOGRAM REPORT: CPT | Performed by: INTERNAL MEDICINE

## 2025-06-11 PROCEDURE — 96375 TX/PRO/DX INJ NEW DRUG ADDON: CPT | Mod: XU

## 2025-06-11 PROCEDURE — 99284 EMERGENCY DEPT VISIT MOD MDM: CPT | Performed by: INTERNAL MEDICINE

## 2025-06-11 PROCEDURE — 36415 COLL VENOUS BLD VENIPUNCTURE: CPT | Performed by: INTERNAL MEDICINE

## 2025-06-11 PROCEDURE — 74174 CTA ABD&PLVS W/CONTRAST: CPT | Mod: 26 | Performed by: RADIOLOGY

## 2025-06-11 PROCEDURE — 83690 ASSAY OF LIPASE: CPT | Performed by: INTERNAL MEDICINE

## 2025-06-11 PROCEDURE — 86140 C-REACTIVE PROTEIN: CPT | Performed by: INTERNAL MEDICINE

## 2025-06-11 PROCEDURE — 74174 CTA ABD&PLVS W/CONTRAST: CPT

## 2025-06-11 PROCEDURE — 71275 CT ANGIOGRAPHY CHEST: CPT | Mod: 26 | Performed by: RADIOLOGY

## 2025-06-11 RX ORDER — ONDANSETRON 4 MG/1
4 TABLET, ORALLY DISINTEGRATING ORAL EVERY 8 HOURS PRN
Qty: 10 TABLET | Refills: 0 | Status: SHIPPED | OUTPATIENT
Start: 2025-06-11 | End: 2025-06-14

## 2025-06-11 RX ORDER — IOPAMIDOL 755 MG/ML
76 INJECTION, SOLUTION INTRAVASCULAR ONCE
Status: COMPLETED | OUTPATIENT
Start: 2025-06-11 | End: 2025-06-11

## 2025-06-11 RX ORDER — FENTANYL CITRATE 50 UG/ML
50 INJECTION, SOLUTION INTRAMUSCULAR; INTRAVENOUS ONCE
Status: COMPLETED | OUTPATIENT
Start: 2025-06-11 | End: 2025-06-11

## 2025-06-11 RX ORDER — MAGNESIUM HYDROXIDE/ALUMINUM HYDROXICE/SIMETHICONE 120; 1200; 1200 MG/30ML; MG/30ML; MG/30ML
30 SUSPENSION ORAL ONCE
Status: COMPLETED | OUTPATIENT
Start: 2025-06-11 | End: 2025-06-11

## 2025-06-11 RX ORDER — ONDANSETRON 2 MG/ML
4 INJECTION INTRAMUSCULAR; INTRAVENOUS ONCE
Status: COMPLETED | OUTPATIENT
Start: 2025-06-11 | End: 2025-06-11

## 2025-06-11 RX ORDER — KETOROLAC TROMETHAMINE 10 MG/1
10 TABLET, FILM COATED ORAL EVERY 6 HOURS PRN
Qty: 10 TABLET | Refills: 0 | Status: SHIPPED | OUTPATIENT
Start: 2025-06-11

## 2025-06-11 RX ADMIN — ALUMINUM HYDROXIDE, MAGNESIUM HYDROXIDE, AND SIMETHICONE 30 ML: 200; 200; 20 SUSPENSION ORAL at 04:39

## 2025-06-11 RX ADMIN — IOPAMIDOL 76 ML: 755 INJECTION, SOLUTION INTRAVENOUS at 05:46

## 2025-06-11 RX ADMIN — FENTANYL CITRATE 50 MCG: 50 INJECTION INTRAMUSCULAR; INTRAVENOUS at 05:56

## 2025-06-11 RX ADMIN — ONDANSETRON 4 MG: 2 INJECTION INTRAMUSCULAR; INTRAVENOUS at 04:40

## 2025-06-11 RX ADMIN — FAMOTIDINE 20 MG: 10 INJECTION, SOLUTION INTRAVENOUS at 04:39

## 2025-06-11 ASSESSMENT — COLUMBIA-SUICIDE SEVERITY RATING SCALE - C-SSRS
6. HAVE YOU EVER DONE ANYTHING, STARTED TO DO ANYTHING, OR PREPARED TO DO ANYTHING TO END YOUR LIFE?: NO
1. IN THE PAST MONTH, HAVE YOU WISHED YOU WERE DEAD OR WISHED YOU COULD GO TO SLEEP AND NOT WAKE UP?: NO
2. HAVE YOU ACTUALLY HAD ANY THOUGHTS OF KILLING YOURSELF IN THE PAST MONTH?: NO

## 2025-06-11 ASSESSMENT — ACTIVITIES OF DAILY LIVING (ADL)
ADLS_ACUITY_SCORE: 50

## 2025-06-11 NOTE — ED PROVIDER NOTES
ED Course     ED Course as of 06/11/25 0706   Wed Jun 11, 2025   0628 Sign out received pending repeat troponin. No concerns on Cta (her pain is not where the fluid collection is located).   0703 Repeat troponin returned reassuringly stable.   0704 Findings were discussed with the patient including non-emergent imaging/lab results (including fluid collection which she states her surgeon already knows about). Additional verbal instructions were discussed with the patient as well. Instructed to follow up with a primary care provider within 3-4 days. Also discussed specific warning signs and instructed to return to the ED if there are any concerns. Patient voiced understanding of instructions, questions were answered and the patient was discharged home in stable condition.         New Prescriptions    KETOROLAC (TORADOL) 10 MG TABLET    Take 1 tablet (10 mg) by mouth every 6 hours as needed for moderate pain.       Final diagnoses:   Chest pain, unspecified type       6/11/2025   [facility]       Stevo Bailey MD  06/11/25 0707

## 2025-06-11 NOTE — TELEPHONE ENCOUNTER
"  Nurse Triage SBAR    Is this a 2nd Level Triage? NO    Situation: chest pain/ difficulty breathing    Background: Patient was given an IV infusion of zoledronic acid on 6/10/25.She states that she has never had this infusion before and believes that she is having side effects She reports chest pain, kidney swelling and difficulty breathing. Patient states that her whole chest feels \" tight and heavy\" she reports that the pain has lasted longer than 5 minutes.     Assessment: Patient was given an IV on 6/10/25.She states that she has never had this infusion before and believes that she is having side effects She reports chest pain, kidney swelling and difficulty breathing. Patient states that her whole chest feels \" tight and heavy\" she reports that the pain has lasted longer than 5 minutes. Unknown if patient has recently taken any medications for pain.     Protocol Recommended Disposition:   Call  Now    Recommendation:  patient advised to call 911          Does the patient meet one of the following criteria for ADS visit consideration? 16+ years old, with an [facility] PCP     TIP  Providers, please consider if this condition is appropriate for management at one of our Acute and Diagnostic Services sites.     If patient is a good candidate, please use dotphrase <dot>triageresponse and select Refer to ADS to document.    Reason for Disposition   Chest pain lasting longer than 5 minutes and ANY of the following:    history of heart disease  (i.e., heart attack, bypass surgery, angina, angioplasty, CHF; not just a heart murmur)    described as crushing, pressure-like, or heavy    age > 44    age > 30 AND at least one cardiac risk factor (i.e., hypertension, diabetes, obesity, smoker or strong family history of heart disease)    not relieved with nitroglycerin    Additional Information   Negative: SEVERE difficulty breathing (e.g., struggling for each breath, speaks in single words)   Negative: Difficult to awaken " or acting confused (e.g., disoriented, slurred speech)   Negative: Shock suspected (e.g., cold/pale/clammy skin, too weak to stand, low BP, rapid pulse)   Negative: Passed out (i.e., lost consciousness, collapsed and was not responding)    Protocols used: Chest Pain-A-AH

## 2025-06-11 NOTE — ED NOTES
Face to face report given with opportunity to observe patient.    Report given to COCO Chapin RN   6/11/2025  5:11 AM

## 2025-06-11 NOTE — ED NOTES
Face to face report given with opportunity to observe patient.    Report given to Maria Eugenia POZO RN   6/11/2025  7:02 AM

## 2025-06-11 NOTE — DISCHARGE INSTRUCTIONS
- Please take Tylenol and Toradol for your pain in addition to any other already prescribed pain medications for your symptoms  - Please return to the Emergency Room if you do not improve, feel worse, or have any new or concerning symptoms. We would especially want to see you back if you experience worsening pain, shortness of breath, or a fever.  - Please follow up with a primary care physician in 3-4 days to discuss any further testing or treatment.

## 2025-06-11 NOTE — ED TRIAGE NOTES
"Patient presents after waking up with chest pain around 0300. Patient reports pain is \"sharp\" and \"stabbing\", pain is midsternal. Patient states that she had a zoledronic acid infusion in the clinic yesterday.         "

## 2025-06-12 ENCOUNTER — TELEPHONE (OUTPATIENT)
Dept: FAMILY MEDICINE | Facility: OTHER | Age: 55
End: 2025-06-12

## 2025-06-12 NOTE — TELEPHONE ENCOUNTER
Appointment scheduled:  no available appt next week, 6/16-6/20.   Please advise, thank you.                    OR  Pended to Provider to review & advise for Over[employer] [email or web address]'t ?      Emergency Department and Urgent Care Follow-up Visit    Reason for ER/UC visit:   Chest pain, difficulty breathing    Date seen:   6/11/25    New or Worsening symptoms:    Back side of left lung is painful when taking a deep breath in.     Prescription Received/Picked up from Pharmacy?   Toradol and zofran. Picked medications up.   Patient is concerned with taking Toradol. Patient reports having allergic reaction to pain medication in the past, tramadol.    Follow-up Results or Labs that are pending:   No       Recommended ED/UC with any acute/rapid decline in condition.  Call back to the clinic with any further questions/concerns  Patient relayed understanding  No further concerns at calls end.

## 2025-06-12 NOTE — TELEPHONE ENCOUNTER
Symptom or reason needing to speak to RN: [place] er/uc follow up-need follow-up this week     Best number to return call: [phone]     Best time to return call: asap

## 2025-06-24 DIAGNOSIS — E11.42 TYPE 2 DIABETES MELLITUS WITH DIABETIC POLYNEUROPATHY, WITHOUT LONG-TERM CURRENT USE OF INSULIN (H): ICD-10-CM

## 2025-06-24 RX ORDER — SEMAGLUTIDE 0.68 MG/ML
INJECTION, SOLUTION SUBCUTANEOUS
Qty: 3 ML | Refills: 0 | Status: SHIPPED | OUTPATIENT
Start: 2025-06-24

## 2025-06-24 ASSESSMENT — ENCOUNTER SYMPTOMS
WHEEZING: 0
FEVER: 0
NUMBNESS: 0
CHEST TIGHTNESS: 0
MYALGIAS: 0
DYSURIA: 0
WOUND: 0
CONFUSION: 0
ARTHRALGIAS: 0
VOMITING: 0
CHILLS: 0
COUGH: 0
NAUSEA: 1
HEMATURIA: 0
DIAPHORESIS: 0
ANAL BLEEDING: 0
FLANK PAIN: 0
PALPITATIONS: 0
VOICE CHANGE: 0
COLOR CHANGE: 0
LIGHT-HEADEDNESS: 0
NECK STIFFNESS: 0
BACK PAIN: 0
DIZZINESS: 1
NECK PAIN: 0
ABDOMINAL DISTENTION: 0
BLOOD IN STOOL: 0
HEADACHES: 0
ABDOMINAL PAIN: 0
SHORTNESS OF BREATH: 0

## 2025-06-24 NOTE — TELEPHONE ENCOUNTER
Disp Refills Start End CAROLY[facility]   OZEMPIC, 0.25 OR 0.5 MG/DOSE, 2 MG/3ML pen 3 mL 0 5/29/2025 -- No     Last Office Visit: 06/02/2025  Future Office visit:    Next 5 appointments (look out 90 days)      Jul 08, 2025 8:00 AM  (Arrive by 7:45 AM)  Provider Visit with Hannah Eaton MD  [facility] ([facility] [place] ) [address]  [place] [address]  [phone]             Routing refill request to provider for review/approval because:

## 2025-06-24 NOTE — ED PROVIDER NOTES
History     Chief Complaint   Patient presents with    Chest Pain     The history is provided by the patient.   Chest Pain  Pain location:  Substernal area  Pain quality: aching, sharp and stabbing    Pain radiates to:  Mid back  Pain severity:  Severe  Onset quality:  Sudden  Duration:  1 hour  Timing:  Constant  Progression:  Worsening  Chronicity:  New  Associated symptoms: dizziness and nausea    Associated symptoms: no abdominal pain, no back pain, no cough, no diaphoresis, no fever, no headache, no numbness, no palpitations, no shortness of breath and no vomiting          Allergies:  Allergies   Allergen Reactions    Hydromorphone Other (See Comments)    Other Food Allergy Itching     Walnuts, pecans     Penicillins Rash     Has tolerated Cefazolin (ANCEF) and other Cephalosporins    Amoxicillin-Pot Clavulanate     Azithromycin GI Disturbance     Other reaction(s): Stomach Upset    Black Boyce Flavoring Agent (Non-Screening) Itching    Erythromycin Nausea and Vomiting    Nuts Itching     Pecans and walnuts     Oxycodone Other (See Comments)     Mood swings    Pecan Extract Itching    Tramadol Itching and Dizziness    Levothyroxine Rash    Other (Do Not Use) Rash     Epoxy Resin       Problem List:    Patient Active Problem List    Diagnosis Date Noted    Age-related osteoporosis without current pathological fracture 06/02/2025     Priority: Medium    LVH (left ventricular hypertrophy) 03/26/2025     Priority: Medium     Mild, noted on cardiac MRI      Spondylolisthesis of lumbosacral region 12/17/2024     Priority: Medium    Neuropathy 09/23/2024     Priority: Medium    Hypertension associated with type 2 diabetes mellitus (H) 07/09/2024     Priority: Medium    Spinal stenosis of lumbar region with neurogenic claudication 05/02/2024     Priority: Medium    Prolonged Q-T interval on ECG 02/27/2024     Priority: Medium    Grade I diastolic dysfunction 01/31/2024     Priority: Medium     Echo 2024       Mild  mitral insufficiency 01/31/2024     Priority: Medium     Formatting of this note might be different from the original.   1/22/2024:    Left ventricular size is normal.    Left ventricular function is decreased. The ejection fraction is 45-50%    (mildly reduced).    No regional wall motion abnormalities are seen.    Grade I or early diastolic dysfunction.    Global right ventricular function is mildly reduced.    Both atria appear normal.    Trace mitral insufficiency is present.    No aortic stenosis is present.    Mild tricuspid insufficiency is present.    The right ventricular systolic pressure is 22mmHg above the right atrial    pressure.    Trace pulmonic insufficiency is present.  Formatting of this note might be different from the original.   Formatting of this note might be different from the original.    1/22/2024:     Left ventricular size is normal.     Left ventricular function is decreased. The ejection fraction is 45-50%     (mildly reduced).     No regional wall motion abnormalities are seen.     Grade I or early diastolic dysfunction.     Global right ventricular function is mildly reduced.     Both atria appear normal.     Trace mitral insufficiency is present.     No aortic stenosis is present.     Mild tricuspid insufficiency is present.     The right ventricular systolic pressure is 22mmHg above the right atrial     pressure.     Trace pulmonic insufficiency is present.      Mild tricuspid insufficiency 01/31/2024     Priority: Medium     Formatting of this note might be different from the original.   1/22/2024:    Left ventricular size is normal.    Left ventricular function is decreased. The ejection fraction is 45-50%    (mildly reduced).    No regional wall motion abnormalities are seen.    Grade I or early diastolic dysfunction.    Global right ventricular function is mildly reduced.    Both atria appear normal.    Trace mitral insufficiency is present.    No aortic stenosis is present.     Mild tricuspid insufficiency is present.    The right ventricular systolic pressure is 22mmHg above the right atrial    pressure.    Trace pulmonic insufficiency is present.  Formatting of this note might be different from the original.   Formatting of this note might be different from the original.    1/22/2024:     Left ventricular size is normal.     Left ventricular function is decreased. The ejection fraction is 45-50%     (mildly reduced).     No regional wall motion abnormalities are seen.     Grade I or early diastolic dysfunction.     Global right ventricular function is mildly reduced.     Both atria appear normal.     Trace mitral insufficiency is present.     No aortic stenosis is present.     Mild tricuspid insufficiency is present.     The right ventricular systolic pressure is 22mmHg above the right atrial     pressure.     Trace pulmonic insufficiency is present.      Bilateral arm weakness 11/14/2023     Priority: Medium    History of Helicobacter pylori infection 11/14/2023     Priority: Medium    Cervical stenosis of spinal canal 10/23/2023     Priority: Medium    Degeneration of lumbar intervertebral disc 10/23/2023     Priority: Medium    Mixed simple and mucopurulent chronic bronchitis (H) 10/23/2023     Priority: Medium    S/P complete hysterectomy 10/23/2023     Priority: Medium    Chronic pain disorder 09/12/2023     Priority: Medium    Type 2 diabetes mellitus with diabetic polyneuropathy, without long-term current use of insulin (H) 04/19/2023     Priority: Medium    Insomnia 08/11/2022     Priority: Medium     Current Medications: Lunesta - causes a foul taste in her mouth. Not sleeping well still.   Previous medications: Valium, Trazodone, Restoril, Gabapentin, Lyrica       Generalized anxiety disorder 08/10/2021     Priority: Medium    Spondylosis without myelopathy or radiculopathy, cervical region 08/10/2021     Priority: Medium    Hyperlipidemia 09/27/2018     Priority: Medium     Tobacco use disorder (30 pack year history plus) 07/18/2018     Priority: Medium    RLS (restless legs syndrome) 03/08/2018     Priority: Medium    Hypothyroidism (acquired) 06/06/2017     Priority: Medium    Depressive disorder 06/21/2002     Priority: Medium        Past Medical History:    Past Medical History:   Diagnosis Date    Abnormal echocardiogram 01/31/2024    Arthritis     Atelectasis 04/09/2025    Decreased left ventricular function 02/27/2024    Depressive disorder     Gastroesophageal reflux disease with esophagitis     H. pylori infection 2018    Hashimoto's disease 10/26/2017    Hypothyroidism     Insomnia     Mild intermittent asthma without complication 12/17/2015    Pneumonia due to infectious organism, unspecified laterality, unspecified part of lung 04/09/2025    Type 2 diabetes mellitus (H)     Umbilical hernia without obstruction and without gangrene 07/22/2022    Urticaria 03/22/2023       Past Surgical History:    Past Surgical History:   Procedure Laterality Date    ABDOMEN SURGERY      CARPAL TUNNEL RELEASE RT/LT Bilateral     [facility] arthroplasty Left     COLONOSCOPY N/A 10/28/2024    rpt 3 year; Procedure: COLONOSCOPY WITH POLYPECTOMY;  Surgeon: Enrique Brambila MD;  Location: [facility] OR    COLONOSCOPY - HIM SCAN N/A [facility]04/2018    [facility] 3 hyperplastic polyps. repeat 10 yr    HYSTERECTOMY, PAP NO LONGER INDICATED N/A 10/10/2018    Cervix removed per [facility]'s Hysterectomy Path Report.    HYSTERECTOMY, TOTAL, LAPAROSCOPIC, WITH SALPINGO-OOPHORECTOMY, CYSTOSCOPY Bilateral 10/10/2018    [facility].    lumbar 5 - sacral 1 anterior lumbar interbody fusion with plate   04/07/2025    [facility]- surgeon- Mandeep Artis DO       Family History:    Family History   Problem Relation Age of Onset    Osteoporosis Mother     Bipolar Disorder Mother        Social History:  Marital Status:   [2]  Social History     Tobacco Use    Smoking status: Some Days     Current  "packs/day: 0.25     Average packs/day: 1 pack/day for 39.5 years (39.2 ttl pk-yrs)     Types: Cigarettes     Start date: 1/1/1986     Last attempt to quit: 3/2/2025     Passive exposure: Current    Smokeless tobacco: Never   Vaping Use    Vaping status: Former   Substance Use Topics    Alcohol use: Not Currently    Drug use: Yes     Types: Marijuana     Comment: \"rarely\"        Medications:    ketorolac (TORADOL) 10 MG tablet  ACCU-CHEK GUIDE test strip  albuterol (PROAIR HFA/PROVENTIL HFA/VENTOLIN HFA) 108 (90 Base) MCG/ACT inhaler  blood glucose monitoring (SOFTCLIX) lancets  Blood Glucose Monitoring Suppl (ACCU-CHEK GUIDE) w/Device KIT  clobetasol propionate (TEMOVATE) 0.05 % external cream  Continuous Blood Gluc  (DEXCOM G6 ) MIKE  Continuous Blood Gluc Sensor (DEXCOM G6 SENSOR) MISC  Continuous Glucose Transmitter (DEXCOM G6 TRANSMITTER) MISC  cyanocobalamin (VITAMIN B-12) 500 MCG tablet  emollient (VANICREAM) external cream  estradiol (ESTRACE) 0.1 MG/GM vaginal cream  fluticasone-salmeterol (WIXELA INHUB) 250-50 MCG/ACT inhaler  Glucose 4-6 GM-MG CHEW  guaiFENesin (MUCINEX) 600 MG 12 hr tablet  hydrOXYzine nia (VISTARIL) 25 MG capsule  ipratropium - albuterol 0.5 mg/2.5 mg/3 mL (DUONEB) 0.5-2.5 (3) MG/3ML neb solution  ketoconazole (NIZORAL) 2 % external cream  levothyroxine (SYNTHROID/LEVOTHROID) 100 MCG tablet  lidocaine (LIDODERM) 5 % patch  methocarbamol (ROBAXIN) 750 MG tablet  naproxen sodium (ANAPROX) 220 MG tablet  nystatin (MYCOSTATIN) 591709 UNIT/GM external powder  omeprazole (PRILOSEC) 20 MG DR capsule  OZEMPIC, 0.25 OR 0.5 MG/DOSE, 2 MG/3ML pen  pramipexole (MIRAPEX) 0.75 MG tablet  pregabalin (LYRICA) 25 MG capsule  rosuvastatin (CRESTOR) 40 MG tablet  senna-docusate (SENOKOT-S/PERICOLACE) 8.6-50 MG tablet  SYNTHROID 100 MCG tablet  tiotropium (SPIRIVA RESPIMAT) 2.5 MCG/ACT inhaler  traZODone (DESYREL) 150 MG tablet  triamcinolone (KENALOG) 0.025 % external " "ointment  triamcinolone (KENALOG) 0.5 % external ointment          Review of Systems   Constitutional:  Negative for chills, diaphoresis and fever.   HENT:  Negative for voice change.    Eyes:  Negative for visual disturbance.   Respiratory:  Negative for cough, chest tightness, shortness of breath and wheezing.    Cardiovascular:  Positive for chest pain. Negative for palpitations and leg swelling.   Gastrointestinal:  Positive for nausea. Negative for abdominal distention, abdominal pain, anal bleeding, blood in stool and vomiting.   Genitourinary:  Negative for decreased urine volume, dysuria, flank pain and hematuria.   Musculoskeletal:  Negative for arthralgias, back pain, gait problem, myalgias, neck pain and neck stiffness.   Skin:  Negative for color change, pallor, rash and wound.   Neurological:  Positive for dizziness. Negative for syncope, light-headedness, numbness and headaches.   Psychiatric/Behavioral:  Negative for confusion and suicidal ideas.        Physical Exam   BP: (!) 143/92  Pulse: 112  Temp: 99  F (37.2  C)  Resp: 26  Height: 157.5 cm (5' 2\")  Weight: 77.9 kg (171 lb 11.8 oz)  SpO2: 94 %      Physical Exam  Vitals and nursing note reviewed.   Constitutional:       Appearance: She is well-developed.   HENT:      Head: Normocephalic and atraumatic.      Mouth/Throat:      Pharynx: No oropharyngeal exudate.   Eyes:      Conjunctiva/sclera: Conjunctivae normal.      Pupils: Pupils are equal, round, and reactive to light.   Neck:      Thyroid: No thyromegaly.      Vascular: No JVD.      Trachea: No tracheal deviation.   Cardiovascular:      Rate and Rhythm: Normal rate and regular rhythm.      Heart sounds: Normal heart sounds. No murmur heard.     No friction rub. No gallop.   Pulmonary:      Effort: Pulmonary effort is normal. No respiratory distress.      Breath sounds: Normal breath sounds. No stridor. No wheezing or rales.   Chest:      Chest wall: No tenderness.   Abdominal:      General: " Bowel sounds are normal. There is no distension.      Palpations: Abdomen is soft. There is no mass.      Tenderness: There is no abdominal tenderness. There is no guarding or rebound.   Musculoskeletal:         General: No tenderness. Normal range of motion.      Cervical back: Normal range of motion and neck supple.   Lymphadenopathy:      Cervical: No cervical adenopathy.   Skin:     General: Skin is warm and dry.      Coloration: Skin is not pale.      Findings: No erythema or rash.   Neurological:      Mental Status: She is alert and oriented to person, place, and time.   Psychiatric:         Behavior: Behavior normal.         ED Course     ED Course as of 06/24/25 0337   Wed Jun 11, 2025   0628 Sign out received pending repeat troponin. No concerns on Cta (her pain is not where the fluid collection is located).   0703 Repeat troponin returned reassuringly stable.   0704 Findings were discussed with the patient including non-emergent imaging/lab results (including fluid collection which she states her surgeon already knows about). Additional verbal instructions were discussed with the patient as well. Instructed to follow up with a primary care provider within 3-4 days. Also discussed specific warning signs and instructed to return to the ED if there are any concerns. Patient voiced understanding of instructions, questions were answered and the patient was discharged home in stable condition.     Procedures                No results found for this or any previous visit (from the past 24 hours).    Medications   alum & mag hydroxide-simethicone (MAALOX) suspension 30 mL (30 mLs Oral $Given 6/11/25 0918)   famotidine (PEPCID) injection 20 mg (20 mg Intravenous $Given 6/11/25 9756)   ondansetron (ZOFRAN) injection 4 mg (4 mg Intravenous $Given 6/11/25 9020)   fentaNYL (PF) (SUBLIMAZE) injection 50 mcg (50 mcg Intravenous $Given 6/11/25 1443)   iopamidol (ISOVUE-370) solution 76 mL (76 mLs Intravenous $Given  6/11/25 8546)   sodium chloride (PF) 0.9% PF flush 100 mL (100 mLs Intravenous $Given 6/11/25 0546)       Assessments & Plan (with Medical Decision Making)   Severe sudden substernal chest pain with radiation to back    EKG NSR  Lab reviewed    Did not respond to anti acid treatment  CTA chest abdomen did not show any aortic emergency  Waiting for 2nd trop and reeval, s/o to dr perez at the change of shift  I have reviewed the nursing notes.    I have reviewed the findings, diagnosis, plan and need for follow up with the patient.          Discharge Medication List as of 6/11/2025  7:08 AM        START taking these medications    Details   ketorolac (TORADOL) 10 MG tablet Take 1 tablet (10 mg) by mouth every 6 hours as needed for moderate pain., Disp-10 tablet, R-0, E-Prescribe             Final diagnoses:   Chest pain, unspecified type       6/11/2025   [facility]       Robe Valdivia MD  06/24/25 [id number]

## 2025-06-30 NOTE — PROGRESS NOTES
S: First visit for Jami states that she has been getting lesions all over her skin that are red that last several weeks perhaps months and that somewhat itch though they but really do not bother her that much.  The visibility is the main issue have occurred on her chest arms abdomen mainly.    O: Noticed on her skin or reddish round oval lesions that do not have any suggestion of significant scaling fissuring thickening.  Ear canals are red-elo in color.  A few may be have left residual pigment.    A: These lesion to me suggest fixed drug reaction.  She has an extensive allergy history with intolerance of many foods as well as medications.    P: Since she has many other issues at this time I did not think a biopsy would be appropriate or perhaps even diagnostic.  I suggest that we take a wait-and-see attitude and if she gets some fresh lesions I would like to see her as soon as possible to perform a biopsy.  I had not prescribed any topicals since these are more for the most part asymptomatic.    Return to see me setting up a return visit by call or by calling my nurse Brandie Davis.    Total time spent preparing to see the patient, review of the chart and reviewing recent visits of the patient to other practitioners, obtaining pertinent history, examining the patient, discussing the diagnosis, reviewing treatment options, education, and documenting the above in Epic/EMR was 20 minutes. All time involved was spent on the day of the service for the patient.        .sh

## 2025-07-10 ENCOUNTER — MEDICAL CORRESPONDENCE (OUTPATIENT)
Dept: MRI IMAGING | Facility: HOSPITAL | Age: 55
End: 2025-07-10

## 2025-07-20 DIAGNOSIS — G62.9 NEUROPATHY: ICD-10-CM

## 2025-07-20 DIAGNOSIS — E11.42 TYPE 2 DIABETES MELLITUS WITH DIABETIC POLYNEUROPATHY, WITHOUT LONG-TERM CURRENT USE OF INSULIN (H): ICD-10-CM

## 2025-07-20 DIAGNOSIS — G25.81 RLS (RESTLESS LEGS SYNDROME): Chronic | ICD-10-CM

## 2025-07-21 RX ORDER — GABAPENTIN 300 MG/1
300 CAPSULE ORAL AT BEDTIME
Qty: 30 CAPSULE | Refills: 0 | OUTPATIENT
Start: 2025-07-21

## 2025-07-21 RX ORDER — SEMAGLUTIDE 0.68 MG/ML
INJECTION, SOLUTION SUBCUTANEOUS
Qty: 3 ML | Refills: 0 | Status: SHIPPED | OUTPATIENT
Start: 2025-07-21

## 2025-07-25 DIAGNOSIS — F32.A DEPRESSIVE DISORDER: Chronic | ICD-10-CM

## 2025-07-25 NOTE — TELEPHONE ENCOUNTER
TRAZODONE 150 MG TABLET       Last Written Prescription Date:  02/03/2025  Last Fill Quantity: 90,   # refills: 1  Last Office Visit: 06/02/2025  Future Office visit:    Next 5 appointments (look out 90 days)      Aug 14, 2025 1:00 PM  (Arrive by 12:45 PM)  Provider Visit with Hannah Eaton MD  [facility] [address] [facility] [address]  [phone]             Routing refill request to provider for review/approval because:    Serotonin Modulators [id number]/2025 03:13 PM   Protocol Details PHQ-9 score less than 5 in past 6 months.    Medication is active on med list and the sig matches. RN to manually verify dose and sig if red X/fail.        Kimberly Boecker, RN

## 2025-07-27 RX ORDER — TRAZODONE HYDROCHLORIDE 150 MG/1
TABLET ORAL
Qty: 90 TABLET | Refills: 0 | Status: SHIPPED | OUTPATIENT
Start: 2025-07-27

## 2025-07-28 ENCOUNTER — HOSPITAL ENCOUNTER (OUTPATIENT)
Dept: MRI IMAGING | Facility: HOSPITAL | Age: 55
Discharge: HOME | End: 2025-07-28
Attending: STUDENT IN AN ORGANIZED HEALTH CARE EDUCATION/TRAINING PROGRAM | Admitting: RADIOLOGY
Payer: MEDICARE

## 2025-07-28 DIAGNOSIS — M54.2 CERVICAL SPINE PAIN: ICD-10-CM

## 2025-07-28 PROCEDURE — 72141 MRI NECK SPINE W/O DYE: CPT | Mod: 26 | Performed by: RADIOLOGY

## 2025-07-28 PROCEDURE — 72141 MRI NECK SPINE W/O DYE: CPT

## 2025-07-31 ENCOUNTER — TRANSFERRED RECORDS (OUTPATIENT)
Dept: HEALTH INFORMATION MANAGEMENT | Facility: CLINIC | Age: 55
End: 2025-07-31

## 2025-08-16 DIAGNOSIS — E11.42 TYPE 2 DIABETES MELLITUS WITH DIABETIC POLYNEUROPATHY, WITHOUT LONG-TERM CURRENT USE OF INSULIN (H): ICD-10-CM

## 2025-08-18 RX ORDER — SEMAGLUTIDE 0.68 MG/ML
INJECTION, SOLUTION SUBCUTANEOUS
Qty: 3 ML | Refills: 1 | Status: SHIPPED | OUTPATIENT
Start: 2025-08-18

## 2025-08-21 ENCOUNTER — LAB (OUTPATIENT)
Dept: LAB | Facility: OTHER | Age: 55
End: 2025-08-21
Attending: STUDENT IN AN ORGANIZED HEALTH CARE EDUCATION/TRAINING PROGRAM
Payer: MEDICARE

## 2025-08-21 ENCOUNTER — OFFICE VISIT (OUTPATIENT)
Dept: FAMILY MEDICINE | Facility: OTHER | Age: 55
End: 2025-08-21
Attending: STUDENT IN AN ORGANIZED HEALTH CARE EDUCATION/TRAINING PROGRAM
Payer: MEDICARE

## 2025-08-21 ENCOUNTER — TELEPHONE (OUTPATIENT)
Dept: FAMILY MEDICINE | Facility: OTHER | Age: 55
End: 2025-08-21

## 2025-08-21 VITALS
BODY MASS INDEX: 29.5 KG/M2 | HEART RATE: 106 BPM | HEIGHT: 62 IN | WEIGHT: 160.3 LBS | OXYGEN SATURATION: 98 % | DIASTOLIC BLOOD PRESSURE: 76 MMHG | SYSTOLIC BLOOD PRESSURE: 126 MMHG | TEMPERATURE: 98.3 F

## 2025-08-21 DIAGNOSIS — M81.0 AGE-RELATED OSTEOPOROSIS WITHOUT CURRENT PATHOLOGICAL FRACTURE: Chronic | ICD-10-CM

## 2025-08-21 DIAGNOSIS — L24.89 IRRITANT CONTACT DERMATITIS DUE TO OTHER AGENTS: ICD-10-CM

## 2025-08-21 DIAGNOSIS — E78.2 MIXED HYPERLIPIDEMIA: Chronic | ICD-10-CM

## 2025-08-21 DIAGNOSIS — J41.8 MIXED SIMPLE AND MUCOPURULENT CHRONIC BRONCHITIS (H): Chronic | ICD-10-CM

## 2025-08-21 DIAGNOSIS — E11.59 HYPERTENSION ASSOCIATED WITH TYPE 2 DIABETES MELLITUS (H): Chronic | ICD-10-CM

## 2025-08-21 DIAGNOSIS — G25.81 RLS (RESTLESS LEGS SYNDROME): Chronic | ICD-10-CM

## 2025-08-21 DIAGNOSIS — M47.12 CERVICAL SPONDYLOSIS WITH MYELOPATHY: ICD-10-CM

## 2025-08-21 DIAGNOSIS — E03.9 HYPOTHYROIDISM (ACQUIRED): Chronic | ICD-10-CM

## 2025-08-21 DIAGNOSIS — F17.200 TOBACCO USE DISORDER: Chronic | ICD-10-CM

## 2025-08-21 DIAGNOSIS — F41.1 GENERALIZED ANXIETY DISORDER: Chronic | ICD-10-CM

## 2025-08-21 DIAGNOSIS — F32.A DEPRESSIVE DISORDER: Chronic | ICD-10-CM

## 2025-08-21 DIAGNOSIS — R94.31 PROLONGED Q-T INTERVAL ON ECG: ICD-10-CM

## 2025-08-21 DIAGNOSIS — I51.7 LVH (LEFT VENTRICULAR HYPERTROPHY): Chronic | ICD-10-CM

## 2025-08-21 DIAGNOSIS — I15.2 HYPERTENSION ASSOCIATED WITH TYPE 2 DIABETES MELLITUS (H): Chronic | ICD-10-CM

## 2025-08-21 DIAGNOSIS — Z01.818 PREOP GENERAL PHYSICAL EXAM: Primary | ICD-10-CM

## 2025-08-21 DIAGNOSIS — Z01.818 PREOP GENERAL PHYSICAL EXAM: ICD-10-CM

## 2025-08-21 DIAGNOSIS — L20.82 FLEXURAL ECZEMA: ICD-10-CM

## 2025-08-21 DIAGNOSIS — E11.42 TYPE 2 DIABETES MELLITUS WITH DIABETIC POLYNEUROPATHY, WITHOUT LONG-TERM CURRENT USE OF INSULIN (H): Chronic | ICD-10-CM

## 2025-08-21 DIAGNOSIS — I51.89 GRADE I DIASTOLIC DYSFUNCTION: Chronic | ICD-10-CM

## 2025-08-21 LAB
ANION GAP SERPL CALCULATED.3IONS-SCNC: 12 MMOL/L (ref 7–15)
ATRIAL RATE - MUSE: 89 BPM
BASOPHILS # BLD AUTO: 0.04 10E3/UL (ref 0–0.2)
BASOPHILS NFR BLD AUTO: 0.4 %
BUN SERPL-MCNC: 6.3 MG/DL (ref 6–20)
CALCIUM SERPL-MCNC: 9.5 MG/DL (ref 8.8–10.4)
CHLORIDE SERPL-SCNC: 105 MMOL/L (ref 98–107)
CHOLEST SERPL-MCNC: 267 MG/DL
CREAT SERPL-MCNC: 0.76 MG/DL (ref 0.51–0.95)
DIASTOLIC BLOOD PRESSURE - MUSE: NORMAL MMHG
EGFRCR SERPLBLD CKD-EPI 2021: >90 ML/MIN/1.73M2
EOSINOPHIL # BLD AUTO: 0.18 10E3/UL (ref 0–0.7)
EOSINOPHIL NFR BLD AUTO: 1.7 %
ERYTHROCYTE [DISTWIDTH] IN BLOOD BY AUTOMATED COUNT: 13.6 % (ref 10–15)
FASTING STATUS PATIENT QL REPORTED: NO
FASTING STATUS PATIENT QL REPORTED: NO
GLUCOSE SERPL-MCNC: 150 MG/DL (ref 70–99)
HCO3 SERPL-SCNC: 23 MMOL/L (ref 22–29)
HCT VFR BLD AUTO: 49 % (ref 35–47)
HDLC SERPL-MCNC: 41 MG/DL
HGB BLD-MCNC: 15.9 G/DL (ref 11.7–15.7)
IMM GRANULOCYTES # BLD: 0.03 10E3/UL
IMM GRANULOCYTES NFR BLD: 0.3 %
INTERPRETATION ECG - MUSE: NORMAL
LDLC SERPL CALC-MCNC: 179 MG/DL
LYMPHOCYTES # BLD AUTO: 2.92 10E3/UL (ref 0.8–5.3)
LYMPHOCYTES NFR BLD AUTO: 27.9 %
MCH RBC QN AUTO: 30.5 PG (ref 26.5–33)
MCHC RBC AUTO-ENTMCNC: 32.4 G/DL (ref 31.5–36.5)
MCV RBC AUTO: 93.9 FL (ref 78–100)
MONOCYTES # BLD AUTO: 0.53 10E3/UL (ref 0–1.3)
MONOCYTES NFR BLD AUTO: 5.1 %
NEUTROPHILS # BLD AUTO: 6.78 10E3/UL (ref 1.6–8.3)
NEUTROPHILS NFR BLD AUTO: 64.6 %
NONHDLC SERPL-MCNC: 226 MG/DL
NRBC # BLD AUTO: <0.03 10E3/UL
NRBC BLD AUTO-RTO: 0 /100
P AXIS - MUSE: 56 DEGREES
PLATELET # BLD AUTO: 231 10E3/UL (ref 150–450)
POTASSIUM SERPL-SCNC: 4 MMOL/L (ref 3.4–5.3)
PR INTERVAL - MUSE: 160 MS
QRS DURATION - MUSE: 82 MS
QT - MUSE: 364 MS
QTC - MUSE: 442 MS
R AXIS - MUSE: -16 DEGREES
RBC # BLD AUTO: 5.22 10E6/UL (ref 3.8–5.2)
SODIUM SERPL-SCNC: 140 MMOL/L (ref 135–145)
SYSTOLIC BLOOD PRESSURE - MUSE: NORMAL MMHG
T AXIS - MUSE: 76 DEGREES
TRIGL SERPL-MCNC: 237 MG/DL
TSH SERPL DL<=0.005 MIU/L-ACNC: 0.7 UIU/ML (ref 0.3–4.2)
VENTRICULAR RATE- MUSE: 89 BPM
WBC # BLD AUTO: 10.48 10E3/UL (ref 4–11)

## 2025-08-21 PROCEDURE — 85004 AUTOMATED DIFF WBC COUNT: CPT | Mod: ZL

## 2025-08-21 PROCEDURE — 82465 ASSAY BLD/SERUM CHOLESTEROL: CPT | Mod: ZL

## 2025-08-21 PROCEDURE — 36415 COLL VENOUS BLD VENIPUNCTURE: CPT | Mod: ZL

## 2025-08-21 PROCEDURE — G0463 HOSPITAL OUTPT CLINIC VISIT: HCPCS

## 2025-08-21 PROCEDURE — 80048 BASIC METABOLIC PNL TOTAL CA: CPT | Mod: ZL

## 2025-08-21 PROCEDURE — 84443 ASSAY THYROID STIM HORMONE: CPT | Mod: ZL

## 2025-08-21 PROCEDURE — 3050F LDL-C >= 130 MG/DL: CPT

## 2025-08-21 RX ORDER — TRIAMCINOLONE ACETONIDE 5 MG/G
OINTMENT TOPICAL
Qty: 60 G | Refills: 2 | Status: SHIPPED | OUTPATIENT
Start: 2025-08-21

## 2025-08-21 RX ORDER — METHOCARBAMOL 750 MG/1
750 TABLET, FILM COATED ORAL 4 TIMES DAILY PRN
Status: CANCELLED | OUTPATIENT
Start: 2025-08-21

## 2025-08-21 RX ORDER — HYDROXYZINE HYDROCHLORIDE 25 MG/1
25 TABLET, FILM COATED ORAL EVERY 8 HOURS PRN
COMMUNITY
Start: 2025-07-23 | End: 2025-08-21

## 2025-08-21 RX ORDER — ROSUVASTATIN CALCIUM 40 MG/1
40 TABLET, COATED ORAL AT BEDTIME
Qty: 90 TABLET | Refills: 3 | Status: SHIPPED | OUTPATIENT
Start: 2025-08-21

## 2025-08-21 ASSESSMENT — ENCOUNTER SYMPTOMS
VOMITING: 0
MYALGIAS: 0
DIZZINESS: 0
ABDOMINAL PAIN: 0
SINUS PRESSURE: 0
CHILLS: 0
DIARRHEA: 0
WHEEZING: 0
PALPITATIONS: 0
CONSTIPATION: 0
FATIGUE: 0
SHORTNESS OF BREATH: 0
COUGH: 0
SORE THROAT: 0
FEVER: 0
DYSURIA: 0
WOUND: 0

## 2025-08-21 ASSESSMENT — PAIN SCALES - GENERAL: PAINLEVEL_OUTOF10: NO PAIN (0)

## 2025-08-26 LAB
ATRIAL RATE - MUSE: 89 BPM
DIASTOLIC BLOOD PRESSURE - MUSE: NORMAL MMHG
INTERPRETATION ECG - MUSE: NORMAL
P AXIS - MUSE: 56 DEGREES
PR INTERVAL - MUSE: 160 MS
QRS DURATION - MUSE: 82 MS
QT - MUSE: 364 MS
QTC - MUSE: 442 MS
R AXIS - MUSE: -16 DEGREES
SYSTOLIC BLOOD PRESSURE - MUSE: NORMAL MMHG
T AXIS - MUSE: 76 DEGREES
VENTRICULAR RATE- MUSE: 89 BPM

## (undated) DEVICE — CONNECTOR ERBEFLO 2 PORT 20325-215

## (undated) DEVICE — SUCTION MANIFOLD NEPTUNE 2 SYS 1 PORT 702-025-000

## (undated) DEVICE — TUBING SUCTION 20FT N620A

## (undated) DEVICE — SOL WATER IRRIG 1000ML BOTTLE 2F7114

## (undated) DEVICE — FORCEPS BIOPSY RADIAL JAW 4 LARGE W/NEEDLE 240CM M00513332